# Patient Record
Sex: FEMALE | Race: WHITE | NOT HISPANIC OR LATINO | Employment: OTHER | URBAN - METROPOLITAN AREA
[De-identification: names, ages, dates, MRNs, and addresses within clinical notes are randomized per-mention and may not be internally consistent; named-entity substitution may affect disease eponyms.]

---

## 2017-05-25 ENCOUNTER — HOSPITAL ENCOUNTER (EMERGENCY)
Facility: CLINIC | Age: 42
Discharge: HOME | End: 2017-05-25
Attending: FAMILY MEDICINE | Admitting: FAMILY MEDICINE

## 2017-05-25 ENCOUNTER — APPOINTMENT (OUTPATIENT)
Dept: CT IMAGING | Facility: CLINIC | Age: 42
End: 2017-05-25
Attending: FAMILY MEDICINE

## 2017-05-25 VITALS
HEART RATE: 86 BPM | RESPIRATION RATE: 20 BRPM | DIASTOLIC BLOOD PRESSURE: 73 MMHG | OXYGEN SATURATION: 96 % | TEMPERATURE: 98.1 F | SYSTOLIC BLOOD PRESSURE: 105 MMHG

## 2017-05-25 DIAGNOSIS — R10.9 LEFT FLANK PAIN: ICD-10-CM

## 2017-05-25 DIAGNOSIS — R30.0 DYSURIA: ICD-10-CM

## 2017-05-25 LAB
ALBUMIN SERPL-MCNC: 3.6 G/DL (ref 3.4–5)
ALBUMIN UR-MCNC: 10 MG/DL
ALP SERPL-CCNC: 73 U/L (ref 40–150)
ALT SERPL W P-5'-P-CCNC: 17 U/L (ref 0–50)
ANION GAP SERPL CALCULATED.3IONS-SCNC: 7 MMOL/L (ref 3–14)
APPEARANCE UR: CLEAR
AST SERPL W P-5'-P-CCNC: 15 U/L (ref 0–45)
BASOPHILS # BLD AUTO: 0 10E9/L (ref 0–0.2)
BASOPHILS NFR BLD AUTO: 0.3 %
BILIRUB SERPL-MCNC: 0.8 MG/DL (ref 0.2–1.3)
BILIRUB UR QL STRIP: NEGATIVE
BUN SERPL-MCNC: 6 MG/DL (ref 7–30)
CALCIUM SERPL-MCNC: 8.5 MG/DL (ref 8.5–10.1)
CHLORIDE SERPL-SCNC: 109 MMOL/L (ref 94–109)
CO2 SERPL-SCNC: 24 MMOL/L (ref 20–32)
COLOR UR AUTO: YELLOW
CREAT SERPL-MCNC: 0.58 MG/DL (ref 0.52–1.04)
DIFFERENTIAL METHOD BLD: ABNORMAL
EOSINOPHIL # BLD AUTO: 0.1 10E9/L (ref 0–0.7)
EOSINOPHIL NFR BLD AUTO: 0.9 %
ERYTHROCYTE [DISTWIDTH] IN BLOOD BY AUTOMATED COUNT: 13.7 % (ref 10–15)
GFR SERPL CREATININE-BSD FRML MDRD: ABNORMAL ML/MIN/1.7M2
GLUCOSE SERPL-MCNC: 98 MG/DL (ref 70–99)
GLUCOSE UR STRIP-MCNC: NEGATIVE MG/DL
HCT VFR BLD AUTO: 38.2 % (ref 35–47)
HGB BLD-MCNC: 12.9 G/DL (ref 11.7–15.7)
HGB UR QL STRIP: NEGATIVE
IMM GRANULOCYTES # BLD: 0 10E9/L (ref 0–0.4)
IMM GRANULOCYTES NFR BLD: 0.2 %
KETONES UR STRIP-MCNC: NEGATIVE MG/DL
LEUKOCYTE ESTERASE UR QL STRIP: ABNORMAL
LIPASE SERPL-CCNC: 62 U/L (ref 73–393)
LYMPHOCYTES # BLD AUTO: 3 10E9/L (ref 0.8–5.3)
LYMPHOCYTES NFR BLD AUTO: 26.4 %
MCH RBC QN AUTO: 31.1 PG (ref 26.5–33)
MCHC RBC AUTO-ENTMCNC: 33.8 G/DL (ref 31.5–36.5)
MCV RBC AUTO: 92 FL (ref 78–100)
MONOCYTES # BLD AUTO: 1.1 10E9/L (ref 0–1.3)
MONOCYTES NFR BLD AUTO: 9.3 %
MUCOUS THREADS #/AREA URNS LPF: PRESENT /LPF
NEUTROPHILS # BLD AUTO: 7.2 10E9/L (ref 1.6–8.3)
NEUTROPHILS NFR BLD AUTO: 62.9 %
NITRATE UR QL: NEGATIVE
NRBC # BLD AUTO: 0 10*3/UL
NRBC BLD AUTO-RTO: 0 /100
PH UR STRIP: 6 PH (ref 5–7)
PLATELET # BLD AUTO: 217 10E9/L (ref 150–450)
POTASSIUM SERPL-SCNC: 3.4 MMOL/L (ref 3.4–5.3)
PROT SERPL-MCNC: 6.9 G/DL (ref 6.8–8.8)
RBC # BLD AUTO: 4.15 10E12/L (ref 3.8–5.2)
RBC #/AREA URNS AUTO: 2 /HPF (ref 0–2)
SODIUM SERPL-SCNC: 140 MMOL/L (ref 133–144)
SP GR UR STRIP: 1.02 (ref 1–1.03)
SQUAMOUS #/AREA URNS AUTO: 2 /HPF (ref 0–1)
TRANS CELLS #/AREA URNS HPF: <1 /HPF (ref 0–1)
URN SPEC COLLECT METH UR: ABNORMAL
UROBILINOGEN UR STRIP-MCNC: NORMAL MG/DL (ref 0–2)
WBC # BLD AUTO: 11.4 10E9/L (ref 4–11)
WBC #/AREA URNS AUTO: 7 /HPF (ref 0–2)

## 2017-05-25 PROCEDURE — 81001 URINALYSIS AUTO W/SCOPE: CPT | Performed by: FAMILY MEDICINE

## 2017-05-25 PROCEDURE — 96361 HYDRATE IV INFUSION ADD-ON: CPT | Performed by: FAMILY MEDICINE

## 2017-05-25 PROCEDURE — 99285 EMERGENCY DEPT VISIT HI MDM: CPT | Mod: 25 | Performed by: FAMILY MEDICINE

## 2017-05-25 PROCEDURE — 96365 THER/PROPH/DIAG IV INF INIT: CPT | Performed by: FAMILY MEDICINE

## 2017-05-25 PROCEDURE — 74176 CT ABD & PELVIS W/O CONTRAST: CPT

## 2017-05-25 PROCEDURE — 80053 COMPREHEN METABOLIC PANEL: CPT | Performed by: FAMILY MEDICINE

## 2017-05-25 PROCEDURE — 96376 TX/PRO/DX INJ SAME DRUG ADON: CPT | Performed by: FAMILY MEDICINE

## 2017-05-25 PROCEDURE — 25000128 H RX IP 250 OP 636: Performed by: FAMILY MEDICINE

## 2017-05-25 PROCEDURE — 83690 ASSAY OF LIPASE: CPT | Performed by: FAMILY MEDICINE

## 2017-05-25 PROCEDURE — 87086 URINE CULTURE/COLONY COUNT: CPT | Performed by: FAMILY MEDICINE

## 2017-05-25 PROCEDURE — 99285 EMERGENCY DEPT VISIT HI MDM: CPT | Mod: Z6 | Performed by: FAMILY MEDICINE

## 2017-05-25 PROCEDURE — 85025 COMPLETE CBC W/AUTO DIFF WBC: CPT | Performed by: FAMILY MEDICINE

## 2017-05-25 PROCEDURE — 96375 TX/PRO/DX INJ NEW DRUG ADDON: CPT | Performed by: FAMILY MEDICINE

## 2017-05-25 RX ORDER — HYDROCODONE BITARTRATE AND ACETAMINOPHEN 5; 325 MG/1; MG/1
1-2 TABLET ORAL EVERY 4 HOURS PRN
Qty: 15 TABLET | Refills: 0 | Status: SHIPPED | OUTPATIENT
Start: 2017-05-25 | End: 2017-08-02

## 2017-05-25 RX ORDER — LIDOCAINE 40 MG/G
CREAM TOPICAL
Status: DISCONTINUED | OUTPATIENT
Start: 2017-05-25 | End: 2017-05-25 | Stop reason: HOSPADM

## 2017-05-25 RX ORDER — CIPROFLOXACIN 500 MG/1
500 TABLET, FILM COATED ORAL 2 TIMES DAILY
Qty: 14 TABLET | Refills: 0 | Status: SHIPPED | OUTPATIENT
Start: 2017-05-25 | End: 2017-06-01

## 2017-05-25 RX ORDER — SODIUM CHLORIDE 9 MG/ML
1000 INJECTION, SOLUTION INTRAVENOUS CONTINUOUS
Status: DISCONTINUED | OUTPATIENT
Start: 2017-05-25 | End: 2017-05-25 | Stop reason: HOSPADM

## 2017-05-25 RX ORDER — HYDROMORPHONE HYDROCHLORIDE 1 MG/ML
0.5 INJECTION, SOLUTION INTRAMUSCULAR; INTRAVENOUS; SUBCUTANEOUS
Status: DISCONTINUED | OUTPATIENT
Start: 2017-05-25 | End: 2017-05-25 | Stop reason: HOSPADM

## 2017-05-25 RX ORDER — CEFTRIAXONE 1 G/1
1 INJECTION, POWDER, FOR SOLUTION INTRAMUSCULAR; INTRAVENOUS ONCE
Status: COMPLETED | OUTPATIENT
Start: 2017-05-25 | End: 2017-05-25

## 2017-05-25 RX ADMIN — HYDROMORPHONE HYDROCHLORIDE 0.5 MG: 1 INJECTION, SOLUTION INTRAMUSCULAR; INTRAVENOUS; SUBCUTANEOUS at 15:40

## 2017-05-25 RX ADMIN — SODIUM CHLORIDE 1000 ML: 9 INJECTION, SOLUTION INTRAVENOUS at 14:00

## 2017-05-25 RX ADMIN — HYDROMORPHONE HYDROCHLORIDE 0.5 MG: 1 INJECTION, SOLUTION INTRAMUSCULAR; INTRAVENOUS; SUBCUTANEOUS at 14:00

## 2017-05-25 RX ADMIN — CEFTRIAXONE 1 G: 1 INJECTION, POWDER, FOR SOLUTION INTRAMUSCULAR; INTRAVENOUS at 15:41

## 2017-05-25 RX ADMIN — SODIUM CHLORIDE 1000 ML: 9 INJECTION, SOLUTION INTRAVENOUS at 15:38

## 2017-05-25 ASSESSMENT — ENCOUNTER SYMPTOMS
TROUBLE SWALLOWING: 0
BRUISES/BLEEDS EASILY: 0
NECK STIFFNESS: 0
DECREASED CONCENTRATION: 1
SHORTNESS OF BREATH: 0
EYE REDNESS: 0
DIARRHEA: 0
CHILLS: 0
DIFFICULTY URINATING: 0
WEAKNESS: 0
FLANK PAIN: 1
HEADACHES: 0
COLOR CHANGE: 0
ABDOMINAL PAIN: 1
NUMBNESS: 0
BACK PAIN: 0
FEVER: 0
CONFUSION: 0
ACTIVITY CHANGE: 0
NAUSEA: 1
DYSURIA: 1
ARTHRALGIAS: 0
DYSPHORIC MOOD: 1
RECTAL PAIN: 0
FREQUENCY: 1
FATIGUE: 0
HEMATURIA: 0
CONSTIPATION: 0
APPETITE CHANGE: 1
VOMITING: 1

## 2017-05-25 NOTE — ED AVS SNAPSHOT
[facility], Emergency Department    [address] [address]    Phone:  [phone]                                       Meli Rodriguez   MRN: [id number]    Department:  [facility], Emergency Department   Date of Visit:  5/25/2017           Patient Information     Date Of Birth          1975        Your diagnoses for this visit were:     Left flank pain     Dysuria        You were seen by Riky Carrera MD.      Follow-up Information     Follow up with No Ref-Primary, Physician.        Discharge Instructions       Home.  Take the cipro for infection.  Take the vicodin for pain.  Make sure to follow up with MD for a recheck.  Return if fever or other concerns.        24 Hour Appointment Hotline       To make an appointment at any [facility] clinic, call [phone] ([phone]). If you don't have a family doctor or clinic, we will help you find one. [facility] clinics are conveniently located to serve the needs of you and your family.             Review of your medicines      START taking        Dose / Directions Last dose taken    ciprofloxacin 500 MG tablet   Commonly known as:  CIPRO   Dose:  500 mg   Quantity:  14 tablet        Take 1 tablet (500 mg) by mouth 2 times daily for 7 days   Refills:  0        HYDROcodone-acetaminophen 5-325 MG per tablet   Commonly known as:  NORCO   Dose:  1-2 tablet   Quantity:  15 tablet        Take 1-2 tablets by mouth every 4 hours as needed for moderate to severe pain   Refills:  0                Prescriptions were sent or printed at these locations (2 Prescriptions)                   Other Prescriptions                Printed at Department/Unit printer (2 of 2)         HYDROcodone-acetaminophen (NORCO) 5-325 MG per tablet               ciprofloxacin (CIPRO) 500 MG tablet                Procedures and tests performed during your visit     CBC with platelets differential    CT Abdomen Pelvis w/o Contrast    Comprehensive metabolic panel     "Lipase    Peripheral IV catheter    Pulse oximetry nursing    UA with Microscopic    Urine Culture Aerobic Bacterial      Orders Needing Specimen Collection     None      Pending Results     Date and Time Order Name Status Description    2017 1349 Urine Culture Aerobic Bacterial In process             Pending Culture Results     Date and Time Order Name Status Description    2017 1349 Urine Culture Aerobic Bacterial In process             Pending Results Instructions     If you had any lab results that were not finalized at the time of your Discharge, you can call the ED Lab Result RN at [phone]. You will be contacted by this team for any positive Lab results or changes in treatment. The nurses are available 7 days a week from 10A to 6:30P.  You can leave a message 24 hours per day and they will return your call.        Thank you for choosing [facility]       Thank you for choosing [facility] for your care. Our goal is always to provide you with excellent care. Hearing back from our patients is one way we can continue to improve our services. Please take a few minutes to complete the written survey that you may receive in the mail after you visit with us. Thank you!        [employer] Information     [employer] lets you send messages to your doctor, view your test results, renew your prescriptions, schedule appointments and more. To sign up, go to [email or web address] . Click on \"Log in\" on the left side of the screen, which will take you to the Welcome page. Then click on \"Sign up Now\" on the right side of the page.     You will be asked to enter the access code listed below, as well as some personal information. Please follow the directions to create your username and password.     Your access code is: [email or web address]  Expires: 2017  4:33 PM     Your access code will  in 90 days. If you need help or a new code, please call your [facility] clinic or [phone].        Care EveryWhere ID     This is " your Care EveryWhere ID. This could be used by other organizations to access your [facility] medical records  [id number]        After Visit Summary       This is your record. Keep this with you and show to your community pharmacist(s) and doctor(s) at your next visit.

## 2017-05-25 NOTE — ED NOTES
Bed: ED15  Expected date: 5/25/17  Expected time: 12:30 PM  Means of arrival: Ambulance  Comments:  Keaton Guajardo 41 year old Female, left sided abd pain/flank pain

## 2017-05-25 NOTE — ED PROVIDER NOTES
History     Chief Complaint   Patient presents with     Abdominal Pain     HPI  Meli Garcia is a 41 year old female with a history of IBS and chronic abdominal pain who was brought in by ambulance with complaints of abdominal pain. Patient reports developing left sided abdominal pain and left flank pain 2 days ago. She describes the pain as sharp and states it has been constant and progressively worse over the past few days. The pain is worse with any kind of movement. She also reports nausea and an episode of vomiting this morning. She does report a history of one small kidney stone noted to be in the kidney 2 years ago, but denies any other issues with kidney stones. Patient also reports a history of urinary retention. She reported a surgery when she was 9 years old for urethral stricture but denies any further procedures since. She states 3 days ago she had some urinary retention overnight and the next day developed urinary incontinence, she reports having about 10 episodes of incontinence per day since. She notes some frequency with decreased urine volumes. She otherwise denies fevers, chills, chest pain, shortness of breath or rash. She has been taking Tylenol and ibuprofen for the pain without relief. Patient received 4mg Zofran and 1mg Dilaudid en route with improvement in her pain.    I have reviewed the Medications, Allergies, Past Medical and Surgical History, and Social History in the [email or web address] system.    Past Medical History:   Diagnosis Date     ADD (attention deficit disorder with hyperactivity)      Allergic state      Anxiety      Depressive disorder      Drug-seeking behavior      Irritable bowel syndrome (IBS)      Migraine      PTSD (post-traumatic stress disorder)      Thyroid disease      Ulcer (H)      Urinary retention        Past Surgical History:   Procedure Laterality Date     APPENDECTOMY       CHOLECYSTECTOMY  2005     HYSTERECTOMY TOTAL ABDOMINAL, BILATERAL SALPINGO-OOPHORECTOMY,  COMBINED  2001    bleeding ovarian cysts     HYSTERECTOMY, PAP NO LONGER INDICATED       URETHRA SURGERY      temporary stent       Family History   Problem Relation Age of Onset     Unknown/Adopted Father        Social History   Substance Use Topics     Smoking status: Former Smoker     Packs/day: 0.50     Years: 20.00     Smokeless tobacco: Current User      Comment: e-cig     Alcohol use No     No current facility-administered medications for this encounter.      Current Outpatient Prescriptions   Medication     HYDROcodone-acetaminophen (NORCO) 5-325 MG per tablet     ciprofloxacin (CIPRO) 500 MG tablet        Allergies   Allergen Reactions     Demerol Itching     Droperidol Other (See Comments)     twitching       Nsaids Other (See Comments)     Gastric ulcer     Toradol Itching         Review of Systems   Constitutional: Positive for appetite change (some nausea noted currently now improved). Negative for activity change, chills, fatigue and fever.   HENT: Negative for congestion and trouble swallowing.    Eyes: Negative for redness and visual disturbance.   Respiratory: Negative for shortness of breath.    Cardiovascular: Negative for chest pain.   Gastrointestinal: Positive for abdominal pain (left sided), nausea and vomiting. Negative for constipation, diarrhea and rectal pain.   Genitourinary: Positive for dysuria, flank pain (left) and frequency. Negative for difficulty urinating, hematuria and urgency.   Musculoskeletal: Negative for arthralgias, back pain, gait problem and neck stiffness.   Skin: Negative for color change.   Allergic/Immunologic: Negative for immunocompromised state.   Neurological: Negative for weakness, numbness and headaches.   Hematological: Does not bruise/bleed easily.   Psychiatric/Behavioral: Positive for decreased concentration and dysphoric mood. Negative for confusion.   All other systems reviewed and are negative.      Physical Exam   BP: (!) 136/97  Heart Rate: 81  Temp:  98.1  F (36.7  C)  Resp: 20  SpO2: 98 %  Physical Exam   Constitutional: She is oriented to person, place, and time. She appears well-developed and well-nourished. She appears distressed.   Patient mild distress alert and oriented nausea is improved   HENT:   Head: Normocephalic and atraumatic.   Mouth/Throat: Oropharynx is clear and moist. No oropharyngeal exudate.   Eyes: Pupils are equal, round, and reactive to light. No scleral icterus.   Neck: Normal range of motion. Neck supple.   Cardiovascular: Regular rhythm, normal heart sounds and intact distal pulses.    Pulmonary/Chest: Breath sounds normal. No stridor. No respiratory distress.   Abdominal: Soft. Bowel sounds are normal. She exhibits no mass. There is tenderness. There is no rebound and no guarding.   Musculoskeletal: She exhibits tenderness. She exhibits no edema or deformity.   Left-sided CVA tenderness   Neurological: She is alert and oriented to person, place, and time. She has normal reflexes. No cranial nerve deficit. Coordination normal.   Skin: Skin is warm and dry. No rash noted. She is not diaphoretic. No erythema. No pallor.   Psychiatric:   Mildly anxious   Nursing note and vitals reviewed.      ED Course     ED Course     Procedures         In ER IV established.  Labs are drawn.  Patient received a liter normal saline IV bolus.  Further testing urinalysis 7 white cells to red cells.  White count is 11.4 hemoglobin is 12.9.  Creatinine is 0.58.  Lipase 62.    Patient states she is not pregnant and has had previous hysterectomy.  Therefore brings does not done.  CT scan abdomen and pelvis was done revealing no acute abnormality seen no kidney stones etc.    Reevaluated with patient.  Patient agrees plan to received a gram Rocephin IV to treat urinary symptoms as these seem to be more likely her symptoms.  Patient was sent out with Cipro along with Vicodin for pain and to follow-up with M.D. return if any concerns patient agrees with plan to  discharge      Critical Care time:  none               Labs Ordered and Resulted from Time of ED Arrival Up to the Time of Departure from the ED   CBC WITH PLATELETS DIFFERENTIAL - Abnormal; Notable for the following:        Result Value    WBC 11.4 (*)     All other components within normal limits   COMPREHENSIVE METABOLIC PANEL - Abnormal; Notable for the following:     Urea Nitrogen 6 (*)     All other components within normal limits   LIPASE - Abnormal; Notable for the following:     Lipase 62 (*)     All other components within normal limits   ROUTINE UA WITH MICROSCOPIC - Abnormal; Notable for the following:     Protein Albumin Urine 10 (*)     Leukocyte Esterase Urine Large (*)     WBC Urine 7 (*)     Squamous Epithelial /HPF Urine 2 (*)     Mucous Urine Present (*)     All other components within normal limits   PULSE OXIMETRY NURSING   PERIPHERAL IV CATHETER   URINE CULTURE AEROBIC BACTERIAL     Results for orders placed or performed during the hospital encounter of 05/25/17 (from the past 24 hour(s))   CBC with platelets differential   Result Value Ref Range    WBC 11.4 (H) 4.0 - 11.0 10e9/L    RBC Count 4.15 3.8 - 5.2 10e12/L    Hemoglobin 12.9 11.7 - 15.7 g/dL    Hematocrit 38.2 35.0 - 47.0 %    MCV 92 78 - 100 fl    MCH 31.1 26.5 - 33.0 pg    MCHC 33.8 31.5 - 36.5 g/dL    RDW 13.7 10.0 - 15.0 %    Platelet Count 217 150 - 450 10e9/L    Diff Method Automated Method     % Neutrophils 62.9 %    % Lymphocytes 26.4 %    % Monocytes 9.3 %    % Eosinophils 0.9 %    % Basophils 0.3 %    % Immature Granulocytes 0.2 %    Nucleated RBCs 0 0 /100    Absolute Neutrophil 7.2 1.6 - 8.3 10e9/L    Absolute Lymphocytes 3.0 0.8 - 5.3 10e9/L    Absolute Monocytes 1.1 0.0 - 1.3 10e9/L    Absolute Eosinophils 0.1 0.0 - 0.7 10e9/L    Absolute Basophils 0.0 0.0 - 0.2 10e9/L    Abs Immature Granulocytes 0.0 0 - 0.4 10e9/L    Absolute Nucleated RBC 0.0    Comprehensive metabolic panel   Result Value Ref Range    Sodium 140 133 -  144 mmol/L    Potassium 3.4 3.4 - 5.3 mmol/L    Chloride 109 94 - 109 mmol/L    Carbon Dioxide 24 20 - 32 mmol/L    Anion Gap 7 3 - 14 mmol/L    Glucose 98 70 - 99 mg/dL    Urea Nitrogen 6 (L) 7 - 30 mg/dL    Creatinine 0.58 0.52 - 1.04 mg/dL    GFR Estimate >90  Non  GFR Calc   >60 mL/min/1.7m2    GFR Estimate If Black >90   GFR Calc   >60 mL/min/1.7m2    Calcium 8.5 8.5 - 10.1 mg/dL    Bilirubin Total 0.8 0.2 - 1.3 mg/dL    Albumin 3.6 3.4 - 5.0 g/dL    Protein Total 6.9 6.8 - 8.8 g/dL    Alkaline Phosphatase 73 40 - 150 U/L    ALT 17 0 - 50 U/L    AST 15 0 - 45 U/L   Lipase   Result Value Ref Range    Lipase 62 (L) 73 - 393 U/L   UA with Microscopic   Result Value Ref Range    Color Urine Yellow     Appearance Urine Clear     Glucose Urine Negative NEG mg/dL    Bilirubin Urine Negative NEG    Ketones Urine Negative NEG mg/dL    Specific Gravity Urine 1.018 1.003 - 1.035    Blood Urine Negative NEG    pH Urine 6.0 5.0 - 7.0 pH    Protein Albumin Urine 10 (A) NEG mg/dL    Urobilinogen mg/dL Normal 0.0 - 2.0 mg/dL    Nitrite Urine Negative NEG    Leukocyte Esterase Urine Large (A) NEG    Source Catheterized Urine     WBC Urine 7 (H) 0 - 2 /HPF    RBC Urine 2 0 - 2 /HPF    Squamous Epithelial /HPF Urine 2 (H) 0 - 1 /HPF    Transitional Epi <1 0 - 1 /HPF    Mucous Urine Present (A) NEG /LPF   CT Abdomen Pelvis w/o Contrast    Narrative    EXAMINATION: CT ABDOMEN PELVIS W/O CONTRAST, 5/25/2017 2:21 PM    TECHNIQUE:  Helical CT images from the lung bases through the  symphysis pubis were obtained  without IV contrast.    COMPARISON: Abdominal CT 3/3/2015    HISTORY: left flank pain    FINDINGS:  Noncontrast enhanced appearance of the liver, spleen, kidneys,  pancreas, and partially distended urinary bladder are unremarkable.  Cholecystectomy changes. No pathologic or suspicious lymphadenopathy  in the abdomen. No significant free fluid. No pneumoperitoneum. No  small bowel obstruction.  Fatty infiltration diffusely throughout the  wall of the decompressed large bowel (literature has demonstrated this  finding to be nonpathologic).  No evidence of significant diverticular  disease. Appendectomy changes. No renal or urinary stone.    The lung bases are clear. Tiny pericardial effusion versus pericardial  thickening, is not significantly changed from 3/3/2015.      Impression    IMPRESSION:   No acute findings.     I have personally reviewed the examination and initial interpretation  and I agree with the findings.    HEIDI REINOSO MD            Assessments & Plan (with Medical Decision Making)  41-year-old female with chronic abdominal pain presented with left flank pain with urinary symptoms.  Urine just shows 7 white cells she has dysuric symptoms some frequency.  She had history of a kidney stone in the past although CT scan today did not show any sign of stone stranding obstruction etc.  Labs otherwise stable and within normal limits.  Patient discharged home after giving a dose of Rocephin here in the ER as her some question about her getting medications filled.  I did write her for some Vicodin along with Cipro and follow-up with M.D. return if any worsening symptoms patient agrees with plan to discharge.           I have reviewed the nursing notes.    I have reviewed the findings, diagnosis, plan and need for follow up with the patient.    Discharge Medication List as of 5/25/2017  4:34 PM      START taking these medications    Details   HYDROcodone-acetaminophen (NORCO) 5-325 MG per tablet Take 1-2 tablets by mouth every 4 hours as needed for moderate to severe pain, Disp-15 tablet, R-0, Local Print      ciprofloxacin (CIPRO) 500 MG tablet Take 1 tablet (500 mg) by mouth 2 times daily for 7 days, Disp-14 tablet, R-0, Local Print             Final diagnoses:   Left flank pain   Dysuria   Andree FITZGERALD, am serving as a trained medical scribe to document services personally performed  by Riky Carrera MD, based on the provider's statements to me.      I, Riky Carrera MD, was physically present and have reviewed and verified the accuracy of this note documented by Andree Caballero.       5/25/2017   [facility], [facility] EMERGENCY DEPARTMENT    This note was created at least in part by the use of dragon voice dictation system. Inadvertent typographical errors may still exist.  Riky Carrera MD.         Riky Carrera MD  05/25/17 2147

## 2017-05-25 NOTE — DISCHARGE INSTRUCTIONS
Home.  Take the cipro for infection.  Take the vicodin for pain.  Make sure to follow up with MD for a recheck.  Return if fever or other concerns.

## 2017-05-25 NOTE — ED NOTES
Pt arrives from home via EMS for complaint of abdominal pain and distention. Pt has history of frequent UTIs. 4mg of zofran and 1mg of Dilaudid given without relief.

## 2017-05-25 NOTE — ED AVS SNAPSHOT
[facility], [place], Emergency Department    [address] [address]    Phone:  [phone]                                       Meli Rodriguez   MRN: [id number]    Department:  [facility], Emergency Department   Date of Visit:  5/25/2017           After Visit Summary Signature Page     I have received my discharge instructions, and my questions have been answered. I have discussed any challenges I see with this plan with the nurse or doctor.    ..........................................................................................................................................  Patient/Patient Representative Signature      ..........................................................................................................................................  Patient Representative Print Name and Relationship to Patient    ..................................................               ................................................  Date                                            Time    ..........................................................................................................................................  Reviewed by Signature/Title    ...................................................              ..............................................  Date                                                            Time

## 2017-05-26 LAB
BACTERIA SPEC CULT: NORMAL
Lab: NORMAL
MICRO REPORT STATUS: NORMAL
SPECIMEN SOURCE: NORMAL

## 2017-06-03 ENCOUNTER — HOSPITAL ENCOUNTER (EMERGENCY)
Facility: CLINIC | Age: 42
Discharge: HOME | End: 2017-06-03
Attending: EMERGENCY MEDICINE | Admitting: EMERGENCY MEDICINE

## 2017-06-03 VITALS
DIASTOLIC BLOOD PRESSURE: 91 MMHG | OXYGEN SATURATION: 96 % | TEMPERATURE: 99 F | HEART RATE: 95 BPM | SYSTOLIC BLOOD PRESSURE: 112 MMHG | RESPIRATION RATE: 18 BRPM

## 2017-06-03 DIAGNOSIS — K92.1 BLOOD IN STOOL: ICD-10-CM

## 2017-06-03 DIAGNOSIS — R10.84 ABDOMINAL PAIN, GENERALIZED: ICD-10-CM

## 2017-06-03 LAB
ALBUMIN SERPL-MCNC: 3.7 G/DL (ref 3.4–5)
ALP SERPL-CCNC: 85 U/L (ref 40–150)
ALT SERPL W P-5'-P-CCNC: 16 U/L (ref 0–50)
ANION GAP SERPL CALCULATED.3IONS-SCNC: 8 MMOL/L (ref 3–14)
AST SERPL W P-5'-P-CCNC: 11 U/L (ref 0–45)
BASOPHILS # BLD AUTO: 0 10E9/L (ref 0–0.2)
BASOPHILS NFR BLD AUTO: 0.2 %
BILIRUB SERPL-MCNC: 0.8 MG/DL (ref 0.2–1.3)
BUN SERPL-MCNC: 6 MG/DL (ref 7–30)
CALCIUM SERPL-MCNC: 8.2 MG/DL (ref 8.5–10.1)
CHLORIDE SERPL-SCNC: 109 MMOL/L (ref 94–109)
CO2 SERPL-SCNC: 24 MMOL/L (ref 20–32)
CREAT SERPL-MCNC: 0.69 MG/DL (ref 0.52–1.04)
DIFFERENTIAL METHOD BLD: NORMAL
EOSINOPHIL # BLD AUTO: 0.1 10E9/L (ref 0–0.7)
EOSINOPHIL NFR BLD AUTO: 1.3 %
ERYTHROCYTE [DISTWIDTH] IN BLOOD BY AUTOMATED COUNT: 13.6 % (ref 10–15)
GFR SERPL CREATININE-BSD FRML MDRD: ABNORMAL ML/MIN/1.7M2
GLUCOSE SERPL-MCNC: 104 MG/DL (ref 70–99)
HCT VFR BLD AUTO: 38.6 % (ref 35–47)
HGB BLD-MCNC: 13.4 G/DL (ref 11.7–15.7)
IMM GRANULOCYTES # BLD: 0 10E9/L (ref 0–0.4)
IMM GRANULOCYTES NFR BLD: 0.3 %
LACTATE BLD-SCNC: 1 MMOL/L (ref 0.7–2.1)
LIPASE SERPL-CCNC: 152 U/L (ref 73–393)
LYMPHOCYTES # BLD AUTO: 3.4 10E9/L (ref 0.8–5.3)
LYMPHOCYTES NFR BLD AUTO: 37.6 %
MCH RBC QN AUTO: 31.6 PG (ref 26.5–33)
MCHC RBC AUTO-ENTMCNC: 34.7 G/DL (ref 31.5–36.5)
MCV RBC AUTO: 91 FL (ref 78–100)
MONOCYTES # BLD AUTO: 0.9 10E9/L (ref 0–1.3)
MONOCYTES NFR BLD AUTO: 9.7 %
NEUTROPHILS # BLD AUTO: 4.6 10E9/L (ref 1.6–8.3)
NEUTROPHILS NFR BLD AUTO: 50.9 %
NRBC # BLD AUTO: 0 10*3/UL
NRBC BLD AUTO-RTO: 0 /100
PLATELET # BLD AUTO: 217 10E9/L (ref 150–450)
POTASSIUM SERPL-SCNC: 3.6 MMOL/L (ref 3.4–5.3)
PROT SERPL-MCNC: 6.8 G/DL (ref 6.8–8.8)
RBC # BLD AUTO: 4.24 10E12/L (ref 3.8–5.2)
SODIUM SERPL-SCNC: 141 MMOL/L (ref 133–144)
WBC # BLD AUTO: 9 10E9/L (ref 4–11)

## 2017-06-03 PROCEDURE — 83605 ASSAY OF LACTIC ACID: CPT | Performed by: EMERGENCY MEDICINE

## 2017-06-03 PROCEDURE — 96361 HYDRATE IV INFUSION ADD-ON: CPT | Performed by: EMERGENCY MEDICINE

## 2017-06-03 PROCEDURE — 96374 THER/PROPH/DIAG INJ IV PUSH: CPT | Performed by: EMERGENCY MEDICINE

## 2017-06-03 PROCEDURE — 83690 ASSAY OF LIPASE: CPT | Performed by: EMERGENCY MEDICINE

## 2017-06-03 PROCEDURE — 25000128 H RX IP 250 OP 636: Performed by: EMERGENCY MEDICINE

## 2017-06-03 PROCEDURE — 80053 COMPREHEN METABOLIC PANEL: CPT | Performed by: EMERGENCY MEDICINE

## 2017-06-03 PROCEDURE — 99285 EMERGENCY DEPT VISIT HI MDM: CPT | Mod: 25 | Performed by: EMERGENCY MEDICINE

## 2017-06-03 PROCEDURE — 81001 URINALYSIS AUTO W/SCOPE: CPT | Performed by: EMERGENCY MEDICINE

## 2017-06-03 PROCEDURE — 99284 EMERGENCY DEPT VISIT MOD MDM: CPT | Mod: Z6 | Performed by: EMERGENCY MEDICINE

## 2017-06-03 PROCEDURE — 85025 COMPLETE CBC W/AUTO DIFF WBC: CPT | Performed by: EMERGENCY MEDICINE

## 2017-06-03 RX ORDER — HYDROMORPHONE HYDROCHLORIDE 1 MG/ML
0.5 INJECTION, SOLUTION INTRAMUSCULAR; INTRAVENOUS; SUBCUTANEOUS ONCE
Status: COMPLETED | OUTPATIENT
Start: 2017-06-03 | End: 2017-06-03

## 2017-06-03 RX ORDER — ONDANSETRON 2 MG/ML
8 INJECTION INTRAMUSCULAR; INTRAVENOUS ONCE
Status: DISCONTINUED | OUTPATIENT
Start: 2017-06-03 | End: 2017-06-04 | Stop reason: HOSPADM

## 2017-06-03 RX ADMIN — SODIUM CHLORIDE 1000 ML: 9 INJECTION, SOLUTION INTRAVENOUS at 20:35

## 2017-06-03 RX ADMIN — HYDROMORPHONE HYDROCHLORIDE 0.5 MG: 1 INJECTION, SOLUTION INTRAMUSCULAR; INTRAVENOUS; SUBCUTANEOUS at 20:47

## 2017-06-03 ASSESSMENT — ENCOUNTER SYMPTOMS
CHILLS: 0
ABDOMINAL PAIN: 1
VOMITING: 1
DIARRHEA: 0
NAUSEA: 1
FLANK PAIN: 1
FEVER: 0
ANAL BLEEDING: 1
SHORTNESS OF BREATH: 0

## 2017-06-04 LAB
ALBUMIN UR-MCNC: 10 MG/DL
APPEARANCE UR: ABNORMAL
BACTERIA #/AREA URNS HPF: ABNORMAL /HPF
BILIRUB UR QL STRIP: NEGATIVE
COLOR UR AUTO: YELLOW
GLUCOSE UR STRIP-MCNC: NEGATIVE MG/DL
HGB UR QL STRIP: ABNORMAL
KETONES UR STRIP-MCNC: 5 MG/DL
LEUKOCYTE ESTERASE UR QL STRIP: ABNORMAL
MUCOUS THREADS #/AREA URNS LPF: PRESENT /LPF
NITRATE UR QL: NEGATIVE
PH UR STRIP: 6 PH (ref 5–7)
RBC #/AREA URNS AUTO: 2 /HPF (ref 0–2)
SP GR UR STRIP: 1.02 (ref 1–1.03)
SQUAMOUS #/AREA URNS AUTO: 5 /HPF (ref 0–1)
URN SPEC COLLECT METH UR: ABNORMAL
UROBILINOGEN UR STRIP-MCNC: 4 MG/DL (ref 0–2)
WBC #/AREA URNS AUTO: 15 /HPF (ref 0–2)

## 2017-06-04 NOTE — ED PROVIDER NOTES
"  History     Chief Complaint   Patient presents with     Abdominal Pain     Rectal Bleeding     HPI  Meli Rodriguez is a 41 year old female with a history of recurrent abdominal pain, recurrent hematochezia, drug-seeking behavior, and prescription opioid and benzodiazepine abuse who presents via EMS for evaluation of abdominal pain, rectal bleeding, nausea and vomiting. The patient reports that she developed bleeding with what appears to be mucoid substance per rectum, which has persisted into today. She states that she's also had a cramping pain in her right lower quadrant abdomen, which wraps around into her right flank and right lower back, a few days ago, and this pain has been progressively worsening. Today, she reports that she additionally endorsed nausea with vomiting, 4-5 episodes of non-bloody, non-bilious emesis, with last episode at around 7 PM (1.5 hours prior to arrival). The patient reports a history of similar recurrent abdominal pain and similar bloody, mucoid discharge per rectum, for which she underwent colonoscopy approximately 1 month ago that was reportedly unremarkable. The patient was given fentanyl en route but reports that this did not \"touch her pain.\" She states that only Dilaudid IV and oxycodone help her recurrent pain. She reports that she did take 1 tablet of Tylenol earlier. The patient reports there is no chance of pregnancy as she is status post total abdominal hysterectomy and bilateral salpingo-oophorectomy.     I have reviewed the Medications, Allergies, Past Medical and Surgical History, and Social History in the Epic system.    Current Facility-Administered Medications   Medication     ondansetron (ZOFRAN) injection 8 mg     Current Outpatient Prescriptions   Medication     HYDROcodone-acetaminophen (NORCO) 5-325 MG per tablet     Past Medical History:   Diagnosis Date     ADD (attention deficit disorder with hyperactivity)      Allergic state      Anxiety      Depressive " disorder      Drug-seeking behavior      Irritable bowel syndrome (IBS)      Migraine      PTSD (post-traumatic stress disorder)      Thyroid disease      Ulcer (H)      Urinary retention        Past Surgical History:   Procedure Laterality Date     APPENDECTOMY       CHOLECYSTECTOMY  2005     HYSTERECTOMY TOTAL ABDOMINAL, BILATERAL SALPINGO-OOPHORECTOMY, COMBINED  2001    bleeding ovarian cysts     HYSTERECTOMY, PAP NO LONGER INDICATED       URETHRA SURGERY      temporary stent       Family History   Problem Relation Age of Onset     Unknown/Adopted Father        Social History   Substance Use Topics     Smoking status: Former Smoker     Packs/day: 0.50     Years: 20.00     Smokeless tobacco: Current User      Comment: e-cig     Alcohol use No        Allergies   Allergen Reactions     Demerol Itching     Droperidol Other (See Comments)     twitching       Nsaids Other (See Comments)     Gastric ulcer     Toradol Itching       Review of Systems   Constitutional: Negative for chills and fever.   Respiratory: Negative for shortness of breath.    Cardiovascular: Negative for chest pain.   Gastrointestinal: Positive for abdominal pain (RLQ), anal bleeding, nausea and vomiting. Negative for diarrhea.   Genitourinary: Positive for flank pain (right).   All other systems reviewed and are negative.      Physical Exam   BP: (!) 112/91  Pulse: 95  Temp: 99  F (37.2  C)  Resp: 18  SpO2: 96 %  Physical Exam   Constitutional: She appears well-developed and well-nourished. No distress.   Eyes: Right eye exhibits no discharge. Left eye exhibits no discharge.   Cardiovascular: Normal rate.    Pulmonary/Chest: Effort normal. No stridor. No respiratory distress. She has no wheezes.   Abdominal: There is tenderness. There is no guarding.   Genitourinary: Rectal exam shows external hemorrhoid and guaiac positive stool.   Neurological: She is alert. No cranial nerve deficit.   Skin: Skin is warm. She is not diaphoretic.       ED Course      Procedures    Results for orders placed or performed during the hospital encounter of 06/03/17 (from the past 24 hour(s))   CBC with platelets differential   Result Value Ref Range    WBC 9.0 4.0 - 11.0 10e9/L    RBC Count 4.24 3.8 - 5.2 10e12/L    Hemoglobin 13.4 11.7 - 15.7 g/dL    Hematocrit 38.6 35.0 - 47.0 %    MCV 91 78 - 100 fl    MCH 31.6 26.5 - 33.0 pg    MCHC 34.7 31.5 - 36.5 g/dL    RDW 13.6 10.0 - 15.0 %    Platelet Count 217 150 - 450 10e9/L    Diff Method Automated Method     % Neutrophils 50.9 %    % Lymphocytes 37.6 %    % Monocytes 9.7 %    % Eosinophils 1.3 %    % Basophils 0.2 %    % Immature Granulocytes 0.3 %    Nucleated RBCs 0 0 /100    Absolute Neutrophil 4.6 1.6 - 8.3 10e9/L    Absolute Lymphocytes 3.4 0.8 - 5.3 10e9/L    Absolute Monocytes 0.9 0.0 - 1.3 10e9/L    Absolute Eosinophils 0.1 0.0 - 0.7 10e9/L    Absolute Basophils 0.0 0.0 - 0.2 10e9/L    Abs Immature Granulocytes 0.0 0 - 0.4 10e9/L    Absolute Nucleated RBC 0.0    Comprehensive metabolic panel   Result Value Ref Range    Sodium 141 133 - 144 mmol/L    Potassium 3.6 3.4 - 5.3 mmol/L    Chloride 109 94 - 109 mmol/L    Carbon Dioxide 24 20 - 32 mmol/L    Anion Gap 8 3 - 14 mmol/L    Glucose 104 (H) 70 - 99 mg/dL    Urea Nitrogen 6 (L) 7 - 30 mg/dL    Creatinine 0.69 0.52 - 1.04 mg/dL    GFR Estimate >90  Non  GFR Calc   >60 mL/min/1.7m2    GFR Estimate If Black >90   GFR Calc   >60 mL/min/1.7m2    Calcium 8.2 (L) 8.5 - 10.1 mg/dL    Bilirubin Total 0.8 0.2 - 1.3 mg/dL    Albumin 3.7 3.4 - 5.0 g/dL    Protein Total 6.8 6.8 - 8.8 g/dL    Alkaline Phosphatase 85 40 - 150 U/L    ALT 16 0 - 50 U/L    AST 11 0 - 45 U/L   Lipase   Result Value Ref Range    Lipase 152 73 - 393 U/L   Lactic acid whole blood   Result Value Ref Range    Lactic Acid 1.0 0.7 - 2.1 mmol/L       Assessments & Plan (with Medical Decision Making)   Meli Rodriguez is a 41 year old female who is presenting to the ED with symptoms  of abdominal pain for which she has had multiple prior presentation. She also reports BRBPR. Hemoccult positive; however, hemoglobin stable. We discussed the need for follow up with GI. After receiving pain medications, the patient left as I was in another room before her urine return.      I have reviewed the nursing notes.    I have reviewed the findings, diagnosis, plan and need for follow up with the patient.    Discharge Medication List as of 6/3/2017 10:38 PM          Final diagnoses:   Abdominal pain, generalized   Blood in stool     I, Denver Noyola, am serving as a trained medical scribe to document services personally performed by Christ Tyler MD, based on the provider's statements to me.      I, Chrsit Tyler MD, was physically present and have reviewed and verified the accuracy of this note documented by Denver Noyola.      6/3/2017   [facility], [place], EMERGENCY DEPARTMENT     Christ Tyler MD  06/03/17 7052

## 2017-06-04 NOTE — ED NOTES
Patient BIBA for abdominal pain, n/v/d, and small amounts of blood in the stool x 5. The pain has been present for 2 days, and the blood in the stool started this morning. Patient was recently admitted about a week ago for an infection. She had a colonoscopy about month ago, with some polyps removed.

## 2017-06-22 ENCOUNTER — TRANSFERRED RECORDS (OUTPATIENT)
Dept: HEALTH INFORMATION MANAGEMENT | Facility: CLINIC | Age: 42
End: 2017-06-22

## 2017-08-02 ENCOUNTER — HOSPITAL ENCOUNTER (EMERGENCY)
Facility: CLINIC | Age: 42
Discharge: HOME | End: 2017-08-03
Attending: EMERGENCY MEDICINE | Admitting: EMERGENCY MEDICINE

## 2017-08-02 ENCOUNTER — APPOINTMENT (OUTPATIENT)
Dept: CT IMAGING | Facility: CLINIC | Age: 42
End: 2017-08-02
Attending: EMERGENCY MEDICINE

## 2017-08-02 DIAGNOSIS — N10 ACUTE PYELONEPHRITIS: ICD-10-CM

## 2017-08-02 LAB
ALBUMIN UR-MCNC: NEGATIVE MG/DL
AMORPH CRY #/AREA URNS HPF: ABNORMAL /HPF
ANION GAP SERPL CALCULATED.3IONS-SCNC: 10 MMOL/L (ref 3–14)
APPEARANCE UR: ABNORMAL
BASOPHILS # BLD AUTO: 0 10E9/L (ref 0–0.2)
BASOPHILS NFR BLD AUTO: 0.2 %
BILIRUB UR QL STRIP: NEGATIVE
BUN SERPL-MCNC: 8 MG/DL (ref 7–30)
CALCIUM SERPL-MCNC: 9.2 MG/DL (ref 8.5–10.1)
CHLORIDE SERPL-SCNC: 103 MMOL/L (ref 94–109)
CO2 SERPL-SCNC: 23 MMOL/L (ref 20–32)
COLOR UR AUTO: YELLOW
CREAT SERPL-MCNC: 0.68 MG/DL (ref 0.52–1.04)
DIFFERENTIAL METHOD BLD: NORMAL
EOSINOPHIL # BLD AUTO: 0.1 10E9/L (ref 0–0.7)
EOSINOPHIL NFR BLD AUTO: 1 %
ERYTHROCYTE [DISTWIDTH] IN BLOOD BY AUTOMATED COUNT: 13.5 % (ref 10–15)
GFR SERPL CREATININE-BSD FRML MDRD: ABNORMAL ML/MIN/1.7M2
GLUCOSE SERPL-MCNC: 97 MG/DL (ref 70–99)
GLUCOSE UR STRIP-MCNC: NEGATIVE MG/DL
HCT VFR BLD AUTO: 38.3 % (ref 35–47)
HGB BLD-MCNC: 13.1 G/DL (ref 11.7–15.7)
HGB UR QL STRIP: NEGATIVE
IMM GRANULOCYTES # BLD: 0 10E9/L (ref 0–0.4)
IMM GRANULOCYTES NFR BLD: 0.2 %
KETONES UR STRIP-MCNC: NEGATIVE MG/DL
LEUKOCYTE ESTERASE UR QL STRIP: ABNORMAL
LYMPHOCYTES # BLD AUTO: 2.8 10E9/L (ref 0.8–5.3)
LYMPHOCYTES NFR BLD AUTO: 28.7 %
MCH RBC QN AUTO: 30.8 PG (ref 26.5–33)
MCHC RBC AUTO-ENTMCNC: 34.2 G/DL (ref 31.5–36.5)
MCV RBC AUTO: 90 FL (ref 78–100)
MONOCYTES # BLD AUTO: 0.9 10E9/L (ref 0–1.3)
MONOCYTES NFR BLD AUTO: 8.9 %
MUCOUS THREADS #/AREA URNS LPF: PRESENT /LPF
NEUTROPHILS # BLD AUTO: 5.8 10E9/L (ref 1.6–8.3)
NEUTROPHILS NFR BLD AUTO: 61 %
NITRATE UR QL: POSITIVE
NRBC # BLD AUTO: 0 10*3/UL
NRBC BLD AUTO-RTO: 0 /100
PH UR STRIP: 6 PH (ref 5–7)
PLATELET # BLD AUTO: 207 10E9/L (ref 150–450)
POTASSIUM SERPL-SCNC: 3.3 MMOL/L (ref 3.4–5.3)
RBC # BLD AUTO: 4.25 10E12/L (ref 3.8–5.2)
RBC #/AREA URNS AUTO: 3 /HPF (ref 0–2)
SODIUM SERPL-SCNC: 136 MMOL/L (ref 133–144)
SP GR UR STRIP: 1.01 (ref 1–1.03)
SQUAMOUS #/AREA URNS AUTO: 12 /HPF (ref 0–1)
URN SPEC COLLECT METH UR: ABNORMAL
UROBILINOGEN UR STRIP-MCNC: NORMAL MG/DL (ref 0–2)
WBC # BLD AUTO: 9.6 10E9/L (ref 4–11)
WBC #/AREA URNS AUTO: 37 /HPF (ref 0–2)

## 2017-08-02 PROCEDURE — 74176 CT ABD & PELVIS W/O CONTRAST: CPT

## 2017-08-02 PROCEDURE — 87086 URINE CULTURE/COLONY COUNT: CPT | Performed by: EMERGENCY MEDICINE

## 2017-08-02 PROCEDURE — 96365 THER/PROPH/DIAG IV INF INIT: CPT | Performed by: EMERGENCY MEDICINE

## 2017-08-02 PROCEDURE — 25000128 H RX IP 250 OP 636: Performed by: EMERGENCY MEDICINE

## 2017-08-02 PROCEDURE — 85025 COMPLETE CBC W/AUTO DIFF WBC: CPT | Performed by: EMERGENCY MEDICINE

## 2017-08-02 PROCEDURE — 81001 URINALYSIS AUTO W/SCOPE: CPT | Performed by: EMERGENCY MEDICINE

## 2017-08-02 PROCEDURE — 80048 BASIC METABOLIC PNL TOTAL CA: CPT | Performed by: EMERGENCY MEDICINE

## 2017-08-02 PROCEDURE — 99285 EMERGENCY DEPT VISIT HI MDM: CPT | Mod: Z6 | Performed by: EMERGENCY MEDICINE

## 2017-08-02 PROCEDURE — 96375 TX/PRO/DX INJ NEW DRUG ADDON: CPT | Performed by: EMERGENCY MEDICINE

## 2017-08-02 PROCEDURE — 99285 EMERGENCY DEPT VISIT HI MDM: CPT | Mod: 25 | Performed by: EMERGENCY MEDICINE

## 2017-08-02 RX ORDER — CEFTRIAXONE 1 G/1
1 INJECTION, POWDER, FOR SOLUTION INTRAMUSCULAR; INTRAVENOUS ONCE
Status: COMPLETED | OUTPATIENT
Start: 2017-08-02 | End: 2017-08-02

## 2017-08-02 RX ORDER — HYDROCODONE BITARTRATE AND ACETAMINOPHEN 5; 325 MG/1; MG/1
1-2 TABLET ORAL EVERY 4 HOURS PRN
Qty: 15 TABLET | Refills: 0 | Status: SHIPPED | OUTPATIENT
Start: 2017-08-02 | End: 2019-05-24

## 2017-08-02 RX ORDER — SODIUM CHLORIDE 9 MG/ML
1000 INJECTION, SOLUTION INTRAVENOUS CONTINUOUS
Status: DISCONTINUED | OUTPATIENT
Start: 2017-08-02 | End: 2017-08-03 | Stop reason: HOSPADM

## 2017-08-02 RX ORDER — ONDANSETRON 2 MG/ML
4 INJECTION INTRAMUSCULAR; INTRAVENOUS ONCE
Status: COMPLETED | OUTPATIENT
Start: 2017-08-02 | End: 2017-08-02

## 2017-08-02 RX ORDER — CIPROFLOXACIN 500 MG/1
500 TABLET, FILM COATED ORAL 2 TIMES DAILY
Qty: 20 TABLET | Refills: 0 | Status: SHIPPED | OUTPATIENT
Start: 2017-08-02 | End: 2018-08-28

## 2017-08-02 RX ADMIN — ONDANSETRON 4 MG: 2 INJECTION INTRAMUSCULAR; INTRAVENOUS at 23:25

## 2017-08-02 RX ADMIN — CEFTRIAXONE 1 G: 1 INJECTION, POWDER, FOR SOLUTION INTRAMUSCULAR; INTRAVENOUS at 23:24

## 2017-08-02 RX ADMIN — SODIUM CHLORIDE 1000 ML: 900 INJECTION, SOLUTION INTRAVENOUS at 23:16

## 2017-08-02 RX ADMIN — HYDROMORPHONE HYDROCHLORIDE 1 MG: 1 INJECTION, SOLUTION INTRAMUSCULAR; INTRAVENOUS; SUBCUTANEOUS at 23:25

## 2017-08-02 NOTE — ED AVS SNAPSHOT
" [facility], Emergency Department    [address] [address]    Phone:  [phone]                                       Meli Rodriguez   MRN: [id number]    Department:  [facility], Emergency Department   Date of Visit:  8/2/2017           Patient Information     Date Of Birth          1975        Your diagnoses for this visit were:     Acute pyelonephritis        You were seen by Aime Chester MD.      Follow-up Information     Follow up with [facility], Emergency Department.    Specialty:  EMERGENCY MEDICINE    Why:  If symptoms worsen    Contact information:    [address] [id number] [phone]    Additional information:    The [facility] is located on the corner of [address] and [address] on the [facility]. It is easily accessible from virtually any point in the [facility] area, via [employer] and [employer].        Discharge Instructions       Please make an appointment to follow up with Your Primary Care Provider in 2-3 days.      Kidney Infection (Adult, Female)    An infection in one or both kidneys is called \"pyelonephritis.\" It usually happens when bacteria (or rarely, viruses, fungi, or other disease-causing organisms) get into the kidney. The bacteria (or other disease-causing organisms) can enter the kidneys from the bladder or blood traveling from other parts of the body. A kidney infection can become serious. It can cause severe illness, scarring of the kidneys, or kidney failure if not treated properly.  Common causes for this problem include:    Not keeping the genital area clean and dry, which promotes the growth of bacteria    Wiping back to front which drags bacteria from the rectum toward the urinary opening (urethra)    Wearing tight pants or underwear (this lets moisture build up in the genital area, which helps bacteria grow)    Holding urine in for long periods of time    Dehydration  Kidney " infections can cause symptoms similar to a bladder infection. Symptoms include:    Pain (or burning) when urinating    Having to urinate more often than usual    Blood in the urine (pink or red)    Abdominal pain or discomfort, usually in the lower abdomen    Pain in the side or back    Pain above the pubic bone    Fever or chills    Vomiting    Loss of appetite  Treatment is oral antibiotics, or in more severe cases, intramuscular or IV antibiotics. These are started right away and may be changed once urine culture results determine the infecting organisms. Treatment helps prevent a more serious kidney infection.  Medicines  Medicines can help in the treatment of a bladder infection:    Take antibiotics until they are used up, even if you feel better. It is important to finish them to make sure the infection is gone.    Unless another medicine was prescribed, you can use over-the-counter medicines for pain, fever, or discomfort. If you have chronic liver of kidney disease, talk with your healthcare provider before using these medicines. Also talk with your provider if you've ever had a stomach ulcer or gastrointestinal (GI) bleeding, or are taking blood thinners.  Home care  The following are general care guidelines:    Stay home from work or school. Rest in bed until your fever breaks and you are feeling better, or as advised by your healthcare provider.    Drink lots of fluid unless you must restrict fluids for other medical reasons. This will force the medicine into your urinary system and flush the bacteria out of your body. Ask your healthcare provider how much you should drink.    Avoid sex until you have finished all of your medicine and your symptoms are gone.    Avoid caffeine, alcohol, and spicy foods. These foods may irritate the kidneys and bladder.    Avoid taking bubble baths. Sensitivity to the chemicals in bubble baths can irritate the urethra.    Make sure you wipe from front to back after using  the toilet.    Wear loose cloths and cotton underwear.  Prevention  These self-care steps can help prevent future infections:    Drink plenty of fluids to prevent dehydration and flush out the bladder. Do this unless you must restrict fluids for other health reasons, or your healthcare provider told you not to.    Proper cleaning after going to the bathroom in important. Make sure you wipe from front to back after using the toilet.    Urinate more often. Don't try to hold urine in for a long time.    Avoid tight-fitting pants and underwear.    Improve your diet to prevent constipation. Eat more fruits, vegetables, and fiber. Eat less junk and fatty foods. Constipation can make a urinary tract infection more likely. Talk with your healthcare provider if you have trouble with bowel movements.    Urinate right after intercourse to flush out the bladder.  Follow-up care  Follow up with your healthcare provider, or as advised. Additional testing may be needed to make sure the infection has cleared. Close follow-up and further testing is very important to find the cause and to prevent future infections.  If a urine culture was done, you will be contacted if your treatment needs to be changed. If directed, you may call to find out the results.  If you had an X-ray, CT scan, or other diagnostic test, you will be notified of any new findings that may affect your care.  Call 911  Call 911 if any of the following occur:    Trouble breathing    Fainting or loss of consciousness    Rapid or very slow heart rate    Weakness, dizziness, or fainting    Difficulty arousing or confusion  When to seek medical advice  Call your healthcare provider right away if any of these occur:    Fever 100.4 F (38 C) or higher after 48 hours of treatment, or as advised by your healthcare provider    Not feeling better within 1 to 2 days after starting antibiotics    Any symptom that continues after 3 days of treatment    Increasing pain in the  stomach, back, side, or groin area    Repeated vomiting    Not able to take prescribed medicine due to nausea or another reason    Bloody, dark-colored, or foul smelling urine    Trouble urinating or decreased urine output    No urine for 8 hours, no tears when crying, sunken eyes, or dry mouth  Date Last Reviewed: 10/1/2016    1601-9926 [employer]. [address] [place] [address], [place] [address]. All rights reserved. This information is not intended as a substitute for professional medical care. Always follow your healthcare professional's instructions.          24 Hour Appointment Hotline       To make an appointment at any [facility], call [phone] ([phone]). If you don't have a family doctor or clinic, we will help you find one. [facility] are conveniently located to serve the needs of you and your family.             Review of your medicines      START taking        Dose / Directions Last dose taken    ciprofloxacin 500 MG tablet   Commonly known as:  CIPRO   Dose:  500 mg   Quantity:  20 tablet        Take 1 tablet (500 mg) by mouth 2 times daily   Refills:  0          Our records show that you are taking the medicines listed below. If these are incorrect, please call your family doctor or clinic.        Dose / Directions Last dose taken    HYDROcodone-acetaminophen 5-325 MG per tablet   Commonly known as:  NORCO   Dose:  1-2 tablet   Quantity:  15 tablet        Take 1-2 tablets by mouth every 4 hours as needed for moderate to severe pain   Refills:  0                Prescriptions were sent or printed at these locations (2 Prescriptions)                   Other Prescriptions                Printed at Department/Unit printer (2 of 2)         ciprofloxacin (CIPRO) 500 MG tablet               HYDROcodone-acetaminophen (NORCO) 5-325 MG per tablet                Procedures and tests performed during your visit     Abd/pelvis CT no contrast - Stone Protocol    Basic metabolic panel  "   CBC with platelets differential    Straight cath for urine    UA with Microscopic    Urine Culture      Orders Needing Specimen Collection     None      Pending Results     Date and Time Order Name Status Description    2017 2208 Abd/pelvis CT no contrast - Stone Protocol Preliminary             Pending Culture Results     No orders found for last 3 day(s).            Pending Results Instructions     If you had any lab results that were not finalized at the time of your Discharge, you can call the ED Lab Result RN at [phone]. You will be contacted by this team for any positive Lab results or changes in treatment. The nurses are available 7 days a week from 10A to 6:30P.  You can leave a message 24 hours per day and they will return your call.        Thank you for choosing [facility]       Thank you for choosing [facility] for your care. Our goal is always to provide you with excellent care. Hearing back from our patients is one way we can continue to improve our services. Please take a few minutes to complete the written survey that you may receive in the mail after you visit with us. Thank you!        [employer] Information     [employer] lets you send messages to your doctor, view your test results, renew your prescriptions, schedule appointments and more. To sign up, go to [email or web address] . Click on \"Log in\" on the left side of the screen, which will take you to the Welcome page. Then click on \"Sign up Now\" on the right side of the page.     You will be asked to enter the access code listed below, as well as some personal information. Please follow the directions to create your username and password.     Your access code is: [email or web address]  Expires: 2017  4:33 PM     Your access code will  in 90 days. If you need help or a new code, please call your [facility] clinic or [phone].        Care EveryWhere ID     This is your Care EveryWhere ID. This could be used by other organizations to access " your [facility] medical records  [id number]        Equal Access to Services     PRANAV WALTERS : Hannah Epstein, nancie patel, deric hogan, benita basurto. So [facility] [phone].    ATENCIÓN: Si habla español, tiene a lowery disposición servicios gratuitos de asistencia lingüística. Llame al [phone].    We comply with applicable federal civil rights laws and [place] laws. We do not discriminate on the basis of race, color, national origin, age, disability sex, sexual orientation or gender identity.            After Visit Summary       This is your record. Keep this with you and show to your community pharmacist(s) and doctor(s) at your next visit.

## 2017-08-02 NOTE — ED AVS SNAPSHOT
[facility], [place], Emergency Department    [address] [address]    Phone:  [phone]                                       Meli Rodriguez   MRN: [id number]    Department:  [facility], Emergency Department   Date of Visit:  8/2/2017           After Visit Summary Signature Page     I have received my discharge instructions, and my questions have been answered. I have discussed any challenges I see with this plan with the nurse or doctor.    ..........................................................................................................................................  Patient/Patient Representative Signature      ..........................................................................................................................................  Patient Representative Print Name and Relationship to Patient    ..................................................               ................................................  Date                                            Time    ..........................................................................................................................................  Reviewed by Signature/Title    ...................................................              ..............................................  Date                                                            Time

## 2017-08-03 VITALS
SYSTOLIC BLOOD PRESSURE: 101 MMHG | HEIGHT: 64 IN | DIASTOLIC BLOOD PRESSURE: 76 MMHG | RESPIRATION RATE: 20 BRPM | BODY MASS INDEX: 22.2 KG/M2 | OXYGEN SATURATION: 98 % | HEART RATE: 77 BPM | WEIGHT: 130 LBS | TEMPERATURE: 99.5 F

## 2017-08-03 NOTE — ED PROVIDER NOTES
History     Chief Complaint   Patient presents with     Flank Pain     Back Pain     Incontinence     HPI  Meli Rodriguez is a 41 year old female with a history of urinary retention, anxiety, hysterectomy, cholecystectomy, appendectomy, bilateral salpingo oophorectomy, and drug seeking behavior who presents to the ED with back pain, left flank pain, and incontinence. Per review of [facility]where she was seen in the ED on 07/09/17 for hematuria. Her UA was negative for UTI, and her bladder scan showed she had over 300 cc of urine. She was discharged home with pyridium. She did have a CT of her pelvis on 07/05/17 which was unremarkable. Patient states she has urine incontinence which she has never had before. She has complaints of severe back pain radiating to her left flank. No fevers, no blood in urine, no vaginal bleeding or discharge. Tolerating PO, no vomiting     PAST MEDICAL HISTORY  Past Medical History:   Diagnosis Date     ADD (attention deficit disorder with hyperactivity)      Allergic state      Anxiety      Depressive disorder      Drug-seeking behavior      Irritable bowel syndrome (IBS)      Migraine      PTSD (post-traumatic stress disorder)      Thyroid disease      Ulcer (H)      Urinary retention      PAST SURGICAL HISTORY  Past Surgical History:   Procedure Laterality Date     APPENDECTOMY       CHOLECYSTECTOMY  2005     HYSTERECTOMY TOTAL ABDOMINAL, BILATERAL SALPINGO-OOPHORECTOMY, COMBINED  2001    bleeding ovarian cysts     HYSTERECTOMY, PAP NO LONGER INDICATED       URETHRA SURGERY      temporary stent     FAMILY HISTORY  Family History   Problem Relation Age of Onset     Unknown/Adopted Father      SOCIAL HISTORY  Social History   Substance Use Topics     Smoking status: Current Every Day Smoker     Packs/day: 0.50     Years: 20.00     Smokeless tobacco: Never Used     Alcohol use No     MEDICATIONS  Current Facility-Administered Medications   Medication     0.9% sodium chloride  "BOLUS    Followed by     0.9% sodium chloride infusion     Current Outpatient Prescriptions   Medication     HYDROcodone-acetaminophen (NORCO) 5-325 MG per tablet     ALLERGIES  Allergies   Allergen Reactions     Demerol Itching     Droperidol Other (See Comments)     twitching       Nsaids Other (See Comments)     Gastric ulcer     Toradol Itching     I have reviewed the Medications, Allergies, Past Medical and Surgical History, and Social History in the Epic system.    Review of Systems  All other systems negative except as noted in the HPI.    Physical Exam   BP: 129/89  Pulse: 77  Temp: 99.5  F (37.5  C)  Resp: 20  Height: 162.6 cm (5' 4\")  Weight: 59 kg (130 lb) (stated)  SpO2: 99 %  Physical Exam  Gen: rocking on stretcher, with stuffed animal in hand. Appears uncomfortable, intermittently grimacing  HEENT: NCAT, PERRL, EOMI, MMM  Neck: trachea midline, supple with FROM  Cardio: normal rate regular rhythm, no R/M/G, normally perfused and warm  Pulm: steady, non-labored respirations, normal WOB, CTAB, no w/r/r  Abd: soft nt/nd, no organomegaly, left CVA tenderness  Ext: normal peripheral pulses, no edema, neurovascularly intact distally.  Skin: no rashes or signs of trauma  Neuro: no focal deficits, 5/5 strength in all ext    ED Course     ED Course     Procedures  10:04 PM  The patient was seen and examined by Dr. Chester in Room 3.                Labs Ordered and Resulted from Time of ED Arrival Up to the Time of Departure from the ED   ROUTINE UA WITH MICROSCOPIC - Abnormal; Notable for the following:        Result Value    Nitrite Urine Positive (*)     Leukocyte Esterase Urine Large (*)     WBC Urine 37 (*)     RBC Urine 3 (*)     Squamous Epithelial /HPF Urine 12 (*)     Mucous Urine Present (*)     Amorphous Crystals Few (*)     All other components within normal limits   BASIC METABOLIC PANEL   CBC WITH PLATELETS DIFFERENTIAL   STRAIGHT CATH FOR URINE   URINE CULTURE AEROBIC BACTERIAL        "     Assessments & Plan (with Medical Decision Making)   41-year-old female with a history of chronic abdominal pain, opiate and benzodiazepine abuse as well as drug seeking behavior who presents with flank pain radiating to her left abdomen and associated with dysuria concerning for UTI versus pyelonephritis versus stone.  After history and physical examination a urinalysis was obtained via straight catheter and was infected but with sqams, so will treat for pyelo empirically. This was sent for culture and ceftriaxone, dilaudid and zofran given to patient along with IVF. Basic labs were obtained and unremarkable with a normal chemistry and a normal CBC.  A CT of the abdomen and pelvis stone protocol was obtained and did not show any evidence of kidney stone. The patient was reassured and feeling better. She was explained that she has a kidney infection and should take antibiotics and pain meds as prescribed. She was encouraged to follow-up with primary care and discharged home at this time good condition with return to Emergency Department precautions.    I have reviewed the nursing notes.  I have reviewed the findings, diagnosis, plan and need for follow up with the patient.  New Prescriptions    CIPROFLOXACIN (CIPRO) 500 MG TABLET    Take 1 tablet (500 mg) by mouth 2 times daily    HYDROCODONE-ACETAMINOPHEN (NORCO) 5-325 MG PER TABLET    Take 1-2 tablets by mouth every 4 hours as needed for moderate to severe pain       Final diagnoses:   Acute pyelonephritis     ICayetano, am serving as a trained medical scribe to document services personally performed by Aime Chester MD, based on the provider's statements to me.      Aime FITZGERALD MD, was physically present and have reviewed and verified the accuracy of this note documented by Cayetano Rizo    8/2/2017   [facility], EMERGENCY DEPARTMENT     Aime Chester MD  08/07/17 [id number]

## 2017-08-03 NOTE — DISCHARGE INSTRUCTIONS
"Please make an appointment to follow up with Your Primary Care Provider in 2-3 days.      Kidney Infection (Adult, Female)    An infection in one or both kidneys is called \"pyelonephritis.\" It usually happens when bacteria (or rarely, viruses, fungi, or other disease-causing organisms) get into the kidney. The bacteria (or other disease-causing organisms) can enter the kidneys from the bladder or blood traveling from other parts of the body. A kidney infection can become serious. It can cause severe illness, scarring of the kidneys, or kidney failure if not treated properly.  Common causes for this problem include:    Not keeping the genital area clean and dry, which promotes the growth of bacteria    Wiping back to front which drags bacteria from the rectum toward the urinary opening (urethra)    Wearing tight pants or underwear (this lets moisture build up in the genital area, which helps bacteria grow)    Holding urine in for long periods of time    Dehydration  Kidney infections can cause symptoms similar to a bladder infection. Symptoms include:    Pain (or burning) when urinating    Having to urinate more often than usual    Blood in the urine (pink or red)    Abdominal pain or discomfort, usually in the lower abdomen    Pain in the side or back    Pain above the pubic bone    Fever or chills    Vomiting    Loss of appetite  Treatment is oral antibiotics, or in more severe cases, intramuscular or IV antibiotics. These are started right away and may be changed once urine culture results determine the infecting organisms. Treatment helps prevent a more serious kidney infection.  Medicines  Medicines can help in the treatment of a bladder infection:    Take antibiotics until they are used up, even if you feel better. It is important to finish them to make sure the infection is gone.    Unless another medicine was prescribed, you can use over-the-counter medicines for pain, fever, or discomfort. If you have " chronic liver of kidney disease, talk with your healthcare provider before using these medicines. Also talk with your provider if you've ever had a stomach ulcer or gastrointestinal (GI) bleeding, or are taking blood thinners.  Home care  The following are general care guidelines:    Stay home from work or school. Rest in bed until your fever breaks and you are feeling better, or as advised by your healthcare provider.    Drink lots of fluid unless you must restrict fluids for other medical reasons. This will force the medicine into your urinary system and flush the bacteria out of your body. Ask your healthcare provider how much you should drink.    Avoid sex until you have finished all of your medicine and your symptoms are gone.    Avoid caffeine, alcohol, and spicy foods. These foods may irritate the kidneys and bladder.    Avoid taking bubble baths. Sensitivity to the chemicals in bubble baths can irritate the urethra.    Make sure you wipe from front to back after using the toilet.    Wear loose cloths and cotton underwear.  Prevention  These self-care steps can help prevent future infections:    Drink plenty of fluids to prevent dehydration and flush out the bladder. Do this unless you must restrict fluids for other health reasons, or your healthcare provider told you not to.    Proper cleaning after going to the bathroom in important. Make sure you wipe from front to back after using the toilet.    Urinate more often. Don't try to hold urine in for a long time.    Avoid tight-fitting pants and underwear.    Improve your diet to prevent constipation. Eat more fruits, vegetables, and fiber. Eat less junk and fatty foods. Constipation can make a urinary tract infection more likely. Talk with your healthcare provider if you have trouble with bowel movements.    Urinate right after intercourse to flush out the bladder.  Follow-up care  Follow up with your healthcare provider, or as advised. Additional testing may  be needed to make sure the infection has cleared. Close follow-up and further testing is very important to find the cause and to prevent future infections.  If a urine culture was done, you will be contacted if your treatment needs to be changed. If directed, you may call to find out the results.  If you had an X-ray, CT scan, or other diagnostic test, you will be notified of any new findings that may affect your care.  Call 911  Call 911 if any of the following occur:    Trouble breathing    Fainting or loss of consciousness    Rapid or very slow heart rate    Weakness, dizziness, or fainting    Difficulty arousing or confusion  When to seek medical advice  Call your healthcare provider right away if any of these occur:    Fever 100.4 F (38 C) or higher after 48 hours of treatment, or as advised by your healthcare provider    Not feeling better within 1 to 2 days after starting antibiotics    Any symptom that continues after 3 days of treatment    Increasing pain in the stomach, back, side, or groin area    Repeated vomiting    Not able to take prescribed medicine due to nausea or another reason    Bloody, dark-colored, or foul smelling urine    Trouble urinating or decreased urine output    No urine for 8 hours, no tears when crying, sunken eyes, or dry mouth  Date Last Reviewed: 10/1/2016    7320-7770 [employer]. [address] [place] [address], [place] [address]. All rights reserved. This information is not intended as a substitute for professional medical care. Always follow your healthcare professional's instructions.

## 2017-08-03 NOTE — ED NOTES
BIBA for flank pain and back pain. Reports that suddenly 2 hrs ago incontinent of urine, nausea, pain in left flank and back. 4mg zofran by EMS.

## 2017-08-04 ENCOUNTER — TELEPHONE (OUTPATIENT)
Dept: EMERGENCY MEDICINE | Facility: CLINIC | Age: 42
End: 2017-08-04

## 2017-08-04 LAB
BACTERIA SPEC CULT: NO GROWTH
Lab: NORMAL
MICRO REPORT STATUS: NORMAL
SPECIMEN SOURCE: NORMAL

## 2017-08-04 NOTE — TELEPHONE ENCOUNTER
"[facility] result notification:    [facility] ED lab result protocol used  Urine Culture    Reason for call  Notify of lab results, assess symptoms,  review ED providers recommendations/discharge instructions (if necessary) and advise per ED lab result f/u protocol    Lab Result  Final urine culture report shows \"NO GROWTH\" and is NEGATIVE.  [facility] ED discharge antibiotic: Ciprofloxacin (Cipro) 500 mg tablet,  1 tablet (500 mg) by mouth 2 times daily for 10 days  Patient was on any antibiotic's prior culture collection (Yes/No): Unknown  RN verified ED provider Rx's antibiotic only for UTI (Yes/No): dx'd with pyelonephritis  Recommendations per [facility] ED Lab result protocol - Urine culture protocol.    Information table from ED Provider visit on 8/2/17  Symptoms reported at ED visit (Chief complaint, HPI) Meli Rodriguez is a 41 year old female with a history of urinary retention, anxiety, hysterectomy, cholecystectomy, appendectomy, bilateral salpingo oophorectomy, and drug seeking behavior who presents to the ED with back pain, left flank pain, and incontinence. Per review of [facility] Everywhere she was seen in the ED on 07/09/17 for hematuria. Her UA was negative for UTI, and her bladder scan showed she had over 300 cc of urine. She was discharged home with pyridium. She did have a CT of her pelvis on 07/05/17 which was unremarkable. Patient states she has urine incontinence which she has never had before. She has complaints of severe back pain radiating to her left flank. No fevers, no blood in urine, no vaginal bleeding or discharge. Tolerating PO, no vomiting    ED providers Impression and Plan (applicable information) 41-year-old female with a history of chronic abdominal pain, opiate and benzodiazepine abuse as well as drug seeking behavior who presents with flank pain radiating to her left abdomen and associated with dysuria concerning for UTI versus pyelonephritis " "versus stone.  After history and physical examination a urinalysis was obtained via straight catheter and was obviously infected. This was sent for culture and ceftriaxone, dilaudid and zofran given to patient along with IVF. Basic labs were obtained and unremarkable with a normal chemistry and a normal CBC.  A CT of the abdomen and pelvis stone protocol was obtained and did not show any evidence of kidney stone. The patient was reassured and feeling better. She was explained that she has a kidney infection and should take antibiotics and pain meds as prescribed. She was encouraged to follow-up with primary care and discharged home at this time good condition with return to Emergency Department precautions.   Miscellaneous information N/A     RN Assessment (Patient s current Symptoms), include time called.  [Insert Left message here if message left]  \"Wrong number\".       Angelica Bojorquez RN    [facility] RN  Lung Nodule and ED Lab Results F/U RN  Epic pool (ED late result f/u RN) : P [id number]   # [phone]    Copy of Lab result   Order   Urine Culture [[id number]] (Order [id number])   Exam Information   Exam Date Exam Time Accession # Results    8/2/17 10:24 PM [id number]    Component Results   Component Collected Lab   Specimen Description 08/02/2017 10:24 PM 75   Unspecified Urine   Special Requests 08/02/2017 10:24 PM 75   Specimen received in preservative   Culture Micro 08/02/2017 10:24    No growth   Micro Report Status 08/02/2017 10:24    FINAL 08/04/2017         "

## 2017-08-12 ENCOUNTER — HOSPITAL ENCOUNTER (EMERGENCY)
Facility: CLINIC | Age: 42
Discharge: HOME | End: 2017-08-12
Attending: EMERGENCY MEDICINE | Admitting: EMERGENCY MEDICINE

## 2017-08-12 VITALS
SYSTOLIC BLOOD PRESSURE: 111 MMHG | DIASTOLIC BLOOD PRESSURE: 88 MMHG | HEART RATE: 67 BPM | TEMPERATURE: 98 F | RESPIRATION RATE: 16 BRPM | OXYGEN SATURATION: 98 %

## 2017-08-12 DIAGNOSIS — K62.5 RECTAL BLEEDING: ICD-10-CM

## 2017-08-12 LAB
ALBUMIN UR-MCNC: NEGATIVE MG/DL
ANION GAP SERPL CALCULATED.3IONS-SCNC: 9 MMOL/L (ref 3–14)
APPEARANCE UR: CLEAR
BASOPHILS # BLD AUTO: 0 10E9/L (ref 0–0.2)
BASOPHILS NFR BLD AUTO: 0.2 %
BILIRUB UR QL STRIP: NEGATIVE
BUN SERPL-MCNC: 8 MG/DL (ref 7–30)
CALCIUM SERPL-MCNC: 8.6 MG/DL (ref 8.5–10.1)
CHLORIDE SERPL-SCNC: 108 MMOL/L (ref 94–109)
CO2 SERPL-SCNC: 22 MMOL/L (ref 20–32)
COLOR UR AUTO: NORMAL
CREAT SERPL-MCNC: 0.63 MG/DL (ref 0.52–1.04)
DIFFERENTIAL METHOD BLD: NORMAL
EOSINOPHIL # BLD AUTO: 0.1 10E9/L (ref 0–0.7)
EOSINOPHIL NFR BLD AUTO: 1 %
ERYTHROCYTE [DISTWIDTH] IN BLOOD BY AUTOMATED COUNT: 13.8 % (ref 10–15)
GFR SERPL CREATININE-BSD FRML MDRD: NORMAL ML/MIN/1.7M2
GLUCOSE SERPL-MCNC: 93 MG/DL (ref 70–99)
GLUCOSE UR STRIP-MCNC: NEGATIVE MG/DL
HCT VFR BLD AUTO: 36.4 % (ref 35–47)
HGB BLD-MCNC: 12.4 G/DL (ref 11.7–15.7)
HGB UR QL STRIP: NEGATIVE
IMM GRANULOCYTES # BLD: 0 10E9/L (ref 0–0.4)
IMM GRANULOCYTES NFR BLD: 0.1 %
INR BLD: 0.9 (ref 0.86–1.14)
KETONES UR STRIP-MCNC: NEGATIVE MG/DL
LEUKOCYTE ESTERASE UR QL STRIP: NEGATIVE
LYMPHOCYTES # BLD AUTO: 2.7 10E9/L (ref 0.8–5.3)
LYMPHOCYTES NFR BLD AUTO: 31 %
MCH RBC QN AUTO: 30.9 PG (ref 26.5–33)
MCHC RBC AUTO-ENTMCNC: 34.1 G/DL (ref 31.5–36.5)
MCV RBC AUTO: 91 FL (ref 78–100)
MONOCYTES # BLD AUTO: 0.6 10E9/L (ref 0–1.3)
MONOCYTES NFR BLD AUTO: 6.7 %
NEUTROPHILS # BLD AUTO: 5.3 10E9/L (ref 1.6–8.3)
NEUTROPHILS NFR BLD AUTO: 61 %
NITRATE UR QL: NEGATIVE
NRBC # BLD AUTO: 0 10*3/UL
NRBC BLD AUTO-RTO: 0 /100
PH UR STRIP: 7 PH (ref 5–7)
PLATELET # BLD AUTO: 163 10E9/L (ref 150–450)
POTASSIUM SERPL-SCNC: 3.6 MMOL/L (ref 3.4–5.3)
RBC # BLD AUTO: 4.01 10E12/L (ref 3.8–5.2)
RBC #/AREA URNS AUTO: 0 /HPF (ref 0–2)
SODIUM SERPL-SCNC: 138 MMOL/L (ref 133–144)
SP GR UR STRIP: 1 (ref 1–1.03)
SQUAMOUS #/AREA URNS AUTO: 1 /HPF (ref 0–1)
URN SPEC COLLECT METH UR: NORMAL
UROBILINOGEN UR STRIP-MCNC: NORMAL MG/DL (ref 0–2)
WBC # BLD AUTO: 8.7 10E9/L (ref 4–11)
WBC #/AREA URNS AUTO: 1 /HPF (ref 0–2)

## 2017-08-12 PROCEDURE — 99282 EMERGENCY DEPT VISIT SF MDM: CPT | Mod: Z6 | Performed by: EMERGENCY MEDICINE

## 2017-08-12 PROCEDURE — 80048 BASIC METABOLIC PNL TOTAL CA: CPT | Performed by: EMERGENCY MEDICINE

## 2017-08-12 PROCEDURE — 85610 PROTHROMBIN TIME: CPT | Mod: QW

## 2017-08-12 PROCEDURE — 99283 EMERGENCY DEPT VISIT LOW MDM: CPT | Performed by: EMERGENCY MEDICINE

## 2017-08-12 PROCEDURE — 25000132 ZZH RX MED GY IP 250 OP 250 PS 637: Performed by: EMERGENCY MEDICINE

## 2017-08-12 PROCEDURE — 81001 URINALYSIS AUTO W/SCOPE: CPT | Performed by: EMERGENCY MEDICINE

## 2017-08-12 PROCEDURE — 85025 COMPLETE CBC W/AUTO DIFF WBC: CPT | Performed by: EMERGENCY MEDICINE

## 2017-08-12 RX ORDER — HYDROMORPHONE HYDROCHLORIDE 1 MG/ML
0.5 INJECTION, SOLUTION INTRAMUSCULAR; INTRAVENOUS; SUBCUTANEOUS
Status: DISCONTINUED | OUTPATIENT
Start: 2017-08-12 | End: 2017-08-12

## 2017-08-12 RX ORDER — DICYCLOMINE HYDROCHLORIDE 10 MG/1
10 CAPSULE ORAL ONCE
Status: COMPLETED | OUTPATIENT
Start: 2017-08-12 | End: 2017-08-12

## 2017-08-12 RX ORDER — OXYCODONE HYDROCHLORIDE 5 MG/1
5 TABLET ORAL ONCE
Status: COMPLETED | OUTPATIENT
Start: 2017-08-12 | End: 2017-08-12

## 2017-08-12 RX ADMIN — DICYCLOMINE HYDROCHLORIDE 10 MG: 10 CAPSULE ORAL at 20:41

## 2017-08-12 RX ADMIN — OXYCODONE HYDROCHLORIDE 5 MG: 5 TABLET ORAL at 20:41

## 2017-08-12 ASSESSMENT — ENCOUNTER SYMPTOMS
BLOOD IN STOOL: 1
DIARRHEA: 1
ABDOMINAL PAIN: 1
BACK PAIN: 1

## 2017-08-12 NOTE — ED AVS SNAPSHOT
[facility], [place], Emergency Department    [address] [address]    Phone:  [phone]                                       Meli Rodriguez   MRN: [id number]    Department:  [facility], Emergency Department   Date of Visit:  8/12/2017           After Visit Summary Signature Page     I have received my discharge instructions, and my questions have been answered. I have discussed any challenges I see with this plan with the nurse or doctor.    ..........................................................................................................................................  Patient/Patient Representative Signature      ..........................................................................................................................................  Patient Representative Print Name and Relationship to Patient    ..................................................               ................................................  Date                                            Time    ..........................................................................................................................................  Reviewed by Signature/Title    ...................................................              ..............................................  Date                                                            Time

## 2017-08-12 NOTE — ED AVS SNAPSHOT
[facility], Emergency Department    [address] [address]    Phone:  [phone]                                       Meli Rodriguez   MRN: [id number]    Department:  [facility], Emergency Department   Date of Visit:  8/12/2017           Patient Information     Date Of Birth          1975        Your diagnoses for this visit were:     Rectal bleeding        You were seen by Jose Fried MD.        Discharge Instructions       Please make an appointment to follow up with Your Primary Care Provider as soon as possible.    Lower GI Bleeding (Stable)  You have signs of blood in your stool. This is called rectal bleeding. The bleeding may have begun in another part of your gastrointestinal (GI) tract. If the blood is bright red, it is likely coming from the lower part of the GI tract. If the blood is black or dark, it might be coming from higher up in the GI tract. Very small amounts of GI bleeding may not be visible and can only be discovered during a test on your stool. Possible causes of lower GI bleeding include:    Hemorrhoids    Anal fissures    Diverticulitis    Inflammatory bowel disease (Crohn's disease or ulcerative colitis)    Polyps (growths) in the intestine  Note: Iron supplements and medicines for diarrhea or upset stomach can cause black stools. Foods such as licorice and red beets can also discolor the stool and be mistaken for bleeding. These are not bleeding and are not a cause for alarm.  Home care  You have not lost a large amount of blood and your condition appears stable at this time. You may resume normal activity as long as you feel well.  Avoid NSAIDs, such as aspirin, ibuprofen, or naproxen. They can irritate the stomach and cause further bleeding. If you are taking these medicines for other medical reasons, talk to your healthcare provider before you stop them.   Follow-up care  Follow up with your healthcare provider as advised. Further tests may be  needed to find the cause of your bleeding.  When to seek medical advice  Call your healthcare provider for any of the following:    Large amount of rectal bleeding     Increasing abdominal pain    Weakness, dizziness  Call 911  Get emergency medical care if any of the following occur:    Loss of consciousness    Vomiting blood  Date Last Reviewed: 6/24/2015 2000-2017 [employer]. [address] [place] [address], [place] [address]. All rights reserved. This information is not intended as a substitute for professional medical care. Always follow your healthcare professional's instructions.          24 Hour Appointment Hotline       To make an appointment at any [facility], call [phone] ([phone]). If you don't have a family doctor or clinic, we will help you find one. [facility] clinics are conveniently located to serve the needs of you and your family.             Review of your medicines      Our records show that you are taking the medicines listed below. If these are incorrect, please call your family doctor or clinic.        Dose / Directions Last dose taken    ciprofloxacin 500 MG tablet   Commonly known as:  CIPRO   Dose:  500 mg   Quantity:  20 tablet        Take 1 tablet (500 mg) by mouth 2 times daily   Refills:  0        HYDROcodone-acetaminophen 5-325 MG per tablet   Commonly known as:  NORCO   Dose:  1-2 tablet   Quantity:  15 tablet        Take 1-2 tablets by mouth every 4 hours as needed for moderate to severe pain   Refills:  0                Procedures and tests performed during your visit     Basic metabolic panel    CBC with platelets differential    INR point of care    ISTAT INR  POCT    UA with Microscopic reflex to Culture      Orders Needing Specimen Collection     None      Pending Results     No orders found from 8/10/2017 to 8/13/2017.            Pending Culture Results     No orders found from 8/10/2017 to 8/13/2017.            Pending Results Instructions     If you  "had any lab results that were not finalized at the time of your Discharge, you can call the ED Lab Result RN at [phone]. You will be contacted by this team for any positive Lab results or changes in treatment. The nurses are available 7 days a week from 10A to 6:30P.  You can leave a message 24 hours per day and they will return your call.        Thank you for choosing [facility]       Thank you for choosing [facility] for your care. Our goal is always to provide you with excellent care. Hearing back from our patients is one way we can continue to improve our services. Please take a few minutes to complete the written survey that you may receive in the mail after you visit with us. Thank you!        [employer] Information     [facility] lets you send messages to your doctor, view your test results, renew your prescriptions, schedule appointments and more. To sign up, go to [email or web address] . Click on \"Log in\" on the left side of the screen, which will take you to the Welcome page. Then click on \"Sign up Now\" on the right side of the page.     You will be asked to enter the access code listed below, as well as some personal information. Please follow the directions to create your username and password.     Your access code is: [email or web address]  Expires: 2017  4:33 PM     Your access code will  in 90 days. If you need help or a new code, please call your [facility] clinic or [phone].        Care EveryWhere ID     This is your Care EveryWhere ID. This could be used by other organizations to access your [facility] medical records  [id number]        Equal Access to Services     [facility]: Hadii ambrose Epstein, waaxda luperi, qaybta arielaalbenita granger. So [facility] [phone].    ATENCIÓN: Si habla español, tiene a lowery disposición servicios gratuitos de asistencia lingüística. Llame al [phone].    We comply with applicable federal civil rights laws and " [place] laws. We do not discriminate on the basis of race, color, national origin, age, disability sex, sexual orientation or gender identity.            After Visit Summary       This is your record. Keep this with you and show to your community pharmacist(s) and doctor(s) at your next visit.

## 2017-08-13 NOTE — DISCHARGE INSTRUCTIONS
Please make an appointment to follow up with Your Primary Care Provider as soon as possible.    Lower GI Bleeding (Stable)  You have signs of blood in your stool. This is called rectal bleeding. The bleeding may have begun in another part of your gastrointestinal (GI) tract. If the blood is bright red, it is likely coming from the lower part of the GI tract. If the blood is black or dark, it might be coming from higher up in the GI tract. Very small amounts of GI bleeding may not be visible and can only be discovered during a test on your stool. Possible causes of lower GI bleeding include:    Hemorrhoids    Anal fissures    Diverticulitis    Inflammatory bowel disease (Crohn's disease or ulcerative colitis)    Polyps (growths) in the intestine  Note: Iron supplements and medicines for diarrhea or upset stomach can cause black stools. Foods such as licorice and red beets can also discolor the stool and be mistaken for bleeding. These are not bleeding and are not a cause for alarm.  Home care  You have not lost a large amount of blood and your condition appears stable at this time. You may resume normal activity as long as you feel well.  Avoid NSAIDs, such as aspirin, ibuprofen, or naproxen. They can irritate the stomach and cause further bleeding. If you are taking these medicines for other medical reasons, talk to your healthcare provider before you stop them.   Follow-up care  Follow up with your healthcare provider as advised. Further tests may be needed to find the cause of your bleeding.  When to seek medical advice  Call your healthcare provider for any of the following:    Large amount of rectal bleeding     Increasing abdominal pain    Weakness, dizziness  Call 911  Get emergency medical care if any of the following occur:    Loss of consciousness    Vomiting blood  Date Last Reviewed: 6/24/2015 2000-2017 [employer]. [address] [place] [address], [place] [address]. All rights reserved. This  information is not intended as a substitute for professional medical care. Always follow your healthcare professional's instructions.

## 2017-08-13 NOTE — ED PROVIDER NOTES
History     Chief Complaint   Patient presents with     Rectal Bleeding     HPI  Meli Rodriguez is a 41 year old female with a history of urinary retention, anxiety, hysterectomy, cholecystectomy, appendectomy, bilateral salpingo oophorectomy, and drug seeking behavior presents to the ED with complaints of rectal bleeding. Patient notes she is experiencing back pain that is radiating to her abdomen. Patient notes that she has had diarrhea for the past 2 days and was noticing some blood in her stool. Patient notes that she had an episode similar to this recently (5/2017) and had a colonoscopy (see below). Patient does not present with any other complaints at this time.   Impression:       - The examined portion of the ileum was normal.       - One 4 mm polyp at the hepatic flexure. Resected and retrieved.       - Non-bleeding internal hemorrhoids.       - Biopsies were taken with a cold forceps for histology in the entire        colon.  I have reviewed the Medications, Allergies, Past Medical and Surgical History, and Social History in the "[employer] system.    Past Medical History:   Diagnosis Date     ADD (attention deficit disorder with hyperactivity)      Allergic state      Anxiety      Depressive disorder      Drug-seeking behavior      Irritable bowel syndrome (IBS)      Migraine      PTSD (post-traumatic stress disorder)      Thyroid disease      Ulcer (H)      Urinary retention        Past Surgical History:   Procedure Laterality Date     APPENDECTOMY       CHOLECYSTECTOMY  2005     HYSTERECTOMY TOTAL ABDOMINAL, BILATERAL SALPINGO-OOPHORECTOMY, COMBINED  2001    bleeding ovarian cysts     HYSTERECTOMY, PAP NO LONGER INDICATED       URETHRA SURGERY      temporary stent       Family History   Problem Relation Age of Onset     Unknown/Adopted Father        Social History   Substance Use Topics     Smoking status: Current Every Day Smoker     Packs/day: 0.50     Years: 20.00     Smokeless tobacco: Never Used      Alcohol use No       No current facility-administered medications for this encounter.      Current Outpatient Prescriptions   Medication     ciprofloxacin (CIPRO) 500 MG tablet     HYDROcodone-acetaminophen (NORCO) 5-325 MG per tablet          Allergies   Allergen Reactions     Demerol Itching     Droperidol Other (See Comments)     twitching       Nsaids Other (See Comments)     Gastric ulcer     Toradol Itching       Review of Systems   Gastrointestinal: Positive for abdominal pain, blood in stool and diarrhea.   Musculoskeletal: Positive for back pain.       Physical Exam   BP: 109/85  Pulse: 78  Temp: 98.3  F (36.8  C)  Resp: 18  SpO2: 99 %  Physical Exam   Constitutional: She is oriented to person, place, and time. She appears well-developed and well-nourished. No distress.   HENT:   Head: Normocephalic and atraumatic.   Eyes: No scleral icterus.   Neck: Normal range of motion. Neck supple.   Cardiovascular: Normal rate.    Pulmonary/Chest: Effort normal. No respiratory distress.   Abdominal: Soft.   Neurological: She is alert and oriented to person, place, and time.   Skin: Skin is warm and dry. No rash noted. She is not diaphoretic. No erythema. No pallor.       ED Course     ED Course     Procedures             Critical Care time:  none         Results for orders placed or performed during the hospital encounter of 08/12/17   Basic metabolic panel   Result Value Ref Range    Sodium 138 133 - 144 mmol/L    Potassium 3.6 3.4 - 5.3 mmol/L    Chloride 108 94 - 109 mmol/L    Carbon Dioxide 22 20 - 32 mmol/L    Anion Gap 9 3 - 14 mmol/L    Glucose 93 70 - 99 mg/dL    Urea Nitrogen 8 7 - 30 mg/dL    Creatinine 0.63 0.52 - 1.04 mg/dL    GFR Estimate >90  Non  GFR Calc   >60 mL/min/1.7m2    GFR Estimate If Black >90   GFR Calc   >60 mL/min/1.7m2    Calcium 8.6 8.5 - 10.1 mg/dL   CBC with platelets differential   Result Value Ref Range    WBC 8.7 4.0 - 11.0 10e9/L    RBC Count 4.01 3.8  - 5.2 10e12/L    Hemoglobin 12.4 11.7 - 15.7 g/dL    Hematocrit 36.4 35.0 - 47.0 %    MCV 91 78 - 100 fl    MCH 30.9 26.5 - 33.0 pg    MCHC 34.1 31.5 - 36.5 g/dL    RDW 13.8 10.0 - 15.0 %    Platelet Count 163 150 - 450 10e9/L    Diff Method Automated Method     % Neutrophils 61.0 %    % Lymphocytes 31.0 %    % Monocytes 6.7 %    % Eosinophils 1.0 %    % Basophils 0.2 %    % Immature Granulocytes 0.1 %    Nucleated RBCs 0 0 /100    Absolute Neutrophil 5.3 1.6 - 8.3 10e9/L    Absolute Lymphocytes 2.7 0.8 - 5.3 10e9/L    Absolute Monocytes 0.6 0.0 - 1.3 10e9/L    Absolute Eosinophils 0.1 0.0 - 0.7 10e9/L    Absolute Basophils 0.0 0.0 - 0.2 10e9/L    Abs Immature Granulocytes 0.0 0 - 0.4 10e9/L    Absolute Nucleated RBC 0.0    UA with Microscopic reflex to Culture   Result Value Ref Range    Color Urine Light Yellow     Appearance Urine Clear     Glucose Urine Negative NEG mg/dL    Bilirubin Urine Negative NEG    Ketones Urine Negative NEG mg/dL    Specific Gravity Urine 1.005 1.003 - 1.035    Blood Urine Negative NEG    pH Urine 7.0 5.0 - 7.0 pH    Protein Albumin Urine Negative NEG mg/dL    Urobilinogen mg/dL Normal 0.0 - 2.0 mg/dL    Nitrite Urine Negative NEG    Leukocyte Esterase Urine Negative NEG    Source Urine     WBC Urine 1 0 - 2 /HPF    RBC Urine 0 0 - 2 /HPF    Squamous Epithelial /HPF Urine 1 0 - 1 /HPF   INR point of care   Result Value Ref Range    INR Point of Care 0.9 0.86 - 1.14              Labs Ordered and Resulted from Time of ED Arrival Up to the Time of Departure from the ED   BASIC METABOLIC PANEL   CBC WITH PLATELETS DIFFERENTIAL   ROUTINE UA WITH MICROSCOPIC REFLEX TO CULTURE   INR POINT OF CARE   ISTAT INR NURSING POCT            Assessments & Plan (with Medical Decision Making)   This is a 41-year-old female patient coming into the emergency room with complaints of rectal bleeding.  She states that this has been going on all day today.  She was unwilling to participate and the rest of her  exam here in the emergency room.  I did review her medical charts from previous emergency department visits both here at the [facility] and at other institutions.  She has a long history of planes of the discomfort and rectal bleeding that have been worked up in the past.  She has otherwise had stable hemoglobins.  I am uncomfortable with providing this patient opiates that she has a long history of opiate abuse and malingering behavior.  I did offer to do additional testing here in the emergency room but the patient was unwilling.  I recommended that she follow up with a primary care physician and she stated she was unwilling to do that.  At this time I believe she can be safely discharged and she can follow-up in the emergency room she has any further concerns.    I have reviewed the nursing notes.    I have reviewed the findings, diagnosis, plan and need for follow up with the patient.    Discharge Medication List as of 8/12/2017  9:40 PM          Final diagnoses:   Rectal bleeding   Mayi FITZGERALD, am serving as a trained medical scribe to document services personally performed by Devaughn Fried MD, based on the provider's statements to me.    IDevaughn MD, was physically present and have reviewed and verified the accuracy of this note documented by Mayi Christopher.     8/12/2017   [facility], [place], EMERGENCY DEPARTMENT     Jose Fried MD  08/12/17 5074       Jose Fried MD  08/12/17 3899       Jose Fried MD  08/12/17 4595

## 2018-08-28 ENCOUNTER — APPOINTMENT (OUTPATIENT)
Dept: GENERAL RADIOLOGY | Facility: CLINIC | Age: 43
End: 2018-08-28
Attending: FAMILY MEDICINE
Payer: MEDICAID

## 2018-08-28 ENCOUNTER — HOSPITAL ENCOUNTER (EMERGENCY)
Facility: CLINIC | Age: 43
Discharge: HOME | End: 2018-08-28
Attending: FAMILY MEDICINE | Admitting: FAMILY MEDICINE
Payer: MEDICAID

## 2018-08-28 VITALS
TEMPERATURE: 98.4 F | SYSTOLIC BLOOD PRESSURE: 132 MMHG | OXYGEN SATURATION: 96 % | HEART RATE: 70 BPM | BODY MASS INDEX: 28.17 KG/M2 | DIASTOLIC BLOOD PRESSURE: 90 MMHG | HEIGHT: 63 IN | WEIGHT: 159 LBS | RESPIRATION RATE: 19 BRPM

## 2018-08-28 DIAGNOSIS — R07.9 CHEST PAIN, UNSPECIFIED TYPE: ICD-10-CM

## 2018-08-28 LAB
ALBUMIN SERPL-MCNC: 4.1 G/DL (ref 3.4–5)
ALP SERPL-CCNC: 65 U/L (ref 40–150)
ALT SERPL W P-5'-P-CCNC: 15 U/L (ref 0–50)
ANION GAP SERPL CALCULATED.3IONS-SCNC: 8 MMOL/L (ref 3–14)
AST SERPL W P-5'-P-CCNC: 18 U/L (ref 0–45)
BASOPHILS # BLD AUTO: 0 10E9/L (ref 0–0.2)
BASOPHILS NFR BLD AUTO: 0.4 %
BILIRUB SERPL-MCNC: 0.9 MG/DL (ref 0.2–1.3)
BUN SERPL-MCNC: 6 MG/DL (ref 7–30)
CALCIUM SERPL-MCNC: 8.8 MG/DL (ref 8.5–10.1)
CHLORIDE SERPL-SCNC: 108 MMOL/L (ref 94–109)
CO2 SERPL-SCNC: 24 MMOL/L (ref 20–32)
CREAT SERPL-MCNC: 0.71 MG/DL (ref 0.52–1.04)
DIFFERENTIAL METHOD BLD: NORMAL
EOSINOPHIL # BLD AUTO: 0.1 10E9/L (ref 0–0.7)
EOSINOPHIL NFR BLD AUTO: 1.2 %
ERYTHROCYTE [DISTWIDTH] IN BLOOD BY AUTOMATED COUNT: 13.2 % (ref 10–15)
GFR SERPL CREATININE-BSD FRML MDRD: >90 ML/MIN/1.7M2
GLUCOSE SERPL-MCNC: 90 MG/DL (ref 70–99)
HCT VFR BLD AUTO: 39.3 % (ref 35–47)
HGB BLD-MCNC: 13.4 G/DL (ref 11.7–15.7)
IMM GRANULOCYTES # BLD: 0 10E9/L (ref 0–0.4)
IMM GRANULOCYTES NFR BLD: 0.2 %
LYMPHOCYTES # BLD AUTO: 2.9 10E9/L (ref 0.8–5.3)
LYMPHOCYTES NFR BLD AUTO: 33.6 %
MCH RBC QN AUTO: 31.2 PG (ref 26.5–33)
MCHC RBC AUTO-ENTMCNC: 34.1 G/DL (ref 31.5–36.5)
MCV RBC AUTO: 92 FL (ref 78–100)
MONOCYTES # BLD AUTO: 0.8 10E9/L (ref 0–1.3)
MONOCYTES NFR BLD AUTO: 9.6 %
NEUTROPHILS # BLD AUTO: 4.7 10E9/L (ref 1.6–8.3)
NEUTROPHILS NFR BLD AUTO: 55 %
NRBC # BLD AUTO: 0 10*3/UL
NRBC BLD AUTO-RTO: 0 /100
PLATELET # BLD AUTO: 198 10E9/L (ref 150–450)
POTASSIUM SERPL-SCNC: 3.3 MMOL/L (ref 3.4–5.3)
PROT SERPL-MCNC: 7.2 G/DL (ref 6.8–8.8)
RBC # BLD AUTO: 4.29 10E12/L (ref 3.8–5.2)
SODIUM SERPL-SCNC: 140 MMOL/L (ref 133–144)
TROPONIN I SERPL-MCNC: <0.015 UG/L (ref 0–0.04)
WBC # BLD AUTO: 8.5 10E9/L (ref 4–11)

## 2018-08-28 PROCEDURE — 85025 COMPLETE CBC W/AUTO DIFF WBC: CPT | Performed by: FAMILY MEDICINE

## 2018-08-28 PROCEDURE — 84484 ASSAY OF TROPONIN QUANT: CPT | Performed by: FAMILY MEDICINE

## 2018-08-28 PROCEDURE — 25000132 ZZH RX MED GY IP 250 OP 250 PS 637: Performed by: FAMILY MEDICINE

## 2018-08-28 PROCEDURE — 93010 ELECTROCARDIOGRAM REPORT: CPT | Mod: Z6 | Performed by: FAMILY MEDICINE

## 2018-08-28 PROCEDURE — 99284 EMERGENCY DEPT VISIT MOD MDM: CPT | Mod: 25

## 2018-08-28 PROCEDURE — 93005 ELECTROCARDIOGRAM TRACING: CPT

## 2018-08-28 PROCEDURE — 71046 X-RAY EXAM CHEST 2 VIEWS: CPT

## 2018-08-28 PROCEDURE — 99284 EMERGENCY DEPT VISIT MOD MDM: CPT | Mod: 25 | Performed by: FAMILY MEDICINE

## 2018-08-28 PROCEDURE — 80053 COMPREHEN METABOLIC PANEL: CPT | Performed by: FAMILY MEDICINE

## 2018-08-28 RX ORDER — SUCRALFATE ORAL 1 G/10ML
1 SUSPENSION ORAL ONCE
Status: COMPLETED | OUTPATIENT
Start: 2018-08-28 | End: 2018-08-28

## 2018-08-28 RX ADMIN — SUCRALFATE 1 G: 1 SUSPENSION ORAL at 17:28

## 2018-08-28 NOTE — DISCHARGE INSTRUCTIONS
Return to the Emergency Room if the following occurs:     Worsened breathing, fever >101, vomiting/dehydration, or for any concern at anytime.    Or, follow-up with the following provider as we discussed:     Return to your primary doctor this week or next for reevaluation.    Medications discussed:    None new.  No changes.    If you received pain-relieving or sedating medication during your time in the ER, avoid alcohol, driving automobiles, or working with machinery.  Also, a responsible adult must stay with you.        Call the Nurse Advice Line at [phone] or [phone] for any concern at anytime.

## 2018-08-28 NOTE — ED AVS SNAPSHOT
[facility] Emergency Department    [address] [address]    Phone:  [phone]    Fax:  [phone]                                       Meli Rodriguez   MRN: [id number]    Department:  [facility] Emergency Department   Date of Visit:  8/28/2018           After Visit Summary Signature Page     I have received my discharge instructions, and my questions have been answered. I have discussed any challenges I see with this plan with the nurse or doctor.    ..........................................................................................................................................  Patient/Patient Representative Signature      ..........................................................................................................................................  Patient Representative Print Name and Relationship to Patient    ..................................................               ................................................  Date                                            Time    ..........................................................................................................................................  Reviewed by Signature/Title    ...................................................              ..............................................  Date                                                            Time          22EPIC Rev 08/18

## 2018-08-28 NOTE — ED AVS SNAPSHOT
[facility] Emergency Department    [address] [address]    Phone:  [phone]    Fax:  [phone]                                       Meli Rodriguez   MRN: [id number]    Department:  [facility] Emergency Department   Date of Visit:  8/28/2018           Patient Information     Date Of Birth          1975        Your diagnoses for this visit were:     Chest pain, unspecified type        You were seen by Carlos Enrique Fontana MD.        Discharge Instructions       Return to the Emergency Room if the following occurs:     Worsened breathing, fever >101, vomiting/dehydration, or for any concern at anytime.    Or, follow-up with the following provider as we discussed:     Return to your primary doctor this week or next for reevaluation.    Medications discussed:    None new.  No changes.    If you received pain-relieving or sedating medication during your time in the ER, avoid alcohol, driving automobiles, or working with machinery.  Also, a responsible adult must stay with you.        Call the Nurse Advice Line at [phone] or [phone] for any concern at anytime.      24 Hour Appointment Hotline       To make an appointment at any [facility], call [phone] ([phone]). If you don't have a family doctor or clinic, we will help you find one. [facility] clinics are conveniently located to serve the needs of you and your family.             Review of your medicines      Our records show that you are taking the medicines listed below. If these are incorrect, please call your family doctor or clinic.        Dose / Directions Last dose taken    HYDROcodone-acetaminophen 5-325 MG per tablet   Commonly known as:  NORCO   Dose:  1-2 tablet   Quantity:  15 tablet        Take 1-2 tablets by mouth every 4 hours as needed for moderate to severe pain   Refills:  0                Procedures and tests performed during your visit     CBC with platelets differential     Comprehensive metabolic panel    EKG 12 lead    Troponin I    XR Chest 2 Views      Orders Needing Specimen Collection     None      Pending Results     Date and Time Order Name Status Description    8/28/2018 1720 XR Chest 2 Views Preliminary             Pending Culture Results     No orders found from 8/26/2018 to 8/29/2018.            Pending Results Instructions     If you had any lab results that were not finalized at the time of your Discharge, you can call the ED Lab Result RN at [phone]. You will be contacted by this team for any positive Lab results or changes in treatment. The nurses are available 7 days a week from 10A to 6:30P.  You can leave a message 24 hours per day and they will return your call.        Test Results From Your Hospital Stay        8/28/2018  4:57 PM      Component Results     Component Value Ref Range & Units Status    WBC 8.5 4.0 - 11.0 10e9/L Final    RBC Count 4.29 3.8 - 5.2 10e12/L Final    Hemoglobin 13.4 11.7 - 15.7 g/dL Final    Hematocrit 39.3 35.0 - 47.0 % Final    MCV 92 78 - 100 fl Final    MCH 31.2 26.5 - 33.0 pg Final    MCHC 34.1 31.5 - 36.5 g/dL Final    RDW 13.2 10.0 - 15.0 % Final    Platelet Count 198 150 - 450 10e9/L Final    Diff Method Automated Method  Final    % Neutrophils 55.0 % Final    % Lymphocytes 33.6 % Final    % Monocytes 9.6 % Final    % Eosinophils 1.2 % Final    % Basophils 0.4 % Final    % Immature Granulocytes 0.2 % Final    Nucleated RBCs 0 0 /100 Final    Absolute Neutrophil 4.7 1.6 - 8.3 10e9/L Final    Absolute Lymphocytes 2.9 0.8 - 5.3 10e9/L Final    Absolute Monocytes 0.8 0.0 - 1.3 10e9/L Final    Absolute Eosinophils 0.1 0.0 - 0.7 10e9/L Final    Absolute Basophils 0.0 0.0 - 0.2 10e9/L Final    Abs Immature Granulocytes 0.0 0 - 0.4 10e9/L Final    Absolute Nucleated RBC 0.0  Final         8/28/2018  5:18 PM      Component Results     Component Value Ref Range & Units Status    Sodium 140 133 - 144 mmol/L Final    Potassium 3.3 (L) 3.4  "- 5.3 mmol/L Final    Chloride 108 94 - 109 mmol/L Final    Carbon Dioxide 24 20 - 32 mmol/L Final    Anion Gap 8 3 - 14 mmol/L Final    Glucose 90 70 - 99 mg/dL Final    Urea Nitrogen 6 (L) 7 - 30 mg/dL Final    Creatinine 0.71 0.52 - 1.04 mg/dL Final    GFR Estimate >90 >60 mL/min/1.7m2 Final    Non  GFR Calc    GFR Estimate If Black >90 >60 mL/min/1.7m2 Final    African American GFR Calc    Calcium 8.8 8.5 - 10.1 mg/dL Final    Bilirubin Total 0.9 0.2 - 1.3 mg/dL Final    Albumin 4.1 3.4 - 5.0 g/dL Final    Protein Total 7.2 6.8 - 8.8 g/dL Final    Alkaline Phosphatase 65 40 - 150 U/L Final    ALT 15 0 - 50 U/L Final    AST 18 0 - 45 U/L Final         8/28/2018  5:18 PM      Component Results     Component Value Ref Range & Units Status    Troponin I ES <0.015 0.000 - 0.045 ug/L Final    The 99th percentile for upper reference range is 0.045 ug/L.  Troponin values   in the range of 0.045 - 0.120 ug/L may be associated with risks of adverse   clinical events.           8/28/2018  5:46 PM      Narrative     CHEST TWO VIEW 8/28/2018 5:44 PM     COMPARISON: Frontal chest x-ray 7/15/2015.    HISTORY: Chest pain.     FINDINGS: The cardiac silhouette, pulmonary vasculature, lungs and  pleural spaces are within normal limits.        Impression     IMPRESSION: Clear lungs.                Thank you for choosing [facility]       Thank you for choosing [facility] for your care. Our goal is always to provide you with excellent care. Hearing back from our patients is one way we can continue to improve our services. Please take a few minutes to complete the written survey that you may receive in the mail after you visit with us. Thank you!        [employer] Information     [employer] lets you send messages to your doctor, view your test results, renew your prescriptions, schedule appointments and more. To sign up, go to [email or web address] . Click on \"Log in\" on the left side of the screen, which will take you to the " "Welcome page. Then click on \"Sign up Now\" on the right side of the page.     You will be asked to enter the access code listed below, as well as some personal information. Please follow the directions to create your username and password.     Your access code is: [email or web address]  Expires: 2018  6:07 PM     Your access code will  in 90 days. If you need help or a new code, please call your [facility] clinic or [phone].        Care EveryWhere ID     This is your Care EveryWhere ID. This could be used by other organizations to access your [facility] medical records  [id number]        Equal Access to Services     [facility] Center[facility] : Hannah Epstein, nancie patel, deric hogan, benita basurto. So [facility] [phone].    ATENCIÓN: Si habla español, tiene a lowery disposición servicios gratuitos de asistencia lingüística. Llame al [phone].    We comply with applicable federal civil rights laws and [place] laws. We do not discriminate on the basis of race, color, national origin, age, disability, sex, sexual orientation, or gender identity.            After Visit Summary       This is your record. Keep this with you and show to your community pharmacist(s) and doctor(s) at your next visit.                  "

## 2018-08-28 NOTE — ED NOTES
"Presents via private car with sig other with c/o chest pain that woke her this AM, states is sharp shooting pain that radiates into back, c/o heartburn, left arm pain. Also had emesis x4 today. Pt tried to take OTC meds for discomfort with no relief. Pt states pain is constant and gets worse intermittently. States has had cough for 2-3 days, feels \"raspy\".   "

## 2018-08-28 NOTE — ED PROVIDER NOTES
"DEN  Recent is a 43-year-old female presenting by private car with her  for chest pain.  She has a known history of chronic bronchitis.  She smokes.  She denies using an albuterol inhaler on a regular basis.  She reports a known history of reflux that occurs on a regular basis.  She does not take medication for this.  She has had a cholecystectomy, appendectomy, and a total hysterectomy.  No recent alcohol.  No drugs of abuse reported.    The patient describes left-sided chest pain starting overnight.  She awoke with pain.  The pain is been waxing and waning since.  She feels it in her left chest.  It will radiate into her left back.  It will radiate into her left axilla and down the left arm.  She denies diaphoresis.  She has thrown up 4 times today.  No shortness of breath though she does describe some trouble going down the steps today \"because my chest hurt.\"  Minimal cough started today.  She does report wheezing.  No fever.  No leg pain or swelling.    ROS: All other review of systems are negative other than that noted above.     Past Medical History:   Diagnosis Date     ADD (attention deficit disorder with hyperactivity)      Allergic state      Anxiety      Depressive disorder      Drug-seeking behavior      Irritable bowel syndrome (IBS)      Migraine      PTSD (post-traumatic stress disorder)      Thyroid disease      Ulcer (H)      Urinary retention      Past Surgical History:   Procedure Laterality Date     APPENDECTOMY       CHOLECYSTECTOMY  2005     HYSTERECTOMY TOTAL ABDOMINAL, BILATERAL SALPINGO-OOPHORECTOMY, COMBINED  2001    bleeding ovarian cysts     HYSTERECTOMY, PAP NO LONGER INDICATED       URETHRA SURGERY      temporary stent     Family History   Problem Relation Age of Onset     Unknown/Adopted Father      Social History   Substance Use Topics     Smoking status: Current Every Day Smoker     Packs/day: 0.50     Years: 20.00     Smokeless tobacco: Never Used     Alcohol use No " "        PHYSICAL  /90  Pulse 70  Temp 98.4  F (36.9  C) (Oral)  Resp 19  Ht 1.6 m (5' 3\")  Wt 72.1 kg (159 lb)  SpO2 96%  BMI 28.17 kg/m2  General: Patient is alert and in moderate to severe distress.  Conversational but then will cry out with pain.  Neurological: Alert.  Moving upper and lower extremities equally, bilaterally.  Head / Neck: Atraumatic.  Ears: Not done.  Eyes: Pupils are equal, round, and reactive.  Normal conjunctiva.  Nose: Midline.  No epistaxis.  Mouth / Throat: No ulcerations or lesions.  Upper pharynx is not erythematous.  Moist.  Respiratory: No respiratory distress. CTA B.  Cardiovascular: Regular rhythm.  Peripheral extremities are warm.  No edema.  No calf tenderness.  Abdomen / Pelvis: Not tender.  No distention.  Soft throughout.  Genitalia: Not done.  Musculoskeletal: No tenderness over major muscles and joints.  Sitting up in the bed seems to exacerbate her pain in the left chest.  Skin: No evidence of rash or trauma.        ED COURSE  1723.  The patient has left-sided chest pain with specific associated symptoms, as described above.  She has had a cough.  She does describe wheezing though I do not appreciate any here.  She does report throwing up 4 times today.  No hematemesis or coffee-ground emesis.  No diarrhea.  No constipation.  No hematochezia or melena.  EKG is unremarkable when compared to previous.  This is documented below.  Chest x-ray ordered.  Lab values pending.  She reports multiple uses of Maalox and lidocaine in the past without any relief of symptoms.  That said, she denies having pain like this previously.  She tells me, \"It just numbs my tongue and that is it.\"  Carafate ordered.    Labs Ordered and Resulted from Time of ED Arrival Up to the Time of Departure from the ED   COMPREHENSIVE METABOLIC PANEL - Abnormal; Notable for the following:        Result Value    Potassium 3.3 (*)     Urea Nitrogen 6 (*)     All other components within normal limits " "  CBC WITH PLATELETS DIFFERENTIAL   TROPONIN I     IMAGING  Images reviewed by me.  Radiology report also reviewed.  XR Chest 2 Views   Preliminary Result   IMPRESSION: Clear lungs.        EKG  (1630)   Interpretation performed by me.  Rate: 71     Rhythm: sinus     Axis: nl  Intervals: MN (12-2) 228, QRS (<12) 84, QTc (>5) 403  P wave: nl     QRS complex: nl  ST segment / T-wave: T-wave inversion in V1- V3.  T-wave flattening in V4.  Conclusion: First-degree AV block, negative T-wave similar to previous EKG on 7/15/15.    1807.  The patient has unremarkable lab work.  Her chest x-ray is unremarkable.  I have low concern for acute coronary syndrome.  Her EKG is similar to previous and her troponin is negative.  I have low concern for pulmonary embolism.  She is without tachycardia, obvious shortness of breath, leg pain or swelling, hemoptysis.  Low concern for aortic dissection.  She has apparently had this multiple times in the past.  She became very frustrated after I told her that I was not finding anything obvious as a cause for her pain.  She began to swear and yell at me.  She told me to leave the room because I was not helpful.  I tried to understand why she was frustrated.  She told me that \"All of you doctors suck  No one can tell me what is going on or relieve my pain.\"  I asked her if she had a primary doctor.  She told me that she refuses to see her doctor, \"because all he wants to do is poked needles into my chest and stuff.\"  There is no obvious need for narcotic medication at this time.  I am not sure if that is ultimately what she was trying to obtain.  There is some concern for that if she is coming back to the ER environment repeatedly with similar symptoms.  Hopefully, she will follow-up with a new primary doctor or her old one for further guidance.  There is no emergent need for hospitalization at this time.    Medications   sucralfate (CARAFATE) suspension 1 g (1 g Oral Given 8/28/18 8873) "       IMPRESSION    ICD-10-CM    1. Chest pain, unspecified type R07.9            Critical Care time:  none                    Carlos Enrique Fontana MD  08/28/18 3667

## 2018-08-28 NOTE — ED NOTES
"Pt states the carafate isn't going to help, states she had had GI cocktails in the past when she has this pain and it doesn't help. Informed pt this is different than the GI cocktail. Pt states this isn't pain from her stomach, it is her heart. \"Why doesn't anyone listen to me?\"  Informed pt that her blood tests and EKG aren't showing any cause for concern for a heart attack. Pt states she will throw up the carafate, states her gag reflex is \"very sensitive\", offered water to drink after to get rid of medicine taste, pt declines. Pt asking sig other for her soda pop.   "

## 2019-03-27 ENCOUNTER — HOSPITAL ENCOUNTER (EMERGENCY)
Facility: CLINIC | Age: 44
Discharge: HOME | End: 2019-03-27
Attending: FAMILY MEDICINE | Admitting: FAMILY MEDICINE
Payer: COMMERCIAL

## 2019-03-27 VITALS
TEMPERATURE: 98 F | RESPIRATION RATE: 20 BRPM | BODY MASS INDEX: 28.34 KG/M2 | OXYGEN SATURATION: 99 % | SYSTOLIC BLOOD PRESSURE: 115 MMHG | DIASTOLIC BLOOD PRESSURE: 77 MMHG | WEIGHT: 160 LBS

## 2019-03-27 DIAGNOSIS — R33.9 URINARY RETENTION WITH INCOMPLETE BLADDER EMPTYING: ICD-10-CM

## 2019-03-27 LAB
ALBUMIN UR-MCNC: NEGATIVE MG/DL
APPEARANCE UR: CLEAR
BACTERIA #/AREA URNS HPF: ABNORMAL /HPF
BILIRUB UR QL STRIP: NEGATIVE
COLOR UR AUTO: ABNORMAL
GLUCOSE UR STRIP-MCNC: NEGATIVE MG/DL
HGB UR QL STRIP: NEGATIVE
KETONES UR STRIP-MCNC: NEGATIVE MG/DL
LEUKOCYTE ESTERASE UR QL STRIP: ABNORMAL
NITRATE UR QL: NEGATIVE
PH UR STRIP: 7 PH (ref 5–7)
RBC #/AREA URNS AUTO: 1 /HPF (ref 0–2)
SOURCE: ABNORMAL
SP GR UR STRIP: 1.01 (ref 1–1.03)
SQUAMOUS #/AREA URNS AUTO: <1 /HPF (ref 0–1)
UROBILINOGEN UR STRIP-MCNC: 0 MG/DL (ref 0–2)
WBC #/AREA URNS AUTO: 1 /HPF (ref 0–5)

## 2019-03-27 PROCEDURE — 99283 EMERGENCY DEPT VISIT LOW MDM: CPT | Performed by: FAMILY MEDICINE

## 2019-03-27 PROCEDURE — 81001 URINALYSIS AUTO W/SCOPE: CPT | Performed by: EMERGENCY MEDICINE

## 2019-03-27 PROCEDURE — 99284 EMERGENCY DEPT VISIT MOD MDM: CPT | Mod: Z6 | Performed by: FAMILY MEDICINE

## 2019-03-28 NOTE — ED NOTES
Pt presents to ED with concerns of urinary obstruction that has been going on for 24-36 hours now. 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 Pt has had issues with urinary obstruction throughout her life. Pt states it's not all the way blocked, but it is partially blocked. Pt states feels like she is not able to empty completely and has to push harder to get urine out. No other symptoms. Has had a suprapubic catheter in the past.

## 2019-03-28 NOTE — ED PROVIDER NOTES
HPI  Current medications, past medical history, and social history are reviewed.    The patient is a 43-year-old female presenting with concern for urinary retention.  She tells me she has had multiple episodes of retention in the past.  She tells me she has urinary scarring.  She tells me she has had to have procedural sedation for placement of a urinary catheter because of this.  She tells me she last saw a urologist in 2007 at [facility].  She denies having a regular urologist.  She tells me she is getting progressively worsened pain in her pelvis.  She tells me the urine does come out when she pushes hard but it is becoming more difficult.  She denies recent fever.  No back pain.  No nausea or vomiting.  No recent trauma or injury.    ROS: All other review of systems are negative other than that noted above.      Past Medical History:   Diagnosis Date     ADD (attention deficit disorder with hyperactivity)      Allergic state      Anxiety      Depressive disorder      Drug-seeking behavior      Irritable bowel syndrome (IBS)      Migraine      PTSD (post-traumatic stress disorder)      Thyroid disease      Ulcer (H)      Urinary retention      Past Surgical History:   Procedure Laterality Date     APPENDECTOMY       CHOLECYSTECTOMY  2005     HYSTERECTOMY TOTAL ABDOMINAL, BILATERAL SALPINGO-OOPHORECTOMY, COMBINED  2001    bleeding ovarian cysts     HYSTERECTOMY, PAP NO LONGER INDICATED       URETHRA SURGERY      temporary stent     Medicines    HYDROcodone-acetaminophen (NORCO) 5-325 MG per tablet     Family History   Problem Relation Age of Onset     Unknown/Adopted Father      Social History     Tobacco Use     Smoking status: Current Every Day Smoker     Packs/day: 0.50     Years: 20.00     Pack years: 10.00     Smokeless tobacco: Never Used   Substance Use Topics     Alcohol use: No     Drug use: No     Comment: Denies, past marijuana         PHYSICAL  /77   Temp 98  F (36.7  C) (Oral)   Resp 20   " Wt 72.6 kg (160 lb)   SpO2 99%   BMI 28.34 kg/m    General: Patient is alert and in mild distress.  She is frustrated.  Neurological: Alert.  Moving upper and lower extremities equally, bilaterally.  Head / Neck: Atraumatic.  Ears: Not done.  Eyes: Pupils are equal, round, and reactive.  Normal conjunctiva.  Nose: Midline.  No epistaxis.  Mouth / Throat:  Moist. Respiratory: No respiratory distress.  Cardiovascular: Regular rhythm.  Peripheral extremities are warm.  Abdomen / Pelvis: Not done.  Genitalia: Not done.  Musculoskeletal: Not done.  Skin: No evidence of rash or trauma.        PHYSICIAN  2018.  The patient told me her history and I suggested a couple of different options.  The first was that we tried to place a bladder catheter to provide relief of her obstruction.  This was with the understanding that it might be a very difficult placement and that we may not be successful.  The patient told me that even if we were successful and we sent her home with a catheter she would pull it 10 minutes after she got home, \"because I am just super anxious and I cannot keep it in.\"  The patient also expresses her extreme frustration that she is without medical insurance starting on Monday.  I told her that it would be extremely difficult if not impossible to have a urologist see her tonight for this problem.  She has not with complete obstruction as she is able to push urine out and maintain her comfort.  She does not need an emergent bladder catheterization.  I did suggest that provide contact information for the urology clinic.  I suggested she try to get in as soon as possible though as it may be very difficult to be seen before Monday.  She told me that was a joke because she would never get in.  My only suggestion was that she perhaps show up at an ER in the [facility] city such as the [place] where she might have the fortune of getting urology to come and see her in the ED.  I suggested she do this during the " "daytime hours.  At this point, she got up and walked out of the room.  Both she and her  then got up and walked out of the room and loudly told the nurses that we \"did not do anything for them.\"  Admittedly, this was a difficult situation and even if we were able to get a catheter placed she would pull it at home so I am not sure how is to help her here in the ED.  She is looking for definitive treatment of her urethral adhesions or scarring if that is in fact the direct cause of her obstruction and unfortunately I cannot get that done tonight.      IMPRESSION    ICD-10-CM    1. Urinary retention with incomplete bladder emptying R33.9                   Carlos Enrique Fontana MD  03/27/19 2023    "

## 2019-05-24 ENCOUNTER — HOSPITAL ENCOUNTER (EMERGENCY)
Facility: CLINIC | Age: 44
Discharge: LEFT BEFORE DISCHARGE | End: 2019-05-24
Attending: EMERGENCY MEDICINE | Admitting: EMERGENCY MEDICINE

## 2019-05-24 VITALS
BODY MASS INDEX: 28.34 KG/M2 | TEMPERATURE: 98 F | HEIGHT: 63 IN | RESPIRATION RATE: 16 BRPM | DIASTOLIC BLOOD PRESSURE: 76 MMHG | OXYGEN SATURATION: 99 % | HEART RATE: 99 BPM | SYSTOLIC BLOOD PRESSURE: 129 MMHG

## 2019-05-24 DIAGNOSIS — R30.0 DYSURIA: ICD-10-CM

## 2019-05-24 PROCEDURE — 99282 EMERGENCY DEPT VISIT SF MDM: CPT | Performed by: EMERGENCY MEDICINE

## 2019-05-24 PROCEDURE — 99282 EMERGENCY DEPT VISIT SF MDM: CPT | Mod: Z6 | Performed by: EMERGENCY MEDICINE

## 2019-05-24 ASSESSMENT — ENCOUNTER SYMPTOMS
DIFFICULTY URINATING: 1
VOMITING: 1
DYSURIA: 1
ABDOMINAL PAIN: 1

## 2019-05-24 NOTE — ED TRIAGE NOTES
"Pt presents ambulatory to triage with c/o abdominal pain, nausea and urinary retention that started 2 days ago. Pt states she was going to the [facility] she \"heard something hit the bottom of the toilet and I know it was a stone\".   "

## 2019-05-24 NOTE — ED PROVIDER NOTES
"  History     Chief Complaint   Patient presents with     Abdominal Pain     Urinary Retention     The history is provided by the patient and medical records.     Meli Rodriguez is a 43 year old female with history of polysubstance abuse and multiple ER visits for urinary retention and UTI, who presents to the ED with complaint of urinary retention and abdominal pain. She states that yesterday afternoon she went to urinate and had to push hard, she then heard a \"ting\" hit the toilet. Since then she is unable to urinate and if she tries it burns. She also reports puking for 2 days. She states she does not have medical insurance. The patient had a similar presentation most recently at [facility] ED 5/14/19, and at this time she had a CT scan that demonstrated no evidence of renal stones or ureteral or bladder stones or other acute pathology.     I have reviewed the Medications, Allergies, Past Medical and Surgical History, and Social History in the [employer] system.  Past Medical History:   Diagnosis Date     ADD (attention deficit disorder with hyperactivity)      Allergic state      Anxiety      Depressive disorder      Drug-seeking behavior      Irritable bowel syndrome (IBS)      Migraine      PTSD (post-traumatic stress disorder)      Thyroid disease      Ulcer      Urinary retention        Past Surgical History:   Procedure Laterality Date     APPENDECTOMY       CHOLECYSTECTOMY  2005     HYSTERECTOMY TOTAL ABDOMINAL, BILATERAL SALPINGO-OOPHORECTOMY, COMBINED  2001    bleeding ovarian cysts     HYSTERECTOMY, PAP NO LONGER INDICATED       URETHRA SURGERY      temporary stent       Family History   Problem Relation Age of Onset     Unknown/Adopted Father        Social History     Tobacco Use     Smoking status: Current Every Day Smoker     Packs/day: 0.50     Years: 20.00     Pack years: 10.00     Smokeless tobacco: Never Used   Substance Use Topics     Alcohol use: No       No current facility-administered medications for " "this encounter.      No current outpatient medications on file.        Allergies   Allergen Reactions     Demerol Itching     Droperidol Other (See Comments)     twitching       Nsaids Other (See Comments)     Gastric ulcer     Toradol Itching     Adhesive Tape Rash       Review of Systems   Constitutional: Negative for fever.   Respiratory: Negative for shortness of breath.    Cardiovascular: Negative for chest pain.   Gastrointestinal: Positive for abdominal pain and vomiting.   Genitourinary: Positive for difficulty urinating and dysuria.   All other systems reviewed and are negative.      Physical Exam   BP: 129/76  Pulse: 99  Temp: 98  F (36.7  C)  Resp: 16  Height: 160 cm (5' 3\")  SpO2: 99 %      Physical Exam   Constitutional: She is oriented to person, place, and time. She appears well-developed and well-nourished.  Non-toxic appearance. She does not appear ill. No distress.   HENT:   Head: Normocephalic and atraumatic.   Eyes: Pupils are equal, round, and reactive to light. EOM are normal. No scleral icterus.   Neck: Normal range of motion. Neck supple.   Cardiovascular: Normal rate.   Pulmonary/Chest: Effort normal. No respiratory distress.   Abdominal: Soft. She exhibits no distension. There is no tenderness. There is no rigidity, no rebound, no guarding and no CVA tenderness.   Neurological: She is alert and oriented to person, place, and time. She has normal strength. No sensory deficit.   Skin: Skin is warm and dry. No rash noted. No pallor.   Psychiatric: She has a normal mood and affect. Her behavior is normal.   Nursing note and vitals reviewed.      ED Course        Procedures               Assessments & Plan (with Medical Decision Making)   This patient  presented to the Emergency Department complaining of hearing a kidney stone fall into the toilet and then having pain and difficulty urinating. She appeared in no acute distress and was resting comfortably in her bed. I was able to review records " on the patient and this is her 16th Emergency Department visit since January 1st of this year. She had recently been seen, 10 days ago, at another Emergency Department with the same story of hearing a kidney stone fall in the toilet and then having pain and was worked up with CT scan that demonstrated no evidence of renal stones or ureteral or bladder stones or other acute pathology. Her abdominal exam is benign, without peritoneal signs and it was explained to the patient that I would be happy to check her urine to see if there was infection and do a post-void residual to see if she was in fact retaining. I stated that we would be happy to treat any infection with antibiotics and to try pyridium for her discomfort and if she is truly retaining we can place a Patel catheter and have her follow up with urology.   She stated that none of this would work and I explained that we would not be using narcotic pain medication in this case and also explained to her that she most likely did not hear a kidney stone given this is the same story she presented with 10 days ago and at that time during work up no signs of renal stones were seen on CT.  Therefore there was little to no likelihood that she would have developed a large enough kidney stone to have caused the symptoms that she described in the interim. I placed orders for the urinalysis and discussed with the nurse the plan for post void residual bladder scan but evidently before this was obtained the patient stated that she was not going to stay and got up and walked out of the Emergency Department.      This part of the medical record was transcribed by Briseida Waddell, Medical Scribe, from a dictation done by Dr. Leyva.     I have reviewed the nursing notes.    I have reviewed the findings, diagnosis, plan and need for follow up with the patient.       Medication List      There are no discharge medications for this visit.         Final diagnoses:   Dysuria     I,  Briseida Waddell, am serving as a trained medical scribe to document services personally performed by Jerson Leyva MD, based on the provider's statements to me.   I, Jerson Leyva MD, was physically present and have reviewed and verified the accuracy of this note documented by Briseida Waddell.    5/24/2019   [facility], [place], EMERGENCY DEPARTMENT     Jerson Leyva MD  05/25/19 0960

## 2019-05-25 ASSESSMENT — ENCOUNTER SYMPTOMS
FEVER: 0
SHORTNESS OF BREATH: 0

## 2019-08-22 PROCEDURE — 99284 EMERGENCY DEPT VISIT MOD MDM: CPT | Mod: Z6 | Performed by: EMERGENCY MEDICINE

## 2019-08-23 ENCOUNTER — HOSPITAL ENCOUNTER (EMERGENCY)
Facility: CLINIC | Age: 44
Discharge: HOME | End: 2019-08-23
Attending: EMERGENCY MEDICINE | Admitting: EMERGENCY MEDICINE

## 2019-08-23 VITALS
OXYGEN SATURATION: 95 % | WEIGHT: 150 LBS | HEIGHT: 63 IN | TEMPERATURE: 97.9 F | DIASTOLIC BLOOD PRESSURE: 100 MMHG | SYSTOLIC BLOOD PRESSURE: 155 MMHG | HEART RATE: 80 BPM | RESPIRATION RATE: 20 BRPM | BODY MASS INDEX: 26.58 KG/M2

## 2019-08-23 DIAGNOSIS — Z88.9 MULTIPLE ALLERGIES: ICD-10-CM

## 2019-08-23 DIAGNOSIS — R11.2 NON-INTRACTABLE VOMITING WITH NAUSEA, UNSPECIFIED VOMITING TYPE: ICD-10-CM

## 2019-08-23 LAB
ALBUMIN SERPL-MCNC: 4.8 G/DL (ref 3.4–5)
ALBUMIN UR-MCNC: NEGATIVE MG/DL
ALP SERPL-CCNC: 78 U/L (ref 40–150)
ALT SERPL W P-5'-P-CCNC: 12 U/L (ref 0–50)
ANION GAP SERPL CALCULATED.3IONS-SCNC: 7 MMOL/L (ref 3–14)
APPEARANCE UR: CLEAR
AST SERPL W P-5'-P-CCNC: 15 U/L (ref 0–45)
BASOPHILS # BLD AUTO: 0 10E9/L (ref 0–0.2)
BASOPHILS NFR BLD AUTO: 0.3 %
BILIRUB SERPL-MCNC: 1.3 MG/DL (ref 0.2–1.3)
BILIRUB UR QL STRIP: NEGATIVE
BUN SERPL-MCNC: 7 MG/DL (ref 7–30)
CALCIUM SERPL-MCNC: 9.5 MG/DL (ref 8.5–10.1)
CHLORIDE SERPL-SCNC: 105 MMOL/L (ref 94–109)
CO2 SERPL-SCNC: 25 MMOL/L (ref 20–32)
COLOR UR AUTO: YELLOW
CREAT SERPL-MCNC: 0.68 MG/DL (ref 0.52–1.04)
DIFFERENTIAL METHOD BLD: NORMAL
EOSINOPHIL # BLD AUTO: 0.1 10E9/L (ref 0–0.7)
EOSINOPHIL NFR BLD AUTO: 1 %
ERYTHROCYTE [DISTWIDTH] IN BLOOD BY AUTOMATED COUNT: 12.7 % (ref 10–15)
GFR SERPL CREATININE-BSD FRML MDRD: >90 ML/MIN/{1.73_M2}
GLUCOSE SERPL-MCNC: 106 MG/DL (ref 70–99)
GLUCOSE UR STRIP-MCNC: NEGATIVE MG/DL
HCG UR QL: NEGATIVE
HCT VFR BLD AUTO: 45 % (ref 35–47)
HGB BLD-MCNC: 15.4 G/DL (ref 11.7–15.7)
HGB UR QL STRIP: ABNORMAL
IMM GRANULOCYTES # BLD: 0 10E9/L (ref 0–0.4)
IMM GRANULOCYTES NFR BLD: 0.2 %
KETONES UR STRIP-MCNC: 5 MG/DL
LEUKOCYTE ESTERASE UR QL STRIP: ABNORMAL
LYMPHOCYTES # BLD AUTO: 2.5 10E9/L (ref 0.8–5.3)
LYMPHOCYTES NFR BLD AUTO: 25.2 %
MCH RBC QN AUTO: 30.3 PG (ref 26.5–33)
MCHC RBC AUTO-ENTMCNC: 34.2 G/DL (ref 31.5–36.5)
MCV RBC AUTO: 88 FL (ref 78–100)
MONOCYTES # BLD AUTO: 0.9 10E9/L (ref 0–1.3)
MONOCYTES NFR BLD AUTO: 9 %
MUCOUS THREADS #/AREA URNS LPF: PRESENT /LPF
NEUTROPHILS # BLD AUTO: 6.5 10E9/L (ref 1.6–8.3)
NEUTROPHILS NFR BLD AUTO: 64.3 %
NITRATE UR QL: NEGATIVE
NRBC # BLD AUTO: 0 10*3/UL
NRBC BLD AUTO-RTO: 0 /100
PH UR STRIP: 5 PH (ref 5–7)
PLATELET # BLD AUTO: 231 10E9/L (ref 150–450)
POTASSIUM SERPL-SCNC: 3.6 MMOL/L (ref 3.4–5.3)
PROT SERPL-MCNC: 8.2 G/DL (ref 6.8–8.8)
RBC # BLD AUTO: 5.09 10E12/L (ref 3.8–5.2)
RBC #/AREA URNS AUTO: 1 /HPF (ref 0–2)
SODIUM SERPL-SCNC: 137 MMOL/L (ref 133–144)
SOURCE: ABNORMAL
SP GR UR STRIP: 1.01 (ref 1–1.03)
SQUAMOUS #/AREA URNS AUTO: 1 /HPF (ref 0–1)
UROBILINOGEN UR STRIP-MCNC: 0 MG/DL (ref 0–2)
WBC # BLD AUTO: 10.1 10E9/L (ref 4–11)
WBC #/AREA URNS AUTO: 8 /HPF (ref 0–5)

## 2019-08-23 PROCEDURE — 96361 HYDRATE IV INFUSION ADD-ON: CPT | Performed by: EMERGENCY MEDICINE

## 2019-08-23 PROCEDURE — 25000128 H RX IP 250 OP 636: Performed by: EMERGENCY MEDICINE

## 2019-08-23 PROCEDURE — 80053 COMPREHEN METABOLIC PANEL: CPT | Performed by: EMERGENCY MEDICINE

## 2019-08-23 PROCEDURE — 85025 COMPLETE CBC W/AUTO DIFF WBC: CPT | Performed by: EMERGENCY MEDICINE

## 2019-08-23 PROCEDURE — 96374 THER/PROPH/DIAG INJ IV PUSH: CPT | Performed by: EMERGENCY MEDICINE

## 2019-08-23 PROCEDURE — 81001 URINALYSIS AUTO W/SCOPE: CPT | Performed by: EMERGENCY MEDICINE

## 2019-08-23 PROCEDURE — 81025 URINE PREGNANCY TEST: CPT | Performed by: EMERGENCY MEDICINE

## 2019-08-23 PROCEDURE — 99284 EMERGENCY DEPT VISIT MOD MDM: CPT | Mod: 25 | Performed by: EMERGENCY MEDICINE

## 2019-08-23 RX ORDER — DIPHENHYDRAMINE HYDROCHLORIDE 50 MG/ML
25 INJECTION INTRAMUSCULAR; INTRAVENOUS ONCE
Status: DISCONTINUED | OUTPATIENT
Start: 2019-08-23 | End: 2019-08-23 | Stop reason: HOSPADM

## 2019-08-23 RX ORDER — ONDANSETRON 2 MG/ML
4 INJECTION INTRAMUSCULAR; INTRAVENOUS EVERY 30 MIN PRN
Status: DISCONTINUED | OUTPATIENT
Start: 2019-08-23 | End: 2019-08-23 | Stop reason: HOSPADM

## 2019-08-23 RX ADMIN — ONDANSETRON 4 MG: 2 INJECTION INTRAMUSCULAR; INTRAVENOUS at 17:46

## 2019-08-23 RX ADMIN — SODIUM CHLORIDE 1000 ML: 9 INJECTION, SOLUTION INTRAVENOUS at 17:15

## 2019-08-23 ASSESSMENT — ENCOUNTER SYMPTOMS
FEVER: 0
SHORTNESS OF BREATH: 0
DIARRHEA: 1
AGITATION: 0
COLOR CHANGE: 0
NAUSEA: 1
VOMITING: 1

## 2019-08-23 ASSESSMENT — MIFFLIN-ST. JEOR: SCORE: 1304.53

## 2019-08-23 NOTE — ED AVS SNAPSHOT
[facility] Emergency Department  [address] [facility] [address]  Phone:  [phone]  Fax:  [phone]                                    Meli Rodriguez   MRN: [id number]    Department:  [facility] Emergency Department   Date of Visit:  8/23/2019           After Visit Summary Signature Page    I have received my discharge instructions, and my questions have been answered. I have discussed any challenges I see with this plan with the nurse or doctor.    ..........................................................................................................................................  Patient/Patient Representative Signature      ..........................................................................................................................................  Patient Representative Print Name and Relationship to Patient    ..................................................               ................................................  Date                                   Time    ..........................................................................................................................................  Reviewed by Signature/Title    ...................................................              ..............................................  Date                                               Time          22EPIC Rev 08/18

## 2019-08-23 NOTE — DISCHARGE INSTRUCTIONS
You were seen today for nausea and vomiting. I suspect your nausea will improve if you quit marijuana. Please consider this.     Please return if you have worsening pain, nausea, or inability to eat.

## 2019-08-23 NOTE — ED PROVIDER NOTES
History     Chief Complaint   Patient presents with     Nausea & Vomiting     Started this morning, abdominal cramping.      HPI  Meli Rodriguez is a 44 year old female who presents with vomiting and nausea.  The patient woke up with this.  Also with brown diarrhea, but she says this is regular for her and not new.  She is a smoker.  She is status post cholecystectomy and status post appendectomy.  She also has no reproductive organs after a hysterectomy.  No fevers.  No recent travel.  No camping no new foods.  Nobody else in the house with same.  She denies chest pain or shortness of breath. She denies trauma.  She is a daily marijuana smoker she has been cautioned about marijuana causing hyperemesis in the past.  She is a history of polysubstance abuse.  She has multiple allergies.     Allergies:  Allergies   Allergen Reactions     Compazine [Prochlorperazine]      Demerol Itching     Diphenhydramine      Droperidol Other (See Comments)     twitching       Nsaids Other (See Comments)     Gastric ulcer     Toradol Itching     Adhesive Tape Rash       Problem List:    Patient Active Problem List    Diagnosis Date Noted     ADD (attention deficit disorder) 02/18/2014     Priority: Medium     Chondromalacia of patella 12/11/2013     Priority: Medium     Drug-seeking behavior 12/06/2013     Priority: Medium     Health Care Home 12/03/2013     Priority: Medium       Status:  Unable to reach    -See Letters for H Care Plan - Emergency Care Plan           Social phobia: with panic attacks 07/23/2012     Priority: Medium     Moderate recurrent major depression: per psychiatry consult 07/23/2012     Priority: Medium     Prescription Opioid and benzodiazepine abuse 07/23/2012     Priority: Medium     Contusion of duodenum 06/26/2012     Priority: Medium     Vitamin D deficiency 06/26/2012     Priority: Medium     At risk for osteoporosis 06/26/2012     Priority: Medium     Hematochezia 06/26/2012     Priority: Medium  "    Abdominal pain, unspecified abdominal location 06/25/2012     Priority: Medium     Problem list name updated by automated process. Provider to review          Past Medical History:    Past Medical History:   Diagnosis Date     ADD (attention deficit disorder with hyperactivity)      Allergic state      Anxiety      Depressive disorder      Drug-seeking behavior      Irritable bowel syndrome (IBS)      Migraine      PTSD (post-traumatic stress disorder)      Thyroid disease      Ulcer      Urinary retention        Past Surgical History:    Past Surgical History:   Procedure Laterality Date     APPENDECTOMY       CHOLECYSTECTOMY  2005     HYSTERECTOMY TOTAL ABDOMINAL, BILATERAL SALPINGO-OOPHORECTOMY, COMBINED  2001    bleeding ovarian cysts     HYSTERECTOMY, PAP NO LONGER INDICATED       URETHRA SURGERY      temporary stent       Family History:    Family History   Problem Relation Age of Onset     Unknown/Adopted Father        Social History:  Marital Status:   [2]  Social History     Tobacco Use     Smoking status: Current Every Day Smoker     Packs/day: 0.50     Years: 20.00     Pack years: 10.00     Smokeless tobacco: Never Used   Substance Use Topics     Alcohol use: No     Drug use: No     Comment: Denies, past marijuana        Medications:      No current outpatient medications on file.      Review of Systems   Constitutional: Negative for fever.   Respiratory: Negative for shortness of breath.    Cardiovascular: Negative for chest pain.   Gastrointestinal: Positive for diarrhea, nausea and vomiting.   Skin: Negative for color change.   Psychiatric/Behavioral: Negative for agitation.       Physical Exam   BP: (!) 170/111  Heart Rate: 83  Temp: 97.5  F (36.4  C)  Height: 160 cm (5' 3\")  Weight: 68 kg (150 lb)  SpO2: 94 %      Physical Exam   Constitutional: No distress.   Non-toxic   HENT:   Head: Normocephalic.   Red hair coloring noted   Eyes: EOM are normal. Left eye exhibits no discharge. " "  Cardiovascular: Normal rate.   Pulmonary/Chest: Effort normal. No stridor.   Abdominal: She exhibits no distension. There is no tenderness.   + active bowel sounds   Musculoskeletal: She exhibits no edema.   Neurological: She is alert. No cranial nerve deficit.   Skin: No erythema.   Psychiatric:   anxious   Vitals reviewed.      ED Course        Procedures            607 -the patient refused Compazine.  She also refused Benadryl.  She says she is allergic to both of these and they both make her \"shake uncontrollably.\"She says that the only thing that works for her is Zofran and I will spoke to her and told her that I was not going to give her any she was agreeable to this and took the Zofran.  Also give her fluids.  She feels much better at this time she did not want any other treatment.  She does not have a white count and her electrolytes are normal.  She has no fever.  I think she is clinically stable for discharge at this time.  Encouraged her to follow-up with regular doctor about her blood pressure which is somewhat high here.  She is agreeable to this.  She is ambulating normally.  I will discharge her at this time.    Results for orders placed or performed during the hospital encounter of 08/23/19 (from the past 24 hour(s))   CBC with platelets differential   Result Value Ref Range    WBC 10.1 4.0 - 11.0 10e9/L    RBC Count 5.09 3.8 - 5.2 10e12/L    Hemoglobin 15.4 11.7 - 15.7 g/dL    Hematocrit 45.0 35.0 - 47.0 %    MCV 88 78 - 100 fl    MCH 30.3 26.5 - 33.0 pg    MCHC 34.2 31.5 - 36.5 g/dL    RDW 12.7 10.0 - 15.0 %    Platelet Count 231 150 - 450 10e9/L    Diff Method Automated Method     % Neutrophils 64.3 %    % Lymphocytes 25.2 %    % Monocytes 9.0 %    % Eosinophils 1.0 %    % Basophils 0.3 %    % Immature Granulocytes 0.2 %    Nucleated RBCs 0 0 /100    Absolute Neutrophil 6.5 1.6 - 8.3 10e9/L    Absolute Lymphocytes 2.5 0.8 - 5.3 10e9/L    Absolute Monocytes 0.9 0.0 - 1.3 10e9/L    Absolute " Eosinophils 0.1 0.0 - 0.7 10e9/L    Absolute Basophils 0.0 0.0 - 0.2 10e9/L    Abs Immature Granulocytes 0.0 0 - 0.4 10e9/L    Absolute Nucleated RBC 0.0    Comprehensive metabolic panel   Result Value Ref Range    Sodium 137 133 - 144 mmol/L    Potassium 3.6 3.4 - 5.3 mmol/L    Chloride 105 94 - 109 mmol/L    Carbon Dioxide 25 20 - 32 mmol/L    Anion Gap 7 3 - 14 mmol/L    Glucose 106 (H) 70 - 99 mg/dL    Urea Nitrogen 7 7 - 30 mg/dL    Creatinine 0.68 0.52 - 1.04 mg/dL    GFR Estimate >90 >60 mL/min/[1.73_m2]    GFR Estimate If Black >90 >60 mL/min/[1.73_m2]    Calcium 9.5 8.5 - 10.1 mg/dL    Bilirubin Total 1.3 0.2 - 1.3 mg/dL    Albumin 4.8 3.4 - 5.0 g/dL    Protein Total 8.2 6.8 - 8.8 g/dL    Alkaline Phosphatase 78 40 - 150 U/L    ALT 12 0 - 50 U/L    AST 15 0 - 45 U/L   HCG qualitative urine (UPT)   Result Value Ref Range    HCG Qual Urine Negative NEG^Negative       Medications   0.9% sodium chloride BOLUS (0 mLs Intravenous Stopped 8/23/19 1810)     Followed by   0.9% sodium chloride BOLUS (has no administration in time range)   prochlorperazine (COMPAZINE) injection 10 mg (has no administration in time range)   diphenhydrAMINE (BENADRYL) injection 25 mg (25 mg Intravenous Not Given 8/23/19 1716)   ondansetron (ZOFRAN) injection 4 mg (4 mg Intravenous Given 8/23/19 1746)       Assessments & Plan (with Medical Decision Making)     Overall the patient looks well and I doubt that she has an acute medical need given that she has no appendix or gallbladder. I strongly doubt that she is need for her surgeon at this time.  Given her age I doubt that she has mesenteric ischemia.  And also this would be an atypical presentation.  I doubt that is a pelvic pathology as she is status post hysterectomy.  Unfortunately she is allergic to.  Also I can give her Haldol which I think would help her the most in the setting of possible hyperemesis syndrome.  I will give her Compazine Benadryl and fluids.  Of note she is also  allergic to Toradol which may help her cramping but unfortunately causes her itching.    I have reviewed the nursing notes.    I have reviewed the findings, diagnosis, plan and need for follow up with the patient.    New Prescriptions    No medications on file       Final diagnoses:   Non-intractable vomiting with nausea, unspecified vomiting type   Multiple allergies       8/23/2019   [facility] EMERGENCY DEPARTMENT     Rocky Marcano MD  08/23/19 4998

## 2019-08-23 NOTE — ED NOTES
Pt states she is allergic to benedryl and compazine. She states she usually doesn't come here and can't remember all her allergies. Pt on the phone and watching tv. No distress noted. States they usually give her zofran and dilaudid

## 2019-08-23 NOTE — ED NOTES
States she woke up with abd pain/n/v. Has diarrhea but has that everyday x several years. Has been dx with intestine problems in the past.

## 2020-09-23 ENCOUNTER — APPOINTMENT (OUTPATIENT)
Dept: GENERAL RADIOLOGY | Facility: CLINIC | Age: 45
End: 2020-09-23
Attending: EMERGENCY MEDICINE

## 2020-09-23 ENCOUNTER — APPOINTMENT (OUTPATIENT)
Dept: CT IMAGING | Facility: CLINIC | Age: 45
End: 2020-09-23
Attending: EMERGENCY MEDICINE

## 2020-09-23 ENCOUNTER — HOSPITAL ENCOUNTER (EMERGENCY)
Facility: CLINIC | Age: 45
Discharge: HOME | End: 2020-09-23
Attending: EMERGENCY MEDICINE | Admitting: EMERGENCY MEDICINE

## 2020-09-23 VITALS
OXYGEN SATURATION: 94 % | HEART RATE: 65 BPM | TEMPERATURE: 99.2 F | RESPIRATION RATE: 30 BRPM | WEIGHT: 153 LBS | BODY MASS INDEX: 27.1 KG/M2 | SYSTOLIC BLOOD PRESSURE: 111 MMHG | DIASTOLIC BLOOD PRESSURE: 79 MMHG

## 2020-09-23 DIAGNOSIS — N73.9 PELVIC INFECTION IN FEMALE: ICD-10-CM

## 2020-09-23 DIAGNOSIS — R10.9 ABDOMINAL PAIN, UNSPECIFIED ABDOMINAL LOCATION: ICD-10-CM

## 2020-09-23 DIAGNOSIS — N30.01 ACUTE CYSTITIS WITH HEMATURIA: ICD-10-CM

## 2020-09-23 LAB
ALBUMIN SERPL-MCNC: 2.8 G/DL (ref 3.4–5)
ALBUMIN UR-MCNC: NEGATIVE MG/DL
ALP SERPL-CCNC: 61 U/L (ref 40–150)
ALT SERPL W P-5'-P-CCNC: 11 U/L (ref 0–50)
ANION GAP SERPL CALCULATED.3IONS-SCNC: 4 MMOL/L (ref 3–14)
APPEARANCE UR: ABNORMAL
AST SERPL W P-5'-P-CCNC: 12 U/L (ref 0–45)
BASOPHILS # BLD AUTO: 0 10E9/L (ref 0–0.2)
BASOPHILS NFR BLD AUTO: 0.3 %
BILIRUB SERPL-MCNC: 0.3 MG/DL (ref 0.2–1.3)
BILIRUB UR QL STRIP: NEGATIVE
BUN SERPL-MCNC: 8 MG/DL (ref 7–30)
CALCIUM SERPL-MCNC: 7.3 MG/DL (ref 8.5–10.1)
CHLORIDE SERPL-SCNC: 111 MMOL/L (ref 94–109)
CO2 SERPL-SCNC: 24 MMOL/L (ref 20–32)
COLOR UR AUTO: YELLOW
CREAT SERPL-MCNC: 0.66 MG/DL (ref 0.52–1.04)
D DIMER PPP FEU-MCNC: <0.3 UG/ML FEU (ref 0–0.5)
DIFFERENTIAL METHOD BLD: ABNORMAL
EOSINOPHIL # BLD AUTO: 0.2 10E9/L (ref 0–0.7)
EOSINOPHIL NFR BLD AUTO: 1.9 %
ERYTHROCYTE [DISTWIDTH] IN BLOOD BY AUTOMATED COUNT: 13.5 % (ref 10–15)
GFR SERPL CREATININE-BSD FRML MDRD: >90 ML/MIN/{1.73_M2}
GLUCOSE SERPL-MCNC: 89 MG/DL (ref 70–99)
GLUCOSE UR STRIP-MCNC: NEGATIVE MG/DL
HCT VFR BLD AUTO: 33 % (ref 35–47)
HGB BLD-MCNC: 11.1 G/DL (ref 11.7–15.7)
HGB UR QL STRIP: NEGATIVE
IMM GRANULOCYTES # BLD: 0 10E9/L (ref 0–0.4)
IMM GRANULOCYTES NFR BLD: 0.2 %
KETONES UR STRIP-MCNC: NEGATIVE MG/DL
LACTATE BLD-SCNC: 0.8 MMOL/L (ref 0.7–2)
LEUKOCYTE ESTERASE UR QL STRIP: ABNORMAL
LIPASE SERPL-CCNC: 605 U/L (ref 73–393)
LYMPHOCYTES # BLD AUTO: 2.9 10E9/L (ref 0.8–5.3)
LYMPHOCYTES NFR BLD AUTO: 33.1 %
MCH RBC QN AUTO: 31.9 PG (ref 26.5–33)
MCHC RBC AUTO-ENTMCNC: 33.6 G/DL (ref 31.5–36.5)
MCV RBC AUTO: 95 FL (ref 78–100)
MONOCYTES # BLD AUTO: 0.8 10E9/L (ref 0–1.3)
MONOCYTES NFR BLD AUTO: 9.6 %
MUCOUS THREADS #/AREA URNS LPF: PRESENT /LPF
NEUTROPHILS # BLD AUTO: 4.7 10E9/L (ref 1.6–8.3)
NEUTROPHILS NFR BLD AUTO: 54.9 %
NITRATE UR QL: NEGATIVE
NRBC # BLD AUTO: 0 10*3/UL
NRBC BLD AUTO-RTO: 0 /100
PH UR STRIP: 6 PH (ref 5–7)
PLATELET # BLD AUTO: 174 10E9/L (ref 150–450)
POTASSIUM SERPL-SCNC: 3.3 MMOL/L (ref 3.4–5.3)
PROT SERPL-MCNC: 5.6 G/DL (ref 6.8–8.8)
RBC # BLD AUTO: 3.48 10E12/L (ref 3.8–5.2)
RBC #/AREA URNS AUTO: 9 /HPF (ref 0–2)
SODIUM SERPL-SCNC: 139 MMOL/L (ref 133–144)
SOURCE: ABNORMAL
SP GR UR STRIP: 1.01 (ref 1–1.03)
SPECIMEN SOURCE: NORMAL
SQUAMOUS #/AREA URNS AUTO: 3 /HPF (ref 0–1)
TROPONIN I SERPL-MCNC: <0.015 UG/L (ref 0–0.04)
TROPONIN I SERPL-MCNC: <0.015 UG/L (ref 0–0.04)
UROBILINOGEN UR STRIP-MCNC: 0 MG/DL (ref 0–2)
WBC # BLD AUTO: 8.6 10E9/L (ref 4–11)
WBC #/AREA URNS AUTO: 56 /HPF (ref 0–5)
WET PREP SPEC: NORMAL

## 2020-09-23 PROCEDURE — 84484 ASSAY OF TROPONIN QUANT: CPT | Performed by: EMERGENCY MEDICINE

## 2020-09-23 PROCEDURE — 80053 COMPREHEN METABOLIC PANEL: CPT | Performed by: EMERGENCY MEDICINE

## 2020-09-23 PROCEDURE — 87088 URINE BACTERIA CULTURE: CPT | Performed by: EMERGENCY MEDICINE

## 2020-09-23 PROCEDURE — 83690 ASSAY OF LIPASE: CPT | Performed by: EMERGENCY MEDICINE

## 2020-09-23 PROCEDURE — 25000128 H RX IP 250 OP 636: Performed by: EMERGENCY MEDICINE

## 2020-09-23 PROCEDURE — 96376 TX/PRO/DX INJ SAME DRUG ADON: CPT | Performed by: EMERGENCY MEDICINE

## 2020-09-23 PROCEDURE — 96375 TX/PRO/DX INJ NEW DRUG ADDON: CPT | Performed by: EMERGENCY MEDICINE

## 2020-09-23 PROCEDURE — 87491 CHLMYD TRACH DNA AMP PROBE: CPT | Performed by: EMERGENCY MEDICINE

## 2020-09-23 PROCEDURE — 81001 URINALYSIS AUTO W/SCOPE: CPT | Performed by: EMERGENCY MEDICINE

## 2020-09-23 PROCEDURE — 93010 ELECTROCARDIOGRAM REPORT: CPT | Mod: Z6 | Performed by: EMERGENCY MEDICINE

## 2020-09-23 PROCEDURE — 85025 COMPLETE CBC W/AUTO DIFF WBC: CPT | Performed by: EMERGENCY MEDICINE

## 2020-09-23 PROCEDURE — 85379 FIBRIN DEGRADATION QUANT: CPT | Performed by: EMERGENCY MEDICINE

## 2020-09-23 PROCEDURE — 25000132 ZZH RX MED GY IP 250 OP 250 PS 637: Performed by: EMERGENCY MEDICINE

## 2020-09-23 PROCEDURE — 74177 CT ABD & PELVIS W/CONTRAST: CPT

## 2020-09-23 PROCEDURE — 93005 ELECTROCARDIOGRAM TRACING: CPT | Performed by: EMERGENCY MEDICINE

## 2020-09-23 PROCEDURE — 71046 X-RAY EXAM CHEST 2 VIEWS: CPT

## 2020-09-23 PROCEDURE — 96372 THER/PROPH/DIAG INJ SC/IM: CPT | Performed by: EMERGENCY MEDICINE

## 2020-09-23 PROCEDURE — 87591 N.GONORRHOEAE DNA AMP PROB: CPT | Performed by: EMERGENCY MEDICINE

## 2020-09-23 PROCEDURE — 25000125 ZZHC RX 250: Performed by: EMERGENCY MEDICINE

## 2020-09-23 PROCEDURE — 87186 SC STD MICRODIL/AGAR DIL: CPT | Performed by: EMERGENCY MEDICINE

## 2020-09-23 PROCEDURE — 83605 ASSAY OF LACTIC ACID: CPT | Performed by: EMERGENCY MEDICINE

## 2020-09-23 PROCEDURE — 96374 THER/PROPH/DIAG INJ IV PUSH: CPT | Mod: 59 | Performed by: EMERGENCY MEDICINE

## 2020-09-23 PROCEDURE — 87086 URINE CULTURE/COLONY COUNT: CPT | Performed by: EMERGENCY MEDICINE

## 2020-09-23 PROCEDURE — 99285 EMERGENCY DEPT VISIT HI MDM: CPT | Mod: 25 | Performed by: EMERGENCY MEDICINE

## 2020-09-23 PROCEDURE — 87210 SMEAR WET MOUNT SALINE/INK: CPT | Performed by: EMERGENCY MEDICINE

## 2020-09-23 RX ORDER — CEPHALEXIN 500 MG/1
500 CAPSULE ORAL 2 TIMES DAILY
Qty: 10 CAPSULE | Refills: 0 | Status: SHIPPED | OUTPATIENT
Start: 2020-09-23 | End: 2020-09-28

## 2020-09-23 RX ORDER — AZITHROMYCIN 250 MG/1
1000 TABLET, FILM COATED ORAL ONCE
Status: COMPLETED | OUTPATIENT
Start: 2020-09-23 | End: 2020-09-23

## 2020-09-23 RX ORDER — HYDROMORPHONE HYDROCHLORIDE 1 MG/ML
0.5 INJECTION, SOLUTION INTRAMUSCULAR; INTRAVENOUS; SUBCUTANEOUS
Status: DISCONTINUED | OUTPATIENT
Start: 2020-09-23 | End: 2020-09-24 | Stop reason: HOSPADM

## 2020-09-23 RX ORDER — IOPAMIDOL 755 MG/ML
75 INJECTION, SOLUTION INTRAVASCULAR ONCE
Status: COMPLETED | OUTPATIENT
Start: 2020-09-23 | End: 2020-09-23

## 2020-09-23 RX ORDER — ONDANSETRON 2 MG/ML
4 INJECTION INTRAMUSCULAR; INTRAVENOUS EVERY 30 MIN PRN
Status: DISCONTINUED | OUTPATIENT
Start: 2020-09-23 | End: 2020-09-24 | Stop reason: HOSPADM

## 2020-09-23 RX ORDER — ONDANSETRON 2 MG/ML
4 INJECTION INTRAMUSCULAR; INTRAVENOUS ONCE
Status: DISCONTINUED | OUTPATIENT
Start: 2020-09-23 | End: 2020-09-24 | Stop reason: HOSPADM

## 2020-09-23 RX ADMIN — HYDROMORPHONE HYDROCHLORIDE 0.5 MG: 1 INJECTION, SOLUTION INTRAMUSCULAR; INTRAVENOUS; SUBCUTANEOUS at 18:12

## 2020-09-23 RX ADMIN — ONDANSETRON 4 MG: 2 INJECTION INTRAMUSCULAR; INTRAVENOUS at 18:10

## 2020-09-23 RX ADMIN — AZITHROMYCIN MONOHYDRATE 1000 MG: 250 TABLET ORAL at 22:15

## 2020-09-23 RX ADMIN — LIDOCAINE HYDROCHLORIDE 250 MG: 10 INJECTION, SOLUTION EPIDURAL; INFILTRATION; INTRACAUDAL; PERINEURAL at 22:15

## 2020-09-23 RX ADMIN — SODIUM CHLORIDE 59 ML: 9 INJECTION, SOLUTION INTRAVENOUS at 21:03

## 2020-09-23 RX ADMIN — HYDROMORPHONE HYDROCHLORIDE 0.5 MG: 1 INJECTION, SOLUTION INTRAMUSCULAR; INTRAVENOUS; SUBCUTANEOUS at 20:50

## 2020-09-23 RX ADMIN — IOPAMIDOL 75 ML: 755 INJECTION, SOLUTION INTRAVENOUS at 21:03

## 2020-09-23 NOTE — ED NOTES
"Pt requesting pain meds, states \"the doctor said he was going to put in orders for pain meds\" talked with her in length there are no orders and I will touch base with MD while she is in x ray. She agrees to this and states \"ya just let me know\"  "

## 2020-09-23 NOTE — ED AVS SNAPSHOT
[facility] Emergency Department  [address] [address]  Phone:  [phone]  Fax:  [phone]                                    Meli Rodriguez   MRN: [id number]    Department:  [facility] Emergency Department   Date of Visit:  9/23/2020           After Visit Summary Signature Page    I have received my discharge instructions, and my questions have been answered. I have discussed any challenges I see with this plan with the nurse or doctor.    ..........................................................................................................................................  Patient/Patient Representative Signature      ..........................................................................................................................................  Patient Representative Print Name and Relationship to Patient    ..................................................               ................................................  Date                                   Time    ..........................................................................................................................................  Reviewed by Signature/Title    ...................................................              ..............................................  Date                                               Time          22EPIC Rev 08/18

## 2020-09-23 NOTE — ED NOTES
Patient arrives with EMS from home. Had chest pain that started around 1200 that was unresolved with medications. Patient complains of LLQ abdominal pain upon arrival. Denies nausea or vomiting. Normal BM this morning.

## 2020-09-24 ENCOUNTER — HOSPITAL ENCOUNTER (EMERGENCY)
Facility: CLINIC | Age: 45
Discharge: HOME | End: 2020-09-24
Attending: EMERGENCY MEDICINE | Admitting: EMERGENCY MEDICINE

## 2020-09-24 VITALS
TEMPERATURE: 97.5 F | BODY MASS INDEX: 27.11 KG/M2 | SYSTOLIC BLOOD PRESSURE: 112 MMHG | HEART RATE: 82 BPM | WEIGHT: 153 LBS | OXYGEN SATURATION: 96 % | HEIGHT: 63 IN | RESPIRATION RATE: 16 BRPM | DIASTOLIC BLOOD PRESSURE: 76 MMHG

## 2020-09-24 DIAGNOSIS — N93.9 VAGINAL BLEEDING: ICD-10-CM

## 2020-09-24 DIAGNOSIS — R10.32 ABDOMINAL PAIN, LEFT LOWER QUADRANT: ICD-10-CM

## 2020-09-24 PROCEDURE — 99283 EMERGENCY DEPT VISIT LOW MDM: CPT | Performed by: EMERGENCY MEDICINE

## 2020-09-24 PROCEDURE — 99284 EMERGENCY DEPT VISIT MOD MDM: CPT | Mod: Z6 | Performed by: EMERGENCY MEDICINE

## 2020-09-24 RX ORDER — ACETAMINOPHEN 325 MG/1
650 TABLET ORAL EVERY 4 HOURS PRN
COMMUNITY
Start: 2019-10-26 | End: 2023-07-10

## 2020-09-24 RX ORDER — ACETAMINOPHEN 500 MG
1000 TABLET ORAL ONCE
Status: DISCONTINUED | OUTPATIENT
Start: 2020-09-24 | End: 2020-09-24 | Stop reason: HOSPADM

## 2020-09-24 ASSESSMENT — ENCOUNTER SYMPTOMS
NAUSEA: 0
DYSURIA: 0
BACK PAIN: 0
ABDOMINAL PAIN: 1
NECK STIFFNESS: 0
HEADACHES: 0
FEVER: 0
FLANK PAIN: 1
DIARRHEA: 0
COUGH: 0
SINUS PAIN: 0
DIZZINESS: 0
CONSTIPATION: 0
SORE THROAT: 0
CHILLS: 0
COLOR CHANGE: 0
LIGHT-HEADEDNESS: 0
VOMITING: 0
FREQUENCY: 0
SHORTNESS OF BREATH: 0
NECK PAIN: 0

## 2020-09-24 ASSESSMENT — MIFFLIN-ST. JEOR: SCORE: 1313.13

## 2020-09-24 NOTE — ED NOTES
"Went in to do 2nd tropin, she states \"either admit me or send me home, everything so far has came back good I want to go home\". Tried to talk with her on repeat trop however she just continued to say the same thing over again. \"either admit me or send me home\" will talk with MD  "

## 2020-09-24 NOTE — ED NOTES
Pt asking about test results, she is sitting on her sell phone and seems comfortable. Talked with her that results are back and things so far look good and the MD will be in soon to go over everything.

## 2020-09-24 NOTE — RESULT ENCOUNTER NOTE
Emergency Dept/Urgent Care discharge antibiotic (if prescribed): Cephalexin (Keflex) 500 mg capsule, 1 capsule (500 mg) by mouth 2 times daily for 5 days.  Date of Rx (if applicable):  9/23/20  No changes in treatment per Urine culture protocol.

## 2020-09-24 NOTE — ED PROVIDER NOTES
History     Chief Complaint   Patient presents with     Vaginal Bleeding     was seen for this yesterday; pain continues today; now today has vaginal bleeding, passing clots     HPI  Meli Rodriguez is a 45 year old female with history pain, recurrent major depression, drug-seeking behavior, opioid and benzodiazepine dependence, social phobia, ADD, who presents with abdominal pain and vaginal bleeding.  She does not have any tampons or pads and has been using a paper towel difficult to quantify amount of vaginal bleeding.  She does report there have been clots.  Patient was seen in this emergency department yesterday for abdominal pain and had unremarkable work-up including CT scan of abdomen and pelvis.  She was treated empirically for STI and urinary tract infection.  No pelvic exam done at that time and she self swab.  Of note patient has had a total hysterectomy does not menstruate.  States she has not been sexually active recently no known vaginal trauma.  No fevers, chills, nausea, vomiting, diarrhea      The patient's PMHx, Surgical Hx, Allergies, and Medications were all reviewed with the patient.    Allergies:  Allergies   Allergen Reactions     Compazine [Prochlorperazine]      Demerol Itching     Diphenhydramine      Droperidol Other (See Comments)     twitching       Nsaids Other (See Comments)     Gastric ulcer     Toradol Itching     Adhesive Tape Rash       Problem List:    Patient Active Problem List    Diagnosis Date Noted     ADD (attention deficit disorder) 02/18/2014     Priority: Medium     Chondromalacia of patella 12/11/2013     Priority: Medium     Drug-seeking behavior 12/06/2013     Priority: Medium     Health Care Home 12/03/2013     Priority: Medium       Status:  Unable to reach    -See Letters for H Care Plan - Emergency Care Plan           Social phobia: with panic attacks 07/23/2012     Priority: Medium     Moderate recurrent major depression: per psychiatry consult 07/23/2012      Priority: Medium     Prescription Opioid and benzodiazepine abuse 07/23/2012     Priority: Medium     Contusion of duodenum 06/26/2012     Priority: Medium     Vitamin D deficiency 06/26/2012     Priority: Medium     At risk for osteoporosis 06/26/2012     Priority: Medium     Hematochezia 06/26/2012     Priority: Medium     Abdominal pain, unspecified abdominal location 06/25/2012     Priority: Medium     Problem list name updated by automated process. Provider to review          Past Medical History:    Past Medical History:   Diagnosis Date     ADD (attention deficit disorder with hyperactivity)      Allergic state      Anxiety      Depressive disorder      Drug-seeking behavior      Irritable bowel syndrome (IBS)      Migraine      PTSD (post-traumatic stress disorder)      Thyroid disease      Ulcer      Urinary retention        Past Surgical History:    Past Surgical History:   Procedure Laterality Date     APPENDECTOMY       CHOLECYSTECTOMY  2005     HYSTERECTOMY TOTAL ABDOMINAL, BILATERAL SALPINGO-OOPHORECTOMY, COMBINED  2001    bleeding ovarian cysts     HYSTERECTOMY, PAP NO LONGER INDICATED       URETHRA SURGERY      temporary stent       Family History:    Family History   Problem Relation Age of Onset     Unknown/Adopted Father        Social History:  Marital Status:   [2]  Social History     Tobacco Use     Smoking status: Current Every Day Smoker     Packs/day: 0.50     Years: 20.00     Pack years: 10.00     Smokeless tobacco: Never Used   Substance Use Topics     Alcohol use: No     Drug use: No     Comment: Denies, past marijuana        Medications:    acetaminophen (TYLENOL) 325 MG tablet  cephALEXin (KEFLEX) 500 MG capsule          Review of Systems   Constitutional: Negative for chills and fever.   HENT: Negative for sore throat.    Eyes: Negative for visual disturbance.   Respiratory: Negative for cough and shortness of breath.    Cardiovascular: Negative for chest pain.  "  Gastrointestinal: Positive for abdominal pain.   Genitourinary: Positive for vaginal bleeding.   Musculoskeletal: Negative for back pain.   Skin: Negative for wound.   Allergic/Immunologic: Negative for immunocompromised state.   Neurological: Negative for light-headedness and headaches.       Physical Exam   BP: 112/76  Pulse: 82  Temp: 97.5  F (36.4  C)  Resp: 16  Height: 160 cm (5' 3\")  Weight: 69.4 kg (153 lb)  SpO2: 96 %    Physical Exam  GEN: Awake, alert, and cooperative.  Appears distressed  HENT: MMM. External ears and nose normal bilaterally.  EYES: EOM intact. Conjunctiva clear. No discharge.   NECK: Supple, symmetric.  CV : Regular rate and rhythm.  Extremities warm and well-perfused  PULM: Normal effort.   ABD: Soft, minimal left lower quadrant tenderness, non-distended. No rebound or guarding.  PELVIC: The external genitalia is without lesions.  Introitus is normal, vaginal walls pink and moist without lesions or evidence of trauma. Cervix is surgically absent. No purulent drainage. Scant amount of blood in vagina and no active bleeding. No identifiable source of bleeding.   NEURO: Normal speech. Following commands. CN II-XII grossly intact. Answering questions and interacting appropriately.   EXT: No gross deformity. Warm and well perfused  INT: Warm. No diaphoresis. Normal color.        ED Course        Procedures           Critical Care time:  none                   Medications   acetaminophen (TYLENOL) tablet 1,000 mg (has no administration in time range)       Assessments & Plan (with Medical Decision Making)   45 year old female with complex past medical history who presents with continued abdominal pain and now with one day of vaginal bleeding in setting of surgical history of total hysterectomy On arrival to Emergency Department, vital signs were blood pressure 112/76, temperature 97.5, pulse 82, respiratory rate 16, SPO2 96% on room air.  She appeared distressed but better than when I saw " her yesterday.  Base concern was vaginal bleeding.  Vaginal exam with normal external genitalia and no source of bleeding identified.  Scant amount of blood in the vault.  Cervix surgically absent.  No lacerations or lesions appreciated.  I discussed repeating a few labs to see if there is any change in giving her oral pain medications.  She declined.  She was very frustrated and irritated and wanted to go home.  She did not want to wait for her paperwork with department.  I did strongly recommend to her before she left that she follows up with a gynecologist.  And concern that she has limited insight which may limit her abilities to effectively interface with the healthcare system.  We did discuss medical insurance and possibility for Medicaid she did tell me that someone significant.  I feel she would benefit significantly from establishing care with a primary care physician.  No signs of active bleeding today, no tachycardia or hypotension, alert oriented and living independently.     I have reviewed the nursing notes.         New Prescriptions    No medications on file       Final diagnoses:   Abdominal pain, left lower quadrant   Vaginal bleeding     Nicholas Núñez MD    9/24/2020   [facility] EMERGENCY DEPARTMENT    Disclaimer: This note consists of words and symbols derived from keyboarding and dictation using voice recognition software.  As a result, there may be errors that have gone undetected.  Please consider this when interpreting information found in this note.             Nicholas Núñez MD  09/26/20 1810

## 2020-09-24 NOTE — ED NOTES
"Patient states medication does not work for her, won't .  CephALEXin (KEFLEX) 500 MG capsule  Take 1 capsule (500 mg) by mouth 2 times daily for 5 days     Medication is ready for  at:    [id number] - [place], [place] - [address] [facility]    Medication flagged for prescribing provider for removal as \"Not filled/taken by patient\"  "

## 2020-09-24 NOTE — ED PROVIDER NOTES
History     Chief Complaint   Patient presents with     Chest Pain     left sided, radiates to armpit and arm     HPI  Meli Rodriguez is a 45 year old female with history of abdominal pain, recurrent major depressions, drug seeking behavior, opioid and BZD dependence. ADD, social phobia who presents with sided chest and abdominal pain.  She called EMS for left-sided chest pain that began at around noon today.  Her chest pain radiates to her left arm and is not associated with shortness of breath, nausea, vomiting, or diaphoresis.It also runs to the left side of her abdomen.  Pain started while she was at rest and has continued on and is moderate in severity.  No known provocative or palliating features.  BM this morning, no diarrhea. Denies fevers, chills, sore throat or headache.  She was given full dose aspirin as well as nitroglycerin by EMS without any change in her pain. No known coronary artery disease. She denies dysuria urgency or frequency but states she thinks he may have a urinary tract infection.  She has some abnormal vaginal discharge for the past 2 months.  She said she is only been with her  for the past 21 years and that he is her power of . I asked her why she had power of  and she says that she has a hard time understanding complex issues and ADHD. States she has an understanding of a twelve year old.     Chart review shows that she has had several ED visits for similar complaints. She does not have medical insurance and will not go to primary care physician.     The patient's PMHx, Surgical Hx, Allergies, and Medications were all reviewed with the patient.    Allergies:  Allergies   Allergen Reactions     Compazine [Prochlorperazine]      Demerol Itching     Diphenhydramine      Droperidol Other (See Comments)     twitching       Nsaids Other (See Comments)     Gastric ulcer     Toradol Itching     Adhesive Tape Rash       Problem List:    Patient Active Problem List     Diagnosis Date Noted     ADD (attention deficit disorder) 02/18/2014     Priority: Medium     Chondromalacia of patella 12/11/2013     Priority: Medium     Drug-seeking behavior 12/06/2013     Priority: Medium     Health Care Home 12/03/2013     Priority: Medium       Status:  Unable to reach    -See Letters for [facility] - Emergency Care Plan           Social phobia: with panic attacks 07/23/2012     Priority: Medium     Moderate recurrent major depression: per psychiatry consult 07/23/2012     Priority: Medium     Prescription Opioid and benzodiazepine abuse 07/23/2012     Priority: Medium     Contusion of duodenum 06/26/2012     Priority: Medium     Vitamin D deficiency 06/26/2012     Priority: Medium     At risk for osteoporosis 06/26/2012     Priority: Medium     Hematochezia 06/26/2012     Priority: Medium     Abdominal pain, unspecified abdominal location 06/25/2012     Priority: Medium     Problem list name updated by automated process. Provider to review          Past Medical History:    Past Medical History:   Diagnosis Date     ADD (attention deficit disorder with hyperactivity)      Allergic state      Anxiety      Depressive disorder      Drug-seeking behavior      Irritable bowel syndrome (IBS)      Migraine      PTSD (post-traumatic stress disorder)      Thyroid disease      Ulcer      Urinary retention        Past Surgical History:    Past Surgical History:   Procedure Laterality Date     APPENDECTOMY       CHOLECYSTECTOMY  2005     HYSTERECTOMY TOTAL ABDOMINAL, BILATERAL SALPINGO-OOPHORECTOMY, COMBINED  2001    bleeding ovarian cysts     HYSTERECTOMY, PAP NO LONGER INDICATED       URETHRA SURGERY      temporary stent       Family History:    Family History   Problem Relation Age of Onset     Unknown/Adopted Father        Social History:  Marital Status:   [2]  Social History     Tobacco Use     Smoking status: Current Every Day Smoker     Packs/day: 0.50     Years: 20.00     Pack years:  10.00     Smokeless tobacco: Never Used   Substance Use Topics     Alcohol use: No     Drug use: No     Comment: Denies, past marijuana        Medications:    cephALEXin (KEFLEX) 500 MG capsule          Review of Systems   Constitutional: Negative for chills and fever.   HENT: Negative for sinus pain and sore throat.    Eyes: Negative for visual disturbance.   Respiratory: Negative for cough and shortness of breath.    Cardiovascular: Positive for chest pain. Negative for leg swelling.   Gastrointestinal: Positive for abdominal pain. Negative for constipation, diarrhea, nausea and vomiting.   Genitourinary: Positive for flank pain and vaginal discharge. Negative for dysuria, frequency and urgency.   Musculoskeletal: Negative for back pain, neck pain and neck stiffness.   Skin: Negative for color change.   Neurological: Negative for dizziness, light-headedness and headaches.       Physical Exam   BP: 107/77  Pulse: 87  Temp: 99.2  F (37.3  C)  Resp: 18  Weight: 69.4 kg (153 lb)  SpO2: 97 %    Physical Exam  GEN: Awake, alert, and cooperative. Appears acutely distressed.  HENT: MMM. External ears and nose normal bilaterally.  EYES: EOM intact. Conjunctiva mildly injected. No discharge.   NECK: Supple, symmetric.non tender  CV : Regular rate and rhythm. No murmurs appreciated.  PULM: Normal effort. No wheezes, rales, or rhonchi bilaterally.  ABD: Soft, left sided abdominal tenderness, non-distended. Voluntary guarding, no rebound tenderness.  BACK: no midline spinal tenderness.  CVA tenderness on the left.    NEURO: Following commands. CN II-XII grossly intact.   EXT: No gross deformity. Warm and well perfused  INT: Warm. No diaphoresis. Normal color.        ED Course        Procedures             EKG Interpretation:      Interpreted by Nicholas Núñez MD  Time reviewed: 1716  Symptoms at time of EKG: CP   Rhythm: normal sinus   Rate: 91  Axis: Normal  Ectopy: none  Conduction: normal  ST Segments/ T Waves:  Non-specific ST-T wave changes  Q Waves: none  Comparison to prior: Unchanged from 8/28/2018    Clinical Impression: no acute changes and non-specific EKG      Critical Care time:  none               Results for orders placed or performed during the hospital encounter of 09/23/20 (from the past 24 hour(s))   D dimer quantitative   Result Value Ref Range    D Dimer <0.3 0.0 - 0.50 ug/ml FEU   CBC with platelets differential   Result Value Ref Range    WBC 8.6 4.0 - 11.0 10e9/L    RBC Count 3.48 (L) 3.8 - 5.2 10e12/L    Hemoglobin 11.1 (L) 11.7 - 15.7 g/dL    Hematocrit 33.0 (L) 35.0 - 47.0 %    MCV 95 78 - 100 fl    MCH 31.9 26.5 - 33.0 pg    MCHC 33.6 31.5 - 36.5 g/dL    RDW 13.5 10.0 - 15.0 %    Platelet Count 174 150 - 450 10e9/L    Diff Method Automated Method     % Neutrophils 54.9 %    % Lymphocytes 33.1 %    % Monocytes 9.6 %    % Eosinophils 1.9 %    % Basophils 0.3 %    % Immature Granulocytes 0.2 %    Nucleated RBCs 0 0 /100    Absolute Neutrophil 4.7 1.6 - 8.3 10e9/L    Absolute Lymphocytes 2.9 0.8 - 5.3 10e9/L    Absolute Monocytes 0.8 0.0 - 1.3 10e9/L    Absolute Eosinophils 0.2 0.0 - 0.7 10e9/L    Absolute Basophils 0.0 0.0 - 0.2 10e9/L    Abs Immature Granulocytes 0.0 0 - 0.4 10e9/L    Absolute Nucleated RBC 0.0    Comprehensive metabolic panel   Result Value Ref Range    Sodium 139 133 - 144 mmol/L    Potassium 3.3 (L) 3.4 - 5.3 mmol/L    Chloride 111 (H) 94 - 109 mmol/L    Carbon Dioxide 24 20 - 32 mmol/L    Anion Gap 4 3 - 14 mmol/L    Glucose 89 70 - 99 mg/dL    Urea Nitrogen 8 7 - 30 mg/dL    Creatinine 0.66 0.52 - 1.04 mg/dL    GFR Estimate >90 >60 mL/min/[1.73_m2]    GFR Estimate If Black >90 >60 mL/min/[1.73_m2]    Calcium 7.3 (L) 8.5 - 10.1 mg/dL    Bilirubin Total 0.3 0.2 - 1.3 mg/dL    Albumin 2.8 (L) 3.4 - 5.0 g/dL    Protein Total 5.6 (L) 6.8 - 8.8 g/dL    Alkaline Phosphatase 61 40 - 150 U/L    ALT 11 0 - 50 U/L    AST 12 0 - 45 U/L   Troponin I   Result Value Ref Range    Troponin I ES  <0.015 0.000 - 0.045 ug/L   Lipase   Result Value Ref Range    Lipase 605 (H) 73 - 393 U/L   Lactic acid whole blood   Result Value Ref Range    Lactic Acid 0.8 0.7 - 2.0 mmol/L   Chest XR,  PA & LAT    Narrative    CHEST TWO VIEWS  9/23/2020 5:54 PM     HISTORY:  Left-sided chest pain.    COMPARISON: 8/28/2018.      Impression    IMPRESSION: Chest is negative and unchanged. Lungs clear. No pleural  effusions.    DAVION DAVILA MD   UA with Microscopic   Result Value Ref Range    Color Urine Yellow     Appearance Urine Slightly Cloudy     Glucose Urine Negative NEG^Negative mg/dL    Bilirubin Urine Negative NEG^Negative    Ketones Urine Negative NEG^Negative mg/dL    Specific Gravity Urine 1.015 1.003 - 1.035    Blood Urine Negative NEG^Negative    pH Urine 6.0 5.0 - 7.0 pH    Protein Albumin Urine Negative NEG^Negative mg/dL    Urobilinogen mg/dL 0.0 0.0 - 2.0 mg/dL    Nitrite Urine Negative NEG^Negative    Leukocyte Esterase Urine Moderate (A) NEG^Negative    Source Midstream Urine     WBC Urine 56 (H) 0 - 5 /HPF    RBC Urine 9 (H) 0 - 2 /HPF    Squamous Epithelial /HPF Urine 3 (H) 0 - 1 /HPF    Mucous Urine Present (A) NEG^Negative /LPF   Wet prep    Specimen: Vagina   Result Value Ref Range    Specimen Description Vagina     Wet Prep No Trichomonas seen     Wet Prep No clue cells seen     Wet Prep No yeast seen     Wet Prep WBC'S seen  Many      Troponin I (second draw)   Result Value Ref Range    Troponin I ES <0.015 0.000 - 0.045 ug/L   CT Abdomen Pelvis w Contrast    Narrative    EXAM: CT ABDOMEN PELVIS W CONTRAST  LOCATION: [facility]  DATE/TIME: 9/23/2020 9:01 PM    INDICATION: Left-sided abdominal pain.  COMPARISON: 08/12/2020  TECHNIQUE: CT scan of the abdomen and pelvis was performed following injection of IV contrast. Multiplanar reformats were obtained. Dose reduction techniques were used.  CONTRAST: 75 mL Isovue 370    FINDINGS:   LOWER CHEST: Normal.    HEPATOBILIARY: Prior  cholecystectomy. Otherwise unremarkable.    PANCREAS: Normal.    SPLEEN: Normal.    ADRENAL GLANDS: Normal.    KIDNEYS/BLADDER: Normal.    BOWEL: No obstruction or acute inflammatory change.    LYMPH NODES: Normal.    VASCULATURE: Unremarkable.    PELVIC ORGANS: Hysterectomy. No adnexal lesions.    MUSCULOSKELETAL: Normal.      Impression    IMPRESSION:   1.  No etiology for symptoms evident. Nothing obstructive or inflammatory.       Medications   HYDROmorphone (PF) (DILAUDID) injection 0.5 mg (0.5 mg Intravenous Given 9/23/20 2050)   ondansetron (ZOFRAN) injection 4 mg (4 mg Intravenous Given 9/23/20 1810)   ondansetron (ZOFRAN) injection 4 mg (has no administration in time range)   iopamidol (ISOVUE-370) solution 75 mL (75 mLs Intravenous Given 9/23/20 2103)   sodium chloride 0.9 % bag 500mL for CT scan flush use (59 mLs Intravenous Given 9/23/20 2103)   cefTRIAXone (ROCEPHIN) 250 mg in lidocaine (PF) (XYLOCAINE) 1 % injection (250 mg Intramuscular Given 9/23/20 2215)   azithromycin (ZITHROMAX) tablet 1,000 mg (1,000 mg Oral Given 9/23/20 2215)       Assessments & Plan (with Medical Decision Making)   45 year old female with past medical history of recurrent depression, substance abuse, abdominal pain, with one day of left sided chest and abdominal pain.  On arrival to Emergency Department, vital signs were 107/77, temperature 99.2, pulse 87, respiratory rate 18, SPO2 97% on room air. Appears acutely distressed.  0.5 mg IV aliquots of hydromorphone for pain and 4 mg IV ondansetron for nausea.    Chest pain is reducible wall palpation.  ECG obtained and no evidence of acute ischemia.  Initial troponin below level detection.  I have a low suspicion for ACS.  Plan for repeat troponin.  Repeat troponin also below level of detection.  Chest x-ray without effusion, pneumothorax, or infiltrate.  No cough, fever, or shortness of breath.  Very low suspicion for pneumonia.  No fussy, tachycardia, hypotension, or pleuritic  component to her chest pain, PERC negative, very low suspicion for VTE do not feel any further work-up necessary.    Her abdominal pain is along the entire second side of her abdomen as well as her left flank.  She does have CVA tenderness on the left.  CBC without leukocytosis, hemoglobin is 11.1.  She denies any blood in her urine, no black tarry stools, she is status post hysterectomy  Her menstruates, hemolysis and tenderness.  Normal lactate.  Patient planes of abdominal pain.  Chart review shows that she has had numerous CTs of her abdomen pelvis without any underlying pathology identified on most recent scans.  I discussed work-up at outside hospital for left-sided abdominal pain with negative CT scans.  She states that this pain is different than the pain she has had before.  I discussed my concern with the amount of radiation she is receiving.  She again says her pain is severe, different from prior and would like a CT scan.  CT of abdomen pelvis unrevealing.  Images reviewed person as well as report from radiologist which is noted above.    Her urinalysis has 56 white cells and moderate leuk esterase.  This could represent infection and with her left CVA tenderness could represent pyonephritis.  Urine culture pending.  She also is complaining of abnormal vaginal discharge.  Discussed testing options and she elected for self swab.  Wet prep unremarkable.  Gonorrhea chlamydia pending at time of disposition.  She states that she cannot follow-up in primary care clinic or with gynecology because she does not have any health insurance discussed option of treating empirically which she opted for care she was given to 50 mg IM Rocephin 1 day of oral azithromycin.  Plan to treat with course of cephalexin for possible upper urinary tract infection.  Prescription for cephalexin sent to preferred pharmacy.    I have reviewed the nursing notes.         New Prescriptions    CEPHALEXIN (KEFLEX) 500 MG CAPSULE    Take 1  capsule (500 mg) by mouth 2 times daily for 5 days       Final diagnoses:   Abdominal pain, unspecified abdominal location - left sided   Acute cystitis with hematuria   Pelvic infection in female - possible     Nicholas Núñez MD    9/23/2020   [facility] EMERGENCY DEPARTMENT    Disclaimer: This note consists of words and symbols derived from keyboarding and dictation using voice recognition software.  As a result, there may be errors that have gone undetected.  Please consider this when interpreting information found in this note.             Nicholas Núñez MD  09/26/20 [id number]

## 2020-09-24 NOTE — DISCHARGE INSTRUCTIONS
Take cephalexin twice daily for next 5 days. Your urine has been sent for culture and testing for STI's will not be resulted for 1-2 days. You should follow up in the Family Medicine Clinic this next week to monitor treatment response and for outstanding results.     If your symptoms worsen or you develop new or concerning symptoms, please return to the Emergency Department for further evaluation and treatment.

## 2020-09-25 LAB
C TRACH DNA SPEC QL NAA+PROBE: NEGATIVE
N GONORRHOEA DNA SPEC QL NAA+PROBE: NEGATIVE
SPECIMEN SOURCE: NORMAL
SPECIMEN SOURCE: NORMAL

## 2020-09-26 LAB
BACTERIA SPEC CULT: ABNORMAL
BACTERIA SPEC CULT: ABNORMAL
Lab: ABNORMAL
SPECIMEN SOURCE: ABNORMAL

## 2020-09-26 ASSESSMENT — ENCOUNTER SYMPTOMS
WOUND: 0
BACK PAIN: 0
CHILLS: 0
SORE THROAT: 0
FEVER: 0
LIGHT-HEADEDNESS: 0
HEADACHES: 0
ABDOMINAL PAIN: 1
SHORTNESS OF BREATH: 0
COUGH: 0

## 2020-09-26 NOTE — RESULT ENCOUNTER NOTE
Final Urine Culture Report on 9/26/20  Emergency Dept/Urgent Care discharge antibiotic prescribed: Cephalexin (Keflex) 500 mg capsule, 1 capsule (500 mg) by mouth 2 times daily for 5 days.   #1. Bacteria, 10,000 - 50,000 colonies/mL Coagulase Negative Staphylococcus, is SUSCEPTIBLE to Antibiotic.    As per [facility] ED Lab Result protocol, no change in antibiotic therapy.

## 2021-02-23 ENCOUNTER — HOSPITAL ENCOUNTER (EMERGENCY)
Facility: CLINIC | Age: 46
Discharge: HOME | End: 2021-02-23
Attending: FAMILY MEDICINE | Admitting: FAMILY MEDICINE

## 2021-02-23 ENCOUNTER — APPOINTMENT (OUTPATIENT)
Dept: CT IMAGING | Facility: CLINIC | Age: 46
End: 2021-02-23
Attending: FAMILY MEDICINE

## 2021-02-23 VITALS
SYSTOLIC BLOOD PRESSURE: 103 MMHG | OXYGEN SATURATION: 93 % | DIASTOLIC BLOOD PRESSURE: 70 MMHG | HEART RATE: 64 BPM | WEIGHT: 170 LBS | TEMPERATURE: 98.1 F | BODY MASS INDEX: 30.11 KG/M2 | RESPIRATION RATE: 18 BRPM

## 2021-02-23 DIAGNOSIS — R19.7 NAUSEA VOMITING AND DIARRHEA: ICD-10-CM

## 2021-02-23 DIAGNOSIS — R11.2 NAUSEA VOMITING AND DIARRHEA: ICD-10-CM

## 2021-02-23 DIAGNOSIS — R31.9 HEMATURIA, UNSPECIFIED TYPE: ICD-10-CM

## 2021-02-23 LAB
ABO + RH BLD: NORMAL
ABO + RH BLD: NORMAL
ALBUMIN SERPL-MCNC: 3.8 G/DL (ref 3.4–5)
ALBUMIN UR-MCNC: 30 MG/DL
ALP SERPL-CCNC: 99 U/L (ref 40–150)
ALT SERPL W P-5'-P-CCNC: 15 U/L (ref 0–50)
ANION GAP SERPL CALCULATED.3IONS-SCNC: 6 MMOL/L (ref 3–14)
APPEARANCE UR: ABNORMAL
AST SERPL W P-5'-P-CCNC: 12 U/L (ref 0–45)
BASOPHILS # BLD AUTO: 0 10E9/L (ref 0–0.2)
BASOPHILS NFR BLD AUTO: 0.4 %
BILIRUB SERPL-MCNC: 0.8 MG/DL (ref 0.2–1.3)
BILIRUB UR QL STRIP: NEGATIVE
BLD GP AB SCN SERPL QL: NORMAL
BLOOD BANK CMNT PATIENT-IMP: NORMAL
BUN SERPL-MCNC: 7 MG/DL (ref 7–30)
CALCIUM SERPL-MCNC: 8.8 MG/DL (ref 8.5–10.1)
CHLORIDE SERPL-SCNC: 107 MMOL/L (ref 94–109)
CO2 SERPL-SCNC: 25 MMOL/L (ref 20–32)
COLOR UR AUTO: YELLOW
CREAT SERPL-MCNC: 0.7 MG/DL (ref 0.52–1.04)
DIFFERENTIAL METHOD BLD: NORMAL
EOSINOPHIL # BLD AUTO: 0.1 10E9/L (ref 0–0.7)
EOSINOPHIL NFR BLD AUTO: 1.1 %
ERYTHROCYTE [DISTWIDTH] IN BLOOD BY AUTOMATED COUNT: 12.9 % (ref 10–15)
GFR SERPL CREATININE-BSD FRML MDRD: >90 ML/MIN/{1.73_M2}
GLUCOSE SERPL-MCNC: 103 MG/DL (ref 70–99)
GLUCOSE UR STRIP-MCNC: NEGATIVE MG/DL
HCT VFR BLD AUTO: 39.4 % (ref 35–47)
HGB BLD-MCNC: 13.4 G/DL (ref 11.7–15.7)
HGB UR QL STRIP: ABNORMAL
IMM GRANULOCYTES # BLD: 0 10E9/L (ref 0–0.4)
IMM GRANULOCYTES NFR BLD: 0.2 %
INR PPP: 1.08 (ref 0.86–1.14)
KETONES UR STRIP-MCNC: NEGATIVE MG/DL
LEUKOCYTE ESTERASE UR QL STRIP: NEGATIVE
LIPASE SERPL-CCNC: 58 U/L (ref 73–393)
LYMPHOCYTES # BLD AUTO: 3.4 10E9/L (ref 0.8–5.3)
LYMPHOCYTES NFR BLD AUTO: 35.8 %
MCH RBC QN AUTO: 31 PG (ref 26.5–33)
MCHC RBC AUTO-ENTMCNC: 34 G/DL (ref 31.5–36.5)
MCV RBC AUTO: 91 FL (ref 78–100)
MONOCYTES # BLD AUTO: 1.1 10E9/L (ref 0–1.3)
MONOCYTES NFR BLD AUTO: 11 %
MUCOUS THREADS #/AREA URNS LPF: PRESENT /LPF
NEUTROPHILS # BLD AUTO: 4.9 10E9/L (ref 1.6–8.3)
NEUTROPHILS NFR BLD AUTO: 51.5 %
NITRATE UR QL: NEGATIVE
NRBC # BLD AUTO: 0 10*3/UL
NRBC BLD AUTO-RTO: 0 /100
PH UR STRIP: 6 PH (ref 5–7)
PLATELET # BLD AUTO: 211 10E9/L (ref 150–450)
POTASSIUM SERPL-SCNC: 3.7 MMOL/L (ref 3.4–5.3)
PROT SERPL-MCNC: 7.1 G/DL (ref 6.8–8.8)
RBC # BLD AUTO: 4.32 10E12/L (ref 3.8–5.2)
RBC #/AREA URNS AUTO: >182 /HPF (ref 0–2)
SODIUM SERPL-SCNC: 138 MMOL/L (ref 133–144)
SOURCE: ABNORMAL
SP GR UR STRIP: 1.02 (ref 1–1.03)
SPECIMEN EXP DATE BLD: NORMAL
UROBILINOGEN UR STRIP-MCNC: 2 MG/DL (ref 0–2)
WBC # BLD AUTO: 9.6 10E9/L (ref 4–11)
WBC #/AREA URNS AUTO: 5 /HPF (ref 0–5)

## 2021-02-23 PROCEDURE — 250N000011 HC RX IP 250 OP 636: Performed by: FAMILY MEDICINE

## 2021-02-23 PROCEDURE — 83690 ASSAY OF LIPASE: CPT | Performed by: FAMILY MEDICINE

## 2021-02-23 PROCEDURE — 96375 TX/PRO/DX INJ NEW DRUG ADDON: CPT | Performed by: FAMILY MEDICINE

## 2021-02-23 PROCEDURE — 81001 URINALYSIS AUTO W/SCOPE: CPT | Performed by: FAMILY MEDICINE

## 2021-02-23 PROCEDURE — 74177 CT ABD & PELVIS W/CONTRAST: CPT

## 2021-02-23 PROCEDURE — 86901 BLOOD TYPING SEROLOGIC RH(D): CPT | Performed by: FAMILY MEDICINE

## 2021-02-23 PROCEDURE — 85025 COMPLETE CBC W/AUTO DIFF WBC: CPT | Performed by: FAMILY MEDICINE

## 2021-02-23 PROCEDURE — 99285 EMERGENCY DEPT VISIT HI MDM: CPT | Performed by: FAMILY MEDICINE

## 2021-02-23 PROCEDURE — 99285 EMERGENCY DEPT VISIT HI MDM: CPT | Mod: 25 | Performed by: FAMILY MEDICINE

## 2021-02-23 PROCEDURE — 86900 BLOOD TYPING SEROLOGIC ABO: CPT | Performed by: FAMILY MEDICINE

## 2021-02-23 PROCEDURE — 96361 HYDRATE IV INFUSION ADD-ON: CPT | Performed by: FAMILY MEDICINE

## 2021-02-23 PROCEDURE — 258N000003 HC RX IP 258 OP 636: Performed by: FAMILY MEDICINE

## 2021-02-23 PROCEDURE — 250N000013 HC RX MED GY IP 250 OP 250 PS 637: Performed by: FAMILY MEDICINE

## 2021-02-23 PROCEDURE — 86850 RBC ANTIBODY SCREEN: CPT | Performed by: FAMILY MEDICINE

## 2021-02-23 PROCEDURE — 85610 PROTHROMBIN TIME: CPT | Performed by: FAMILY MEDICINE

## 2021-02-23 PROCEDURE — 96374 THER/PROPH/DIAG INJ IV PUSH: CPT | Mod: 59 | Performed by: FAMILY MEDICINE

## 2021-02-23 PROCEDURE — C9113 INJ PANTOPRAZOLE SODIUM, VIA: HCPCS | Performed by: FAMILY MEDICINE

## 2021-02-23 PROCEDURE — 250N000009 HC RX 250: Performed by: FAMILY MEDICINE

## 2021-02-23 PROCEDURE — 80053 COMPREHEN METABOLIC PANEL: CPT | Performed by: FAMILY MEDICINE

## 2021-02-23 RX ORDER — MORPHINE SULFATE 4 MG/ML
4 INJECTION, SOLUTION INTRAMUSCULAR; INTRAVENOUS ONCE
Status: COMPLETED | OUTPATIENT
Start: 2021-02-23 | End: 2021-02-23

## 2021-02-23 RX ORDER — IOPAMIDOL 755 MG/ML
83 INJECTION, SOLUTION INTRAVASCULAR ONCE
Status: COMPLETED | OUTPATIENT
Start: 2021-02-23 | End: 2021-02-23

## 2021-02-23 RX ORDER — SUCRALFATE ORAL 1 G/10ML
1-2 SUSPENSION ORAL 4 TIMES DAILY PRN
Qty: 420 ML | Refills: 0 | Status: SHIPPED | OUTPATIENT
Start: 2021-02-23 | End: 2021-03-02

## 2021-02-23 RX ORDER — HYDROMORPHONE HYDROCHLORIDE 1 MG/ML
0.2 INJECTION, SOLUTION INTRAMUSCULAR; INTRAVENOUS; SUBCUTANEOUS ONCE
Status: COMPLETED | OUTPATIENT
Start: 2021-02-23 | End: 2021-02-23

## 2021-02-23 RX ORDER — ONDANSETRON 2 MG/ML
4 INJECTION INTRAMUSCULAR; INTRAVENOUS
Status: DISCONTINUED | OUTPATIENT
Start: 2021-02-23 | End: 2021-02-23 | Stop reason: HOSPADM

## 2021-02-23 RX ADMIN — HYDROMORPHONE HYDROCHLORIDE 0.2 MG: 1 INJECTION, SOLUTION INTRAMUSCULAR; INTRAVENOUS; SUBCUTANEOUS at 20:42

## 2021-02-23 RX ADMIN — LIDOCAINE HYDROCHLORIDE 30 ML: 20 SOLUTION ORAL; TOPICAL at 20:46

## 2021-02-23 RX ADMIN — MORPHINE SULFATE 4 MG: 4 INJECTION, SOLUTION INTRAMUSCULAR; INTRAVENOUS at 19:44

## 2021-02-23 RX ADMIN — ONDANSETRON 4 MG: 2 INJECTION INTRAMUSCULAR; INTRAVENOUS at 19:44

## 2021-02-23 RX ADMIN — IOPAMIDOL 83 ML: 755 INJECTION, SOLUTION INTRAVENOUS at 20:12

## 2021-02-23 RX ADMIN — PANTOPRAZOLE SODIUM 40 MG: 40 INJECTION, POWDER, FOR SOLUTION INTRAVENOUS at 19:44

## 2021-02-23 RX ADMIN — SODIUM CHLORIDE 1000 ML: 9 INJECTION, SOLUTION INTRAVENOUS at 19:44

## 2021-02-23 RX ADMIN — SODIUM CHLORIDE 61 ML: 9 INJECTION, SOLUTION INTRAVENOUS at 20:12

## 2021-02-24 NOTE — DISCHARGE INSTRUCTIONS
Return to the Emergency Room if the following occurs:     Severely worsened pain, dehydration, or for any concern at anytime.    Or, follow-up with the following provider as we discussed:     Return to your primary doctor to discuss the blood seen in your urine and to discuss your diarrhea imaging if persistent over the next 5 days.    Medications discussed:    Prilosec daily x 30 days.  Carafate or acute pain.  Avoid caffeine, smoking, chewing tobacco, ibuprofen/advil/aleve, alcohol, eating within 2-3 hours of bed.    If you received pain-relieving or sedating medication during your time in the ER, avoid alcohol, driving automobiles, or working with machinery.  Also, a responsible adult must stay with you.        Call the Nurse Advice Line at [phone] or [phone] for any concern at anytime.

## 2021-02-24 NOTE — ED PROVIDER NOTES
"  HPI   The patient is a 45-year-old female presenting with abdominal pain, nausea with vomiting, and black stool.  She has a known history of stomach ulcer diagnosed about 10 years ago.  She has not had an upper endoscopy since that time.  She avoids NSAIDs.  She does smoke.  She does not use chewing tobacco.  She uses alcohol rarely.  She smokes marijuana daily.  She does not take medication for her stomach on a regular basis.    The patient started to feel poorly about 5 days ago.  She first began to have epigastric abdominal pain.  Her pain is been constant but waxing and waning since that time.  Her pain has severely worsened over the past 24 to 48 hours.  She describes generalized abdominal pain now.  She was nauseous and vomiting today only.  She denies coffee-ground emesis.  She does report 10+ episodes of diarrhea daily over the past 3 days.  These are described as small volume but with \"coffee-ground black stuff and blood.\"  She denies lightheadedness or fainting.  She denies chest pain.  She does have back pain at about the same level as her epigastrium.  No dysuria, urgency, or frequency of urination.  No vaginal symptoms that are new or different.  No trauma or injury.        Allergies:  Allergies   Allergen Reactions     Compazine [Prochlorperazine]      Demerol Itching     Diphenhydramine      Droperidol Other (See Comments)     twitching       Nsaids Other (See Comments)     Gastric ulcer     Toradol Itching     Adhesive Tape Rash     Problem List:    Patient Active Problem List    Diagnosis Date Noted     ADD (attention deficit disorder) 02/18/2014     Priority: Medium     Chondromalacia of patella 12/11/2013     Priority: Medium     Drug-seeking behavior 12/06/2013     Priority: Medium     Health Care Home 12/03/2013     Priority: Medium       Status:  Unable to reach    -See Letters for [facility] Care Plan - Emergency Care Plan           Social phobia: with panic attacks 07/23/2012     Priority: Medium "     Moderate recurrent major depression: per psychiatry consult 07/23/2012     Priority: Medium     Prescription Opioid and benzodiazepine abuse 07/23/2012     Priority: Medium     Contusion of duodenum 06/26/2012     Priority: Medium     Vitamin D deficiency 06/26/2012     Priority: Medium     At risk for osteoporosis 06/26/2012     Priority: Medium     Hematochezia 06/26/2012     Priority: Medium     Abdominal pain, unspecified abdominal location 06/25/2012     Priority: Medium     Problem list name updated by automated process. Provider to review        Past Medical History:    Past Medical History:   Diagnosis Date     ADD (attention deficit disorder with hyperactivity)      Allergic state      Anxiety      Depressive disorder      Drug-seeking behavior      Irritable bowel syndrome (IBS)      Migraine      PTSD (post-traumatic stress disorder)      Thyroid disease      Ulcer      Urinary retention      Past Surgical History:    Past Surgical History:   Procedure Laterality Date     APPENDECTOMY       CHOLECYSTECTOMY  2005     HYSTERECTOMY TOTAL ABDOMINAL, BILATERAL SALPINGO-OOPHORECTOMY, COMBINED  2001    bleeding ovarian cysts     HYSTERECTOMY, PAP NO LONGER INDICATED       URETHRA SURGERY      temporary stent     Family History:    Family History   Problem Relation Age of Onset     Unknown/Adopted Father      Social History:  Marital Status:   [2]  Social History     Tobacco Use     Smoking status: Current Every Day Smoker     Packs/day: 0.50     Years: 20.00     Pack years: 10.00     Smokeless tobacco: Never Used   Substance Use Topics     Alcohol use: No     Drug use: No     Comment: Denies, past marijuana      Medications:    omeprazole (PRILOSEC) 20 MG DR capsule  sucralfate (CARAFATE) 1 GM/10ML suspension  acetaminophen (TYLENOL) 325 MG tablet      Review of Systems   All other systems reviewed and are negative.      PE   BP: 129/77  Pulse: 86  Temp: 98.1  F (36.7  C)  Resp: 18  Weight: 77.1 kg  (170 lb)  SpO2: 100 %  Physical Exam  Vitals signs reviewed.   Constitutional:       General: She is in acute distress.      Appearance: She is well-developed.      Comments: Patient is lying on her right side.  She is moving frequently but remains obviously uncomfortable.  Skin is warm.  No respiratory distress.  Talking in full sentences.   HENT:      Head: Normocephalic and atraumatic.      Right Ear: External ear normal.      Left Ear: External ear normal.      Nose: Nose normal.      Mouth/Throat:      Mouth: Mucous membranes are moist.      Pharynx: Oropharynx is clear.   Eyes:      Extraocular Movements: Extraocular movements intact.      Conjunctiva/sclera: Conjunctivae normal.      Pupils: Pupils are equal, round, and reactive to light.   Neck:      Musculoskeletal: Normal range of motion.   Cardiovascular:      Rate and Rhythm: Normal rate and regular rhythm.   Pulmonary:      Effort: Pulmonary effort is normal.   Abdominal:      Comments: She describes extreme tenderness as I push in the epigastrium and upper abdomen both left and right.  She has tenderness as a move down into the pelvis but not as severe.  Soft throughout.  No distention.   Musculoskeletal: Normal range of motion.   Skin:     General: Skin is warm and dry.   Neurological:      Mental Status: She is alert and oriented to person, place, and time.   Psychiatric:         Behavior: Behavior normal.         ED COURSE and [facility]   1934.  Patient presents with epigastric pain that has evolved into generalized abdominal pain and tenderness.  She has had frequent diarrhea over the past 3 days with report of melena.  She has had vomiting today without reported melena.  Vital signs within normal limits.  Lab values pending.  CT scan pending.    2143.  Blood work unremarkable.  Urine analysis shows microscopic hematuria.  CT scan does not show evidence of acute pathology.  Certainly no evidence of ulcer perforation.  No need for emergent hospitalization  or consultation.  Hemoglobin is within normal limits.  Nausea with vomiting and diarrhea perhaps related to an infectious source?  Prilosec recommended.  She refuses a prescription for Zofran saying that she has plenty at home.  Carafate as needed for epigastric discomfort.  Close follow-up regarding hematuria.  Return for worsening as discussed.    LABS  Labs Ordered and Resulted from Time of ED Arrival Up to the Time of Departure from the ED   COMPREHENSIVE METABOLIC PANEL - Abnormal; Notable for the following components:       Result Value    Glucose 103 (*)     All other components within normal limits   LIPASE - Abnormal; Notable for the following components:    Lipase 58 (*)     All other components within normal limits   ROUTINE UA WITH MICROSCOPIC - Abnormal; Notable for the following components:    Blood Urine Large (*)     Protein Albumin Urine 30 (*)     RBC Urine >182 (*)     Mucous Urine Present (*)     All other components within normal limits   CBC WITH PLATELETS DIFFERENTIAL   INR   ABO/RH TYPE AND SCREEN       IMAGING  Images reviewed by me.  Radiology report also reviewed.  CT Abdomen Pelvis w Contrast   Final Result   IMPRESSION:    1.  No definite etiology for symptoms. No suggestion for obstruction or acute inflammatory change involving bowel.          Procedures    Medications   ondansetron (ZOFRAN) injection 4 mg (4 mg Intravenous Given 2/23/21 1944)   0.9% sodium chloride BOLUS (0 mLs Intravenous Stopped 2/23/21 2044)   morphine (PF) injection 4 mg (4 mg Intravenous Given 2/23/21 1944)   iopamidol (ISOVUE-370) solution 83 mL (83 mLs Intravenous Given 2/23/21 2012)   sodium chloride 0.9 % bag 500mL for CT scan flush use (61 mLs Intravenous Given 2/23/21 2012)   pantoprazole (PROTONIX) IV push injection 40 mg (40 mg Intravenous Given 2/23/21 1944)   HYDROmorphone (PF) (DILAUDID) injection 0.2 mg (0.2 mg Intravenous Given 2/23/21 2042)   lidocaine (XYLOCAINE) 2 % 15 mL, alum & mag  hydroxide-simethicone (MAALOX) 15 mL GI Cocktail (30 mLs Oral Given 2/23/21 2046)         IMPRESSION       ICD-10-CM    1. Nausea vomiting and diarrhea  R11.2     R19.7    2. Hematuria, unspecified type  R31.9             Medication List      Started    omeprazole 20 MG DR capsule  Commonly known as: priLOSEC  20 mg, Oral, DAILY     sucralfate 1 GM/10ML suspension  Commonly known as: Carafate  1-2 g, Oral, 4 TIMES DAILY PRN                          Carlos Enrique Fontana MD  02/23/21 9439

## 2021-05-21 ENCOUNTER — APPOINTMENT (OUTPATIENT)
Dept: GENERAL RADIOLOGY | Facility: CLINIC | Age: 46
End: 2021-05-21
Attending: EMERGENCY MEDICINE
Payer: COMMERCIAL

## 2021-05-21 ENCOUNTER — HOSPITAL ENCOUNTER (EMERGENCY)
Facility: CLINIC | Age: 46
Discharge: HOME | End: 2021-05-21
Attending: EMERGENCY MEDICINE | Admitting: EMERGENCY MEDICINE
Payer: COMMERCIAL

## 2021-05-21 VITALS
TEMPERATURE: 98.8 F | WEIGHT: 167 LBS | HEART RATE: 80 BPM | BODY MASS INDEX: 29.59 KG/M2 | HEIGHT: 63 IN | OXYGEN SATURATION: 97 % | SYSTOLIC BLOOD PRESSURE: 138 MMHG | RESPIRATION RATE: 22 BRPM | DIASTOLIC BLOOD PRESSURE: 100 MMHG

## 2021-05-21 DIAGNOSIS — S93.401A SPRAIN OF RIGHT ANKLE, UNSPECIFIED LIGAMENT, INITIAL ENCOUNTER: ICD-10-CM

## 2021-05-21 PROCEDURE — 73610 X-RAY EXAM OF ANKLE: CPT | Mod: RT

## 2021-05-21 PROCEDURE — 99284 EMERGENCY DEPT VISIT MOD MDM: CPT | Performed by: EMERGENCY MEDICINE

## 2021-05-21 PROCEDURE — 73630 X-RAY EXAM OF FOOT: CPT | Mod: RT

## 2021-05-21 ASSESSMENT — MIFFLIN-ST. JEOR: SCORE: 1376.64

## 2021-05-21 NOTE — ED PROVIDER NOTES
History     Chief Complaint   Patient presents with     Ankle Pain     ankle injury patient alert orient  vitals stabl. Pt reports she was out walking her dog, stepped in a large hole and rolled right ankle, pt reports she hear a popping noise. Pt given 4mg zofran and 50mcg fentanyl en route      HPI  Meli Rodriguez is a 45 year old female who arrives by ambulance secondary to right ankle pain.  She was seen 2 days ago at [facility] emergency department for right ankle pain after reportedly catching her foot after getting out of bed and feeling a pop, subsequently got tangled up with the dog and felt another pop.  X-ray exams from that visit were read as negative and reviewed in epic.  She arrives by ambulance today after reportedly stepping in a hole at noon while chasing her dog.  She received fentanyl in route.  She describes her pain as severe worse with any movement or palpation.  Denies numbness.  Denies injury elsewhere.  Patient has chronic abdominal pain, multiple emergency department visits over the last few years, drug-seeking behavior documented in chart 12/6/2013.  Reports trying ibuprofen and Tylenol for her ankle pain prior to arrival with no relief.  Allergies:  Allergies   Allergen Reactions     Compazine [Prochlorperazine]      Demerol Itching     Diphenhydramine      Droperidol Other (See Comments)     twitching       Nsaids Other (See Comments)     Gastric ulcer     Toradol Itching     Adhesive Tape Rash       Problem List:    Patient Active Problem List    Diagnosis Date Noted     ADD (attention deficit disorder) 02/18/2014     Priority: Medium     Chondromalacia of patella 12/11/2013     Priority: Medium     Drug-seeking behavior 12/06/2013     Priority: Medium     Health Care Home 12/03/2013     Priority: Medium       Status:  Unable to reach    -See Letters for [facility] Care Plan - Emergency Care Plan           Social phobia: with panic attacks 07/23/2012     Priority: Medium     Moderate  "recurrent major depression: per psychiatry consult 07/23/2012     Priority: Medium     Prescription Opioid and benzodiazepine abuse 07/23/2012     Priority: Medium     Contusion of duodenum 06/26/2012     Priority: Medium     Vitamin D deficiency 06/26/2012     Priority: Medium     At risk for osteoporosis 06/26/2012     Priority: Medium     Hematochezia 06/26/2012     Priority: Medium     Abdominal pain, unspecified abdominal location 06/25/2012     Priority: Medium     Problem list name updated by automated process. Provider to review          Past Medical History:    Past Medical History:   Diagnosis Date     ADD (attention deficit disorder with hyperactivity)      Allergic state      Anxiety      Depressive disorder      Drug-seeking behavior      Irritable bowel syndrome (IBS)      Migraine      PTSD (post-traumatic stress disorder)      Thyroid disease      Ulcer      Urinary retention        Past Surgical History:    Past Surgical History:   Procedure Laterality Date     APPENDECTOMY       CHOLECYSTECTOMY  2005     HYSTERECTOMY TOTAL ABDOMINAL, BILATERAL SALPINGO-OOPHORECTOMY, COMBINED  2001    bleeding ovarian cysts     HYSTERECTOMY, PAP NO LONGER INDICATED       URETHRA SURGERY      temporary stent       Family History:    Family History   Problem Relation Age of Onset     Unknown/Adopted Father        Social History:  Marital Status:   [2]  Social History     Tobacco Use     Smoking status: Current Every Day Smoker     Packs/day: 0.50     Years: 20.00     Pack years: 10.00     Smokeless tobacco: Never Used   Substance Use Topics     Alcohol use: No     Drug use: No     Comment: Denies, past marijuana        Medications:    acetaminophen (TYLENOL) 325 MG tablet          Review of Systems  Problem focused review of systems otherwise negative    Physical Exam   BP: (!) 138/100  Pulse: 80  Temp: 98.8  F (37.1  C)  Resp: 22  Height: 160 cm (5' 3\")  Weight: 75.8 kg (167 lb)  SpO2: 97 %      Physical " Exam  Toxic appearing no respiratory distress alert and oriented  Writhing in pain  Examination of the right lower extremity shows the knee is unremarkable, lower leg is unremarkable, there is a slight abrasion just inferior and posterior to the medial malleolus, there is no ligamentous laxity appreciated although exam is difficult secondary to patient's level of pain and inability to cooperate with exam, there is some tenderness along the insertion of the Achilles over the calcaneus, although I am able to palpate the Achilles throughout its length, calcaneus is nontender, lateral malleolus is nontender without redness or swelling, pulses are intact and strong sensation intact to light touch, proximal fifth metatarsal is nontender.  ED Course        Procedures               Critical Care time:  none               Results for orders placed or performed during the hospital encounter of 05/21/21 (from the past 24 hour(s))   Foot  XR, G/E 3 views, right    Narrative    EXAM: XR FOOT RIGHT G/E 3 VIEWS  LOCATION: [facility]  DATE/TIME: 5/21/2021 6:41 PM    INDICATION: Right foot pain.  COMPARISON: None.      Impression    IMPRESSION: No evidence of acute fracture. 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 Normal joint spacing.   Ankle XR, G/E 3 views, right    Narrative    EXAM: XR ANKLE RIGHT G/E 3 VIEWS  LOCATION: [facility]  DATE/TIME: 5/21/2021 6:46 PM    INDICATION: Right ankle pain.  COMPARISON: None.      Impression    IMPRESSION: Normal joint spaces and alignment. No fracture.       Medications - No data to display    Assessments & Plan (with Medical Decision Making)  Reportedly stepped in a hole while chasing her dog at noon arrives by ambulance.  Received fentanyl in route.  History of drug-seeking behavior.  Seen 2 days ago in emergency department or see hospital, right ankle sprain, x-ray exams at that time were negative.  There is  some minimal abrasion inferior and posterior to the medial malleolus.  Otherwise exam is unremarkable.  X-ray exams as above by my review as well as radiology read are negative for acute finding.  Recommend crutches, advance weightbearing as tolerated, ice, ibuprofen and Tylenol, follow-up primary care/sports medicine     I have reviewed the nursing notes.    I have reviewed the findings, diagnosis, plan and need for follow up with the patient.          Discharge Medication List as of 5/21/2021  7:04 PM          Final diagnoses:   Sprain of right ankle, unspecified ligament, initial encounter       5/21/2021   [facility] EMERGENCY DEPT     Jose Rodriguez MD  05/22/21 5089

## 2021-05-21 NOTE — ED NOTES
Bed: ED23  Expected date:   Expected time:   Means of arrival:   Comments:  Ambulance  ankle injury

## 2021-05-22 NOTE — DISCHARGE INSTRUCTIONS
Is, elevate, ibuprofen and Tylenol    Crutches as needed advance bearing as tolerated    Follow-up primary care for further evaluation.

## 2021-05-25 ENCOUNTER — RECORDS - HEALTHEAST (OUTPATIENT)
Dept: ADMINISTRATIVE | Facility: CLINIC | Age: 46
End: 2021-05-25

## 2021-05-29 ENCOUNTER — RECORDS - HEALTHEAST (OUTPATIENT)
Dept: ADMINISTRATIVE | Facility: CLINIC | Age: 46
End: 2021-05-29

## 2021-09-04 ENCOUNTER — HEALTH MAINTENANCE LETTER (OUTPATIENT)
Age: 46
End: 2021-09-04

## 2022-09-03 ENCOUNTER — HOSPITAL ENCOUNTER (EMERGENCY)
Facility: CLINIC | Age: 47
Discharge: LEFT BEFORE DISCHARGE | End: 2022-09-03

## 2022-09-03 VITALS
HEIGHT: 63 IN | BODY MASS INDEX: 29.58 KG/M2 | OXYGEN SATURATION: 99 % | DIASTOLIC BLOOD PRESSURE: 95 MMHG | SYSTOLIC BLOOD PRESSURE: 135 MMHG | RESPIRATION RATE: 16 BRPM | HEART RATE: 99 BPM | TEMPERATURE: 97.5 F

## 2022-10-16 ENCOUNTER — HEALTH MAINTENANCE LETTER (OUTPATIENT)
Age: 47
End: 2022-10-16

## 2023-03-29 ENCOUNTER — TRANSCRIBE ORDERS (OUTPATIENT)
Dept: OTHER | Age: 48
End: 2023-03-29

## 2023-03-29 DIAGNOSIS — R10.9 CHRONIC ABDOMINAL PAIN: Primary | ICD-10-CM

## 2023-03-29 DIAGNOSIS — G89.29 CHRONIC ABDOMINAL PAIN: Primary | ICD-10-CM

## 2023-03-30 ENCOUNTER — DOCUMENTATION ONLY (OUTPATIENT)
Dept: GASTROENTEROLOGY | Facility: CLINIC | Age: 48
End: 2023-03-30
Payer: COMMERCIAL

## 2023-03-30 NOTE — PROGRESS NOTES
Aury Contreras RN to received records from:    [facility]  Phone: [phone]  Fax: [phone]    Faxed request on  Thursday 3/30/2023 at 8:15AM    Miroslava Mancia MA

## 2023-04-12 ENCOUNTER — DOCUMENTATION ONLY (OUTPATIENT)
Dept: GASTROENTEROLOGY | Facility: CLINIC | Age: 48
End: 2023-04-12
Payer: COMMERCIAL

## 2023-04-12 NOTE — PROGRESS NOTES
SPENCER Salcedo, RN: Requested we re-request records from [facility] as no records have come through from request on 3/30.     Called [facility] to request records for most recent 3 years if care on 4/12 at 2:00pm     Clinic Info: [facility]  [address],   [place] [address]    Phone: [phone]   Fax: [phone]    Mary Wright

## 2023-04-17 NOTE — PROGRESS NOTES
Re-Requested records on 4/17 at 11:45am. Request records via phone.     [facility] office said they would send 1 office note and 1 EGD reports from 2017. That was all the records they had on the patient.     Clinic Info: [facility]  [address],   [place] [address]     Phone: [phone]   Fax: [phone]    Mary Wright

## 2023-04-21 NOTE — PROGRESS NOTES
[facility] called writer back on 4/21 at 8:44am.     [facility] said they will re-fax  Records again today, (1 office note and 1 EGD from 2017) and keep a close eye on the fax to guarantee it goes through.     Mary Wright

## 2023-04-21 NOTE — PROGRESS NOTES
4th time requesting records for this patient.    Request most recent 3 years of care with [facility].     Called on 4/21 and left a voicemail at 8:20am. Requested [facility] ofice call writer back with a status update on this request.     Clinic Info: [facility]  [address],   [place] [address]     Phone: [phone]   Fax: [phone]     Mary Wright

## 2023-04-24 NOTE — TELEPHONE ENCOUNTER
REFERRAL INFORMATION:    Referring Provider:  Gena Cornejo    Referring Clinic:  [facility]    Reason for Visit/Diagnosis: Chronic abdominal pain     FUTURE VISIT INFORMATION:    Appointment Date: 5/2/2023    Appointment Time:      NOTES STATUS DETAILS   OFFICE NOTE from Referring Provider Internal 3/27/2023, 12/1/2021 OV With KAYLEY Cornejo   OFFICE NOTE from Other Specialist N/A    HOSPITAL DISCHARGE SUMMARY/  ED VISITS Care Everywhere 3/23/2023 [facility] ED with TREASURE Downing  2/28/2023 [facility] ED with BASSAM Cartwright  10/19/2022 [facility] ED with HEVER RoeMore In Epic   OPERATIVE REPORT N/A    MEDICATION LIST Internal         ENDOSCOPY  Care Everywhere 6/22/2019 - [facility]  6/22/2017 - [facility]   COLONOSCOPY Care Everywhere 5/6/2017 - [facility]   ERCP N/A    EUS N/A    STOOL TESTING N/A    PERTINENT LABS N/A    PATHOLOGY REPORTS (RELATED) N/A    IMAGING (CT, MRI, EGD, MRCP, Small Bowel Follow Through/SBT, MR/CT Enterography) Received  [facility]:   CT ABD PEL: 2/23/2021, 9/23/2020, 8/12/2020,  2/21/2019    [facility]   10/19/2022, 6/17/2021 CT ABD PEL     [facility]: IN PACS   7/1/2022, 5/11/2021, 4/26/2021 CT ABD PEL   4/11/2021 XR ABD       [facility] requested on 4/12/2023 - See encounter.  Request sent to [facility] for [place] fax [phone]

## 2023-05-02 ENCOUNTER — OFFICE VISIT (OUTPATIENT)
Dept: GASTROENTEROLOGY | Facility: CLINIC | Age: 48
End: 2023-05-02
Attending: NURSE PRACTITIONER
Payer: COMMERCIAL

## 2023-05-02 ENCOUNTER — PRE VISIT (OUTPATIENT)
Dept: GASTROENTEROLOGY | Facility: CLINIC | Age: 48
End: 2023-05-02

## 2023-05-02 VITALS
RESPIRATION RATE: 16 BRPM | OXYGEN SATURATION: 96 % | SYSTOLIC BLOOD PRESSURE: 120 MMHG | WEIGHT: 136 LBS | DIASTOLIC BLOOD PRESSURE: 82 MMHG | BODY MASS INDEX: 24.09 KG/M2 | HEART RATE: 84 BPM | TEMPERATURE: 96 F

## 2023-05-02 DIAGNOSIS — G89.29 CHRONIC ABDOMINAL PAIN: ICD-10-CM

## 2023-05-02 DIAGNOSIS — Z92.89 HISTORY OF CT SCAN: ICD-10-CM

## 2023-05-02 DIAGNOSIS — R63.4 WEIGHT LOSS: ICD-10-CM

## 2023-05-02 DIAGNOSIS — G89.29 OTHER CHRONIC PAIN: Primary | ICD-10-CM

## 2023-05-02 DIAGNOSIS — Z98.890 HX OF COLONOSCOPY: ICD-10-CM

## 2023-05-02 DIAGNOSIS — Z98.890 HISTORY OF ESOPHAGOGASTRODUODENOSCOPY (EGD): ICD-10-CM

## 2023-05-02 DIAGNOSIS — R10.9 CHRONIC ABDOMINAL PAIN: ICD-10-CM

## 2023-05-02 LAB — TSH SERPL DL<=0.005 MIU/L-ACNC: 3.99 UIU/ML (ref 0.3–4.2)

## 2023-05-02 PROCEDURE — 84443 ASSAY THYROID STIM HORMONE: CPT | Performed by: INTERNAL MEDICINE

## 2023-05-02 PROCEDURE — 99203 OFFICE O/P NEW LOW 30 MIN: CPT | Performed by: INTERNAL MEDICINE

## 2023-05-02 PROCEDURE — 36415 COLL VENOUS BLD VENIPUNCTURE: CPT | Performed by: INTERNAL MEDICINE

## 2023-05-02 PROCEDURE — 86364 TISS TRNSGLTMNASE EA IG CLAS: CPT | Performed by: INTERNAL MEDICINE

## 2023-05-02 ASSESSMENT — PAIN SCALES - GENERAL: PAINLEVEL: SEVERE PAIN (6)

## 2023-05-02 NOTE — LETTER
May 22, 2023      Meli Rodriguez  [address] [facility] [address]        Dear ,    We are writing to inform you of your test results.    These blood test labs are normal.      Resulted Orders   TSH   Result Value Ref Range    TSH 3.99 0.30 - 4.20 uIU/mL   Tissue transglutaminase lanette IgA and IgG   Result Value Ref Range    Tissue Transglutaminase Antibody IgA <0.2 <7.0 U/mL      Comment:      Negative- The tTG-IgA assay has limited utility for patients with decreased levels of IgA. Screening for celiac disease should include IgA testing to rule out selective IgA deficiency and to guide selection and interpretation of serological testing. tTG-IgG testing may be positive in celiac disease patients with IgA deficiency.    Tissue Transglutaminase Antibody IgG <0.6 <7.0 U/mL      Comment:      Negative       If you have any questions or concerns, please call the clinic at the number listed above.       Sincerely,      Jong Damico MD

## 2023-05-02 NOTE — PATIENT INSTRUCTIONS
As discussed    1.  Explore my GI [email or web address] with an attention to low FODMAPs vegetables and beans.  Ignore the other parts.  This is a diet trial for 4 to 6 weeks only    2.  Over-the-counter Salonpas patch to the upper abdominal area may offer some relief.  Voltaren gel 1% to the area may also provide some relief.  In addition there is a over-the-counter peppermint product called IBgard may offer relief with chronic pain.    3.  Chronic pain clinic may offer some benefit.  Encouraged consultation with that group instead of neurology which may not be as helpful    4.  Stool and lab tests ordered    Follow-up as scheduled

## 2023-05-02 NOTE — PROGRESS NOTES
Gastroenterology    47-year-old to review 20 years of symptoms.  This is predominantly chronic abdominal pain and loss of appetite.  She notes things have become much worse over the last 6 months her weight is perhaps down from 170 pounds to 130-5.  Eating aggravates upper abdominal pain patient notes she has a history of hiatal hernia.  EGD in 2017 revealed LA grade a esophagitis but was otherwise unremarkable.  Patient also notes some rib pain heartburn at night but symptoms persist despite a proton pump inhibitor.  It is uncertain if she was on an H2 receptor antagonist.  No benefit of Tums etc.  Pain is typically 6 out of 10 constantly without clear aggravating alleviating or associated factors aggravation by mail when it becomes 8 out of 10.  There are elements of early satiety.  Uses THC will have episodic vomiting finds relief with taking hot baths.    Neurontin has helped in the past but she notes that this will not be prescribed by her restrictive provider.  She is off the tricyclic because she notes these are not good for her they do not work out.  No clear history of Cymbalta.    Tendency towards diarrhea but this is typically 2-3 stools in the morning and none for the rest of the day none of these are nocturnal rarely they are hard.  There is no pain relief with defecation.  She does recall a history of ulcers greater than 10 years ago.  Previous history of migraines but these are less troublesome now.  Patient scheduled to see neurology but has not until November.  No clear plans for chronic pain consultation.    Previous [place] [facility] evaluation with unknown records.  Apparently there are financial reasons not to follow-up there.    Past medical history: MND, patellar concerns depression anxiety, medication concern    Medications reviewed on epic    Allergies reviewed in epic    Social: Typically stays at home but has involved been involved with  in the past.  She is not able to  work.    Family history noncontributory    Previous colonoscopy in 2017.  Question small polyp pathology uncertain 4 mm.    Review of systems otherwise negative noncontributory    No acute distress.  Blood pressure 120 x 82, pulse 84 afebrile BMI 24.09 head and neck notable for altered dentition, cardiac S1-S2 without murmur lungs clear throughout spine percussion negative.  Abdomen scaphoid tenderness in the epigastrium with light palpation rather severe no organomegaly no masses no lower extremity edema skin without rash.    Abdominal CTs multiple over the last 10 years.  Nondiagnostic.    Impression: Chronic abdominal pain.  Consider elements of dyspepsia.  Chronic THC use with abdominal pain relief with hot baths.  Consider elements of wall pain as well these are coexisting and are difficult to discern from each other.    Plan: 1.  Trial of low FODMAPs with vegetables and legumes for 4 weeks, 2.  IBgard peppermint source for elements of IBS.  3.  Suggest consultation with chronic pain specialty for additional management.  May benefit from Cymbalta.  Neurontin etc.  Patient notes she is intolerant to tricyclic's.  Or they are ineffective.  4.  Add fecal elastase for completeness.  5.  Salonpas patch or topical Voltaren gel 1% to upper abdominal region    Discussed goals for management.  Follow-up with me as scheduled.

## 2023-05-02 NOTE — NURSING NOTE
Chief Complaint   Patient presents with     Consult     Diarrhea     Ongoing for about 6 months      Nausea     Gastrophageal Reflux

## 2023-05-04 LAB
TTG IGA SER-ACNC: <0.2 U/ML
TTG IGG SER-ACNC: <0.6 U/ML

## 2023-06-23 ENCOUNTER — HOSPITAL ENCOUNTER (EMERGENCY)
Facility: CLINIC | Age: 48
Discharge: HOME | End: 2023-06-23
Attending: EMERGENCY MEDICINE | Admitting: EMERGENCY MEDICINE
Payer: COMMERCIAL

## 2023-06-23 ENCOUNTER — APPOINTMENT (OUTPATIENT)
Dept: GENERAL RADIOLOGY | Facility: CLINIC | Age: 48
End: 2023-06-23
Attending: EMERGENCY MEDICINE
Payer: COMMERCIAL

## 2023-06-23 VITALS
HEART RATE: 64 BPM | WEIGHT: 129 LBS | TEMPERATURE: 97 F | BODY MASS INDEX: 22.86 KG/M2 | RESPIRATION RATE: 13 BRPM | HEIGHT: 63 IN | SYSTOLIC BLOOD PRESSURE: 123 MMHG | OXYGEN SATURATION: 96 % | DIASTOLIC BLOOD PRESSURE: 88 MMHG

## 2023-06-23 DIAGNOSIS — J40 BRONCHITIS WITH WHEEZING: ICD-10-CM

## 2023-06-23 PROCEDURE — 93005 ELECTROCARDIOGRAM TRACING: CPT

## 2023-06-23 PROCEDURE — 71046 X-RAY EXAM CHEST 2 VIEWS: CPT

## 2023-06-23 PROCEDURE — 93010 ELECTROCARDIOGRAM REPORT: CPT | Performed by: EMERGENCY MEDICINE

## 2023-06-23 PROCEDURE — 250N000009 HC RX 250: Performed by: EMERGENCY MEDICINE

## 2023-06-23 PROCEDURE — 99284 EMERGENCY DEPT VISIT MOD MDM: CPT | Mod: 25

## 2023-06-23 PROCEDURE — 94640 AIRWAY INHALATION TREATMENT: CPT

## 2023-06-23 PROCEDURE — 99284 EMERGENCY DEPT VISIT MOD MDM: CPT | Mod: 25 | Performed by: EMERGENCY MEDICINE

## 2023-06-23 RX ORDER — IPRATROPIUM BROMIDE AND ALBUTEROL SULFATE 2.5; .5 MG/3ML; MG/3ML
3 SOLUTION RESPIRATORY (INHALATION) ONCE
Status: COMPLETED | OUTPATIENT
Start: 2023-06-23 | End: 2023-06-23

## 2023-06-23 RX ORDER — ALBUTEROL SULFATE 90 UG/1
2 AEROSOL, METERED RESPIRATORY (INHALATION) EVERY 4 HOURS PRN
Qty: 8.5 G | Refills: 0 | Status: SHIPPED | OUTPATIENT
Start: 2023-06-23 | End: 2023-06-30

## 2023-06-23 RX ORDER — PREDNISONE 20 MG/1
40 TABLET ORAL DAILY
Qty: 14 TABLET | Refills: 0 | Status: SHIPPED | OUTPATIENT
Start: 2023-06-23 | End: 2023-06-30

## 2023-06-23 RX ORDER — DOXYCYCLINE 100 MG/1
100 CAPSULE ORAL 2 TIMES DAILY
Qty: 14 CAPSULE | Refills: 0 | Status: SHIPPED | OUTPATIENT
Start: 2023-06-23 | End: 2023-06-30

## 2023-06-23 RX ADMIN — IPRATROPIUM BROMIDE AND ALBUTEROL SULFATE 3 ML: .5; 3 SOLUTION RESPIRATORY (INHALATION) at 12:01

## 2023-06-23 ASSESSMENT — ACTIVITIES OF DAILY LIVING (ADL): ADLS_ACUITY_SCORE: 35

## 2023-06-23 NOTE — ED TRIAGE NOTES
Pt states she has CP for over 10 days.  Pt states that when she lays flat, O2 sats drop and she gets stabbing pains, and heart palpitations. Denies fevers.  Pt is current smoker.      Triage Assessment     Row Name 06/23/23 1015       Triage Assessment (Adult)    Airway WDL WDL       Respiratory WDL    Respiratory WDL rhythm/pattern    Rhythm/Pattern, Respiratory shortness of breath       Skin Circulation/Temperature WDL    Skin Circulation/Temperature WDL WDL       Cardiac WDL    Cardiac WDL X;chest pain       Chest Pain Assessment    Chest Pain Location anterior chest, left;upper back, left       Peripheral/Neurovascular WDL    Peripheral Neurovascular WDL WDL       Cognitive/Neuro/Behavioral WDL    Cognitive/Neuro/Behavioral WDL WDL

## 2023-06-23 NOTE — ED PROVIDER NOTES
History     Chief Complaint   Patient presents with     Chest Pain     Shortness of Breath     Pt states she has CP for over 10 days.  Pt states that when she lays flat, O2 sats drop and she gets stabbing pains, and heart palpitations.      HPI  Meli Rodriguez is a 47 year old female who 10 days of chest pain cough minimally productive every day smoker every day marijuana smoker.  Denies history of asthma.  No fever chills or sweats.  No vomiting or diarrhea.  Noting oxygen saturations in the upper 80s on home monitor.  Denies leg pain or swelling.  No history of a risk factor for DVT/pulmonary embolism.    Allergies:  Allergies   Allergen Reactions     Compazine [Prochlorperazine]      Demerol Itching     Diphenhydramine      Droperidol Other (See Comments)     twitching       Ketorolac Tromethamine Itching     Nsaids Other (See Comments)     Gastric ulcer     Adhesive Tape Rash       Problem List:    Patient Active Problem List    Diagnosis Date Noted     ADD (attention deficit disorder) 02/18/2014     Priority: Medium     Chondromalacia of patella 12/11/2013     Priority: Medium     Drug-seeking behavior 12/06/2013     Priority: Medium     Health Care Home 12/03/2013     Priority: Medium       Status:  Unable to reach    -See Letters for [facility] Care Plan - Emergency Care Plan           Social phobia: with panic attacks 07/23/2012     Priority: Medium     Moderate recurrent major depression: per psychiatry consult 07/23/2012     Priority: Medium     Prescription Opioid and benzodiazepine abuse 07/23/2012     Priority: Medium     Contusion of duodenum 06/26/2012     Priority: Medium     Vitamin D deficiency 06/26/2012     Priority: Medium     At risk for osteoporosis 06/26/2012     Priority: Medium     Hematochezia 06/26/2012     Priority: Medium     Abdominal pain, unspecified abdominal location 06/25/2012     Priority: Medium     Problem list name updated by automated process. 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     Bipolar 2 disorder (H)      Carpal tunnel syndrome      Colitis      Depression      Depressive disorder      Drug-seeking behavior      Drug-seeking behavior      GERD (gastroesophageal reflux disease)      Head injury      Irritable bowel syndrome (IBS)      Kidney infection      Lower urinary tract infection 2/18/2011     Migraine      Migraine      Polysubstance abuse (H) 12/4/2012     PTSD (post-traumatic stress disorder)      PTSD (post-traumatic stress disorder)      Thyroid disease      Ulcer      Urinary retention        Past Surgical History:    Past Surgical History:   Procedure Laterality Date     APPENDECTOMY       APPENDECTOMY       CHOLECYSTECTOMY  2005     CHOLECYSTECTOMY       HYSTERECTOMY      with ophorectomy bilateral     HYSTERECTOMY TOTAL ABDOMINAL, BILATERAL SALPINGO-OOPHORECTOMY, COMBINED  2001    bleeding ovarian cysts     HYSTERECTOMY, PAP NO LONGER INDICATED       TONSILLECTOMY, ADENOIDECTOMY, MYRINGOTOMY, INSERT TUBE BILATERAL, COMBINED       TOTAL VAGINAL HYSTERECTOMY       URETHRA SURGERY      temporary stent     URETHRA SURGERY         Family History:    Family History   Problem Relation Age of Onset     Unknown/Adopted Father      Chronic Obstructive Pulmonary Disease Mother      Aneurysm Maternal Grandmother      Cerebrovascular Disease Maternal Grandfather      Breast Cancer No family hx of      Cancer No family hx of      Diabetes No family hx of        Social History:  Marital Status:   [2]  Social History     Tobacco Use     Smoking status: Every Day     Packs/day: 0.50     Years: 20.00     Pack years: 10.00     Types: Cigarettes  "    Smokeless tobacco: Never   Substance Use Topics     Alcohol use: No     Drug use: No     Comment: Denies, past marijuana        Medications:    albuterol (PROAIR HFA/PROVENTIL HFA/VENTOLIN HFA) 108 (90 Base) MCG/ACT inhaler  doxycycline hyclate (VIBRAMYCIN) 100 MG capsule  predniSONE (DELTASONE) 20 MG tablet  acetaminophen (TYLENOL) 325 MG tablet          Review of Systems    Physical Exam   BP: 115/80  Pulse: 91  Temp: 97  F (36.1  C)  Resp: 16  Height: 160 cm (5' 3\")  Weight: 58.5 kg (129 lb)  SpO2: 96 %      Physical Exam  Nontoxic appearing no respiratory distress alert and oriented ×3  Head atraumatic normocephalic  Neck supple full active painless range of motion  Lungs diminished breath sounds increased expiratory phase expiratory wheeze  Heart regular no murmur  Abdomen soft nontender bowel sounds positive   Strength and sensation grossly intact throughout the extremities, gait and station normal  Speech is fluent, good eye contact, thought processes are rational  Lower extremities without swelling, redness or tenderness  Pedal pulses symmetrical and strong    ED Course                 Procedures         ECG: Normal rate and rhythm, axis and intervals within normal limits, no Q waves, no ectopy no acute ST or T wave changes, unchanged from previous, read by Dr. Jose Rodriguez  T wave inversion V1 V2 unchanged from previous       Results for orders placed or performed during the hospital encounter of 06/23/23 (from the past 24 hour(s))   Chest XR,  PA & LAT    Narrative    XR CHEST 2 VIEWS   6/23/2023 12:20 PM     HISTORY: Cough    COMPARISON: 9/23/2020      Impression    IMPRESSION: Cardiac and mediastinal silhouettes are unremarkable. No  pleural effusion, pneumothorax or focal consolidation.    LV SALDANA MD         SYSTEM ID:  [id number]       Medications   ipratropium - albuterol 0.5 mg/2.5 mg/3 mL (DUONEB) neb solution 3 mL (3 mLs Nebulization $Given 6/23/23 1201)       Assessments & Plan (with " Medical Decision Making)  47-year-old female daily smoker Daily marijuana use presents with wheezing no fever.  Usual differential considered.  No indication for lab work.  Chest x-ray by my independent review of images as well as radiology read is negative for acute finding.  ECG without acute ischemic change.  Safe for discharged home.  Treat with prednisone, doxycycline, albuterol, return criteria reviewed     I have reviewed the nursing notes.    I have reviewed the findings, diagnosis, plan and need for follow up with the patient.          New Prescriptions    ALBUTEROL (PROAIR HFA/PROVENTIL HFA/VENTOLIN HFA) 108 (90 BASE) MCG/ACT INHALER    Inhale 2 puffs into the lungs every 4 hours as needed for shortness of breath, wheezing or cough    DOXYCYCLINE HYCLATE (VIBRAMYCIN) 100 MG CAPSULE    Take 1 capsule (100 mg) by mouth 2 times daily for 7 days    PREDNISONE (DELTASONE) 20 MG TABLET    Take 2 tablets (40 mg) by mouth daily for 7 days       Final diagnoses:   Bronchitis with wheezing       6/23/2023   [facility] EMERGENCY DEPT     Jose Rodriguez MD  06/23/23 0717

## 2023-06-23 NOTE — DISCHARGE INSTRUCTIONS
Albuterol inhaler as needed    Doxycycline and prednisone as prescribed    Return for progressive shortness of air passing out or any other concern

## 2023-06-26 ENCOUNTER — TELEPHONE (OUTPATIENT)
Dept: SCHEDULING | Facility: CLINIC | Age: 48
End: 2023-06-26
Payer: COMMERCIAL

## 2023-06-26 NOTE — TELEPHONE ENCOUNTER
Please call pt back.       Pt has never been seen by a [place] doctor in clinic And is not an established pt here.     The only office visit she has with FP is from 3 years ago with [facility].     We do not have any appts to offer her .

## 2023-06-26 NOTE — TELEPHONE ENCOUNTER
New Medication Request    Contacts       Type Contact Phone/Fax    06/26/2023 08:04 AM CDT Phone (Incoming) Meli Rodriguez (Self) [phone] (H)          What medication are you requesting?: Albuterol inhaler: 90 base, 2 puffs every 4 hours, doxycycline: 100 mg 1capsule 2x a day for 7 days, prednisone:  20mg 2 tablets for 7 days.     Reason for medication request: Pt was seen at the ER and prescribed these medications, however, pt is on a restricted insurance and was told by the pharmacy that since it did not come from her PCP, they cannot fill the prescription. PT was diagnosed with bronchitis with wheezing.     Have you taken this medication before?: Yes: Prednisone, years ago.     Controlled Substance Agreement on file:   CSA -- Patient Level:    CSA: None found at the patient level.         Patient offered an appointment? No    Preferred Pharmacy:     [address], [place] [address]  Phone: [phone]        Could we send this information to you in [facility] or would you prefer to receive a phone call?:   Patient would prefer a phone call   Okay to leave a detailed message?: Yes at Cell number on file:    Telephone Information:   Mobile [phone]

## 2023-06-27 NOTE — TELEPHONE ENCOUNTER
"The medications should not be out for a few more days.    If she is not improving, can we see her in a \"same day\" or \"approval req\" spot with Dr. Angeles with whom patient is establishing care at the end of July?      Routed to Dr. Angeles to address issue (patient wanting refills of meds prescribed in ER for bronchitis).    Paula Sherwood RN  [facility]      "

## 2023-06-29 NOTE — TELEPHONE ENCOUNTER
"I can't refill medication for a patient that I have never seen and has not established care here . She can be scheduled in a \"same day \" or \"approval req\" spot.  "

## 2023-06-30 ENCOUNTER — OFFICE VISIT (OUTPATIENT)
Dept: FAMILY MEDICINE | Facility: CLINIC | Age: 48
End: 2023-06-30
Payer: COMMERCIAL

## 2023-06-30 VITALS
WEIGHT: 134.6 LBS | BODY MASS INDEX: 23.85 KG/M2 | HEIGHT: 63 IN | TEMPERATURE: 97.7 F | SYSTOLIC BLOOD PRESSURE: 120 MMHG | HEART RATE: 92 BPM | OXYGEN SATURATION: 98 % | DIASTOLIC BLOOD PRESSURE: 84 MMHG | RESPIRATION RATE: 16 BRPM

## 2023-06-30 DIAGNOSIS — F17.210 CIGARETTE SMOKER: ICD-10-CM

## 2023-06-30 DIAGNOSIS — J20.9 ACUTE BRONCHITIS WITH SYMPTOMS > 10 DAYS: Primary | ICD-10-CM

## 2023-06-30 PROCEDURE — 99204 OFFICE O/P NEW MOD 45 MIN: CPT | Performed by: STUDENT IN AN ORGANIZED HEALTH CARE EDUCATION/TRAINING PROGRAM

## 2023-06-30 RX ORDER — PREDNISONE 20 MG/1
40 TABLET ORAL DAILY
Qty: 10 TABLET | Refills: 0 | Status: SHIPPED | OUTPATIENT
Start: 2023-06-30 | End: 2023-07-05

## 2023-06-30 RX ORDER — DOXYCYCLINE 100 MG/1
100 CAPSULE ORAL 2 TIMES DAILY
Qty: 14 CAPSULE | Refills: 0 | Status: SHIPPED | OUTPATIENT
Start: 2023-06-30 | End: 2023-07-10

## 2023-06-30 RX ORDER — ALBUTEROL SULFATE 90 UG/1
2 AEROSOL, METERED RESPIRATORY (INHALATION) EVERY 4 HOURS PRN
Qty: 8.5 G | Refills: 0 | Status: SHIPPED | OUTPATIENT
Start: 2023-06-30 | End: 2023-09-12

## 2023-06-30 ASSESSMENT — PATIENT HEALTH QUESTIONNAIRE - PHQ9
10. IF YOU CHECKED OFF ANY PROBLEMS, HOW DIFFICULT HAVE THESE PROBLEMS MADE IT FOR YOU TO DO YOUR WORK, TAKE CARE OF THINGS AT HOME, OR GET ALONG WITH OTHER PEOPLE: VERY DIFFICULT
SUM OF ALL RESPONSES TO PHQ QUESTIONS 1-9: 14
SUM OF ALL RESPONSES TO PHQ QUESTIONS 1-9: 14

## 2023-06-30 NOTE — PROGRESS NOTES
Assessment & Plan     1. Acute bronchitis with symptoms > 10 days    - doxycycline hyclate (VIBRAMYCIN) 100 MG capsule; Take 1 capsule (100 mg) by mouth 2 times daily  Dispense: 14 capsule; Refill: 0  - albuterol (PROAIR HFA/PROVENTIL HFA/VENTOLIN HFA) 108 (90 Base) MCG/ACT inhaler; Inhale 2 puffs into the lungs every 4 hours as needed for shortness of breath, wheezing or cough  Dispense: 8.5 g; Refill: 0  - predniSONE (DELTASONE) 20 MG tablet; Take 2 tablets (40 mg) by mouth daily for 5 days With food  Dispense: 10 tablet; Refill: 0  Primary care follow up scheduling.    2. Cigarette smoker    - doxycycline hyclate (VIBRAMYCIN) 100 MG capsule; Take 1 capsule (100 mg) by mouth 2 times daily  Dispense: 14 capsule; Refill: 0  - albuterol (PROAIR HFA/PROVENTIL HFA/VENTOLIN HFA) 108 (90 Base) MCG/ACT inhaler; Inhale 2 puffs into the lungs every 4 hours as needed for shortness of breath, wheezing or cough  Dispense: 8.5 g; Refill: 0  - predniSONE (DELTASONE) 20 MG tablet; Take 2 tablets (40 mg) by mouth daily for 5 days With food  Dispense: 10 tablet; Refill: 0    She declined preventive screening and vaccine today.    She has had total hystectomy    Prescription drug management    Nicotine/Tobacco Cessation:  She reports that she has been smoking cigarettes. She has a 10.00 pack-year smoking history. She has never used smokeless tobacco.  Nicotine/Tobacco Cessation Plan:   Information offered: Patient not interested at this time          Radha Estrada MD  [facility] ANNY Garrison is a 47 year old, presenting for the following health issues:  Establish Care (Bronchitis and chest pain concern )    Pt was seen in the Ed  On 6/23/23 for 10 days of chest pain with cough . Cough Is productive and she smokes everyday.    Denies history of asthma.  No fever chills or sweats.  No vomiting or diarrhea.  Noting oxygen saturations in the upper 80s on home monitor.  Denies leg pain or  "swelling.  No history of a risk factor for DVT/pulmonary embolism.       No data to display              History of Present Illness       Reason for visit:  Bronchitis    She eats 2-3 servings of fruits and vegetables daily.She consumes 11 or more sweetened beverage(s) daily.She exercises with enough effort to increase her heart rate 9 or less minutes per day.  She exercises with enough effort to increase her heart rate 3 or less days per week.   She is taking medications regularly.    Today's PHQ-9         PHQ-9 Total Score: 14    PHQ-9 Q9 Thoughts of better off dead/self-harm past 2 weeks :   Not at all    How difficult have these problems made it for you to do your work, take care of things at home, or get along with other people: Very difficult             Review of Systems   Constitutional, HEENT, cardiovascular, pulmonary, gi and gu systems are negative, except as otherwise noted.      Objective    /84 (BP Location: Right arm, Patient Position: Sitting, Cuff Size: Adult Regular)   Pulse 92   Temp 97.7  F (36.5  C) (Temporal)   Resp 16   Ht 1.6 m (5' 3\")   Wt 61.1 kg (134 lb 9.6 oz)   SpO2 98%   BMI 23.84 kg/m    Body mass index is 23.84 kg/m .  Physical Exam   GENERAL: healthy, alert and no distress  RESP: lungs clear to auscultation - no rales, rhonchi or wheezes  CV: regular rate and rhythm, normal S1 S2, no S3 or S4, no murmur, click or rub, no   MS: no gross musculoskeletal defects noted, no edema  Pysch: normal affect              "

## 2023-06-30 NOTE — PATIENT INSTRUCTIONS
Kaden Garrison,    Thank you for allowing [facility] to manage your care.        I sent your prescriptions to your pharmacy.        For your convenience, test results are released as soon as they are available  Please allow 1-2 business days for me to send you a comment about your results.  If not done so, I encourage you to login into [employer] ([email or web address] to review your results in real time.     If you have any questions or concerns, please feel free to call us at [phone].    Sincerely,    Dr. Estrada    Did you know?      You can schedule a video visit for follow-up appointments as well as future appointments for certain conditions.  Please see the below link.     [email or web address]    If you have not already done so,  I encourage you to sign up for Face to Face [employer] ([email or web address]).  This will allow you to review your results, securely communicate with a provider, and schedule virtual visits as well.

## 2023-07-10 ENCOUNTER — HOSPITAL ENCOUNTER (OUTPATIENT)
Facility: CLINIC | Age: 48
Setting detail: OBSERVATION
Discharge: LEFT BEFORE DISCHARGE | End: 2023-07-10
Attending: EMERGENCY MEDICINE | Admitting: INTERNAL MEDICINE
Payer: COMMERCIAL

## 2023-07-10 VITALS
TEMPERATURE: 98.3 F | OXYGEN SATURATION: 94 % | HEIGHT: 63 IN | DIASTOLIC BLOOD PRESSURE: 74 MMHG | WEIGHT: 136.69 LBS | HEART RATE: 68 BPM | SYSTOLIC BLOOD PRESSURE: 115 MMHG | RESPIRATION RATE: 17 BRPM | BODY MASS INDEX: 24.22 KG/M2

## 2023-07-10 DIAGNOSIS — K92.0 HEMATEMESIS WITH NAUSEA: ICD-10-CM

## 2023-07-10 DIAGNOSIS — R10.30 ABDOMINAL PAIN, LOWER: Primary | ICD-10-CM

## 2023-07-10 DIAGNOSIS — R10.30 LOWER ABDOMINAL PAIN: ICD-10-CM

## 2023-07-10 DIAGNOSIS — K92.1 HEMATOCHEZIA: ICD-10-CM

## 2023-07-10 LAB
ALBUMIN UR-MCNC: NEGATIVE MG/DL
AMPHETAMINES UR QL SCN: ABNORMAL
ANION GAP SERPL CALCULATED.3IONS-SCNC: 17 MMOL/L (ref 7–15)
APPEARANCE UR: CLEAR
BARBITURATES UR QL SCN: ABNORMAL
BASOPHILS # BLD AUTO: 0 10E3/UL (ref 0–0.2)
BASOPHILS NFR BLD AUTO: 0 %
BENZODIAZ UR QL SCN: ABNORMAL
BILIRUB UR QL STRIP: NEGATIVE
BUN SERPL-MCNC: 10.9 MG/DL (ref 6–20)
BZE UR QL SCN: ABNORMAL
CALCIUM SERPL-MCNC: 10.9 MG/DL (ref 8.6–10)
CANNABINOIDS UR QL SCN: ABNORMAL
CHLORIDE SERPL-SCNC: 98 MMOL/L (ref 98–107)
COLOR UR AUTO: YELLOW
CREAT SERPL-MCNC: 0.76 MG/DL (ref 0.51–0.95)
DEPRECATED HCO3 PLAS-SCNC: 20 MMOL/L (ref 22–29)
EOSINOPHIL # BLD AUTO: 0 10E3/UL (ref 0–0.7)
EOSINOPHIL NFR BLD AUTO: 0 %
ERYTHROCYTE [DISTWIDTH] IN BLOOD BY AUTOMATED COUNT: 13.8 % (ref 10–15)
ETHANOL SERPL-MCNC: <0.01 G/DL
GASTROCULT GAST QL: POSITIVE
GFR SERPL CREATININE-BSD FRML MDRD: >90 ML/MIN/1.73M2
GLUCOSE SERPL-MCNC: 138 MG/DL (ref 70–99)
GLUCOSE UR STRIP-MCNC: NEGATIVE MG/DL
HCT VFR BLD AUTO: 53.1 % (ref 35–47)
HEMOCCULT STL QL: POSITIVE
HGB BLD-MCNC: 18.5 G/DL (ref 11.7–15.7)
HGB UR QL STRIP: ABNORMAL
HOLD SPECIMEN: NORMAL
IMM GRANULOCYTES # BLD: 0.1 10E3/UL
IMM GRANULOCYTES NFR BLD: 1 %
KETONES UR STRIP-MCNC: 5 MG/DL
LEUKOCYTE ESTERASE UR QL STRIP: NEGATIVE
LYMPHOCYTES # BLD AUTO: 1.5 10E3/UL (ref 0.8–5.3)
LYMPHOCYTES NFR BLD AUTO: 8 %
MCH RBC QN AUTO: 33.6 PG (ref 26.5–33)
MCHC RBC AUTO-ENTMCNC: 34.8 G/DL (ref 31.5–36.5)
MCV RBC AUTO: 97 FL (ref 78–100)
MONOCYTES # BLD AUTO: 1.2 10E3/UL (ref 0–1.3)
MONOCYTES NFR BLD AUTO: 6 %
MUCOUS THREADS #/AREA URNS LPF: PRESENT /LPF
NEUTROPHILS # BLD AUTO: 15.8 10E3/UL (ref 1.6–8.3)
NEUTROPHILS NFR BLD AUTO: 85 %
NITRATE UR QL: NEGATIVE
NRBC # BLD AUTO: 0 10E3/UL
NRBC BLD AUTO-RTO: 0 /100
OPIATES UR QL SCN: ABNORMAL
PCP QUAL URINE (ROCHE): ABNORMAL
PH GAST: ABNORMAL [PH]
PH UR STRIP: 6 [PH] (ref 5–7)
PLATELET # BLD AUTO: 209 10E3/UL (ref 150–450)
POTASSIUM SERPL-SCNC: 4.5 MMOL/L (ref 3.4–5.3)
RBC # BLD AUTO: 5.5 10E6/UL (ref 3.8–5.2)
RBC URINE: 3 /HPF
SODIUM SERPL-SCNC: 135 MMOL/L (ref 136–145)
SP GR UR STRIP: 1.02 (ref 1–1.03)
UROBILINOGEN UR STRIP-MCNC: NORMAL MG/DL
WBC # BLD AUTO: 18.7 10E3/UL (ref 4–11)
WBC URINE: 3 /HPF

## 2023-07-10 PROCEDURE — 85025 COMPLETE CBC W/AUTO DIFF WBC: CPT | Performed by: EMERGENCY MEDICINE

## 2023-07-10 PROCEDURE — 258N000003 HC RX IP 258 OP 636: Performed by: INTERNAL MEDICINE

## 2023-07-10 PROCEDURE — 81001 URINALYSIS AUTO W/SCOPE: CPT | Mod: XU | Performed by: EMERGENCY MEDICINE

## 2023-07-10 PROCEDURE — 250N000011 HC RX IP 250 OP 636: Performed by: EMERGENCY MEDICINE

## 2023-07-10 PROCEDURE — 250N000013 HC RX MED GY IP 250 OP 250 PS 637: Performed by: INTERNAL MEDICINE

## 2023-07-10 PROCEDURE — 36415 COLL VENOUS BLD VENIPUNCTURE: CPT | Performed by: EMERGENCY MEDICINE

## 2023-07-10 PROCEDURE — 80048 BASIC METABOLIC PNL TOTAL CA: CPT | Performed by: EMERGENCY MEDICINE

## 2023-07-10 PROCEDURE — 82272 OCCULT BLD FECES 1-3 TESTS: CPT | Performed by: EMERGENCY MEDICINE

## 2023-07-10 PROCEDURE — G0378 HOSPITAL OBSERVATION PER HR: HCPCS

## 2023-07-10 PROCEDURE — 96361 HYDRATE IV INFUSION ADD-ON: CPT | Performed by: EMERGENCY MEDICINE

## 2023-07-10 PROCEDURE — 80307 DRUG TEST PRSMV CHEM ANLYZR: CPT | Performed by: EMERGENCY MEDICINE

## 2023-07-10 PROCEDURE — 82077 ASSAY SPEC XCP UR&BREATH IA: CPT | Performed by: EMERGENCY MEDICINE

## 2023-07-10 PROCEDURE — 250N000011 HC RX IP 250 OP 636: Mod: JZ | Performed by: INTERNAL MEDICINE

## 2023-07-10 PROCEDURE — 96376 TX/PRO/DX INJ SAME DRUG ADON: CPT

## 2023-07-10 PROCEDURE — 258N000003 HC RX IP 258 OP 636: Performed by: EMERGENCY MEDICINE

## 2023-07-10 PROCEDURE — C9113 INJ PANTOPRAZOLE SODIUM, VIA: HCPCS | Mod: JZ | Performed by: INTERNAL MEDICINE

## 2023-07-10 PROCEDURE — 99285 EMERGENCY DEPT VISIT HI MDM: CPT | Performed by: EMERGENCY MEDICINE

## 2023-07-10 PROCEDURE — 96374 THER/PROPH/DIAG INJ IV PUSH: CPT | Performed by: EMERGENCY MEDICINE

## 2023-07-10 PROCEDURE — 99222 1ST HOSP IP/OBS MODERATE 55: CPT | Mod: AI | Performed by: INTERNAL MEDICINE

## 2023-07-10 PROCEDURE — 96376 TX/PRO/DX INJ SAME DRUG ADON: CPT | Performed by: EMERGENCY MEDICINE

## 2023-07-10 PROCEDURE — 99285 EMERGENCY DEPT VISIT HI MDM: CPT | Mod: 25 | Performed by: EMERGENCY MEDICINE

## 2023-07-10 PROCEDURE — C9113 INJ PANTOPRAZOLE SODIUM, VIA: HCPCS | Mod: JZ | Performed by: EMERGENCY MEDICINE

## 2023-07-10 PROCEDURE — 82271 OCCULT BLOOD OTHER SOURCES: CPT | Performed by: EMERGENCY MEDICINE

## 2023-07-10 PROCEDURE — 96375 TX/PRO/DX INJ NEW DRUG ADDON: CPT | Performed by: EMERGENCY MEDICINE

## 2023-07-10 RX ORDER — ALBUTEROL SULFATE 90 UG/1
2 AEROSOL, METERED RESPIRATORY (INHALATION) EVERY 4 HOURS PRN
Status: DISCONTINUED | OUTPATIENT
Start: 2023-07-10 | End: 2023-07-10 | Stop reason: HOSPADM

## 2023-07-10 RX ORDER — ONDANSETRON 2 MG/ML
4 INJECTION INTRAMUSCULAR; INTRAVENOUS EVERY 6 HOURS PRN
Status: DISCONTINUED | OUTPATIENT
Start: 2023-07-10 | End: 2023-07-10

## 2023-07-10 RX ORDER — DICYCLOMINE HCL 20 MG
20 TABLET ORAL
Status: DISCONTINUED | OUTPATIENT
Start: 2023-07-10 | End: 2023-07-10 | Stop reason: HOSPADM

## 2023-07-10 RX ORDER — ACETAMINOPHEN 500 MG
1000 TABLET ORAL EVERY 8 HOURS PRN
Status: DISCONTINUED | OUTPATIENT
Start: 2023-07-10 | End: 2023-07-10 | Stop reason: HOSPADM

## 2023-07-10 RX ORDER — ONDANSETRON 2 MG/ML
4 INJECTION INTRAMUSCULAR; INTRAVENOUS ONCE
Status: COMPLETED | OUTPATIENT
Start: 2023-07-10 | End: 2023-07-10

## 2023-07-10 RX ORDER — ONDANSETRON 2 MG/ML
4 INJECTION INTRAMUSCULAR; INTRAVENOUS EVERY 6 HOURS PRN
Status: DISCONTINUED | OUTPATIENT
Start: 2023-07-10 | End: 2023-07-10 | Stop reason: HOSPADM

## 2023-07-10 RX ORDER — ONDANSETRON 4 MG/1
4 TABLET, ORALLY DISINTEGRATING ORAL EVERY 6 HOURS PRN
Status: DISCONTINUED | OUTPATIENT
Start: 2023-07-10 | End: 2023-07-10 | Stop reason: HOSPADM

## 2023-07-10 RX ORDER — ONDANSETRON 4 MG/1
4 TABLET, ORALLY DISINTEGRATING ORAL EVERY 6 HOURS PRN
Status: DISCONTINUED | OUTPATIENT
Start: 2023-07-10 | End: 2023-07-10

## 2023-07-10 RX ORDER — SODIUM CHLORIDE 9 MG/ML
INJECTION, SOLUTION INTRAVENOUS CONTINUOUS
Status: DISCONTINUED | OUTPATIENT
Start: 2023-07-10 | End: 2023-07-10 | Stop reason: HOSPADM

## 2023-07-10 RX ORDER — MORPHINE SULFATE 4 MG/ML
4 INJECTION, SOLUTION INTRAMUSCULAR; INTRAVENOUS ONCE
Status: COMPLETED | OUTPATIENT
Start: 2023-07-10 | End: 2023-07-10

## 2023-07-10 RX ADMIN — SODIUM CHLORIDE: 9 INJECTION, SOLUTION INTRAVENOUS at 17:41

## 2023-07-10 RX ADMIN — ONDANSETRON 4 MG: 2 INJECTION INTRAMUSCULAR; INTRAVENOUS at 18:36

## 2023-07-10 RX ADMIN — ACETAMINOPHEN 1000 MG: 500 TABLET, FILM COATED ORAL at 18:17

## 2023-07-10 RX ADMIN — PANTOPRAZOLE SODIUM 40 MG: 40 INJECTION, POWDER, LYOPHILIZED, FOR SOLUTION INTRAVENOUS at 18:17

## 2023-07-10 RX ADMIN — SODIUM CHLORIDE, POTASSIUM CHLORIDE, SODIUM LACTATE AND CALCIUM CHLORIDE 1000 ML: 600; 310; 30; 20 INJECTION, SOLUTION INTRAVENOUS at 11:53

## 2023-07-10 RX ADMIN — MORPHINE SULFATE 4 MG: 4 INJECTION, SOLUTION INTRAMUSCULAR; INTRAVENOUS at 15:40

## 2023-07-10 RX ADMIN — ONDANSETRON 4 MG: 2 INJECTION INTRAMUSCULAR; INTRAVENOUS at 12:35

## 2023-07-10 RX ADMIN — ONDANSETRON 4 MG: 2 INJECTION INTRAMUSCULAR; INTRAVENOUS at 15:34

## 2023-07-10 RX ADMIN — PANTOPRAZOLE SODIUM 40 MG: 40 INJECTION, POWDER, LYOPHILIZED, FOR SOLUTION INTRAVENOUS at 15:34

## 2023-07-10 ASSESSMENT — ACTIVITIES OF DAILY LIVING (ADL)
ADLS_ACUITY_SCORE: 35

## 2023-07-10 NOTE — H&P
[facility]    History and Physical - Hospitalist Service       Date of Admission:  7/10/2023    Assessment & Plan      Meli Rodriguez is a 47 year old female admitted on 7/10/2023.  Patient reportedly lives with brother and sister-in-law in law.  Patient presents [facility] emergency department on 7/10/2023 with reported bright red blood per rectum.  Patient was also noted to vomit blood by the emergency department provider.  Patient appears to have an extensive psychiatric history.  At this time, patient frankly denies alcohol or illicit drug use.  Patient reports a large bowel movement this morning and noticed bright red blood.  Patient does have a history of symptomatic hemorrhoids.  Given patient's hemoglobin of 18.5 g/dL, it is unlikely she will require inpatient endoscopy.  Patient will be admitted for observation to the hospitalist service.    #Hematemesis  #Hematochezia  #Symptomatic hemorrhoids  #Intractable nausea and vomiting  #Consider cannabinoid hyperemesis syndrome  #Chronic abdominal pain  - Protonix 40 mg IV twice daily  - Continue to monitor hemoglobin hematocrit  - IV hydration with normal saline at 100 mL/h  - QTc within normal limits  - Zofran as needed nausea/vomiting  - Compazine allergy noted  - Work to avoid opiate analgesics whenever possible    #Asthma  - Continue PTA albuterol       Diet:  Clear liquid diet, advance as tolerated  DVT Prophylaxis: Ambulate every shift  Patel Catheter: Not present  Lines: None     Cardiac Monitoring: None  Code Status:  Full resuscitation status    Clinically Significant Risk Factors Present on Admission           # Hypercalcemia: Highest Ca = 10.9 mg/dL in last 2 days, will monitor as appropriate                      Disposition Plan      Expected Discharge Date: 07/11/2023                  Jose Hoang DO, MHS  Hospitalist Service  [facility]  Securely message with [employer]  (more info)  Text page via [facility] Paging/Directory     ______________________________________________________________________    Chief Complaint   Hematemesis, hematochezia, nausea/vomiting.    History is obtained from the patient & electronic medical record.    History of Present Illness   Meli Rodriguez is a 47 year old female admitted on 7/10/2023.  Patient reportedly lives with brother and sister-in-law in law.  Patient presents [facility] emergency department on 7/10/2023 with reported bright red blood per rectum.  Patient was also noted to vomit blood by the emergency department provider.  Patient appears to have an extensive psychiatric history.  At this time, patient frankly denies alcohol or illicit drug use.  Patient reports a large bowel movement this morning and noticed bright red blood.  Patient does have a history of symptomatic hemorrhoids.  Given patient's hemoglobin of 18.5 g/dL, it is unlikely she will require inpatient endoscopy.  Patient will be admitted for observation to the hospitalist service.    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      Migraine      Migraine      Polysubstance abuse (H) 12/4/2012     PTSD (post-traumatic stress disorder)      PTSD  (post-traumatic stress disorder)      Thyroid disease      Ulcer      Urinary retention        Past Surgical History   Past Surgical History:   Procedure Laterality Date     APPENDECTOMY       APPENDECTOMY       CHOLECYSTECTOMY  01/01/2005     CHOLECYSTECTOMY       HYSTERECTOMY      with ophorectomy bilateral     HYSTERECTOMY TOTAL ABDOMINAL, BILATERAL SALPINGO-OOPHORECTOMY, COMBINED  01/01/2001    bleeding ovarian cysts     HYSTERECTOMY, PAP NO LONGER INDICATED       HYSTERECTOMY, PAP NO LONGER INDICATED       TONSILLECTOMY, ADENOIDECTOMY, MYRINGOTOMY, INSERT TUBE BILATERAL, COMBINED       TOTAL VAGINAL HYSTERECTOMY       URETHRA SURGERY      temporary stent     URETHRA SURGERY         Prior to Admission Medications   Prior to Admission Medications   Prescriptions Last Dose Informant Patient Reported? Taking?   albuterol (PROAIR HFA/PROVENTIL HFA/VENTOLIN HFA) 108 (90 Base) MCG/ACT inhaler Past Week Self No Yes   Sig: Inhale 2 puffs into the lungs every 4 hours as needed for shortness of breath, wheezing or cough      Facility-Administered Medications: None        Physical Exam   Vital Signs: Temp: 98.4  F (36.9  C) Temp src: Oral BP: 135/88 Pulse: 93   Resp: 18 SpO2: 95 % O2 Device: None (Room air)    Weight: 135 lbs 0 oz    GENERAL: Alert and oriented x 3; no acute distress; well-nourished.  HEENT: Normocephalic; atraumatic; PERRLA; MMM.  CV: RRR; normal S1, S2; no rubs, murmurs, or gallops.  RESP: Lung fields clear to aucultation B/L; no wheezing or crepitations.  GI: Abdomen is soft, lower quadrant abdominal discomfort to palpation.  : Deferred genital examination.   MSK: No clubbing, cyanosis, or edema.  DERM: Skin is intact; no rash, lesions, or skin breakdown.  NEURO: No focal deficits appreciated; strength & sensorium are grossly intact.  PSYCH: No active hallucinations; affect, insight appear within normal limits.    Medical Decision Making       55 MINUTES SPENT BY ME on the date of service doing chart  review, history, exam, documentation & further activities per the note.      Data     I have personally reviewed the following data over the past 24 hrs:    18.7 (H)  \   18.5 (H)   / 209     135 (L) 98 10.9 /  138 (H)   4.5 20 (L) 0.76 \       Imaging results reviewed over the past 24 hrs:   No results found for this or any previous visit (from the past 24 hour(s)).

## 2023-07-10 NOTE — ED TRIAGE NOTES
Patient reports about 0400 am woke up with abdominal pain felt like I was constipated, when went to bathroom had to strain to have BM, and blood poured out of rectum.  5 episodes of bloody stool 1 episode of bloody emesis.  Has h/o hemorrhoids,and GI consult scheduled within the past month.  Cant eat because I puke.      Triage Assessment       Row Name 07/10/23 1053       Triage Assessment (Adult)    Airway WDL WDL       Respiratory WDL    Respiratory WDL WDL       Skin Circulation/Temperature WDL    Skin Circulation/Temperature WDL WDL       Cardiac WDL    Cardiac WDL WDL       Peripheral/Neurovascular WDL    Peripheral Neurovascular WDL WDL       Cognitive/Neuro/Behavioral WDL    Cognitive/Neuro/Behavioral WDL WDL

## 2023-07-10 NOTE — ED NOTES
[facility]   Admission Handoff    The patient is Meli Rodriguez, 47 year old who arrived in the ED by AMBULANCE from home with a complaint of Nausea, Vomiting, & Diarrhea (Patient reports about 0400 am woke up with abdominal pain felt like I was constipated, when went to bathroom had to strain to have BM, and blood poured out of rectum.  Has h/o hemorrhoids,and GI consult scheduled within the past month.  Cant eat because I puke. )  . The patient's current symptoms are a recurrence of a past episode and during this time the symptoms have decreased. In the ED, patient was diagnosed with   Final diagnoses:   Lower abdominal pain   Hematemesis with nausea   Hematochezia         Needed?: No    Allergies:    Allergies   Allergen Reactions    Demerol Itching    Droperidol Other (See Comments)     twitching      Ketorolac Tromethamine Itching    Nsaids Other (See Comments)     Gastric ulcer    Adhesive Tape Rash    Compazine [Prochlorperazine] Rash     Arm red and rash    Diphenhydramine Anxiety     Jittery and jumpy       Past Medical Hx:   Past Medical History:   Diagnosis Date    ADD (attention deficit disorder with hyperactivity)     ADD (attention deficit disorder)     ADHD (attention deficit hyperactivity disorder)     Agoraphobia     pt reported    Allergic state     Antisocial personality disorder (H)     Anxiety     Anxiety     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     Migraine     Migraine     Polysubstance abuse (H) 12/4/2012    PTSD (post-traumatic stress disorder)     PTSD (post-traumatic stress  "disorder)     Thyroid disease     Ulcer     Urinary retention        Initial vitals were: BP: (!) 130/107  Pulse: 96  Temp: 98.4  F (36.9  C)  Resp: 18  Height: 160 cm (5' 3\")  Weight: 61.2 kg (135 lb)  SpO2: 98 %   Recent vital Signs: /82   Pulse 73   Temp 98.4  F (36.9  C) (Oral)   Resp 18   Ht 1.6 m (5' 3\")   Wt 61.2 kg (135 lb)   SpO2 92%   BMI 23.91 kg/m      Elimination Status: Continent: Yes     Activity Level: SBA    Fall Status: Reason for falls risk: Elimination  nonskid shoes/slippers when out of bed and patient and family education    Baseline Mental status: WDL  Current Mental Status changes: at basesline    Infection present or suspected this encounter: no  Sepsis suspected: No    Isolation type: N/A    Bariatric equipment needed?: No    In the ED these meds were given:   Medications   pantoprazole (PROTONIX) IV push injection 40 mg (has no administration in time range)   sodium chloride 0.9% infusion (has no administration in time range)   acetaminophen (TYLENOL) tablet 1,000 mg (has no administration in time range)   ondansetron (ZOFRAN) injection 4 mg (has no administration in time range)   lactated ringers BOLUS 1,000 mL (0 mLs Intravenous Stopped 7/10/23 1336)   ondansetron (ZOFRAN) injection 4 mg (4 mg Intravenous $Given 7/10/23 1235)   pantoprazole (PROTONIX) IV push injection 40 mg (40 mg Intravenous $Given 7/10/23 1534)   morphine (PF) injection 4 mg (4 mg Intravenous $Given 7/10/23 1540)   ondansetron (ZOFRAN) injection 4 mg (4 mg Intravenous $Given 7/10/23 1534)       Drips running?  Yes    Home pump  No    Current LDAs: Peripheral IV: Site L AC; Gauge 20  normal saline     Results:   Labs/Imaging  Ordered and Resulted from Time of ED Arrival Up to the Time of Departure from the ED  Results for orders placed or performed during the hospital encounter of 07/10/23 (from the past 24 hour(s))   [place] Draw    Narrative    The following orders were created for panel order [place] " Draw.  Procedure                               Abnormality         Status                     ---------                               -----------         ------                     Extra Green Top (Lithium...[[id number]]                                                 Extra Purple Top Tube[[id number]]                            Final result                 Please view results for these tests on the individual orders.   CBC with platelets, differential    Narrative    The following orders were created for panel order CBC with platelets, differential.  Procedure                               Abnormality         Status                     ---------                               -----------         ------                     CBC with platelets and d...[[id number]]  Abnormal            Final result                 Please view results for these tests on the individual orders.   Basic metabolic panel   Result Value Ref Range    Sodium 135 (L) 136 - 145 mmol/L    Potassium 4.5 3.4 - 5.3 mmol/L    Chloride 98 98 - 107 mmol/L    Carbon Dioxide (CO2) 20 (L) 22 - 29 mmol/L    Anion Gap 17 (H) 7 - 15 mmol/L    Urea Nitrogen 10.9 6.0 - 20.0 mg/dL    Creatinine 0.76 0.51 - 0.95 mg/dL    Calcium 10.9 (H) 8.6 - 10.0 mg/dL    Glucose 138 (H) 70 - 99 mg/dL    GFR Estimate >90 >60 mL/min/1.73m2   Extra Purple Top Tube   Result Value Ref Range    Hold Specimen JI    CBC with platelets and differential   Result Value Ref Range    WBC Count 18.7 (H) 4.0 - 11.0 10e3/uL    RBC Count 5.50 (H) 3.80 - 5.20 10e6/uL    Hemoglobin 18.5 (H) 11.7 - 15.7 g/dL    Hematocrit 53.1 (H) 35.0 - 47.0 %    MCV 97 78 - 100 fL    MCH 33.6 (H) 26.5 - 33.0 pg    MCHC 34.8 31.5 - 36.5 g/dL    RDW 13.8 10.0 - 15.0 %    Platelet Count 209 150 - 450 10e3/uL    % Neutrophils 85 %    % Lymphocytes 8 %    % Monocytes 6 %    % Eosinophils 0 %    % Basophils 0 %    % Immature Granulocytes 1 %    NRBCs per 100 WBC 0 <1 /100    Absolute Neutrophils 15.8 (H) 1.6 - 8.3  10e3/uL    Absolute Lymphocytes 1.5 0.8 - 5.3 10e3/uL    Absolute Monocytes 1.2 0.0 - 1.3 10e3/uL    Absolute Eosinophils 0.0 0.0 - 0.7 10e3/uL    Absolute Basophils 0.0 0.0 - 0.2 10e3/uL    Absolute Immature Granulocytes 0.1 <=0.4 10e3/uL    Absolute NRBCs 0.0 10e3/uL   Occult blood stool   Result Value Ref Range    Occult Blood Positive (A) Negative   Occult blood gastric   Result Value Ref Range    Occult Blood Gastric Positive (A) Negative    pH Gastric 4 pH    UA Macroscopic with reflex to Microscopic and Culture    Specimen: Urine, Midstream   Result Value Ref Range    Color Urine Yellow Colorless, Straw, Light Yellow, Yellow    Appearance Urine Clear Clear    Glucose Urine Negative Negative mg/dL    Bilirubin Urine Negative Negative    Ketones Urine 5 (A) Negative mg/dL    Specific Gravity Urine 1.018 1.003 - 1.035    Blood Urine Small (A) Negative    pH Urine 6.0 5.0 - 7.0    Protein Albumin Urine Negative Negative mg/dL    Urobilinogen Urine Normal Normal, 2.0 mg/dL    Nitrite Urine Negative Negative    Leukocyte Esterase Urine Negative Negative    Mucus Urine Present (A) None Seen /LPF    RBC Urine 3 (H) <=2 /HPF    WBC Urine 3 <=5 /HPF    Narrative    Urine Culture not indicated   Urine Drugs of Abuse Screen    Narrative    The following orders were created for panel order Urine Drugs of Abuse Screen.  Procedure                               Abnormality         Status                     ---------                               -----------         ------                     Drug abuse screen 77 uri...[[id number]]  Abnormal            Final result                 Please view results for these tests on the individual orders.   Drug abuse screen 77 urine (FL, RH, SH)   Result Value Ref Range    Amphetamines Urine Screen Negative Screen Negative    Barbituates Urine Screen Negative Screen Negative    Benzodiazepine Urine Screen Negative Screen Negative    Cannabinoids Urine Screen Positive (A) Screen Negative     Opiates Urine Screen Positive (A) Screen Negative    PCP Urine Screen Negative Screen Negative    Cocaine Urine Screen Negative Screen Negative       For the majority of the shift this patient's behavior was Green     Cardiac Rhythm: Normal Sinus  Pt needs tele? No  Skin/wound Issues: None    Code Status: Full Code    Pain control: good    Nausea control: good    Abnormal labs/tests/findings requiring intervention:     Patient tested for COVID 19 prior to admission: NO     OBS brochure/video discussed/provided to patient/family: Yes     Family present during ED course? No     Family Comments/Social Situation comments: Patient advocate at bedside    Tasks needing completion: None    Angeles Watson RN

## 2023-07-10 NOTE — ED PROVIDER NOTES
History     Chief Complaint   Patient presents with     Nausea, Vomiting, & Diarrhea     Patient reports about 0400 am woke up with abdominal pain felt like I was constipated, when went to bathroom had to strain to have BM, and blood poured out of rectum.  Has h/o hemorrhoids,and GI consult scheduled within the past month.  Cant eat because I puke.      HPI   Meli Rodriguez is a 47 year old female with complex past medical history notable for chronic abdominal pain, nausea, and vomiting who presents via EMS with blood in stool and bloody emesis. Felt difficult having bowel movement this morning that had large amount of loose stool. She reports hx of hemorrhoids but no rectal pain at this time. Bright red blood in emesis. No melena. Pain is typically across her lower abdomen. Symptoms have been ongoing for years. Previously [facility] but now is seeing a gastroenterologist in [place]. States that she is not currently taking any medications. Was previously on ppi. No fevers or chills. No chest pain. No dyspnea.     EGD in 2017 w/ LA grade A esophagitis. No mention of varices. Numerous unremarkable abdominal CTs.     Rare alcohol use, none recently. Stopped using NSAIDS in 2017 ulceration. Lives at home with .     GI clinic note from 5/2/23 reviewed.     [facility]       Value Time User    State PDMP site checked  Yes 7/10/2023  2:41 PM Nicholas Núñez MD            The patient's PMHx, Surgical Hx, Allergies, and Medications were all reviewed with the patient.    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     Priority: Medium     ADD (attention deficit disorder) 02/18/2014     Priority: Medium     Chondromalacia of patella 12/11/2013     Priority: Medium     Drug-seeking behavior 12/06/2013     Priority: Medium     Health Care Home 12/03/2013     Priority: Medium       Status:  Unable to reach    -See Letters for [facility] Care Plan - Emergency Care Plan           Cannabis abuse 07/03/2013     Priority: Medium     Polysubstance abuse (H) 12/04/2012     Priority: Medium     Social phobia: with panic attacks 07/23/2012     Priority: Medium     Moderate recurrent major depression: per psychiatry consult 07/23/2012     Priority: Medium     Prescription Opioid and benzodiazepine abuse 07/23/2012     Priority: Medium     Contusion of duodenum 06/26/2012     Priority: Medium     Vitamin D deficiency 06/26/2012     Priority: Medium     At risk for osteoporosis 06/26/2012     Priority: Medium     Hematochezia 06/26/2012     Priority: Medium     Abdominal pain, unspecified abdominal location 06/25/2012     Priority: Medium     Problem list name updated by automated process. Provider to review       Panic attack 05/22/2012     Priority: Medium     Acquired absence of both cervix and uterus 09/16/2011     Priority: Medium     Colitis 05/16/2011     Priority: Medium     Outbursts of anger 12/14/2009     Priority: Medium     Gastroesophageal reflux disease 06/02/2009     Priority: Medium     Formatting of this note might be different from the original.  Esophagitis- EGD 2017. PPI not helpful. No further follow up.       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     Bipolar 2 disorder (H)      Carpal tunnel syndrome      Colitis      Depression      Depressive disorder      Drug-seeking behavior      Drug-seeking behavior      GERD (gastroesophageal reflux disease)      Head injury      Irritable bowel syndrome (IBS)      Kidney infection      Lower urinary tract infection 2/18/2011     Migraine      Migraine      Polysubstance abuse (H) 12/4/2012     PTSD (post-traumatic stress disorder)      PTSD (post-traumatic stress disorder)      Thyroid disease      Ulcer      Urinary retention        Past Surgical History:    Past Surgical History:   Procedure Laterality Date     APPENDECTOMY       APPENDECTOMY       CHOLECYSTECTOMY  01/01/2005     CHOLECYSTECTOMY       HYSTERECTOMY      with ophorectomy bilateral     HYSTERECTOMY TOTAL ABDOMINAL, BILATERAL SALPINGO-OOPHORECTOMY, COMBINED  01/01/2001    bleeding ovarian cysts     HYSTERECTOMY, PAP NO LONGER INDICATED       HYSTERECTOMY, PAP NO LONGER INDICATED       TONSILLECTOMY, ADENOIDECTOMY, MYRINGOTOMY, INSERT TUBE BILATERAL, COMBINED       TOTAL VAGINAL HYSTERECTOMY       URETHRA SURGERY      temporary stent     URETHRA SURGERY         Family History:    Family History   Problem Relation Age of Onset     Unknown/Adopted Father      Chronic Obstructive Pulmonary Disease Mother      Aneurysm Maternal Grandmother      Cerebrovascular Disease Maternal Grandfather      Breast Cancer No family hx of      Cancer No family hx of      Diabetes No family hx of        Social History:  Marital Status:   [2]  Social History     Tobacco Use     Smoking status: Every Day     Packs/day: 0.50     Years: 20.00     Pack years: 10.00     Types: Cigarettes     Smokeless tobacco: Never   Substance Use Topics     Alcohol use: No     Drug use: No     Comment: Denies, past marijuana        Medications:   "  albuterol (PROAIR HFA/PROVENTIL HFA/VENTOLIN HFA) 108 (90 Base) MCG/ACT inhaler          Review of Systems  Pertinent positives and negatives mentioned in HPI    Physical Exam   BP: (!) 130/107  Pulse: 96  Temp: 98.4  F (36.9  C)  Resp: 18  Height: 160 cm (5' 3\")  Weight: 61.2 kg (135 lb)  SpO2: 98 %    Physical Exam  GEN: Awake, alert, and cooperative. No acute distress.  HENT: oral mucosa moist.   NECK: Symmetric, freely mobile.   CV : Regular rate and rhythm.  PULM: Normal effort.   ABD: Soft, non-tender, non-distended. No rebound or guarding.   RECTAL: perianal skin tags. Fecal occult blood positive. No bright red blood or melena.   NEURO: Normal speech. Following commands. CN II-XII grossly intact. Answering questions and interacting appropriately.   EXT: No gross deformity. Warm and well perfused.  INT: Warm. No diaphoresis. Normal color.        ED Course        Procedures         Critical Care time:  none               Results for orders placed or performed during the hospital encounter of 07/10/23 (from the past 24 hour(s))   [place] Draw    Narrative    The following orders were created for panel order [place] Draw.  Procedure                               Abnormality         Status                     ---------                               -----------         ------                     Extra Green Top (Lithium...[[id number]]                                                 Extra Purple Top Tube[[id number]]                            Final result                 Please view results for these tests on the individual orders.   CBC with platelets, differential    Narrative    The following orders were created for panel order CBC with platelets, differential.  Procedure                               Abnormality         Status                     ---------                               -----------         ------                     CBC with platelets and d...[[id number]]  Abnormal            Final result          "        Please view results for these tests on the individual orders.   Basic metabolic panel   Result Value Ref Range    Sodium 135 (L) 136 - 145 mmol/L    Potassium 4.5 3.4 - 5.3 mmol/L    Chloride 98 98 - 107 mmol/L    Carbon Dioxide (CO2) 20 (L) 22 - 29 mmol/L    Anion Gap 17 (H) 7 - 15 mmol/L    Urea Nitrogen 10.9 6.0 - 20.0 mg/dL    Creatinine 0.76 0.51 - 0.95 mg/dL    Calcium 10.9 (H) 8.6 - 10.0 mg/dL    Glucose 138 (H) 70 - 99 mg/dL    GFR Estimate >90 >60 mL/min/1.73m2   Extra Purple Top Tube   Result Value Ref Range    Hold Specimen JIC    CBC with platelets and differential   Result Value Ref Range    WBC Count 18.7 (H) 4.0 - 11.0 10e3/uL    RBC Count 5.50 (H) 3.80 - 5.20 10e6/uL    Hemoglobin 18.5 (H) 11.7 - 15.7 g/dL    Hematocrit 53.1 (H) 35.0 - 47.0 %    MCV 97 78 - 100 fL    MCH 33.6 (H) 26.5 - 33.0 pg    MCHC 34.8 31.5 - 36.5 g/dL    RDW 13.8 10.0 - 15.0 %    Platelet Count 209 150 - 450 10e3/uL    % Neutrophils 85 %    % Lymphocytes 8 %    % Monocytes 6 %    % Eosinophils 0 %    % Basophils 0 %    % Immature Granulocytes 1 %    NRBCs per 100 WBC 0 <1 /100    Absolute Neutrophils 15.8 (H) 1.6 - 8.3 10e3/uL    Absolute Lymphocytes 1.5 0.8 - 5.3 10e3/uL    Absolute Monocytes 1.2 0.0 - 1.3 10e3/uL    Absolute Eosinophils 0.0 0.0 - 0.7 10e3/uL    Absolute Basophils 0.0 0.0 - 0.2 10e3/uL    Absolute Immature Granulocytes 0.1 <=0.4 10e3/uL    Absolute NRBCs 0.0 10e3/uL   Occult blood stool   Result Value Ref Range    Occult Blood Positive (A) Negative   Occult blood gastric   Result Value Ref Range    Occult Blood Gastric Positive (A) Negative    pH Gastric 4 pH    UA Macroscopic with reflex to Microscopic and Culture    Specimen: Urine, Midstream   Result Value Ref Range    Color Urine Yellow Colorless, Straw, Light Yellow, Yellow    Appearance Urine Clear Clear    Glucose Urine Negative Negative mg/dL    Bilirubin Urine Negative Negative    Ketones Urine 5 (A) Negative mg/dL    Specific Gravity Urine  1.018 1.003 - 1.035    Blood Urine Small (A) Negative    pH Urine 6.0 5.0 - 7.0    Protein Albumin Urine Negative Negative mg/dL    Urobilinogen Urine Normal Normal, 2.0 mg/dL    Nitrite Urine Negative Negative    Leukocyte Esterase Urine Negative Negative    Mucus Urine Present (A) None Seen /LPF    RBC Urine 3 (H) <=2 /HPF    WBC Urine 3 <=5 /HPF    Narrative    Urine Culture not indicated   Urine Drugs of Abuse Screen    Narrative    The following orders were created for panel order Urine Drugs of Abuse Screen.  Procedure                               Abnormality         Status                     ---------                               -----------         ------                     Drug abuse screen 77 uri...[[id number]]                      In process                   Please view results for these tests on the individual orders.       Medications   pantoprazole (PROTONIX) IV push injection 40 mg (has no administration in time range)   sodium chloride 0.9% infusion (has no administration in time range)   acetaminophen (TYLENOL) tablet 1,000 mg (has no administration in time range)   ondansetron (ZOFRAN) injection 4 mg (has no administration in time range)   lactated ringers BOLUS 1,000 mL (0 mLs Intravenous Stopped 7/10/23 1336)   ondansetron (ZOFRAN) injection 4 mg (4 mg Intravenous $Given 7/10/23 1235)   pantoprazole (PROTONIX) IV push injection 40 mg (40 mg Intravenous $Given 7/10/23 1534)   morphine (PF) injection 4 mg (4 mg Intravenous $Given 7/10/23 1540)   ondansetron (ZOFRAN) injection 4 mg (4 mg Intravenous $Given 7/10/23 1534)       Assessments & Plan (with Medical Decision Making)   47 year old female with complex past medical history including chronic abdominal pain with one day of lower abdominal pain associated with nausea, hematemesis and hematochezia. Non tender abdominal exam. Reassuring vital signs. What appears to be bright red blood and mucous in emesis bag (gastrocult positive). Fecal occult  blood positive. No signs of bleed hemorrhoids or anal fissure. No masses in rectum palpated. One liter LR and IV ondansetron with initial improvement of symptoms. UA w/out evidence of acute infection. CBC w/ leukocytosis w/ left shift, likely related to vomiting and diarrhea. Hgc of 18.5 and hematocrit of 53.1. could be hemoconcentration. BMP w/ gap acidosis likely related to emesis. Calcium slightly elevated. Normal creatinine. Uremia and ketosis unlikely causes of AG acidosis. Okay to recheck after fluids.     2:44 PM nausea improved with Zofran.  Complaining of lower abdominal discomfort and asking for pain medication.  Given hemorrhage will not give NSAIDs.  She reports that she tolerates morphine better than hydromorphone.  4 mg IV morphine given and 40 mg of IV pantoprazole. PO challenge with water which she unfortunately vomited.     On reevaluation, pain improved and reassuring abdominal exam. Still not able to tolerated oral hydration and is uncomfortable going home. I do not think that she needs a scope emergently. Would consider restarting ppi. She did receive 40 mg IV pantoprazole while in the ED.     Plan to obs in hospital. Case discussed with Dr. Jose Hoang who accepted admission. Transition orders placed.       I have reviewed the nursing notes.         New Prescriptions    No medications on file       Final diagnoses:   Lower abdominal pain   Hematemesis with nausea   Hematochezia     Nicholas Núñez MD    7/10/2023   [facility] EMERGENCY DEPT    Disclaimer: This note consists of words and symbols derived from keyboarding and dictation using voice recognition software.  As a result, there may be errors that have gone undetected.  Please consider this when interpreting information found in this note.             Nicholas Núñez MD  07/10/23 [id number]

## 2023-07-10 NOTE — ED NOTES
"Patient has  [facility] to Observation  order. Patient has been given the Observation brochure -  What does Observation mean to me.\"  Patient has been given the opportunity to ask questions about observation status and their plan of care.      Ilsa Chahal RN   "

## 2023-07-10 NOTE — MEDICATION SCRIBE - ADMISSION MEDICATION HISTORY
Medication Scribe Admission Medication History    Admission medication history is complete. The information provided in this note is only as accurate as the sources available at the time of the update.    Medication reconciliation/reorder completed by provider prior to medication history? No    Information Source(s): Patient and CareEverywhere/SureScripts via  in room with patient.    Pertinent Information: Patient finished 7 day course of Doxycycline 100 mg a few days ago.  Does not take Tylenol unless she has a headache.    Changes made to PTA medication list:  Added: None  Deleted: Acetaminophen 325 mg, Doxycycline 100 mg capsule.  Changed: None    Medication Affordability:  Not including over the counter (OTC) medications, was there a time in the past 3 months when you did not take your medications as prescribed because of cost?: No    Allergies reviewed with patient and updates made in EHR: yes, added reactions to Prochlorperazine and Diphenhydramine.    Medication History Completed By: Cecilia Jewell 7/10/2023 3:53 PM    Prior to Admission medications    Medication Sig Last Dose Taking? Auth Provider Long Term End Date   albuterol (PROAIR HFA/PROVENTIL HFA/VENTOLIN HFA) 108 (90 Base) MCG/ACT inhaler Inhale 2 puffs into the lungs every 4 hours as needed for shortness of breath, wheezing or cough Past Week Yes Radha Estrada MD Yes

## 2023-07-10 NOTE — PROGRESS NOTES
Writer was second RN on admission skin check.  Writer agrees with admitting RN's findings.    Kirt CHERY [facility]

## 2023-07-10 NOTE — PROGRESS NOTES
"[facility] [place]MISSION NOTE    Patient admitted to room 2310 at approximately 1830 via cart from emergency room. Patient was accompanied by other:n/a.     Verbal SBAR report received from RN prior to patient arrival.     Patient ambulated to bed with stand-by assist. Patient alert and oriented X 3. Pain is not well controlled.  Medication(s) being used: acetaminophen.  . Admission vital signs: Blood pressure 115/74, pulse 68, temperature 98.3  F (36.8  C), temperature source Oral, resp. rate 17, height 1.6 m (5' 3\"), weight 62 kg (136 lb 11 oz), SpO2 94 %, not currently breastfeeding. Patient was oriented to plan of care, call light, bed controls, tv, telephone, bathroom, and visiting hours.     Risk Assessment    The following safety risks were identified during admission: none. Yellow risk band applied: NO.     Skin Initial Assessment    This writer admitted this patient and completed a full skin assessment and Reynold score in the Adult PCS flowsheet. Appropriate interventions initiated as needed.     Secondary skin check completed by Kirt CHERY.         Education    Patient has a [place] to Observation order: Yes  Observation education completed and documented: Yes done in ED      Regla Meier RN      "

## 2023-07-11 NOTE — PROGRESS NOTES
Pt arrived to unit at 1830 from ED. Given PRN IV zofran per pt request. Asked for pain medication. Tylenol was already given in ED. No other pain medications available. MD paged. No changes to orders. Offered heating pad. Pt unhappy with this option.  MD updated. Removed PIV. Pt signed AMA paperwork left unit ambulatory at 1905.

## 2023-07-11 NOTE — DISCHARGE SUMMARY
[facility]  Hospitalist Discharge Summary      Date of Admission:  7/10/2023  Date of Discharge:  7/10/2023  7:10 PM  Discharging Provider: Jose Hoang DO  Discharge Service: Hospitalist Service    Discharge Diagnoses   Hematemesis  Hematochezia  Symptomatic hemorrhoids  Intractable nausea and vomiting  Consider cannabinoid hyperemesis syndrome  Chronic abdominal pain  Asthma    Clinically Significant Risk Factors          Follow-ups Needed After Discharge   Patient left AGAINST MEDICAL ADVICE.    Unresulted Labs Ordered in the Past 30 Days of this Admission     No orders found for last 31 day(s).        Discharge Disposition   Patient left AGAINST MEDICAL ADVICE    Hospital Course      Meli Rodriguez is a 47 year old female admitted on 7/10/2023.  Patient reportedly lives with brother and sister-in-law in law.  Patient presents [facility] emergency department on 7/10/2023 with reported bright red blood per rectum.  Patient was also noted to vomit blood by the emergency department provider.  Patient appears to have an extensive psychiatric history.  At this time, patient frankly denies alcohol or illicit drug use.  Patient reports a large bowel movement this morning and noticed bright red blood.  Patient does have a history of symptomatic hemorrhoids.  Given patient's hemoglobin of 18.5 g/dL, it is unlikely she will require inpatient endoscopy.  Patient will be admitted for observation to the hospitalist service.    #Hematemesis  #Hematochezia  #Symptomatic hemorrhoids  #Intractable nausea and vomiting  #Consider cannabinoid hyperemesis syndrome  #Chronic abdominal pain  - Protonix 40 mg IV twice daily  - Continue to monitor hemoglobin hematocrit  - IV hydration with normal saline at 100 mL/h  - QTc within normal limits  - Zofran as needed nausea/vomiting  - Compazine allergy noted  - Work to avoid opiate analgesics whenever possible    #Asthma  - Continue PTA  albuterol      Consultations This Hospital Stay   None    Code Status   Prior    Time Spent on this Encounter   I, Jose Hoang DO, personally saw the patient today and spent less than or equal to 30 minutes discharging this patient.       Jose Hoang DO, S  [facility] SURGICAL  [address] [address]  Phone: [phone]  Fax: [phone]  ______________________________________________________________________       Primary Care Physician   Physician No Ref-Primary    Discharge Orders   No discharge procedures on file.    Significant Results and Procedures   Results for orders placed or performed during the hospital encounter of 06/23/23   Chest XR,  PA & LAT    Narrative    XR CHEST 2 VIEWS   6/23/2023 12:20 PM     HISTORY: Cough    COMPARISON: 9/23/2020      Impression    IMPRESSION: Cardiac and mediastinal silhouettes are unremarkable. No  pleural effusion, pneumothorax or focal consolidation.    LV SALDANA MD         SYSTEM ID:  [id number]       Discharge Medications   Discharge Medication List as of 7/10/2023  7:15 PM      CONTINUE these medications which have NOT CHANGED    Details   albuterol (PROAIR HFA/PROVENTIL HFA/VENTOLIN HFA) 108 (90 Base) MCG/ACT inhaler Inhale 2 puffs into the lungs every 4 hours as needed for shortness of breath, wheezing or cough, Disp-8.5 g, R-0, E-PrescribePharmacy may dispense brand covered by insurance (Proair, or proventil or ventolin or generic albuterol inhaler)           Allergies   Allergies   Allergen Reactions     Demerol Itching     Droperidol Other (See Comments)     twitching       Ketorolac Tromethamine Itching     Nsaids Other (See Comments)     Gastric ulcer     Adhesive Tape Rash     Compazine [Prochlorperazine] Rash     Arm red and rash     Diphenhydramine Anxiety     Jittery and jumpy

## 2023-07-28 ENCOUNTER — TELEPHONE (OUTPATIENT)
Dept: FAMILY MEDICINE | Facility: CLINIC | Age: 48
End: 2023-07-28
Payer: COMMERCIAL

## 2023-07-28 NOTE — TELEPHONE ENCOUNTER
I have only seen the patient once for acute issues. She was receiving her care in [facility]. I am okay releasing the medical record we have on file to social security.

## 2023-07-28 NOTE — TELEPHONE ENCOUNTER
Forms/Letter Request    Type of form/letter:  social security     Have you been seen for this request: No    Do we have the form/letter: Yes: 7/28/23    Who is the form from? Patient    Where did/will the form come from? Patient or family brought in       When is form/letter needed by: asap    How would you like the form/letter returned:     Patient Notified form requests are processed in 3-5 business days:Yes    Could we send this information to you in [employer] or would you prefer to receive a phone call?:   Patient would prefer a phone call   Okay to leave a detailed message?: Yes at Cell number on file:    Telephone Information:   Mobile [phone]

## 2023-08-02 ENCOUNTER — HOSPITAL ENCOUNTER (EMERGENCY)
Facility: CLINIC | Age: 48
Discharge: HOME | End: 2023-08-02
Attending: EMERGENCY MEDICINE | Admitting: EMERGENCY MEDICINE
Payer: COMMERCIAL

## 2023-08-02 ENCOUNTER — APPOINTMENT (OUTPATIENT)
Dept: CT IMAGING | Facility: CLINIC | Age: 48
End: 2023-08-02
Attending: EMERGENCY MEDICINE
Payer: COMMERCIAL

## 2023-08-02 VITALS
SYSTOLIC BLOOD PRESSURE: 122 MMHG | TEMPERATURE: 99.3 F | BODY MASS INDEX: 22.15 KG/M2 | DIASTOLIC BLOOD PRESSURE: 74 MMHG | WEIGHT: 125 LBS | HEIGHT: 63 IN | OXYGEN SATURATION: 93 % | RESPIRATION RATE: 18 BRPM | HEART RATE: 77 BPM

## 2023-08-02 DIAGNOSIS — M54.6 ACUTE RIGHT-SIDED THORACIC BACK PAIN: ICD-10-CM

## 2023-08-02 DIAGNOSIS — R10.11 RIGHT UPPER QUADRANT ABDOMINAL PAIN: ICD-10-CM

## 2023-08-02 LAB
ALBUMIN SERPL BCG-MCNC: 4.7 G/DL (ref 3.5–5.2)
ALBUMIN SERPL BCG-MCNC: 4.7 G/DL (ref 3.5–5.2)
ALBUMIN UR-MCNC: NEGATIVE MG/DL
ALP SERPL-CCNC: 86 U/L (ref 35–104)
ALP SERPL-CCNC: 86 U/L (ref 35–104)
ALT SERPL W P-5'-P-CCNC: 9 U/L (ref 0–50)
ALT SERPL W P-5'-P-CCNC: 9 U/L (ref 0–50)
ANION GAP SERPL CALCULATED.3IONS-SCNC: 13 MMOL/L (ref 7–15)
APPEARANCE UR: CLEAR
AST SERPL W P-5'-P-CCNC: 20 U/L (ref 0–45)
AST SERPL W P-5'-P-CCNC: 20 U/L (ref 0–45)
BASOPHILS # BLD AUTO: 0 10E3/UL (ref 0–0.2)
BASOPHILS NFR BLD AUTO: 0 %
BILIRUB DIRECT SERPL-MCNC: 0.37 MG/DL (ref 0–0.3)
BILIRUB SERPL-MCNC: 2.3 MG/DL
BILIRUB SERPL-MCNC: 2.3 MG/DL
BILIRUB UR QL STRIP: NEGATIVE
BUN SERPL-MCNC: 5.3 MG/DL (ref 6–20)
CALCIUM SERPL-MCNC: 9.6 MG/DL (ref 8.6–10)
CHLORIDE SERPL-SCNC: 101 MMOL/L (ref 98–107)
COLOR UR AUTO: YELLOW
CREAT SERPL-MCNC: 0.75 MG/DL (ref 0.51–0.95)
DEPRECATED HCO3 PLAS-SCNC: 21 MMOL/L (ref 22–29)
EOSINOPHIL # BLD AUTO: 0.1 10E3/UL (ref 0–0.7)
EOSINOPHIL NFR BLD AUTO: 1 %
ERYTHROCYTE [DISTWIDTH] IN BLOOD BY AUTOMATED COUNT: 13.2 % (ref 10–15)
GFR SERPL CREATININE-BSD FRML MDRD: >90 ML/MIN/1.73M2
GLUCOSE SERPL-MCNC: 102 MG/DL (ref 70–99)
GLUCOSE UR STRIP-MCNC: NEGATIVE MG/DL
HCG SERPL QL: NEGATIVE
HCT VFR BLD AUTO: 45.6 % (ref 35–47)
HGB BLD-MCNC: 16.2 G/DL (ref 11.7–15.7)
HGB UR QL STRIP: NEGATIVE
HOLD SPECIMEN: NORMAL
IMM GRANULOCYTES # BLD: 0 10E3/UL
IMM GRANULOCYTES NFR BLD: 0 %
KETONES UR STRIP-MCNC: 5 MG/DL
LEUKOCYTE ESTERASE UR QL STRIP: ABNORMAL
LIPASE SERPL-CCNC: 11 U/L (ref 13–60)
LYMPHOCYTES # BLD AUTO: 4.3 10E3/UL (ref 0.8–5.3)
LYMPHOCYTES NFR BLD AUTO: 34 %
MCH RBC QN AUTO: 33.5 PG (ref 26.5–33)
MCHC RBC AUTO-ENTMCNC: 35.5 G/DL (ref 31.5–36.5)
MCV RBC AUTO: 94 FL (ref 78–100)
MONOCYTES # BLD AUTO: 1.2 10E3/UL (ref 0–1.3)
MONOCYTES NFR BLD AUTO: 9 %
MUCOUS THREADS #/AREA URNS LPF: PRESENT /LPF
NEUTROPHILS # BLD AUTO: 6.8 10E3/UL (ref 1.6–8.3)
NEUTROPHILS NFR BLD AUTO: 56 %
NITRATE UR QL: NEGATIVE
NRBC # BLD AUTO: 0 10E3/UL
NRBC BLD AUTO-RTO: 0 /100
PH UR STRIP: 6 [PH] (ref 5–7)
PLATELET # BLD AUTO: 218 10E3/UL (ref 150–450)
POTASSIUM SERPL-SCNC: 3.4 MMOL/L (ref 3.4–5.3)
PROT SERPL-MCNC: 7.2 G/DL (ref 6.4–8.3)
PROT SERPL-MCNC: 7.2 G/DL (ref 6.4–8.3)
RBC # BLD AUTO: 4.84 10E6/UL (ref 3.8–5.2)
RBC URINE: 4 /HPF
SODIUM SERPL-SCNC: 135 MMOL/L (ref 136–145)
SP GR UR STRIP: 1.01 (ref 1–1.03)
SQUAMOUS EPITHELIAL: 1 /HPF
UROBILINOGEN UR STRIP-MCNC: 2 MG/DL
WBC # BLD AUTO: 12.4 10E3/UL (ref 4–11)
WBC URINE: 8 /HPF

## 2023-08-02 PROCEDURE — 250N000009 HC RX 250: Performed by: EMERGENCY MEDICINE

## 2023-08-02 PROCEDURE — 96361 HYDRATE IV INFUSION ADD-ON: CPT | Performed by: EMERGENCY MEDICINE

## 2023-08-02 PROCEDURE — 81001 URINALYSIS AUTO W/SCOPE: CPT | Performed by: EMERGENCY MEDICINE

## 2023-08-02 PROCEDURE — 250N000011 HC RX IP 250 OP 636: Performed by: EMERGENCY MEDICINE

## 2023-08-02 PROCEDURE — 250N000011 HC RX IP 250 OP 636: Mod: JZ | Performed by: EMERGENCY MEDICINE

## 2023-08-02 PROCEDURE — 250N000013 HC RX MED GY IP 250 OP 250 PS 637: Performed by: EMERGENCY MEDICINE

## 2023-08-02 PROCEDURE — 74177 CT ABD & PELVIS W/CONTRAST: CPT

## 2023-08-02 PROCEDURE — 85025 COMPLETE CBC W/AUTO DIFF WBC: CPT | Performed by: EMERGENCY MEDICINE

## 2023-08-02 PROCEDURE — 96374 THER/PROPH/DIAG INJ IV PUSH: CPT | Mod: 59 | Performed by: EMERGENCY MEDICINE

## 2023-08-02 PROCEDURE — 99285 EMERGENCY DEPT VISIT HI MDM: CPT | Mod: 25 | Performed by: EMERGENCY MEDICINE

## 2023-08-02 PROCEDURE — 36415 COLL VENOUS BLD VENIPUNCTURE: CPT | Performed by: EMERGENCY MEDICINE

## 2023-08-02 PROCEDURE — 83690 ASSAY OF LIPASE: CPT | Performed by: EMERGENCY MEDICINE

## 2023-08-02 PROCEDURE — 258N000003 HC RX IP 258 OP 636: Performed by: EMERGENCY MEDICINE

## 2023-08-02 PROCEDURE — 99284 EMERGENCY DEPT VISIT MOD MDM: CPT | Performed by: EMERGENCY MEDICINE

## 2023-08-02 PROCEDURE — 96375 TX/PRO/DX INJ NEW DRUG ADDON: CPT | Mod: 59 | Performed by: EMERGENCY MEDICINE

## 2023-08-02 PROCEDURE — 84703 CHORIONIC GONADOTROPIN ASSAY: CPT | Performed by: EMERGENCY MEDICINE

## 2023-08-02 PROCEDURE — 87086 URINE CULTURE/COLONY COUNT: CPT | Performed by: EMERGENCY MEDICINE

## 2023-08-02 PROCEDURE — 82248 BILIRUBIN DIRECT: CPT | Performed by: EMERGENCY MEDICINE

## 2023-08-02 RX ORDER — IOPAMIDOL 755 MG/ML
61 INJECTION, SOLUTION INTRAVASCULAR ONCE
Status: COMPLETED | OUTPATIENT
Start: 2023-08-02 | End: 2023-08-02

## 2023-08-02 RX ORDER — MORPHINE SULFATE 4 MG/ML
4 INJECTION, SOLUTION INTRAMUSCULAR; INTRAVENOUS ONCE
Status: COMPLETED | OUTPATIENT
Start: 2023-08-02 | End: 2023-08-02

## 2023-08-02 RX ORDER — OXYCODONE HYDROCHLORIDE 5 MG/1
5 TABLET ORAL EVERY 4 HOURS PRN
Status: DISCONTINUED | OUTPATIENT
Start: 2023-08-02 | End: 2023-08-02 | Stop reason: HOSPADM

## 2023-08-02 RX ORDER — ONDANSETRON 2 MG/ML
4 INJECTION INTRAMUSCULAR; INTRAVENOUS ONCE
Status: COMPLETED | OUTPATIENT
Start: 2023-08-02 | End: 2023-08-02

## 2023-08-02 RX ADMIN — IOPAMIDOL 61 ML: 755 INJECTION, SOLUTION INTRAVENOUS at 17:48

## 2023-08-02 RX ADMIN — SODIUM CHLORIDE, POTASSIUM CHLORIDE, SODIUM LACTATE AND CALCIUM CHLORIDE 1000 ML: 600; 310; 30; 20 INJECTION, SOLUTION INTRAVENOUS at 16:42

## 2023-08-02 RX ADMIN — MORPHINE SULFATE 4 MG: 4 INJECTION, SOLUTION INTRAMUSCULAR; INTRAVENOUS at 16:36

## 2023-08-02 RX ADMIN — ONDANSETRON 4 MG: 2 INJECTION INTRAMUSCULAR; INTRAVENOUS at 16:34

## 2023-08-02 RX ADMIN — SODIUM CHLORIDE 55 ML: 9 INJECTION, SOLUTION INTRAVENOUS at 17:48

## 2023-08-02 RX ADMIN — OXYCODONE HYDROCHLORIDE 5 MG: 5 TABLET ORAL at 17:22

## 2023-08-02 ASSESSMENT — ACTIVITIES OF DAILY LIVING (ADL)
ADLS_ACUITY_SCORE: 35
ADLS_ACUITY_SCORE: 35

## 2023-08-02 NOTE — ED PROVIDER NOTES
"  History     Chief Complaint   Patient presents with    Abdominal Pain     HPI  Meli Rodriguez is a 47 year old female with complex past medical history notable for chronic abdominal pain, nausea, and vomiting who presents via EMS with worsening abdominal pain and 4 days of inability to eat 2/2 nausea and vomiting.    Says she has lost a lot of weight over the past month.  Since she was previously on 60 pounds and now was 125 pounds.  Is trying to drink water but that does not come up due to nausea vomiting.  Has been taking Tylenol and ibuprofen \"like candy\" for pain.  Says she has a fever of 99.0.  Says her temperature is normally 97.  No blood in vomit.  States she still does have a small amount of blood in her stools.  I did see her earlier this month and admitted her on 7/10 for melena and hematochezia.  She does have upcoming follow-up with GI and has a virtual appointment on 8/7 (5 days of).  Says that she cannot go on like this any longer.  Quickly clarifies that she has no thoughts of hurting herself or others at this time.    Has chronic abdominal pain but now has pain in her right upper quadrant and in her right back.  Pain is colicky in nature.  Has not had pain like this before.  Surgical history notable for appendectomy, cholecystectomy, hysterectomy.    The patient's PMHx, Surgical Hx, Allergies, and Medications were all reviewed with the patient.    Allergies:  Allergies   Allergen Reactions    Demerol Itching    Droperidol Other (See Comments)     twitching      Ketorolac Tromethamine Itching    Nsaids Other (See Comments)     Gastric ulcer    Adhesive Tape Rash    Compazine [Prochlorperazine] Rash     Arm red and rash    Diphenhydramine Anxiety     Jittery and jumpy       Problem List:    Patient Active Problem List    Diagnosis Date Noted    Lower abdominal pain 07/10/2023     Priority: Medium    Hematemesis with nausea 07/10/2023     Priority: Medium    Borderline intellectual functioning " 11/06/2015     Priority: Medium    ADD (attention deficit disorder) 02/18/2014     Priority: Medium    Chondromalacia of patella 12/11/2013     Priority: Medium    Drug-seeking behavior 12/06/2013     Priority: Medium    Health Care Home 12/03/2013     Priority: Medium       Status:  Unable to reach    -See Letters for [facility] Care Plan - Emergency Care Plan          Cannabis abuse 07/03/2013     Priority: Medium    Polysubstance abuse (H) 12/04/2012     Priority: Medium    Social phobia: with panic attacks 07/23/2012     Priority: Medium    Moderate recurrent major depression: per psychiatry consult 07/23/2012     Priority: Medium    Prescription Opioid and benzodiazepine abuse 07/23/2012     Priority: Medium    Contusion of duodenum 06/26/2012     Priority: Medium    Vitamin D deficiency 06/26/2012     Priority: Medium    At risk for osteoporosis 06/26/2012     Priority: Medium    Hematochezia 06/26/2012     Priority: Medium    Abdominal pain, unspecified abdominal location 06/25/2012     Priority: Medium     Problem list name updated by automated process. Provider to review      Panic attack 05/22/2012     Priority: Medium    Acquired absence of both cervix and uterus 09/16/2011     Priority: Medium    Colitis 05/16/2011     Priority: Medium    Outbursts of anger 12/14/2009     Priority: Medium    Gastroesophageal reflux disease 06/02/2009     Priority: Medium     Formatting of this note might be different from the original.  Esophagitis- EGD 2017. PPI not helpful. No further follow up.      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    Bipolar 2 disorder  (H)     Carpal tunnel syndrome     Colitis     Depression     Depressive disorder     Drug-seeking behavior     Drug-seeking behavior     GERD (gastroesophageal reflux disease)     Head injury     Irritable bowel syndrome (IBS)     Kidney infection     Lower urinary tract infection 2/18/2011    Migraine     Migraine     Polysubstance abuse (H) 12/4/2012    PTSD (post-traumatic stress disorder)     PTSD (post-traumatic stress disorder)     Thyroid disease     Ulcer     Urinary retention        Past Surgical History:    Past Surgical History:   Procedure Laterality Date    APPENDECTOMY      APPENDECTOMY      CHOLECYSTECTOMY  01/01/2005    CHOLECYSTECTOMY      HYSTERECTOMY      with ophorectomy bilateral    HYSTERECTOMY TOTAL ABDOMINAL, BILATERAL SALPINGO-OOPHORECTOMY, COMBINED  01/01/2001    bleeding ovarian cysts    HYSTERECTOMY, PAP NO LONGER INDICATED      HYSTERECTOMY, PAP NO LONGER INDICATED      TONSILLECTOMY, ADENOIDECTOMY, MYRINGOTOMY, INSERT TUBE BILATERAL, COMBINED      TOTAL VAGINAL HYSTERECTOMY      URETHRA SURGERY      temporary stent    URETHRA SURGERY         Family History:    Family History   Problem Relation Age of Onset    Unknown/Adopted Father     Chronic Obstructive Pulmonary Disease Mother     Aneurysm Maternal Grandmother     Cerebrovascular Disease Maternal Grandfather     Breast Cancer No family hx of     Cancer No family hx of     Diabetes No family hx of        Social History:  Marital Status:   [2]  Social History     Tobacco Use    Smoking status: Every Day     Packs/day: 0.50     Years: 20.00     Pack years: 10.00     Types: Cigarettes    Smokeless tobacco: Never   Substance Use Topics    Alcohol use: No    Drug use: No     Comment: Denies, past marijuana        Medications:    albuterol (PROAIR HFA/PROVENTIL HFA/VENTOLIN HFA) 108 (90 Base) MCG/ACT inhaler          Review of Systems  Pertinent positives and negatives mentioned in HPI    Physical Exam   BP: 110/78  Pulse: 95  Temp:  "99.3  F (37.4  C)  Resp: 18  Height: 160 cm (5' 3\")  Weight: 56.7 kg (125 lb)  SpO2: 96 %    Physical Exam  GEN: Appears distressed.  Irritable.  CV : Regular rate and rhythm.  PULM: Normal effort.  Speaking in full sentences and audible wheezing ABD: Right upper quadrant tenderness to palpation.  Abdomen soft nondistended.  No involuntary guarding or rebound tenderness.    BACK: No midline spinal tenderness to palpation.  Right CVA tenderness.  NEURO: Normal speech. Following commands. CN II-XII grossly intact. Answering questions and interacting appropriately.   EXT: No gross deformity.   INT: Warm. No diaphoresis. Normal color.     Wt Readings from Last 3 Encounters:   08/02/23 56.7 kg (125 lb)   07/10/23 62 kg (136 lb 11 oz)   06/30/23 61.1 kg (134 lb 9.6 oz)              ED Course        Procedures       Critical Care time:  none               Results for orders placed or performed during the hospital encounter of 08/02/23 (from the past 24 hour(s))   [facility] Draw    Narrative    The following orders were created for panel order [facility] Draw.  Procedure                               Abnormality         Status                     ---------                               -----------         ------                     Extra Blue Top Tube[[id number]]                              Final result               Extra Red Top Tube[[id number]]                               Final result               Extra Green Top (Lithium...[[id number]]                      Final result               Extra Purple Top Tube[[id number]]                            Final result                 Please view results for these tests on the individual orders.   Extra Blue Top Tube   Result Value Ref Range    Hold Specimen JIC    Extra Red Top Tube   Result Value Ref Range    Hold Specimen jic    Extra Green Top (Lithium Heparin) Tube   Result Value Ref Range    Hold Specimen JIC    Extra Purple Top Tube   Result Value Ref Range    Hold Specimen   "   Comprehensive metabolic panel   Result Value Ref Range    Sodium 135 (L) 136 - 145 mmol/L    Potassium 3.4 3.4 - 5.3 mmol/L    Chloride 101 98 - 107 mmol/L    Carbon Dioxide (CO2) 21 (L) 22 - 29 mmol/L    Anion Gap 13 7 - 15 mmol/L    Urea Nitrogen 5.3 (L) 6.0 - 20.0 mg/dL    Creatinine 0.75 0.51 - 0.95 mg/dL    Calcium 9.6 8.6 - 10.0 mg/dL    Glucose 102 (H) 70 - 99 mg/dL    Alkaline Phosphatase 86 35 - 104 U/L    AST 20 0 - 45 U/L    ALT 9 0 - 50 U/L    Protein Total 7.2 6.4 - 8.3 g/dL    Albumin 4.7 3.5 - 5.2 g/dL    Bilirubin Total 2.3 (H) <=1.2 mg/dL    GFR Estimate >90 >60 mL/min/1.73m2   CBC with Platelets & Differential    Narrative    The following orders were created for panel order CBC with Platelets & Differential.  Procedure                               Abnormality         Status                     ---------                               -----------         ------                     CBC with platelets and d...[[id number]]  Abnormal            Final result                 Please view results for these tests on the individual orders.   Hepatic function panel   Result Value Ref Range    Protein Total 7.2 6.4 - 8.3 g/dL    Albumin 4.7 3.5 - 5.2 g/dL    Bilirubin Total 2.3 (H) <=1.2 mg/dL    Alkaline Phosphatase 86 35 - 104 U/L    AST 20 0 - 45 U/L    ALT 9 0 - 50 U/L    Bilirubin Direct 0.37 (H) 0.00 - 0.30 mg/dL   Lipase   Result Value Ref Range    Lipase 11 (L) 13 - 60 U/L   hCG QUALitative Pregnancy (blood)   Result Value Ref Range    hCG Serum Qualitative Negative Negative   CBC with platelets and differential   Result Value Ref Range    WBC Count 12.4 (H) 4.0 - 11.0 10e3/uL    RBC Count 4.84 3.80 - 5.20 10e6/uL    Hemoglobin 16.2 (H) 11.7 - 15.7 g/dL    Hematocrit 45.6 35.0 - 47.0 %    MCV 94 78 - 100 fL    MCH 33.5 (H) 26.5 - 33.0 pg    MCHC 35.5 31.5 - 36.5 g/dL    RDW 13.2 10.0 - 15.0 %    Platelet Count 218 150 - 450 10e3/uL    % Neutrophils 56 %    % Lymphocytes 34 %    % Monocytes 9 %    %  Eosinophils 1 %    % Basophils 0 %    % Immature Granulocytes 0 %    NRBCs per 100 WBC 0 <1 /100    Absolute Neutrophils 6.8 1.6 - 8.3 10e3/uL    Absolute Lymphocytes 4.3 0.8 - 5.3 10e3/uL    Absolute Monocytes 1.2 0.0 - 1.3 10e3/uL    Absolute Eosinophils 0.1 0.0 - 0.7 10e3/uL    Absolute Basophils 0.0 0.0 - 0.2 10e3/uL    Absolute Immature Granulocytes 0.0 <=0.4 10e3/uL    Absolute NRBCs 0.0 10e3/uL   CBC with platelets, differential *Canceled*    Narrative    The following orders were created for panel order CBC with platelets, differential.  Procedure                               Abnormality         Status                     ---------                               -----------         ------                       Please view results for these tests on the individual orders.   UA with Microscopic reflex to Culture    Specimen: Urine, Midstream   Result Value Ref Range    Color Urine Yellow Colorless, Straw, Light Yellow, Yellow    Appearance Urine Clear Clear    Glucose Urine Negative Negative mg/dL    Bilirubin Urine Negative Negative    Ketones Urine 5 (A) Negative mg/dL    Specific Gravity Urine 1.015 1.003 - 1.035    Blood Urine Negative Negative    pH Urine 6.0 5.0 - 7.0    Protein Albumin Urine Negative Negative mg/dL    Urobilinogen Urine 2.0 Normal, 2.0 mg/dL    Nitrite Urine Negative Negative    Leukocyte Esterase Urine Small (A) Negative    Mucus Urine Present (A) None Seen /LPF    RBC Urine 4 (H) <=2 /HPF    WBC Urine 8 (H) <=5 /HPF    Squamous Epithelials Urine 1 <=1 /HPF    Narrative    Urine Culture not indicated   CT Chest/Abdomen/Pelvis w Contrast    Narrative    EXAM: CT CHEST/ABDOMEN/PELVIS W CONTRAST  LOCATION: [facility]  DATE: 8/2/2023    INDICATION: right CVA and RUQ tenderness and intermittent pain  COMPARISON: None.  TECHNIQUE: CT scan of the chest, abdomen, and pelvis was performed following injection of IV contrast. Multiplanar reformats were obtained. Dose  reduction techniques were used.   CONTRAST: 61 mL Isovue 370    FINDINGS:   LUNGS AND PLEURA: A few tiny peripheral nodules are present within left lung, largest within the upper lobe measuring 3 mm. There may be mild diffuse bronchial wall thickening. No focal infiltrate.    MEDIASTINUM/AXILLAE: No lymphadenopathy. Incidental note of aberrant origin right subclavian artery.    CORONARY ARTERY CALCIFICATION: Mild.    HEPATOBILIARY: Cholecystectomy. Tiny cyst inferior right lobe of liver.    PANCREAS: Normal.    SPLEEN: Normal.    ADRENAL GLANDS: Normal.    KIDNEYS/BLADDER: Tiny cyst left kidney needs no follow-up. Kidneys otherwise negative. Normal bladder.    BOWEL: Fairly prominent submucosal fat deposition along the entire length of the colon is nonspecific but can be seen in patients with history of inflammatory bowel disease. There is no acute inflammatory change. No obstruction.    LYMPH NODES: Normal.    VASCULATURE: Unremarkable.    PELVIC ORGANS: Hysterectomy. No adnexal lesion no free fluid.    MUSCULOSKELETAL: Normal.      Impression    IMPRESSION:  1.  No etiology for symptoms evident.  2.  Prior cholecystectomy with hepatobiliary system otherwise unremarkable.  3.  Right kidney negative. No acute inflammatory changes of bowel.  4.  Colonic submucosal fat deposition nonspecific but can be seen in patients with history of inflammatory bowel disease.       Medications   oxyCODONE (ROXICODONE) tablet 5 mg (5 mg Oral $Given 8/2/23 1722)   lactated ringers BOLUS 1,000 mL (0 mLs Intravenous Stopped 8/2/23 1927)   ondansetron (ZOFRAN) injection 4 mg (4 mg Intravenous $Given 8/2/23 1634)   morphine (PF) injection 4 mg (4 mg Intravenous $Given 8/2/23 1636)   iopamidol (ISOVUE-370) solution 61 mL (61 mLs Intravenous $Given 8/2/23 1748)   sodium chloride 0.9 % bag 500mL for CT scan flush use (55 mLs Intravenous $Given 8/2/23 1748)       Assessments & Plan (with Medical Decision Making)   47 year old female with  chronic abdominal pain, borderline intellectual functioning, prior history of substance abuse, right upper quadrant and right mid back pain and nausea vomiting unable to eat or drink for past 4 days.  On arrival vital signs are reassuring and afebrile.  She did have right upper quadrant tenderness to palpation and right CVA tenderness.  Labs ordered from triage show  leukocytosis of 12.4 which is improved from 3 weeks ago when it was 18.7.  No left shift.  Hemoglobin 16.2.  CMP with bicarb of 21 but normal gap, glucose 102 total bili of 2.3.  Direct bili 0.37 and normal transaminases.  Lipase of 11, pancreatitis unlikely.  Pain treated with 4 mg aliquots of IV morphine and 4 mg ondansetron for nausea.  Also given 1 L of lactated Ringer's solution.  Given her complex history and exam findings CT chest abdomen pelvis was obtained and no etiology found for her symptoms.  She has prior cholecystectomy in the hepatobiliary system otherwise unremarkable.  No signs of inflammatory changes or obstruction in right kidney.  Her urinalysis was not normal but there were only 8 WBCs and 4 RBCs and small leukocyte esterase nitrate negative.  Did discuss either empiric treatment for possible UTI or waiting for culture results to guide therapy.  She wanted to forego antibiotics empirically and was wanting to wait on urine culture.  I think this is a reasonable approach.  She was feeling improved at this time and asking to be discharged    Unfortunately she has chronic abdominal pain however this was reported to be different from her.  I would like her to follow-up with her primary care team.  I would like her to return to the emergency department for new or worsening symptoms.    I have reviewed the nursing notes.         Discharge Medication List as of 8/2/2023  7:27 PM          Final diagnoses:   Right upper quadrant abdominal pain   Acute right-sided thoracic back pain     Nicholas Núñez MD    8/2/2023   [facility]  [place] [facility]    Disclaimer: This note consists of words and symbols derived from keyboarding and dictation using voice recognition software.  As a result, there may be errors that have gone undetected.  Please consider this when interpreting information found in this note.               Nicholas Núñez MD  08/08/23 1111

## 2023-08-02 NOTE — ED TRIAGE NOTES
"Pt has chronic abdominal pain but has not been able to eat or drink anything for the past 4 days. Pt has been unable to keep down food or fluids and feels like she is dehydrated. Pt states that currently \"someone is stabbing me under the rib\". \"I'm a chronic pain patient, so I will be in pain all my life, but I want to get down from 7/8 and I want to get down to a 4/5.\" Pt does not have her appendix or gallbladder.       Pt states that she has anxiety, ADD, autism.      Triage Assessment       Row Name 08/02/23 1435       Triage Assessment (Adult)    Airway WDL WDL       Respiratory WDL    Respiratory WDL WDL       Skin Circulation/Temperature WDL    Skin Circulation/Temperature WDL WDL       Cardiac WDL    Cardiac WDL WDL       Peripheral/Neurovascular WDL    Peripheral Neurovascular WDL WDL       Cognitive/Neuro/Behavioral WDL    Cognitive/Neuro/Behavioral WDL WDL      Row Name 08/02/23 1418       Triage Assessment (Adult)    Airway WDL --       Respiratory WDL    Respiratory WDL --       Skin Circulation/Temperature WDL    Skin Circulation/Temperature WDL --       Cardiac WDL    Cardiac WDL --       Peripheral/Neurovascular WDL    Peripheral Neurovascular WDL --       Cognitive/Neuro/Behavioral WDL    Cognitive/Neuro/Behavioral WDL --                    "

## 2023-08-02 NOTE — ED NOTES
Pt reports that abdominal pain has been shooting into her back as well as up into chest. MD aware. Pt states that morphine previously given helped to slightly decrease chest pain but that she still has intense  R sided abdominal pain located just below just below rib cage. Pt is asking for additional pain meds, and MD notified.

## 2023-08-02 NOTE — ED TRIAGE NOTES
"Pt presents with multiple symptoms that started when he woke at 5am:  Dull abdominal pain \"like I'm starving to death\"     Triage Assessment       Row Name 08/02/23 9488       Triage Assessment (Adult)    Airway WDL WDL       Respiratory WDL    Respiratory WDL WDL       Skin Circulation/Temperature WDL    Skin Circulation/Temperature WDL WDL       Cardiac WDL    Cardiac WDL WDL       Peripheral/Neurovascular WDL    Peripheral Neurovascular WDL WDL       Cognitive/Neuro/Behavioral WDL    Cognitive/Neuro/Behavioral WDL WDL                    "

## 2023-08-03 NOTE — DISCHARGE INSTRUCTIONS
Your urine may be infected, culture is pending. As we discussed you wished to hold antibiotics until culture is complete. This is okay as long as you don't have a temperature above 100.4 or feel worse.     Please keep your follow up with Gastroenterology next week.     As always, if your symptoms worsen or you develop new or concerning symptoms, please return to the Emergency Department for further evaluation and treatment.      I hope you fell better soon.

## 2023-08-04 LAB — BACTERIA UR CULT: NO GROWTH

## 2023-08-06 ENCOUNTER — APPOINTMENT (OUTPATIENT)
Dept: GENERAL RADIOLOGY | Facility: CLINIC | Age: 48
End: 2023-08-06
Attending: EMERGENCY MEDICINE
Payer: COMMERCIAL

## 2023-08-06 ENCOUNTER — HOSPITAL ENCOUNTER (EMERGENCY)
Facility: CLINIC | Age: 48
Discharge: HOME | End: 2023-08-06
Attending: PHYSICIAN ASSISTANT | Admitting: PHYSICIAN ASSISTANT
Payer: COMMERCIAL

## 2023-08-06 VITALS
HEART RATE: 68 BPM | BODY MASS INDEX: 22.5 KG/M2 | SYSTOLIC BLOOD PRESSURE: 136 MMHG | HEIGHT: 63 IN | OXYGEN SATURATION: 99 % | TEMPERATURE: 96.8 F | DIASTOLIC BLOOD PRESSURE: 99 MMHG | WEIGHT: 127 LBS | RESPIRATION RATE: 16 BRPM

## 2023-08-06 DIAGNOSIS — M79.641 PAIN OF RIGHT HAND: ICD-10-CM

## 2023-08-06 PROCEDURE — 29125 APPL SHORT ARM SPLINT STATIC: CPT | Mod: RT | Performed by: PHYSICIAN ASSISTANT

## 2023-08-06 PROCEDURE — 73130 X-RAY EXAM OF HAND: CPT | Mod: RT

## 2023-08-06 PROCEDURE — 250N000013 HC RX MED GY IP 250 OP 250 PS 637: Performed by: PHYSICIAN ASSISTANT

## 2023-08-06 PROCEDURE — 99213 OFFICE O/P EST LOW 20 MIN: CPT | Mod: 25 | Performed by: PHYSICIAN ASSISTANT

## 2023-08-06 PROCEDURE — G0463 HOSPITAL OUTPT CLINIC VISIT: HCPCS | Performed by: PHYSICIAN ASSISTANT

## 2023-08-06 RX ORDER — HYDROCODONE BITARTRATE AND ACETAMINOPHEN 5; 325 MG/1; MG/1
2 TABLET ORAL ONCE
Status: COMPLETED | OUTPATIENT
Start: 2023-08-06 | End: 2023-08-06

## 2023-08-06 RX ORDER — HYDROCODONE BITARTRATE AND ACETAMINOPHEN 5; 325 MG/1; MG/1
1 TABLET ORAL EVERY 6 HOURS PRN
Qty: 6 TABLET | Refills: 0 | Status: SHIPPED | OUTPATIENT
Start: 2023-08-06 | End: 2023-08-06

## 2023-08-06 RX ADMIN — HYDROCODONE BITARTRATE AND ACETAMINOPHEN 2 TABLET: 5; 325 TABLET ORAL at 09:52

## 2023-08-06 ASSESSMENT — ENCOUNTER SYMPTOMS
NEUROLOGICAL NEGATIVE: 1
COLOR CHANGE: 1
MYALGIAS: 1
ARTHRALGIAS: 1

## 2023-08-06 NOTE — DISCHARGE INSTRUCTIONS
Discharging the patient with a sling to support the right forearm and hand.  Recommend over-the-counter Tylenol/ibuprofen as needed for pain control.  Elevate the right hand as much as possible.  Recommend continued application of ice to the affected area and 15-minute intervals.  Follow-up with primary care in 5 to 7 days if symptoms persist.    Recommend urgent medical evaluation if you develop worsening pain, pain out of proportion to injury, numbness or tingling or loss of feeling, worsening redness/tenderness/swelling in right hand.

## 2023-08-06 NOTE — ED PROVIDER NOTES
"  History     Chief Complaint   Patient presents with    Hand Injury     \"Back handed\" a wall on Thursday, using ice and OTC medications without improvement in pain/swelling     HPI  Meli Rodriguez is a 47 year old female who presents for evaluation of right hand pain after slamming the back of her hand into a wall multiple times 3 days ago.  States that she deliberately struck the wall with her hands due to feelings of anger.  Decided to hit the wall multiple times frustration.  She had used ice, Ace wrap, ibuprofen and Tylenol with limited improvement in pain and swelling.  Concerned about a boxer's fracture today considering she has had this previously.  No sensory deficits in the right hand.  No wrist pain or forearm pain.    Allergies:  Allergies   Allergen Reactions    Demerol Itching    Droperidol Other (See Comments)     twitching      Ketorolac Tromethamine Itching    Nsaids Other (See Comments)     Gastric ulcer    Adhesive Tape Rash    Compazine [Prochlorperazine] Rash     Arm red and rash    Diphenhydramine Anxiety     Jittery and jumpy       Problem List:    Patient Active Problem List    Diagnosis Date Noted    Lower abdominal pain 07/10/2023     Priority: Medium    Hematemesis with nausea 07/10/2023     Priority: Medium    Borderline intellectual functioning 11/06/2015     Priority: Medium    ADD (attention deficit disorder) 02/18/2014     Priority: Medium    Chondromalacia of patella 12/11/2013     Priority: Medium    Drug-seeking behavior 12/06/2013     Priority: Medium    Health Care Home 12/03/2013     Priority: Medium       Status:  Unable to reach    -See Letters for [facility] Care Plan - Emergency Care Plan          Cannabis abuse 07/03/2013     Priority: Medium    Polysubstance abuse (H) 12/04/2012     Priority: Medium    Social phobia: with panic attacks 07/23/2012     Priority: Medium    Moderate recurrent major depression: per psychiatry consult 07/23/2012     Priority: Medium    Prescription " Opioid and benzodiazepine abuse 07/23/2012     Priority: Medium    Contusion of duodenum 06/26/2012     Priority: Medium    Vitamin D deficiency 06/26/2012     Priority: Medium    At risk for osteoporosis 06/26/2012     Priority: Medium    Hematochezia 06/26/2012     Priority: Medium    Abdominal pain, unspecified abdominal location 06/25/2012     Priority: Medium     Problem list name updated by automated process. Provider to review      Panic attack 05/22/2012     Priority: Medium    Acquired absence of both cervix and uterus 09/16/2011     Priority: Medium    Colitis 05/16/2011     Priority: Medium    Outbursts of anger 12/14/2009     Priority: Medium    Gastroesophageal reflux disease 06/02/2009     Priority: Medium     Formatting of this note might be different from the original.  Esophagitis- EGD 2017. PPI not helpful. No further follow up.      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    Bipolar 2 disorder (H)     Carpal tunnel syndrome     Colitis     Depression     Depressive disorder     Drug-seeking behavior     Drug-seeking behavior     GERD (gastroesophageal reflux disease)     Head injury     Irritable bowel syndrome (IBS)     Kidney infection     Lower urinary tract infection 2/18/2011    Migraine     Migraine     Polysubstance abuse (H) 12/4/2012    PTSD (post-traumatic stress disorder)     PTSD (post-traumatic stress disorder)     Thyroid disease     Ulcer     Urinary retention        Past Surgical History:    Past Surgical History:   Procedure Laterality Date    APPENDECTOMY      APPENDECTOMY      CHOLECYSTECTOMY  01/01/2005     "CHOLECYSTECTOMY      HYSTERECTOMY      with ophorectomy bilateral    HYSTERECTOMY TOTAL ABDOMINAL, BILATERAL SALPINGO-OOPHORECTOMY, COMBINED  01/01/2001    bleeding ovarian cysts    HYSTERECTOMY, PAP NO LONGER INDICATED      HYSTERECTOMY, PAP NO LONGER INDICATED      TONSILLECTOMY, ADENOIDECTOMY, MYRINGOTOMY, INSERT TUBE BILATERAL, COMBINED      TOTAL VAGINAL HYSTERECTOMY      URETHRA SURGERY      temporary stent    URETHRA SURGERY         Family History:    Family History   Problem Relation Age of Onset    Unknown/Adopted Father     Chronic Obstructive Pulmonary Disease Mother     Aneurysm Maternal Grandmother     Cerebrovascular Disease Maternal Grandfather     Breast Cancer No family hx of     Cancer No family hx of     Diabetes No family hx of        Social History:  Marital Status:   [2]  Social History     Tobacco Use    Smoking status: Every Day     Packs/day: 0.50     Years: 20.00     Pack years: 10.00     Types: Cigarettes    Smokeless tobacco: Never   Substance Use Topics    Alcohol use: No    Drug use: No     Comment: Denies, past marijuana        Medications:    HYDROcodone-acetaminophen (NORCO) 5-325 MG tablet  albuterol (PROAIR HFA/PROVENTIL HFA/VENTOLIN HFA) 108 (90 Base) MCG/ACT inhaler          Review of Systems   Musculoskeletal:  Positive for arthralgias and myalgias.   Skin:  Positive for color change (bruising on dorsum of right hand).   Neurological: Negative.        Physical Exam   BP: (!) 136/99  Pulse: 68  Temp: 96.8  F (36  C)  Resp: 16  Height: 158.8 cm (5' 2.5\")  Weight: 57.6 kg (127 lb)  SpO2: 99 %      Physical Exam  Constitutional:       General: She is not in acute distress.     Appearance: Normal appearance. She is normal weight.   Cardiovascular:      Pulses: Normal pulses.           Radial pulses are 2+ on the right side and 2+ on the left side.   Musculoskeletal:         General: No swelling.      Right forearm: Normal.      Right wrist: Normal.      Right hand: Swelling " and tenderness present. No deformity, lacerations or bony tenderness. Normal range of motion. Normal strength. Normal sensation. Normal capillary refill. Normal pulse.      Cervical back: Normal range of motion and neck supple. No rigidity or tenderness. No muscular tenderness.      Comments: Has ecchymosis and mild swelling of the dorsum of the right hand.  Has moderate tenderness over the third and fourth metacarpals especially.  Strength is 4/5 in the right hand, likely due to pain.  Normal range of motion of the right hand fingers.  Normal sensation light touch bilaterally in the upper extremities.   Skin:     General: Skin is warm and dry.      Capillary Refill: Capillary refill takes less than 2 seconds.   Neurological:      Mental Status: She is alert and oriented to person, place, and time.      Sensory: No sensory deficit.         ED Course                 [facility]    Splint Application    Date/Time: 8/6/2023 9:36 AM    Performed by: Pramod Packer PA-C  Authorized by: Pramod Packer PA-C    Risks, benefits and alternatives discussed.      PRE-PROCEDURE DETAILS     Sensation:  Normal    Skin color:  Normal    PROCEDURE DETAILS     Laterality:  Right    Location:  Hand    Hand:  R hand    Splint type:  Volar short arm    Supplies:  Ortho-Glass    POST PROCEDURE DETAILS     Pain:  Improved    Sensation:  Normal    Skin color:  Normal      PROCEDURE    Patient Tolerance:  Patient tolerated the procedure well with no immediate complications              Results for orders placed or performed during the hospital encounter of 08/06/23 (from the past 24 hour(s))   XR Hand Right G/E 3 Views    Narrative    EXAM: XR HAND RIGHT G/E 3 VIEWS  LOCATION: [facility]  DATE: 8/6/2023    INDICATION: hit a wall with back of hand, pain and swelling  COMPARISON: None.      Impression    IMPRESSION: No acute fracture or malalignment. No significant  degenerative changes.       Medications   HYDROcodone-acetaminophen (NORCO) 5-325 MG per tablet 2 tablet (2 tablets Oral $Given 8/6/23 3148)       Assessments & Plan (with Medical Decision Making)       X-rays of the right hand showed no evidence for acute fracture or malalignment/dislocation.  Presentation is consistent with soft tissue injury.    Provided a volar splint for comfort, procedure described above.  Administered [place] in clinic for symptomatic relief of pain.    Discharging the patient with a sling to support the right forearm and hand.  Recommend over-the-counter Tylenol/ibuprofen as needed for pain control.  Elevate the right hand as much as possible.  Recommend continued application of ice to the affected area and 15-minute intervals.  Follow-up with primary care in 5 to 7 days if symptoms persist.    Recommend urgent medical evaluation if you develop worsening pain, pain out of proportion to injury, numbness or tingling or loss of feeling, worsening redness/tenderness/swelling in right hand.    Unable to prescribe additional pain medication considering the patient has a contract with another primary care provider.  The patient understands this and is willing to request additional pain medications as necessary from her primary care provider.      I have reviewed the nursing notes.    I have reviewed the findings, diagnosis, plan and need for follow up with the patient.      Discharge Medication List as of 8/6/2023  9:37 AM        START taking these medications    Details   HYDROcodone-acetaminophen (NORCO) 5-325 MG tablet Take 1 tablet by mouth every 6 hours as needed for severe pain, Disp-6 tablet, R-0, E-Prescribe             Final diagnoses:   Pain of right hand       8/6/2023   [facility] EMERGENCY DEPT       Pramod Packer PA-C  08/06/23 1177

## 2023-08-06 NOTE — ED TRIAGE NOTES
"\"Back handed\" a wall on Thursday, using ice and OTC medications without improvement in pain/swelling     Triage Assessment       Row Name 08/06/23 0801       Triage Assessment (Adult)    Airway WDL WDL       Respiratory WDL    Respiratory WDL WDL       Skin Circulation/Temperature WDL    Skin Circulation/Temperature WDL WDL       Cardiac WDL    Cardiac WDL WDL       Peripheral/Neurovascular WDL    Peripheral Neurovascular WDL WDL       Cognitive/Neuro/Behavioral WDL    Cognitive/Neuro/Behavioral WDL WDL                    "

## 2023-08-10 NOTE — RESULT ENCOUNTER NOTE
"Final urine culture report shows \"NO GROWTH\" and is NEGATIVE.  Recommendations in treatment per [facility] ED Lab result Urine culture protocol.  "

## 2023-08-15 ENCOUNTER — APPOINTMENT (OUTPATIENT)
Dept: GENERAL RADIOLOGY | Facility: CLINIC | Age: 48
End: 2023-08-15
Attending: EMERGENCY MEDICINE
Payer: COMMERCIAL

## 2023-08-15 ENCOUNTER — HOSPITAL ENCOUNTER (EMERGENCY)
Facility: CLINIC | Age: 48
Discharge: HOME | End: 2023-08-15
Attending: EMERGENCY MEDICINE | Admitting: EMERGENCY MEDICINE
Payer: COMMERCIAL

## 2023-08-15 VITALS
OXYGEN SATURATION: 98 % | HEIGHT: 62 IN | DIASTOLIC BLOOD PRESSURE: 104 MMHG | SYSTOLIC BLOOD PRESSURE: 134 MMHG | RESPIRATION RATE: 16 BRPM | HEART RATE: 87 BPM | BODY MASS INDEX: 23.37 KG/M2 | TEMPERATURE: 98.1 F | WEIGHT: 127 LBS

## 2023-08-15 DIAGNOSIS — R82.90 ABNORMAL URINALYSIS: ICD-10-CM

## 2023-08-15 DIAGNOSIS — R10.9 LEFT FLANK PAIN: ICD-10-CM

## 2023-08-15 DIAGNOSIS — R10.9 LEFT SIDED ABDOMINAL PAIN: ICD-10-CM

## 2023-08-15 LAB
ALBUMIN SERPL BCG-MCNC: 4.2 G/DL (ref 3.5–5.2)
ALBUMIN UR-MCNC: NEGATIVE MG/DL
ALP SERPL-CCNC: 75 U/L (ref 35–104)
ALT SERPL W P-5'-P-CCNC: 8 U/L (ref 0–50)
ANION GAP SERPL CALCULATED.3IONS-SCNC: 11 MMOL/L (ref 7–15)
APPEARANCE UR: CLEAR
AST SERPL W P-5'-P-CCNC: 15 U/L (ref 0–45)
BACTERIA #/AREA URNS HPF: ABNORMAL /HPF
BASOPHILS # BLD AUTO: 0 10E3/UL (ref 0–0.2)
BASOPHILS NFR BLD AUTO: 1 %
BILIRUB SERPL-MCNC: 0.6 MG/DL
BILIRUB UR QL STRIP: NEGATIVE
BUN SERPL-MCNC: 5.8 MG/DL (ref 6–20)
CALCIUM SERPL-MCNC: 9 MG/DL (ref 8.6–10)
CHLORIDE SERPL-SCNC: 105 MMOL/L (ref 98–107)
COLOR UR AUTO: YELLOW
CREAT SERPL-MCNC: 0.67 MG/DL (ref 0.51–0.95)
CRP SERPL-MCNC: <3 MG/L
DEPRECATED HCO3 PLAS-SCNC: 22 MMOL/L (ref 22–29)
EOSINOPHIL # BLD AUTO: 0.1 10E3/UL (ref 0–0.7)
EOSINOPHIL NFR BLD AUTO: 1 %
ERYTHROCYTE [DISTWIDTH] IN BLOOD BY AUTOMATED COUNT: 13.8 % (ref 10–15)
GFR SERPL CREATININE-BSD FRML MDRD: >90 ML/MIN/1.73M2
GLUCOSE SERPL-MCNC: 104 MG/DL (ref 70–99)
GLUCOSE UR STRIP-MCNC: NEGATIVE MG/DL
HCT VFR BLD AUTO: 41.5 % (ref 35–47)
HGB BLD-MCNC: 14.5 G/DL (ref 11.7–15.7)
HGB UR QL STRIP: ABNORMAL
HOLD SPECIMEN: NORMAL
IMM GRANULOCYTES # BLD: 0 10E3/UL
IMM GRANULOCYTES NFR BLD: 0 %
KETONES UR STRIP-MCNC: NEGATIVE MG/DL
LEUKOCYTE ESTERASE UR QL STRIP: ABNORMAL
LYMPHOCYTES # BLD AUTO: 3.2 10E3/UL (ref 0.8–5.3)
LYMPHOCYTES NFR BLD AUTO: 37 %
MCH RBC QN AUTO: 33.9 PG (ref 26.5–33)
MCHC RBC AUTO-ENTMCNC: 34.9 G/DL (ref 31.5–36.5)
MCV RBC AUTO: 97 FL (ref 78–100)
MONOCYTES # BLD AUTO: 1 10E3/UL (ref 0–1.3)
MONOCYTES NFR BLD AUTO: 11 %
MUCOUS THREADS #/AREA URNS LPF: PRESENT /LPF
NEUTROPHILS # BLD AUTO: 4.4 10E3/UL (ref 1.6–8.3)
NEUTROPHILS NFR BLD AUTO: 50 %
NITRATE UR QL: NEGATIVE
NRBC # BLD AUTO: 0 10E3/UL
NRBC BLD AUTO-RTO: 0 /100
PH UR STRIP: 6 [PH] (ref 5–7)
PLATELET # BLD AUTO: 221 10E3/UL (ref 150–450)
POTASSIUM SERPL-SCNC: 4 MMOL/L (ref 3.4–5.3)
PROT SERPL-MCNC: 6.3 G/DL (ref 6.4–8.3)
RBC # BLD AUTO: 4.28 10E6/UL (ref 3.8–5.2)
RBC URINE: >182 /HPF
SODIUM SERPL-SCNC: 138 MMOL/L (ref 136–145)
SP GR UR STRIP: 1.01 (ref 1–1.03)
SQUAMOUS EPITHELIAL: 1 /HPF
UROBILINOGEN UR STRIP-MCNC: NORMAL MG/DL
WBC # BLD AUTO: 8.7 10E3/UL (ref 4–11)
WBC URINE: 30 /HPF

## 2023-08-15 PROCEDURE — 250N000011 HC RX IP 250 OP 636: Mod: JZ | Performed by: EMERGENCY MEDICINE

## 2023-08-15 PROCEDURE — 99284 EMERGENCY DEPT VISIT MOD MDM: CPT | Performed by: EMERGENCY MEDICINE

## 2023-08-15 PROCEDURE — 80053 COMPREHEN METABOLIC PANEL: CPT | Performed by: EMERGENCY MEDICINE

## 2023-08-15 PROCEDURE — 85025 COMPLETE CBC W/AUTO DIFF WBC: CPT | Performed by: EMERGENCY MEDICINE

## 2023-08-15 PROCEDURE — 86140 C-REACTIVE PROTEIN: CPT | Performed by: EMERGENCY MEDICINE

## 2023-08-15 PROCEDURE — 258N000003 HC RX IP 258 OP 636: Performed by: EMERGENCY MEDICINE

## 2023-08-15 PROCEDURE — 96374 THER/PROPH/DIAG INJ IV PUSH: CPT | Performed by: EMERGENCY MEDICINE

## 2023-08-15 PROCEDURE — 96375 TX/PRO/DX INJ NEW DRUG ADDON: CPT | Performed by: EMERGENCY MEDICINE

## 2023-08-15 PROCEDURE — 96376 TX/PRO/DX INJ SAME DRUG ADON: CPT | Performed by: EMERGENCY MEDICINE

## 2023-08-15 PROCEDURE — 99284 EMERGENCY DEPT VISIT MOD MDM: CPT | Mod: 25 | Performed by: EMERGENCY MEDICINE

## 2023-08-15 PROCEDURE — 81001 URINALYSIS AUTO W/SCOPE: CPT | Performed by: EMERGENCY MEDICINE

## 2023-08-15 PROCEDURE — 74019 RADEX ABDOMEN 2 VIEWS: CPT

## 2023-08-15 PROCEDURE — 36415 COLL VENOUS BLD VENIPUNCTURE: CPT | Performed by: EMERGENCY MEDICINE

## 2023-08-15 PROCEDURE — 96361 HYDRATE IV INFUSION ADD-ON: CPT | Performed by: EMERGENCY MEDICINE

## 2023-08-15 PROCEDURE — 87086 URINE CULTURE/COLONY COUNT: CPT | Performed by: EMERGENCY MEDICINE

## 2023-08-15 RX ORDER — ONDANSETRON 2 MG/ML
4 INJECTION INTRAMUSCULAR; INTRAVENOUS
Status: COMPLETED | OUTPATIENT
Start: 2023-08-15 | End: 2023-08-15

## 2023-08-15 RX ORDER — CEPHALEXIN 500 MG/1
500 CAPSULE ORAL 2 TIMES DAILY
Qty: 10 CAPSULE | Refills: 0 | Status: SHIPPED | OUTPATIENT
Start: 2023-08-15 | End: 2023-08-20

## 2023-08-15 RX ORDER — CEPHALEXIN 500 MG/1
500 CAPSULE ORAL 2 TIMES DAILY
Qty: 6 CAPSULE | Refills: 0 | Status: SHIPPED | OUTPATIENT
Start: 2023-08-15 | End: 2023-08-15

## 2023-08-15 RX ORDER — MORPHINE SULFATE 4 MG/ML
4 INJECTION, SOLUTION INTRAMUSCULAR; INTRAVENOUS
Status: COMPLETED | OUTPATIENT
Start: 2023-08-15 | End: 2023-08-15

## 2023-08-15 RX ADMIN — MORPHINE SULFATE 4 MG: 4 INJECTION, SOLUTION INTRAMUSCULAR; INTRAVENOUS at 10:18

## 2023-08-15 RX ADMIN — MORPHINE SULFATE 4 MG: 4 INJECTION, SOLUTION INTRAMUSCULAR; INTRAVENOUS at 12:28

## 2023-08-15 RX ADMIN — SODIUM CHLORIDE 500 ML: 9 INJECTION, SOLUTION INTRAVENOUS at 10:18

## 2023-08-15 RX ADMIN — ONDANSETRON 4 MG: 2 INJECTION INTRAMUSCULAR; INTRAVENOUS at 10:17

## 2023-08-15 ASSESSMENT — ENCOUNTER SYMPTOMS
NEUROLOGICAL NEGATIVE: 1
HEMATOLOGIC/LYMPHATIC NEGATIVE: 1
ENDOCRINE NEGATIVE: 1
EYES NEGATIVE: 1
NAUSEA: 1
CONSTITUTIONAL NEGATIVE: 1
FLANK PAIN: 1
ALLERGIC/IMMUNOLOGIC NEGATIVE: 1
PSYCHIATRIC NEGATIVE: 1
ABDOMINAL PAIN: 1
RESPIRATORY NEGATIVE: 1

## 2023-08-15 ASSESSMENT — ACTIVITIES OF DAILY LIVING (ADL)
ADLS_ACUITY_SCORE: 35
ADLS_ACUITY_SCORE: 35

## 2023-08-15 NOTE — DISCHARGE INSTRUCTIONS
1) Your evaluation today did not reveal any evidence of an infection in the abdomen or findings to suggest an acute process with recent imaging completed 13 days prior.  We discussed the CT findings from August 2, 2023.  Your work-up did show some abnormal bacteria in the urine based on your concern about urine color changes you are placed on oral antibiotics - cephalexin) pending urine culture results.    2) we have discussed that if there is a need to change antibiotics based on urine culture results you will be notified by the ED follow-up lab team.    3) Be sure to touch base with your primary care team for medication management needs and follow-up care

## 2023-08-15 NOTE — ED NOTES
Bed: ED10  Expected date: 8/15/23  Expected time: 9:29 AM  Means of arrival:   Comments:  EMS - [facility]

## 2023-08-15 NOTE — ED PROVIDER NOTES
History     Chief Complaint   Patient presents with    Flank Pain     4mg zofran PTA and 50mcg of fentanyl PTA.  Pt is A&Ox4.  VSS.  Pt has known kidney cyst.  States pain has been manageable, until last night at midnight.  Pain unbearable at that time and pt was dry heavin gprior to zofran.      HPI  Meli Rodriguez is a 47 year old female who presents by EMS with flank pain.  Patient reported on intake that she been previously diagnosed with kidney cyst and her pain was intractable she received IV fentanyl and Zofran by EMS providers.    Medical records show a diagnosis of polysubstance abuse, history of hematemesis, borderline intellectual functioning, history of cannabis abuse prior diagnosis of colitis and concern for prescription opiate and benzodiazepine abuse and drug-seeking behavior.    On examination patient was accompanied by her Claire Barboza from home in [facility].  She arrived by EMS reporting that over the last 4 days she has had left upper quadrant and left flank pain.  She was initially concerned about Imaging findings from her visit on 8/2/2023 where she was worried that there had been ruptured cysts.  Given unexpected findings described in the lung, a tiny cyst found in the liver lobe and left kidney-no follow-up required.  Patient was also concerned that there was a dark coloration to her urine.  She was read about a kidney infection.  And wanted to come in for evaluation.  She confirmed that she takes active medications.  She expressed frustration about being labeled as a drug seeker.    Allergies:  Allergies   Allergen Reactions    Demerol Itching    Droperidol Other (See Comments)     twitching      Ketorolac Tromethamine Itching    Nsaids Other (See Comments)     Gastric ulcer    Adhesive Tape Rash    Compazine [Prochlorperazine] Rash     Arm red and rash    Diphenhydramine Anxiety     Jittery and jumpy       Problem List:    Patient Active Problem List    Diagnosis Date Noted     Lower abdominal pain 07/10/2023     Priority: Medium    Hematemesis with nausea 07/10/2023     Priority: Medium    Borderline intellectual functioning 11/06/2015     Priority: Medium    ADD (attention deficit disorder) 02/18/2014     Priority: Medium    Chondromalacia of patella 12/11/2013     Priority: Medium    Drug-seeking behavior 12/06/2013     Priority: Medium    Health Care Home 12/03/2013     Priority: Medium       Status:  Unable to reach    -See Letters for [facility] Care Plan - Emergency Care Plan          Cannabis abuse 07/03/2013     Priority: Medium    Polysubstance abuse (H) 12/04/2012     Priority: Medium    Social phobia: with panic attacks 07/23/2012     Priority: Medium    Moderate recurrent major depression: per psychiatry consult 07/23/2012     Priority: Medium    Prescription Opioid and benzodiazepine abuse 07/23/2012     Priority: Medium    Contusion of duodenum 06/26/2012     Priority: Medium    Vitamin D deficiency 06/26/2012     Priority: Medium    At risk for osteoporosis 06/26/2012     Priority: Medium    Hematochezia 06/26/2012     Priority: Medium    Abdominal pain, unspecified abdominal location 06/25/2012     Priority: Medium     Problem list name updated by automated process. Provider to review      Panic attack 05/22/2012     Priority: Medium    Acquired absence of both cervix and uterus 09/16/2011     Priority: Medium    Colitis 05/16/2011     Priority: Medium    Outbursts of anger 12/14/2009     Priority: Medium    Gastroesophageal reflux disease 06/02/2009     Priority: Medium     Formatting of this note might be different from the original.  Esophagitis- EGD 2017. PPI not helpful. No further follow up.      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    Bipolar 2 disorder (H)     Carpal tunnel syndrome     Colitis     Depression     Depressive disorder     Drug-seeking behavior     Drug-seeking behavior     GERD (gastroesophageal reflux disease)     Head injury     Irritable bowel syndrome (IBS)     Kidney infection     Lower urinary tract infection 2/18/2011    Migraine     Migraine     Polysubstance abuse (H) 12/4/2012    PTSD (post-traumatic stress disorder)     PTSD (post-traumatic stress disorder)     Thyroid disease     Ulcer     Urinary retention        Past Surgical History:    Past Surgical History:   Procedure Laterality Date    APPENDECTOMY      APPENDECTOMY      CHOLECYSTECTOMY  01/01/2005    CHOLECYSTECTOMY      HYSTERECTOMY      with ophorectomy bilateral    HYSTERECTOMY TOTAL ABDOMINAL, BILATERAL SALPINGO-OOPHORECTOMY, COMBINED  01/01/2001    bleeding ovarian cysts    HYSTERECTOMY, PAP NO LONGER INDICATED      HYSTERECTOMY, PAP NO LONGER INDICATED      TONSILLECTOMY, ADENOIDECTOMY, MYRINGOTOMY, INSERT TUBE BILATERAL, COMBINED      TOTAL VAGINAL HYSTERECTOMY      URETHRA SURGERY      temporary stent    URETHRA SURGERY         Family History:    Family History   Problem Relation Age of Onset    Unknown/Adopted Father     Chronic Obstructive Pulmonary Disease Mother     Aneurysm Maternal Grandmother     Cerebrovascular Disease Maternal Grandfather     Breast Cancer No family hx of     Cancer No family hx of     Diabetes No family hx of        Social History:  Marital Status:   [2]  Social History     Tobacco Use    Smoking status: Every Day     Packs/day: 0.50     Years: 20.00     Pack years: 10.00     Types: Cigarettes    Smokeless tobacco: Never   Substance Use Topics    Alcohol use: No    Drug use: No     Comment: Denies, past marijuana        Medications:    cephALEXin (KEFLEX) 500 MG capsule  albuterol (PROAIR  "HFA/PROVENTIL HFA/VENTOLIN HFA) 108 (90 Base) MCG/ACT inhaler          Review of Systems   Constitutional: Negative.    HENT: Negative.     Eyes: Negative.    Respiratory: Negative.     Gastrointestinal:  Positive for abdominal pain and nausea.   Endocrine: Negative.    Genitourinary:  Positive for flank pain.   Allergic/Immunologic: Negative.    Neurological: Negative.    Hematological: Negative.    Psychiatric/Behavioral: Negative.     All other systems reviewed and are negative.      Physical Exam   BP: (!) 113/97  Pulse: 87  Temp: 98.1  F (36.7  C)  Resp: 16  Height: 157.5 cm (5' 2\")  Weight: 57.6 kg (127 lb)  SpO2: 97 %      Physical Exam  Constitutional:       General: She is not in acute distress.     Appearance: Normal appearance. She is not ill-appearing, toxic-appearing or diaphoretic.   HENT:      Head: Normocephalic and atraumatic.      Nose: Nose normal.   Eyes:      Extraocular Movements: Extraocular movements intact.      Pupils: Pupils are equal, round, and reactive to light.   Cardiovascular:      Rate and Rhythm: Normal rate and regular rhythm.   Pulmonary:      Effort: Pulmonary effort is normal. No respiratory distress.      Breath sounds: Normal breath sounds. No stridor. No wheezing, rhonchi or rales.   Chest:      Chest wall: No tenderness.   Abdominal:       Musculoskeletal:         General: No swelling, tenderness, deformity or signs of injury.      Cervical back: Normal range of motion and neck supple.        Back:       Right lower leg: No edema.      Left lower leg: No edema.   Skin:     Capillary Refill: Capillary refill takes less than 2 seconds.      Coloration: Skin is not jaundiced or pale.      Findings: No bruising, erythema, lesion or rash.   Neurological:      General: No focal deficit present.      Mental Status: She is alert and oriented to person, place, and time.      Cranial Nerves: No cranial nerve deficit.      Sensory: No sensory deficit.      Motor: No weakness.      " "Coordination: Coordination normal.      Gait: Gait normal.      Deep Tendon Reflexes: Reflexes normal.   Psychiatric:         Mood and Affect: Mood normal.         Behavior: Behavior normal.         Thought Content: Thought content normal.         Judgment: Judgment normal.         ED Course                 Procedures              Critical Care time:  none             ED medications:  Medications   0.9% sodium chloride BOLUS (0 mLs Intravenous Stopped 8/15/23 1228)   morphine (PF) injection 4 mg (4 mg Intravenous $Given 8/15/23 1228)   ondansetron (ZOFRAN) injection 4 mg (4 mg Intravenous $Given 8/15/23 1017)      ED Vitals:  Vitals:    08/15/23 0946 08/15/23 0955 08/15/23 1010   BP: (!) 113/97 119/87 (!) 134/104   Pulse: 87     Resp: 16     Temp: 98.1  F (36.7  C)     TempSrc: Oral     SpO2: 97% 96% 98%   Weight: 57.6 kg (127 lb)     Height: 1.575 m (5' 2\")        ED labs and imaging:  Results for orders placed or performed during the hospital encounter of 08/15/23   Abdomen, flat/upright (2 views)     Status: None    Narrative    ABDOMEN TWO VIEWS August 15, 2023 10:36 AM    HISTORY: Acute on chronic left-sided abdominal pain. Last imaging  8/2/23. Evaluate for constipation versus other intra-abdominal  process.    COMPARISON: 8/2/2023.    FINDINGS/    Impression    IMPRESSION: No signs of bowel obstruction, pneumatosis, or  pneumoperitoneum. A mild amount of formed stool is seen throughout the  colon, most notable in the right hemicolon. No pathologic  calcifications or signs of organomegaly. Suspected phlebolith in the  right hemipelvis. Surgical clips are noted in the right upper  quadrant. Imaged lung bases are clear. No acute osseous abnormality.    WYATT DONALDSON MD         SYSTEM ID:  [id number]   [place] Draw     Status: None    Narrative    The following orders were created for panel order [place] Draw.  Procedure                               Abnormality         Status                     ---------      "                          -----------         ------                     Extra Blue Top Tube[[id number]]                              Final result               Extra Red Top Tube[[id number]]                               Final result               Extra Green Top (Lithium...[[id number]]                      Final result               Extra Purple Top Tube[[id number]]                            Final result                 Please view results for these tests on the individual orders.   Extra Blue Top Tube     Status: None   Result Value Ref Range    Hold Specimen JIC    Extra Red Top Tube     Status: None   Result Value Ref Range    Hold Specimen JIC    Extra Green Top (Lithium Heparin) Tube     Status: None   Result Value Ref Range    Hold Specimen JIC    Extra Purple Top Tube     Status: None   Result Value Ref Range    Hold Specimen JIC    UA with Microscopic reflex to Culture     Status: Abnormal    Specimen: Urine, Midstream   Result Value Ref Range    Color Urine Yellow Colorless, Straw, Light Yellow, Yellow    Appearance Urine Clear Clear    Glucose Urine Negative Negative mg/dL    Bilirubin Urine Negative Negative    Ketones Urine Negative Negative mg/dL    Specific Gravity Urine 1.012 1.003 - 1.035    Blood Urine Large (A) Negative    pH Urine 6.0 5.0 - 7.0    Protein Albumin Urine Negative Negative mg/dL    Urobilinogen Urine Normal Normal, 2.0 mg/dL    Nitrite Urine Negative Negative    Leukocyte Esterase Urine Small (A) Negative    Bacteria Urine Few (A) None Seen /HPF    Mucus Urine Present (A) None Seen /LPF    RBC Urine >182 (H) <=2 /HPF    WBC Urine 30 (H) <=5 /HPF    Squamous Epithelials Urine 1 <=1 /HPF    Narrative    Urine Culture ordered based on laboratory criteria   Comprehensive metabolic panel     Status: Abnormal   Result Value Ref Range    Sodium 138 136 - 145 mmol/L    Potassium 4.0 3.4 - 5.3 mmol/L    Chloride 105 98 - 107 mmol/L    Carbon Dioxide (CO2) 22 22 - 29 mmol/L    Anion Gap 11 7 -  15 mmol/L    Urea Nitrogen 5.8 (L) 6.0 - 20.0 mg/dL    Creatinine 0.67 0.51 - 0.95 mg/dL    Calcium 9.0 8.6 - 10.0 mg/dL    Glucose 104 (H) 70 - 99 mg/dL    Alkaline Phosphatase 75 35 - 104 U/L    AST 15 0 - 45 U/L    ALT 8 0 - 50 U/L    Protein Total 6.3 (L) 6.4 - 8.3 g/dL    Albumin 4.2 3.5 - 5.2 g/dL    Bilirubin Total 0.6 <=1.2 mg/dL    GFR Estimate >90 >60 mL/min/1.73m2   CRP inflammation     Status: Normal   Result Value Ref Range    CRP Inflammation <3.00 <5.00 mg/L   CBC with platelets and differential     Status: Abnormal   Result Value Ref Range    WBC Count 8.7 4.0 - 11.0 10e3/uL    RBC Count 4.28 3.80 - 5.20 10e6/uL    Hemoglobin 14.5 11.7 - 15.7 g/dL    Hematocrit 41.5 35.0 - 47.0 %    MCV 97 78 - 100 fL    MCH 33.9 (H) 26.5 - 33.0 pg    MCHC 34.9 31.5 - 36.5 g/dL    RDW 13.8 10.0 - 15.0 %    Platelet Count 221 150 - 450 10e3/uL    % Neutrophils 50 %    % Lymphocytes 37 %    % Monocytes 11 %    % Eosinophils 1 %    % Basophils 1 %    % Immature Granulocytes 0 %    NRBCs per 100 WBC 0 <1 /100    Absolute Neutrophils 4.4 1.6 - 8.3 10e3/uL    Absolute Lymphocytes 3.2 0.8 - 5.3 10e3/uL    Absolute Monocytes 1.0 0.0 - 1.3 10e3/uL    Absolute Eosinophils 0.1 0.0 - 0.7 10e3/uL    Absolute Basophils 0.0 0.0 - 0.2 10e3/uL    Absolute Immature Granulocytes 0.0 <=0.4 10e3/uL    Absolute NRBCs 0.0 10e3/uL   CBC with platelets differential     Status: Abnormal    Narrative    The following orders were created for panel order CBC with platelets differential.  Procedure                               Abnormality         Status                     ---------                               -----------         ------                     CBC with platelets and d...[[id number]]  Abnormal            Final result                 Please view results for these tests on the individual orders.              Assessments & Plan (with Medical Decision Making)   Assessment Summary and Clinical Impression: 47-year-old female who presented  with flank pain and discomfort by EMS.  She expressed concern about being labeled a drug seeker and concern about unexpected abnormal findings involving tiny cysts in the kidney and inferior right lobe of the liver on imaging 13 days prior.  She was reassured after our discussion that the cysts are unlikely to have ruptured or contribute to her pain and discomfort which reported began 4 days prior.  She noted pain in the left upper quadrant and left flank.  There is no rash or bruising or ecchymosis on exam she was otherwise hemodynamically normal.  She bargained for pain medication with morphine and was offered 2 doses and we agreed to work-up to ensure there is no new findings that explain her discomfort in light of her recent evaluation there was some abnormal findings on her urinalysis history.  She was treated empirically with cephalexin pending urine culture results although she reported no true urinary symptoms.     ED course and plan:  Reviewed the medical record.  Hospital course from 7/10/2023.  Visit on 8/2/2023, and 8/6/2023.  Imaging from 8 2/23/2023 chest abdomen pelvis with contrast-see details in the medical record  We discussed pain management plan in light of her hospital course 2 months ago.  She was offered morphine for pain as requested and work-up was undertaken to ensure there was no infectious process or other intra-abdominal process that would contribute to her pain.    Urinalysis-revealed some bacteria 182 red cells per high-power field and 30 white cells per high-power field.  Small leukocyte Estrace negative nitrites.  Normal CRP normal white count.  Patient not reporting urinary symptoms were reported there was some color change to her urine.  X-ray abdomen pelvis reviewed independently and the radiology report was also reviewed.    We discussed empiric treatment for possibility of bladder infection given bacteria in the urine although she did not report any urinary urgency or frequency.   She was informed that if there is any to change treatment plan based on urine culture results should be notified by the ED follow-up nurses.  She was given cephalexin twice daily x5 days.      Disclaimer: This note consists of symbols derived from keyboarding, dictation and/or voice recognition software. As a result, there may be errors in the script that have gone undetected. Please consider this when interpreting information found in this chart.   I have reviewed the nursing notes.    I have reviewed the findings, diagnosis, plan and need for follow up with the patient.           Medical Decision Making  The patient's presentation was of moderate complexity (an acute illness with systemic symptoms).    The patient's evaluation involved:  ordering and/or review of 2 test(s) in this encounter (diagnostic imaging and labs)    The patient's management necessitated moderate risk (prescription drug management including medications given in the ED).        Discharge Medication List as of 8/15/2023  1:02 PM          Final diagnoses:   Left sided abdominal pain   Left flank pain   Abnormal urinalysis       8/15/2023   [facility] EMERGENCY DEPT       Robert Hernández MD  08/15/23 [id number]

## 2023-08-16 LAB — BACTERIA UR CULT: NO GROWTH

## 2023-08-18 NOTE — RESULT ENCOUNTER NOTE
Notified of culture result  Consider discontinuing antibiotic  She continues to have the flank pain.  Encouraged to discuss with her PCp

## 2023-09-12 ENCOUNTER — HOSPITAL ENCOUNTER (OUTPATIENT)
Facility: CLINIC | Age: 48
Setting detail: OBSERVATION
Discharge: HOME | End: 2023-09-14
Attending: EMERGENCY MEDICINE | Admitting: INTERNAL MEDICINE
Payer: COMMERCIAL

## 2023-09-12 ENCOUNTER — APPOINTMENT (OUTPATIENT)
Dept: CT IMAGING | Facility: CLINIC | Age: 48
End: 2023-09-12
Attending: EMERGENCY MEDICINE
Payer: COMMERCIAL

## 2023-09-12 DIAGNOSIS — K52.9 GASTROENTERITIS: ICD-10-CM

## 2023-09-12 LAB
ALBUMIN SERPL BCG-MCNC: 4.5 G/DL (ref 3.5–5.2)
ALBUMIN UR-MCNC: NEGATIVE MG/DL
ALP SERPL-CCNC: 72 U/L (ref 35–104)
ALT SERPL W P-5'-P-CCNC: 10 U/L (ref 0–50)
ANION GAP SERPL CALCULATED.3IONS-SCNC: 14 MMOL/L (ref 7–15)
APPEARANCE UR: CLEAR
AST SERPL W P-5'-P-CCNC: 21 U/L (ref 0–45)
BASOPHILS # BLD AUTO: 0 10E3/UL (ref 0–0.2)
BASOPHILS NFR BLD AUTO: 0 %
BILIRUB SERPL-MCNC: 1.3 MG/DL
BILIRUB UR QL STRIP: NEGATIVE
BUN SERPL-MCNC: 6.7 MG/DL (ref 6–20)
CALCIUM SERPL-MCNC: 9.9 MG/DL (ref 8.6–10)
CHLORIDE SERPL-SCNC: 102 MMOL/L (ref 98–107)
COLOR UR AUTO: YELLOW
CREAT SERPL-MCNC: 0.7 MG/DL (ref 0.51–0.95)
DEPRECATED HCO3 PLAS-SCNC: 21 MMOL/L (ref 22–29)
EGFRCR SERPLBLD CKD-EPI 2021: >90 ML/MIN/1.73M2
EOSINOPHIL # BLD AUTO: 0.1 10E3/UL (ref 0–0.7)
EOSINOPHIL NFR BLD AUTO: 1 %
ERYTHROCYTE [DISTWIDTH] IN BLOOD BY AUTOMATED COUNT: 13.2 % (ref 10–15)
GLUCOSE SERPL-MCNC: 102 MG/DL (ref 70–99)
GLUCOSE UR STRIP-MCNC: NEGATIVE MG/DL
HCG UR QL: NEGATIVE
HCT VFR BLD AUTO: 43 % (ref 35–47)
HGB BLD-MCNC: 14.9 G/DL (ref 11.7–15.7)
HGB UR QL STRIP: ABNORMAL
HOLD SPECIMEN: NORMAL
HOLD SPECIMEN: NORMAL
IMM GRANULOCYTES # BLD: 0 10E3/UL
IMM GRANULOCYTES NFR BLD: 0 %
KETONES UR STRIP-MCNC: NEGATIVE MG/DL
LEUKOCYTE ESTERASE UR QL STRIP: ABNORMAL
LYMPHOCYTES # BLD AUTO: 3.8 10E3/UL (ref 0.8–5.3)
LYMPHOCYTES NFR BLD AUTO: 33 %
MCH RBC QN AUTO: 32.9 PG (ref 26.5–33)
MCHC RBC AUTO-ENTMCNC: 34.7 G/DL (ref 31.5–36.5)
MCV RBC AUTO: 95 FL (ref 78–100)
MONOCYTES # BLD AUTO: 1.1 10E3/UL (ref 0–1.3)
MONOCYTES NFR BLD AUTO: 9 %
MUCOUS THREADS #/AREA URNS LPF: PRESENT /LPF
NEUTROPHILS # BLD AUTO: 6.3 10E3/UL (ref 1.6–8.3)
NEUTROPHILS NFR BLD AUTO: 57 %
NITRATE UR QL: NEGATIVE
NRBC # BLD AUTO: 0 10E3/UL
NRBC BLD AUTO-RTO: 0 /100
PH UR STRIP: 6 [PH] (ref 5–7)
PLATELET # BLD AUTO: 218 10E3/UL (ref 150–450)
POTASSIUM SERPL-SCNC: 3.9 MMOL/L (ref 3.4–5.3)
PROT SERPL-MCNC: 6.9 G/DL (ref 6.4–8.3)
RBC # BLD AUTO: 4.53 10E6/UL (ref 3.8–5.2)
RBC URINE: 0 /HPF
SODIUM SERPL-SCNC: 137 MMOL/L (ref 136–145)
SP GR UR STRIP: 1.01 (ref 1–1.03)
SQUAMOUS EPITHELIAL: <1 /HPF
UROBILINOGEN UR STRIP-MCNC: NORMAL MG/DL
WBC # BLD AUTO: 11.3 10E3/UL (ref 4–11)
WBC URINE: 1 /HPF

## 2023-09-12 PROCEDURE — 96374 THER/PROPH/DIAG INJ IV PUSH: CPT | Performed by: EMERGENCY MEDICINE

## 2023-09-12 PROCEDURE — G0378 HOSPITAL OBSERVATION PER HR: HCPCS

## 2023-09-12 PROCEDURE — 96361 HYDRATE IV INFUSION ADD-ON: CPT

## 2023-09-12 PROCEDURE — 99222 1ST HOSP IP/OBS MODERATE 55: CPT | Performed by: INTERNAL MEDICINE

## 2023-09-12 PROCEDURE — 74176 CT ABD & PELVIS W/O CONTRAST: CPT

## 2023-09-12 PROCEDURE — 99285 EMERGENCY DEPT VISIT HI MDM: CPT | Performed by: EMERGENCY MEDICINE

## 2023-09-12 PROCEDURE — 80053 COMPREHEN METABOLIC PANEL: CPT | Performed by: EMERGENCY MEDICINE

## 2023-09-12 PROCEDURE — 96376 TX/PRO/DX INJ SAME DRUG ADON: CPT

## 2023-09-12 PROCEDURE — 250N000011 HC RX IP 250 OP 636: Performed by: EMERGENCY MEDICINE

## 2023-09-12 PROCEDURE — 81001 URINALYSIS AUTO W/SCOPE: CPT | Performed by: EMERGENCY MEDICINE

## 2023-09-12 PROCEDURE — 80307 DRUG TEST PRSMV CHEM ANLYZR: CPT | Performed by: INTERNAL MEDICINE

## 2023-09-12 PROCEDURE — 258N000003 HC RX IP 258 OP 636: Performed by: EMERGENCY MEDICINE

## 2023-09-12 PROCEDURE — 96376 TX/PRO/DX INJ SAME DRUG ADON: CPT | Performed by: EMERGENCY MEDICINE

## 2023-09-12 PROCEDURE — 85025 COMPLETE CBC W/AUTO DIFF WBC: CPT | Performed by: EMERGENCY MEDICINE

## 2023-09-12 PROCEDURE — 81025 URINE PREGNANCY TEST: CPT | Performed by: EMERGENCY MEDICINE

## 2023-09-12 PROCEDURE — 99285 EMERGENCY DEPT VISIT HI MDM: CPT | Mod: 25 | Performed by: EMERGENCY MEDICINE

## 2023-09-12 PROCEDURE — 36415 COLL VENOUS BLD VENIPUNCTURE: CPT | Performed by: EMERGENCY MEDICINE

## 2023-09-12 PROCEDURE — C9113 INJ PANTOPRAZOLE SODIUM, VIA: HCPCS | Mod: JZ | Performed by: EMERGENCY MEDICINE

## 2023-09-12 PROCEDURE — 96375 TX/PRO/DX INJ NEW DRUG ADDON: CPT | Performed by: EMERGENCY MEDICINE

## 2023-09-12 RX ORDER — HYDROMORPHONE HYDROCHLORIDE 1 MG/ML
0.3 INJECTION, SOLUTION INTRAMUSCULAR; INTRAVENOUS; SUBCUTANEOUS
Status: DISCONTINUED | OUTPATIENT
Start: 2023-09-12 | End: 2023-09-13

## 2023-09-12 RX ORDER — NALOXONE HYDROCHLORIDE 0.4 MG/ML
0.4 INJECTION, SOLUTION INTRAMUSCULAR; INTRAVENOUS; SUBCUTANEOUS
Status: DISCONTINUED | OUTPATIENT
Start: 2023-09-12 | End: 2023-09-13

## 2023-09-12 RX ORDER — DEXTROSE MONOHYDRATE, SODIUM CHLORIDE, AND POTASSIUM CHLORIDE 50; 1.49; 4.5 G/1000ML; G/1000ML; G/1000ML
INJECTION, SOLUTION INTRAVENOUS CONTINUOUS
Status: DISCONTINUED | OUTPATIENT
Start: 2023-09-12 | End: 2023-09-13

## 2023-09-12 RX ORDER — NALOXONE HYDROCHLORIDE 0.4 MG/ML
0.2 INJECTION, SOLUTION INTRAMUSCULAR; INTRAVENOUS; SUBCUTANEOUS
Status: DISCONTINUED | OUTPATIENT
Start: 2023-09-12 | End: 2023-09-13

## 2023-09-12 RX ORDER — ONDANSETRON 4 MG/1
4 TABLET, ORALLY DISINTEGRATING ORAL EVERY 6 HOURS PRN
Status: DISCONTINUED | OUTPATIENT
Start: 2023-09-12 | End: 2023-09-13

## 2023-09-12 RX ORDER — HYDROMORPHONE HYDROCHLORIDE 1 MG/ML
0.5 INJECTION, SOLUTION INTRAMUSCULAR; INTRAVENOUS; SUBCUTANEOUS
Status: COMPLETED | OUTPATIENT
Start: 2023-09-12 | End: 2023-09-13

## 2023-09-12 RX ORDER — ONDANSETRON 2 MG/ML
4 INJECTION INTRAMUSCULAR; INTRAVENOUS EVERY 6 HOURS PRN
Status: DISCONTINUED | OUTPATIENT
Start: 2023-09-12 | End: 2023-09-13

## 2023-09-12 RX ADMIN — PANTOPRAZOLE SODIUM 40 MG: 40 INJECTION, POWDER, FOR SOLUTION INTRAVENOUS at 20:01

## 2023-09-12 RX ADMIN — HYDROMORPHONE HYDROCHLORIDE 0.3 MG: 1 INJECTION, SOLUTION INTRAMUSCULAR; INTRAVENOUS; SUBCUTANEOUS at 22:49

## 2023-09-12 RX ADMIN — HYDROMORPHONE HYDROCHLORIDE 0.5 MG: 1 INJECTION, SOLUTION INTRAMUSCULAR; INTRAVENOUS; SUBCUTANEOUS at 19:11

## 2023-09-12 RX ADMIN — HYDROMORPHONE HYDROCHLORIDE 0.5 MG: 1 INJECTION, SOLUTION INTRAMUSCULAR; INTRAVENOUS; SUBCUTANEOUS at 17:51

## 2023-09-12 RX ADMIN — POTASSIUM CHLORIDE, DEXTROSE MONOHYDRATE AND SODIUM CHLORIDE: 150; 5; 450 INJECTION, SOLUTION INTRAVENOUS at 22:58

## 2023-09-12 ASSESSMENT — ENCOUNTER SYMPTOMS
CHILLS: 0
WEAKNESS: 0
NAUSEA: 1
VOMITING: 1
FREQUENCY: 0
FEVER: 1
ABDOMINAL DISTENTION: 1
COUGH: 0
EYE DISCHARGE: 0
ADENOPATHY: 0
SHORTNESS OF BREATH: 0
LIGHT-HEADEDNESS: 0
SORE THROAT: 0
ABDOMINAL PAIN: 1
CONFUSION: 0
AGITATION: 0
DIARRHEA: 0
COLOR CHANGE: 0
MYALGIAS: 0
HEADACHES: 0
DYSURIA: 0

## 2023-09-12 ASSESSMENT — ACTIVITIES OF DAILY LIVING (ADL)
PATIENT'S_PREFERRED_MEANS_OF_COMMUNICATION: OTHER
DIFFICULTY_EATING/SWALLOWING: NO
CONCENTRATING,_REMEMBERING_OR_MAKING_DECISIONS_DIFFICULTY: YES
DRESSING/BATHING_DIFFICULTY: NO
DESCRIBE_HEARING_LOSS: BILATERAL HEARING LOSS
WALKING_OR_CLIMBING_STAIRS_DIFFICULTY: NO
WEAR_GLASSES_OR_BLIND: YES
DIFFICULTY_COMMUNICATING: NO
ADLS_ACUITY_SCORE: 35
ADLS_ACUITY_SCORE: 35
ADLS_ACUITY_SCORE: 23
TOILETING_ISSUES: NO
DOING_ERRANDS_INDEPENDENTLY_DIFFICULTY: NO
WERE_AUXILIARY_AIDS_OFFERED?: YES
FALL_HISTORY_WITHIN_LAST_SIX_MONTHS: NO
HEARING_DIFFICULTY_OR_DEAF: YES
ADLS_ACUITY_SCORE: 35
CHANGE_IN_FUNCTIONAL_STATUS_SINCE_ONSET_OF_CURRENT_ILLNESS/INJURY: NO

## 2023-09-12 NOTE — ED PROVIDER NOTES
History     Chief Complaint   Patient presents with     Abdominal Pain     Pt coming in via EMS with abdominal and back pain x 3 days.     DEN Rodriguez is a 48 year old female who with history of abdominal pain who presents with a 3-day history of pain which worsened last evening and now is radiating to bilateral flank region.  She reports the back pain is new for her.  She began having vomiting about 2:00 this morning.  She reports having bowel movements whenever she tries to eat.  She reports a low-grade fever.  She has no dysuria or urinary frequency.  She has no melena or bright red blood per rectum.  She has no chest pain or shortness of breath.    Allergies:  Allergies   Allergen Reactions     Demerol Itching     Droperidol Other (See Comments)     twitching       Ketorolac Tromethamine Itching     Nsaids Other (See Comments)     Gastric ulcer     Adhesive Tape Rash     Compazine [Prochlorperazine] Rash     Arm red and rash     Diphenhydramine Anxiety     Jittery and jumpy       Problem List:    Patient Active Problem List    Diagnosis Date Noted     Gastroenteritis 09/12/2023     Priority: Medium     Lower abdominal pain 07/10/2023     Priority: Medium     Hematemesis with nausea 07/10/2023     Priority: Medium     Borderline intellectual functioning 11/06/2015     Priority: Medium     ADD (attention deficit disorder) 02/18/2014     Priority: Medium     Chondromalacia of patella 12/11/2013     Priority: Medium     Drug-seeking behavior 12/06/2013     Priority: Medium     Health Care Home 12/03/2013     Priority: Medium       Status:  Unable to reach    -See Letters for [facility] Care Plan - Emergency Care Plan           Cannabis abuse 07/03/2013     Priority: Medium     Polysubstance abuse (H) 12/04/2012     Priority: Medium     Social phobia: with panic attacks 07/23/2012     Priority: Medium     Moderate recurrent major depression: per psychiatry consult 07/23/2012     Priority: Medium     Prescription  Opioid and benzodiazepine abuse 07/23/2012     Priority: Medium     Contusion of duodenum 06/26/2012     Priority: Medium     Vitamin D deficiency 06/26/2012     Priority: Medium     At risk for osteoporosis 06/26/2012     Priority: Medium     Hematochezia 06/26/2012     Priority: Medium     Abdominal pain, unspecified abdominal location 06/25/2012     Priority: Medium     Problem list name updated by automated process. Provider to review       Panic attack 05/22/2012     Priority: Medium     Acquired absence of both cervix and uterus 09/16/2011     Priority: Medium     Colitis 05/16/2011     Priority: Medium     Outbursts of anger 12/14/2009     Priority: Medium     Gastroesophageal reflux disease 06/02/2009     Priority: Medium     Formatting of this note might be different from the original.  Esophagitis- EGD 2017. PPI not helpful. No further follow up.       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     Bipolar 2 disorder (H)      Carpal tunnel syndrome      Colitis      Depression      Depressive disorder      Drug-seeking behavior      Drug-seeking behavior      GERD (gastroesophageal reflux disease)      Head injury      Irritable bowel syndrome (IBS)      Kidney infection      Lower urinary tract infection 2/18/2011     Migraine      Migraine      Polysubstance abuse (H) 12/4/2012     PTSD (post-traumatic stress disorder)      PTSD (post-traumatic stress disorder)      Thyroid disease      Ulcer      Urinary retention        Past Surgical History:    Past Surgical History:   Procedure Laterality Date     APPENDECTOMY       APPENDECTOMY        CHOLECYSTECTOMY  01/01/2005     CHOLECYSTECTOMY       HYSTERECTOMY      with ophorectomy bilateral     HYSTERECTOMY TOTAL ABDOMINAL, BILATERAL SALPINGO-OOPHORECTOMY, COMBINED  01/01/2001    bleeding ovarian cysts     HYSTERECTOMY, PAP NO LONGER INDICATED       HYSTERECTOMY, PAP NO LONGER INDICATED       TONSILLECTOMY, ADENOIDECTOMY, MYRINGOTOMY, INSERT TUBE BILATERAL, COMBINED       TOTAL VAGINAL HYSTERECTOMY       URETHRA SURGERY      temporary stent     URETHRA SURGERY         Family History:    Family History   Problem Relation Age of Onset     Unknown/Adopted Father      Chronic Obstructive Pulmonary Disease Mother      Aneurysm Maternal Grandmother      Cerebrovascular Disease Maternal Grandfather      Breast Cancer No family hx of      Cancer No family hx of      Diabetes No family hx of        Social History:  Marital Status:   [2]  Social History     Tobacco Use     Smoking status: Every Day     Packs/day: 0.50     Years: 20.00     Pack years: 10.00     Types: Cigarettes     Smokeless tobacco: Never   Substance Use Topics     Alcohol use: No     Drug use: No     Comment: Denies, past marijuana        Medications:    No current outpatient medications on file.        Review of Systems   Constitutional:  Positive for fever. Negative for chills.   HENT:  Negative for congestion and sore throat.    Eyes:  Negative for discharge.   Respiratory:  Negative for cough and shortness of breath.    Cardiovascular:  Negative for chest pain and leg swelling.   Gastrointestinal:  Positive for abdominal distention, abdominal pain, nausea and vomiting. Negative for diarrhea.   Genitourinary:  Negative for dysuria and frequency.   Musculoskeletal:  Negative for myalgias.   Skin:  Negative for color change and rash.   Neurological:  Negative for weakness, light-headedness and headaches.   Hematological:  Negative for adenopathy.   Psychiatric/Behavioral:  Negative for agitation, behavioral problems and confusion.   "      Physical Exam   BP: 105/74  Pulse: 100  Temp: 98.6  F (37  C)  Resp: 22  Height: 157.5 cm (5' 2\")  Weight: 59 kg (130 lb)  SpO2: 96 %      Physical Exam  Constitutional:       General: She is in acute distress.      Appearance: She is well-developed.   HENT:      Head: Normocephalic and atraumatic.   Eyes:      Conjunctiva/sclera: Conjunctivae normal.      Pupils: Pupils are equal, round, and reactive to light.   Neck:      Thyroid: No thyromegaly.      Trachea: No tracheal deviation.   Cardiovascular:      Rate and Rhythm: Normal rate and regular rhythm.      Heart sounds: Normal heart sounds. No murmur heard.  Pulmonary:      Effort: Pulmonary effort is normal. No respiratory distress.      Breath sounds: Normal breath sounds. No wheezing.   Chest:      Chest wall: No tenderness.   Abdominal:      General: There is no distension.      Palpations: Abdomen is soft.      Tenderness: There is generalized abdominal tenderness.   Musculoskeletal:         General: No tenderness.      Cervical back: Normal range of motion and neck supple.   Skin:     General: Skin is warm.      Findings: No rash.   Neurological:      Mental Status: She is alert and oriented to person, place, and time.      Sensory: No sensory deficit.   Psychiatric:         Behavior: Behavior normal.         ED Course             Procedures                Results for orders placed or performed during the hospital encounter of 09/12/23 (from the past 24 hour(s))   Comprehensive metabolic panel   Result Value Ref Range    Sodium 137 136 - 145 mmol/L    Potassium 3.9 3.4 - 5.3 mmol/L    Chloride 102 98 - 107 mmol/L    Carbon Dioxide (CO2) 21 (L) 22 - 29 mmol/L    Anion Gap 14 7 - 15 mmol/L    Urea Nitrogen 6.7 6.0 - 20.0 mg/dL    Creatinine 0.70 0.51 - 0.95 mg/dL    Calcium 9.9 8.6 - 10.0 mg/dL    Glucose 102 (H) 70 - 99 mg/dL    Alkaline Phosphatase 72 35 - 104 U/L    AST 21 0 - 45 U/L    ALT 10 0 - 50 U/L    Protein Total 6.9 6.4 - 8.3 g/dL    " Albumin 4.5 3.5 - 5.2 g/dL    Bilirubin Total 1.3 (H) <=1.2 mg/dL    GFR Estimate >90 >60 mL/min/1.73m2   CBC with platelets and differential   Result Value Ref Range    WBC Count 11.3 (H) 4.0 - 11.0 10e3/uL    RBC Count 4.53 3.80 - 5.20 10e6/uL    Hemoglobin 14.9 11.7 - 15.7 g/dL    Hematocrit 43.0 35.0 - 47.0 %    MCV 95 78 - 100 fL    MCH 32.9 26.5 - 33.0 pg    MCHC 34.7 31.5 - 36.5 g/dL    RDW 13.2 10.0 - 15.0 %    Platelet Count 218 150 - 450 10e3/uL    % Neutrophils 57 %    % Lymphocytes 33 %    % Monocytes 9 %    % Eosinophils 1 %    % Basophils 0 %    % Immature Granulocytes 0 %    NRBCs per 100 WBC 0 <1 /100    Absolute Neutrophils 6.3 1.6 - 8.3 10e3/uL    Absolute Lymphocytes 3.8 0.8 - 5.3 10e3/uL    Absolute Monocytes 1.1 0.0 - 1.3 10e3/uL    Absolute Eosinophils 0.1 0.0 - 0.7 10e3/uL    Absolute Basophils 0.0 0.0 - 0.2 10e3/uL    Absolute Immature Granulocytes 0.0 <=0.4 10e3/uL    Absolute NRBCs 0.0 10e3/uL   UA with Microscopic reflex to Culture    Specimen: Urine, Clean Catch   Result Value Ref Range    Color Urine Yellow Colorless, Straw, Light Yellow, Yellow    Appearance Urine Clear Clear    Glucose Urine Negative Negative mg/dL    Bilirubin Urine Negative Negative    Ketones Urine Negative Negative mg/dL    Specific Gravity Urine 1.011 1.003 - 1.035    Blood Urine Small (A) Negative    pH Urine 6.0 5.0 - 7.0    Protein Albumin Urine Negative Negative mg/dL    Urobilinogen Urine Normal Normal, 2.0 mg/dL    Nitrite Urine Negative Negative    Leukocyte Esterase Urine Trace (A) Negative    Mucus Urine Present (A) None Seen /LPF    RBC Urine 0 <=2 /HPF    WBC Urine 1 <=5 /HPF    Squamous Epithelials Urine <1 <=1 /HPF    Narrative    Urine Culture not indicated   HCG qualitative urine (UPT)   Result Value Ref Range    hCG Urine Qualitative Negative Negative   CT Abdomen Pelvis w/o Contrast    Narrative    EXAM: CT ABDOMEN PELVIS W/O CONTRAST  LOCATION: [facility]  DATE:  9/12/2023    INDICATION: back pain; abdominal pain radiating to the bilateral flank region. Vomiting. Low-grade fever.  COMPARISON: 10/19/2022  TECHNIQUE: CT scan of the abdomen and pelvis was performed without IV contrast. Multiplanar reformats were obtained. Dose reduction techniques were used.  CONTRAST: None.    FINDINGS:   LOWER CHEST: No basilar infiltrates.    HEPATOBILIARY: Cholecystectomy.    PANCREAS: Unremarkable    SPLEEN: Unremarkable    ADRENAL GLANDS: Unremarkable    KIDNEYS/BLADDER: No hydronephrosis. No nephroureterolithiasis. Bladder is normal in contour without stones.    BOWEL: No bowel obstruction. Mild generalized colonic wall thickening..    LYMPH NODES: No significant retroperitoneal adenopathy    VASCULATURE: No abdominal aortic aneurysm    PELVIC ORGANS: Hysterectomy. No free fluid.    MUSCULOSKELETAL: No acute bony abnormalities.      Impression    IMPRESSION:   1.  Mild colonic wall thickening compatible with colitis. Infectious or inflammatory etiologies favored.  2.  No evidence of nephroureterolithiasis.         Medications   HYDROmorphone (PF) (DILAUDID) injection 0.5 mg (0.5 mg Intravenous $Given 9/12/23 1911)   dextrose 5% and 0.45% NaCl + KCl 20 mEq/L infusion (has no administration in time range)   ondansetron (ZOFRAN ODT) ODT tab 4 mg (has no administration in time range)     Or   ondansetron (ZOFRAN) injection 4 mg (has no administration in time range)   naloxone (NARCAN) injection 0.2 mg (has no administration in time range)     Or   naloxone (NARCAN) injection 0.4 mg (has no administration in time range)     Or   naloxone (NARCAN) injection 0.2 mg (has no administration in time range)     Or   naloxone (NARCAN) injection 0.4 mg (has no administration in time range)   HYDROmorphone (PF) (DILAUDID) injection 0.3 mg (has no administration in time range)   pantoprazole (PROTONIX) IV push injection 40 mg (40 mg Intravenous $Given 9/12/23 2001)       Assessments & Plan (with Medical  Decision Making)     I have reviewed the nursing notes.    I have reviewed the findings, diagnosis, plan and need for follow up with the patient.    48 year old female who with history of abdominal pain who presents with a 3-day history of pain which worsened last evening and now is radiating to bilateral flank region.  She reports the back pain is new for her.  She began having vomiting about 2:00 this morning.  She reports having bowel movements whenever she tries to eat.  She reports a low-grade fever.  She presented via EMS and received Zofran in route.  She presented with an emesis bag with about 100 cc of fluid.    On exam, patient blood pressure is 105/74 with pulse rate of 100.  Her temperature is 98.6.  Respirations are 22.  O2 saturations are 96%.  She appears uncomfortable and has diffuse abdominal pain.  Labs were performed and CT scan was ordered.  Patient was given Dilaudid for pain management as well as IV fluids.    Patient's white count 11.3 and was normal 1 month ago.    Comprehensive metabolic panel showed a bicarb of 21 and glucose of 102.  LFTs were normal.    Urinalysis showed no evidence of infection.  Urine pregnancy test was negative.  C. difficile and enteric bacteria cultures were ordered.    Urine culture from 8/15/2023 showed no growth.  Her last CT of the abdomen and pelvis was performed on 8/2/2023.  That showed colonic submucosal fat deposition which was nonspecific but could be seen with inflammatory bowel disease.    Patient received doses of parenteral narcotics with transient improvement.    CT of the abdomen and pelvis obtained and independently read by me.  I saw no evidence of free air or obstruction.  I saw no abnormal calcifications.  Formal read showed mild colonic thickening compatible with colitis.  Either infectious or inflammatory etiologies are favored.      I do not see a recent colonoscopy.    Vital signs were repeated and showed blood pressure 119/77 with a pulse  rate 77.  Patient also stated on Protonix.  Patient do not feel that she can be discharged with her current pain regimen.  She is restricted to [facility].  I discussed the case and plan of care with Vin Byrd MD who accepted the patient for admission.  Patient continued to receive IV fluids, antiemetics, and pain management.      Medical Decision Making  The patient's presentation was of high complexity (a chronic illness severe exacerbation, progression, or side effect of treatment).    The patient's evaluation involved:  review of 3+ test result(s) ordered prior to this encounter (see separate area of note for details)  ordering and/or review of 3+ test(s) in this encounter (see separate area of note for details)  independent interpretation of testing performed by another health professional (see separate area of note for details)  discussion of management or test interpretation with another health professional (see separate area of note for details)    The patient's management necessitated high risk (a decision regarding hospitalization).        There are no discharge medications for this patient.      Final diagnoses:   Gastroenteritis       9/12/2023   [facility] EMERGENCY DEPT       Dami Collins MD  09/13/23 1035

## 2023-09-12 NOTE — ED TRIAGE NOTES
Pt presents with abdominal pain radiating to the back. Pt states bowel movements have been normal and urinating as normal. Pain for three days and woke up with this out of sleep. Vomiting and decreased appetite. Hx of abdominal problems and testing always comes back normal. Pt states pain has never radiated to the back before. Pt throwing up immediately after eating.      Triage Assessment       Row Name 09/12/23 1609       Triage Assessment (Adult)    Airway WDL WDL       Respiratory WDL    Respiratory WDL WDL       Skin Circulation/Temperature WDL    Skin Circulation/Temperature WDL WDL       Cardiac WDL    Cardiac WDL WDL       Peripheral/Neurovascular WDL    Peripheral Neurovascular WDL WDL       Cognitive/Neuro/Behavioral WDL    Cognitive/Neuro/Behavioral WDL X;mood/behavior    Mood/Behavior restless

## 2023-09-13 LAB
AMPHETAMINES UR QL SCN: ABNORMAL
ANION GAP SERPL CALCULATED.3IONS-SCNC: 10 MMOL/L (ref 7–15)
BARBITURATES UR QL SCN: ABNORMAL
BENZODIAZ UR QL SCN: ABNORMAL
BUN SERPL-MCNC: 5.9 MG/DL (ref 6–20)
BZE UR QL SCN: ABNORMAL
CALCIUM SERPL-MCNC: 9.4 MG/DL (ref 8.6–10)
CANNABINOIDS UR QL SCN: ABNORMAL
CHLORIDE SERPL-SCNC: 102 MMOL/L (ref 98–107)
CREAT SERPL-MCNC: 0.72 MG/DL (ref 0.51–0.95)
DEPRECATED HCO3 PLAS-SCNC: 25 MMOL/L (ref 22–29)
EGFRCR SERPLBLD CKD-EPI 2021: >90 ML/MIN/1.73M2
ERYTHROCYTE [DISTWIDTH] IN BLOOD BY AUTOMATED COUNT: 13.5 % (ref 10–15)
FENTANYL UR QL: ABNORMAL
GLUCOSE SERPL-MCNC: 120 MG/DL (ref 70–99)
HCT VFR BLD AUTO: 43.6 % (ref 35–47)
HGB BLD-MCNC: 14.6 G/DL (ref 11.7–15.7)
MCH RBC QN AUTO: 32.8 PG (ref 26.5–33)
MCHC RBC AUTO-ENTMCNC: 33.5 G/DL (ref 31.5–36.5)
MCV RBC AUTO: 98 FL (ref 78–100)
OPIATES UR QL SCN: ABNORMAL
PCP QUAL URINE (ROCHE): ABNORMAL
PLATELET # BLD AUTO: 188 10E3/UL (ref 150–450)
POTASSIUM SERPL-SCNC: 3.9 MMOL/L (ref 3.4–5.3)
RBC # BLD AUTO: 4.45 10E6/UL (ref 3.8–5.2)
SODIUM SERPL-SCNC: 137 MMOL/L (ref 136–145)
WBC # BLD AUTO: 10.1 10E3/UL (ref 4–11)

## 2023-09-13 PROCEDURE — 36415 COLL VENOUS BLD VENIPUNCTURE: CPT | Performed by: INTERNAL MEDICINE

## 2023-09-13 PROCEDURE — 96375 TX/PRO/DX INJ NEW DRUG ADDON: CPT

## 2023-09-13 PROCEDURE — 80048 BASIC METABOLIC PNL TOTAL CA: CPT | Performed by: INTERNAL MEDICINE

## 2023-09-13 PROCEDURE — 250N000013 HC RX MED GY IP 250 OP 250 PS 637: Performed by: INTERNAL MEDICINE

## 2023-09-13 PROCEDURE — 85027 COMPLETE CBC AUTOMATED: CPT | Performed by: INTERNAL MEDICINE

## 2023-09-13 PROCEDURE — 250N000011 HC RX IP 250 OP 636: Mod: JZ | Performed by: EMERGENCY MEDICINE

## 2023-09-13 PROCEDURE — 250N000011 HC RX IP 250 OP 636: Mod: JZ | Performed by: INTERNAL MEDICINE

## 2023-09-13 PROCEDURE — 96361 HYDRATE IV INFUSION ADD-ON: CPT

## 2023-09-13 PROCEDURE — 93005 ELECTROCARDIOGRAM TRACING: CPT

## 2023-09-13 PROCEDURE — 99233 SBSQ HOSP IP/OBS HIGH 50: CPT | Performed by: INTERNAL MEDICINE

## 2023-09-13 PROCEDURE — 258N000003 HC RX IP 258 OP 636: Performed by: INTERNAL MEDICINE

## 2023-09-13 PROCEDURE — C9113 INJ PANTOPRAZOLE SODIUM, VIA: HCPCS | Mod: JZ | Performed by: INTERNAL MEDICINE

## 2023-09-13 PROCEDURE — G0378 HOSPITAL OBSERVATION PER HR: HCPCS

## 2023-09-13 PROCEDURE — 96376 TX/PRO/DX INJ SAME DRUG ADON: CPT

## 2023-09-13 RX ORDER — AMOXICILLIN 250 MG
2 CAPSULE ORAL 2 TIMES DAILY PRN
Status: DISCONTINUED | OUTPATIENT
Start: 2023-09-13 | End: 2023-09-14 | Stop reason: HOSPADM

## 2023-09-13 RX ORDER — ONDANSETRON 2 MG/ML
4 INJECTION INTRAMUSCULAR; INTRAVENOUS EVERY 6 HOURS PRN
Status: DISCONTINUED | OUTPATIENT
Start: 2023-09-13 | End: 2023-09-14 | Stop reason: HOSPADM

## 2023-09-13 RX ORDER — BISACODYL 10 MG
10 SUPPOSITORY, RECTAL RECTAL DAILY PRN
Status: DISCONTINUED | OUTPATIENT
Start: 2023-09-13 | End: 2023-09-14 | Stop reason: HOSPADM

## 2023-09-13 RX ORDER — NALOXONE HYDROCHLORIDE 0.4 MG/ML
0.2 INJECTION, SOLUTION INTRAMUSCULAR; INTRAVENOUS; SUBCUTANEOUS
Status: DISCONTINUED | OUTPATIENT
Start: 2023-09-13 | End: 2023-09-14 | Stop reason: HOSPADM

## 2023-09-13 RX ORDER — OXYCODONE HYDROCHLORIDE 5 MG/1
5 TABLET ORAL EVERY 6 HOURS PRN
Status: DISCONTINUED | OUTPATIENT
Start: 2023-09-13 | End: 2023-09-14 | Stop reason: HOSPADM

## 2023-09-13 RX ORDER — AMOXICILLIN 250 MG
1 CAPSULE ORAL 2 TIMES DAILY PRN
Status: DISCONTINUED | OUTPATIENT
Start: 2023-09-13 | End: 2023-09-14 | Stop reason: HOSPADM

## 2023-09-13 RX ORDER — ONDANSETRON 4 MG/1
4 TABLET, ORALLY DISINTEGRATING ORAL EVERY 6 HOURS PRN
Status: DISCONTINUED | OUTPATIENT
Start: 2023-09-13 | End: 2023-09-14 | Stop reason: HOSPADM

## 2023-09-13 RX ORDER — SODIUM CHLORIDE 9 MG/ML
INJECTION, SOLUTION INTRAVENOUS CONTINUOUS
Status: DISCONTINUED | OUTPATIENT
Start: 2023-09-13 | End: 2023-09-14 | Stop reason: HOSPADM

## 2023-09-13 RX ORDER — PROMETHAZINE HYDROCHLORIDE 25 MG/1
25 TABLET ORAL EVERY 6 HOURS PRN
Status: DISCONTINUED | OUTPATIENT
Start: 2023-09-13 | End: 2023-09-14 | Stop reason: HOSPADM

## 2023-09-13 RX ORDER — ACETAMINOPHEN 325 MG/1
650 TABLET ORAL EVERY 6 HOURS PRN
Status: DISCONTINUED | OUTPATIENT
Start: 2023-09-13 | End: 2023-09-14 | Stop reason: HOSPADM

## 2023-09-13 RX ORDER — NALOXONE HYDROCHLORIDE 0.4 MG/ML
0.4 INJECTION, SOLUTION INTRAMUSCULAR; INTRAVENOUS; SUBCUTANEOUS
Status: DISCONTINUED | OUTPATIENT
Start: 2023-09-13 | End: 2023-09-14 | Stop reason: HOSPADM

## 2023-09-13 RX ADMIN — PANTOPRAZOLE SODIUM 40 MG: 40 INJECTION, POWDER, FOR SOLUTION INTRAVENOUS at 05:45

## 2023-09-13 RX ADMIN — PANTOPRAZOLE SODIUM 40 MG: 40 INJECTION, POWDER, FOR SOLUTION INTRAVENOUS at 16:52

## 2023-09-13 RX ADMIN — ONDANSETRON 4 MG: 2 INJECTION INTRAMUSCULAR; INTRAVENOUS at 01:57

## 2023-09-13 RX ADMIN — OXYCODONE HYDROCHLORIDE 5 MG: 5 TABLET ORAL at 16:52

## 2023-09-13 RX ADMIN — ACETAMINOPHEN 650 MG: 325 TABLET, FILM COATED ORAL at 04:36

## 2023-09-13 RX ADMIN — ACETAMINOPHEN 650 MG: 325 TABLET, FILM COATED ORAL at 22:55

## 2023-09-13 RX ADMIN — SODIUM CHLORIDE: 9 INJECTION, SOLUTION INTRAVENOUS at 22:29

## 2023-09-13 RX ADMIN — ONDANSETRON 4 MG: 2 INJECTION INTRAMUSCULAR; INTRAVENOUS at 10:54

## 2023-09-13 RX ADMIN — HYDROMORPHONE HYDROCHLORIDE 0.5 MG: 1 INJECTION, SOLUTION INTRAMUSCULAR; INTRAVENOUS; SUBCUTANEOUS at 02:01

## 2023-09-13 RX ADMIN — SODIUM CHLORIDE: 9 INJECTION, SOLUTION INTRAVENOUS at 04:14

## 2023-09-13 RX ADMIN — PROMETHAZINE HYDROCHLORIDE 25 MG: 25 TABLET ORAL at 20:56

## 2023-09-13 RX ADMIN — ACETAMINOPHEN 650 MG: 325 TABLET, FILM COATED ORAL at 16:52

## 2023-09-13 RX ADMIN — SODIUM CHLORIDE: 9 INJECTION, SOLUTION INTRAVENOUS at 12:35

## 2023-09-13 RX ADMIN — ONDANSETRON 4 MG: 2 INJECTION INTRAMUSCULAR; INTRAVENOUS at 16:52

## 2023-09-13 RX ADMIN — ONDANSETRON 4 MG: 2 INJECTION INTRAMUSCULAR; INTRAVENOUS at 22:56

## 2023-09-13 RX ADMIN — OXYCODONE HYDROCHLORIDE 5 MG: 5 TABLET ORAL at 10:49

## 2023-09-13 ASSESSMENT — ACTIVITIES OF DAILY LIVING (ADL)
ADLS_ACUITY_SCORE: 23

## 2023-09-13 NOTE — H&P
[facility]    History and Physical - Hospitalist Service       Date of Admission:  9/12/2023    Assessment & Plan   Meli Rodriguez is a 48 year old female admitted on 9/12/2023. She presents with abdominal and back pain.    Possible infectious gastroenteritis  Possible cannabis hyperemesis syndrome  CT abdomen pelvis without contrast demonstrates mild colonic wall thickening compatible with colitis.  Infectious or inflammatory etiology is favored.  No evidence of nephroureterolithiasis.  - C. difficile toxin and enteric panel have been ordered  - Urine tox screen  - Volume resuscitate with IV fluids  - No indication for antibiotics  - IV Protonix twice daily  - Zofran    Asthma  No evidence of acute exacerbation.  - Albuterol as needed     Diet:  Clear liquids  DVT Prophylaxis: Low Risk/Ambulatory with no VTE prophylaxis indicated  Patel Catheter: Not present  Code Status:  Full    Disposition Plan   Anticipate 1 to 2 days for resolution of nausea vomiting diarrhea    Entered: Vin Byrd MD 09/12/2023, 9:38 PM       Vin Byrd MD  [facility]    ______________________________________________________________________    Chief Complaint   Chief Complaint   Patient presents with    Abdominal Pain     Pt coming in via EMS with abdominal and back pain x 3 days.        History is obtained from the patient    History of Present Illness   Meli Rodriguez is a 48 year old female who presented to the emergency department with abdominal pain for the past 3 days.  Yesterday evening, patient began radiating around to her flanks bilaterally.  Overnight, she began vomiting.  Patient states that she has vomited 8 times.  She denies fevers.  She denies hematemesis or hematochezia.  She denies dysuria or hematuria.  She denies sick contacts.  Her only recent travel is to [place].  Patient states that she has chronic diarrhea which is unchanged.  Patient admits to  smoking marijuana multiple times per day, but insists that this is not cannabis hyperemesis syndrome, because she has done this for years.    Patient was recently hospitalized at [facility] on 7/10/2023 with hematemesis and hematochezia.  She was treated with IV fluids and IV Protonix.  There is suspicion for cannabis hyperemesis syndrome and chronic abdominal pain.  When she did not receive pain medication, patient left AMA later that evening.  Patient presented to emergency department on 8/2/2023 with abdominal pain, nausea and vomiting.  No etiology was identified for the patient's pain.  Patient presented on 8/15/2023 with flank pain; she was concerned about an infected a ruptured kidney cyst.  Urinalysis demonstrated a small amount of hematuria so the patient was given a 5-day course of Keflex.    The patient denies nasal congestion, runny nose, sore throat, wheezing, cough, or shortness of breath.  Patient denies chest pain, chest pressure, palpitations.  Patient denies syncope, falls, or trauma.  Patient denies rash, muscle aches, joint pain, or edema.  Patient denies headache, neck pain, or back pain.  Patient denies diplopia, dysarthria, dysphagia, incoordination, focal numbness, or unilateral weakness.    Review of Systems    The 10 point Review of Systems is negative other than noted in the HPI or here.     Past Medical History    I have reviewed this patient's medical history and updated it with pertinent information if needed.   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     Priority: Medium    ADD (attention deficit disorder) 02/18/2014     Priority: Medium    Chondromalacia of patella 12/11/2013     Priority: Medium    Drug-seeking behavior 12/06/2013     Priority: Medium    Health Care Home 12/03/2013     Priority: Medium       Status:  Unable to reach    -See Letters for [facility] Care Plan - Emergency Care Plan          Cannabis abuse 07/03/2013     Priority: Medium    Polysubstance abuse (H) 12/04/2012     Priority: Medium    Social phobia: with panic attacks 07/23/2012     Priority: Medium    Moderate recurrent major depression: per psychiatry consult 07/23/2012     Priority: Medium    Prescription Opioid and benzodiazepine abuse 07/23/2012     Priority: Medium    Contusion of duodenum 06/26/2012     Priority: Medium    Vitamin D deficiency 06/26/2012     Priority: Medium    At risk for osteoporosis 06/26/2012     Priority: Medium    Hematochezia 06/26/2012     Priority: Medium    Abdominal pain, unspecified abdominal location 06/25/2012     Priority: Medium     Problem list name updated by automated process. Provider to review      Panic attack 05/22/2012     Priority: Medium    Acquired absence of both cervix and uterus 09/16/2011     Priority: Medium    Colitis 05/16/2011      Priority: Medium    Outbursts of anger 12/14/2009     Priority: Medium    Gastroesophageal reflux disease 06/02/2009     Priority: Medium     Formatting of this note might be different from the original.  Esophagitis- EGD 2017. PPI not helpful. No further follow up.      Other irritable bowel syndrome 03/28/2009     Priority: Medium        Past Surgical History   I have reviewed this patient's surgical history and updated it with pertinent information if needed.  Past Surgical History:   Procedure Laterality Date    APPENDECTOMY      APPENDECTOMY      CHOLECYSTECTOMY  01/01/2005    CHOLECYSTECTOMY      HYSTERECTOMY      with ophorectomy bilateral    HYSTERECTOMY TOTAL ABDOMINAL, BILATERAL SALPINGO-OOPHORECTOMY, COMBINED  01/01/2001    bleeding ovarian cysts    HYSTERECTOMY, PAP NO LONGER INDICATED      HYSTERECTOMY, PAP NO LONGER INDICATED      TONSILLECTOMY, ADENOIDECTOMY, MYRINGOTOMY, INSERT TUBE BILATERAL, COMBINED      TOTAL VAGINAL HYSTERECTOMY      URETHRA SURGERY      temporary stent    URETHRA SURGERY         Social History   I have reviewed this patient's social history and updated it with pertinent information if needed.  Social History     Tobacco Use    Smoking status: Every Day     Packs/day: 0.50     Years: 20.00     Pack years: 10.00     Types: Cigarettes    Smokeless tobacco: Never   Substance Use Topics    Alcohol use: No    Drug use: No     Comment: Denies, past marijuana       Family History   I have reviewed this patient's family history and updated it with pertinent information if needed.   Family History   Problem Relation Age of Onset    Unknown/Adopted Father     Chronic Obstructive Pulmonary Disease Mother     Aneurysm Maternal Grandmother     Cerebrovascular Disease Maternal Grandfather     Breast Cancer No family hx of     Cancer No family hx of     Diabetes No family hx of        Prior to Admission Medications   None     Allergies   Allergies   Allergen Reactions    Demerol Itching     Droperidol Other (See Comments)     twitching      Ketorolac Tromethamine Itching    Nsaids Other (See Comments)     Gastric ulcer    Adhesive Tape Rash    Compazine [Prochlorperazine] Rash     Arm red and rash    Diphenhydramine Anxiety     Jittery and jumpy       Physical Exam   Vital Signs: Temp: 98.6  F (37  C) Temp src: Oral BP: 121/75 Pulse: 73   Resp: 22 SpO2: 92 % O2 Device: Nasal cannula Oxygen Delivery: 2 LPM  Weight: 130 lbs 0 oz    Gen: Well nourished, well developed, resting comfortably in bed, no acute distress  Head: atraumatic normocephalic; sclera non-injected, anicterric; oral mucosa moist, no lesions, no exudates  Neck: supple without spinal abnormality  Chest: clear to auscultation bilaterally, no wheezes, no rhonchi, no rales.  Cardiovascular: regular rate and rhythm, no gallops or rubs, no murmurs, no edema  Abdomen: bowel sounds normal, no hepatosplenomegaly, no masses, diffusely tender, non-distended, no guarding, no rebound tenderness  Musculoskeletal: normal muscle mass, normal muscle tone  Skin: no rashes, no chronic venous stasis  Lymph: no lymphadenopathy.  Neuro: cranial nerves II-XII intact, strength in all four extremities normal, reflexes normal, coordination normal.    Data   Data reviewed today: I reviewed all medications, new labs and imaging results over the last 24 hours. I personally reviewed no images or EKG's today.    Recent Labs   Lab 09/12/23  1708   WBC 11.3*   HGB 14.9   MCV 95         POTASSIUM 3.9   CHLORIDE 102   CO2 21*   BUN 6.7   CR 0.70   ANIONGAP 14   SG 9.9   *   ALBUMIN 4.5   PROTTOTAL 6.9   BILITOTAL 1.3*   ALKPHOS 72   ALT 10   AST 21         Recent Results (from the past 24 hour(s))   CT Abdomen Pelvis w/o Contrast    Narrative    EXAM: CT ABDOMEN PELVIS W/O CONTRAST  LOCATION: [facility]  DATE: 9/12/2023    INDICATION: back pain; abdominal pain radiating to the bilateral flank region. Vomiting. Low-grade  fever.  COMPARISON: 10/19/2022  TECHNIQUE: CT scan of the abdomen and pelvis was performed without IV contrast. Multiplanar reformats were obtained. Dose reduction techniques were used.  CONTRAST: None.    FINDINGS:   LOWER CHEST: No basilar infiltrates.    HEPATOBILIARY: Cholecystectomy.    PANCREAS: Unremarkable    SPLEEN: Unremarkable    ADRENAL GLANDS: Unremarkable    KIDNEYS/BLADDER: No hydronephrosis. No nephroureterolithiasis. Bladder is normal in contour without stones.    BOWEL: No bowel obstruction. Mild generalized colonic wall thickening..    LYMPH NODES: No significant retroperitoneal adenopathy    VASCULATURE: No abdominal aortic aneurysm    PELVIC ORGANS: Hysterectomy. No free fluid.    MUSCULOSKELETAL: No acute bony abnormalities.      Impression    IMPRESSION:   1.  Mild colonic wall thickening compatible with colitis. Infectious or inflammatory etiologies favored.  2.  No evidence of nephroureterolithiasis.

## 2023-09-13 NOTE — PLAN OF CARE
"Patient is alert and oriented x 4.  Patient requested \"pain medication\" this morning for lower back pain.  Medicated with oral oxycodone 5 mg oraly at 1049 for pain rated \"7.\"  Also medicated with IV zofran 4 mg at 1054; patient reports \"nausea from the pain medication.\"  Patient was frustrated and reported \"I'm not going to eat any of the clear liquids except water; why can't I eat regular food?\"  Call to Dr. Alberts; he ordered clear liquid diet with advancement to low fiber diet.  Patient only wanted ice cream when offered food choices available on the unit.  The kitchen will bring low fiber diet for supper.    Patient did not have any emesis or bowel movement from 4011-3526.    Notified Dr. Alberts of patient's heart rate in the 40's-50's at times; MD stated that he was not concerned about that.        Goal Outcome Evaluation:      Plan of Care Reviewed With: patient                 "

## 2023-09-13 NOTE — PROGRESS NOTES
"[place] [facility] ADMISSION NOTE    Patient admitted to room 310 at approximately 2200 via wheel chair from emergency room. Patient was accompanied by transport tech.     Verbal SBAR report received from MIRI Thomason prior to patient arrival.     Patient ambulated to bed independently. Patient alert and oriented X 3. Pain is not well controlled.  Medication(s) being used: awaiting orders.  . Admission vital signs: Blood pressure 109/71, pulse 74, temperature 98.4  F (36.9  C), temperature source Oral, resp. rate 20, height 1.575 m (5' 2\"), weight 61.8 kg (136 lb 3.9 oz), SpO2 95 %, not currently breastfeeding. Patient was oriented to plan of care, call light, bed controls, tv, telephone, bathroom, and visiting hours.     Risk Assessment    The following safety risks were identified during admission: none. Yellow risk band applied: NO.     Skin Initial Assessment    This writer admitted this patient and completed a full skin assessment and Reynold score in the Adult PCS flowsheet. Appropriate interventions initiated as needed.     Secondary skin check completed by tamiko, patient refused.         Education    Patient has a [place] to Observation order: Yes  Observation education completed and documented: Yes      Leslie Vickers RN      "

## 2023-09-13 NOTE — PROGRESS NOTES
[facility]    Medicine Progress Note - Hospitalist Service    Date of Admission:  9/12/2023    Assessment & Plan   Meli Rodriguez is a 48 year old female admitted on 9/12/2023. She presents with abdominal and back pain.    Possible infectious gastroenteritis  Possible cannabis hyperemesis syndrome  CT abdomen pelvis without contrast demonstrates mild colonic wall thickening compatible with colitis.  Infectious or inflammatory etiology is favored.  No evidence of nephroureterolithiasis.  - C. difficile toxin and enteric panel have been ordered  - Urine tox screen + cannabinoids.  - Volume resuscitate with IV fluids  - No indication for antibiotics  - IV Protonix twice daily  - Zofran    Asthma  No evidence of acute exacerbation.  - Albuterol as needed       Diet: Clear Liquid Diet    DVT Prophylaxis: Pneumatic Compression Devices  Patel Catheter: Not present  Lines: None     Cardiac Monitoring: None  Code Status: Full Code      Clinically Significant Risk Factors Present on Admission                                  Disposition Plan      Expected Discharge Date: 09/14/2023                  Jose Alberts MD  Hospitalist Service  [facility]  Securely message with [employer] (more info)  Text page via [employer] Paging/Directory   ______________________________________________________________________    Interval History     No fevers/chills. + emesis. No diarrhea.    Physical Exam   Vital Signs: Temp: (!) 96.4  F (35.8  C) Temp src: Oral BP: 110/72 Pulse: 61   Resp: 20 SpO2: 97 % O2 Device: Nasal cannula Oxygen Delivery: 1.5 LPM  Weight: 136 lbs 3.91 oz    Gen - AAO x 3 in NAD.  Lungs - CTA B.  Heart - RR,S1+S2 nml, no m/g/r.  Abd - soft, + diffuse ttp, ND, + BS.  Ext - no edema.    Medical Decision Making       60 MINUTES SPENT BY ME on the date of service doing chart review, history, exam, documentation & further activities per the note.      Data     I have personally  reviewed the following data over the past 24 hrs:    10.1  \   14.6   / 188     137 102 5.9 (L) /  120 (H)   3.9 25 0.72 \     ALT: 10 AST: 21 AP: 72 TBILI: 1.3 (H)   ALB: 4.5 TOT PROTEIN: 6.9 LIPASE: N/A       Imaging results reviewed over the past 24 hrs:   Recent Results (from the past 24 hour(s))   CT Abdomen Pelvis w/o Contrast    Narrative    EXAM: CT ABDOMEN PELVIS W/O CONTRAST  LOCATION: [facility]  DATE: 9/12/2023    INDICATION: back pain; abdominal pain radiating to the bilateral flank region. Vomiting. Low-grade fever.  COMPARISON: 10/19/2022  TECHNIQUE: CT scan of the abdomen and pelvis was performed without IV contrast. Multiplanar reformats were obtained. Dose reduction techniques were used.  CONTRAST: None.    FINDINGS:   LOWER CHEST: No basilar infiltrates.    HEPATOBILIARY: Cholecystectomy.    PANCREAS: Unremarkable    SPLEEN: Unremarkable    ADRENAL GLANDS: Unremarkable    KIDNEYS/BLADDER: No hydronephrosis. No nephroureterolithiasis. Bladder is normal in contour without stones.    BOWEL: No bowel obstruction. Mild generalized colonic wall thickening..    LYMPH NODES: No significant retroperitoneal adenopathy    VASCULATURE: No abdominal aortic aneurysm    PELVIC ORGANS: Hysterectomy. No free fluid.    MUSCULOSKELETAL: No acute bony abnormalities.      Impression    IMPRESSION:   1.  Mild colonic wall thickening compatible with colitis. Infectious or inflammatory etiologies favored.  2.  No evidence of nephroureterolithiasis.

## 2023-09-13 NOTE — PLAN OF CARE
"Reason for Admission: Possible infection gastroenteritis     Activity: SBA      Neuro: A&O x 4     Cardiac: WNL     Respiratory: Required 2 lpm while sleeping to keep SpO2 >92%     GI/: Pt reported ongoing nausea and abdominal pain; no BM for stool sample. Pt had multiple episodes of emesis, Zofran given x 1 without relief.     Skin: WNL     Diet: Clear liquids     Lines/Drains: PIV with fluids running at 125 ml/hr    Vitals: /63   Pulse 55   Temp 98  F (36.7  C) (Oral)   Resp 18   Ht 1.575 m (5' 2\")   Wt 61.8 kg (136 lb 3.9 oz)   SpO2 97%   BMI 24.92 kg/m       Pain: Abdominal pain that improves with IV dilaudid     Plan: Pain management, fluids, stool sample, antiemetic PRN                        "

## 2023-09-13 NOTE — ED NOTES
"[facility]   Admission Handoff    The patient is Meli Rodriguez, 48 year old who arrived in the ED by AMBULANCE from home with a complaint of Abdominal Pain (Pt coming in via EMS with abdominal and back pain x 3 days.)  . The patient's current symptoms are new and during this time the symptoms have remained the same. In the ED, patient was diagnosed with   Final diagnoses:   Gastroenteritis         Needed?: No    Allergies:    Allergies   Allergen Reactions    Demerol Itching    Droperidol Other (See Comments)     twitching      Ketorolac Tromethamine Itching    Nsaids Other (See Comments)     Gastric ulcer    Adhesive Tape Rash    Compazine [Prochlorperazine] Rash     Arm red and rash    Diphenhydramine Anxiety     Jittery and jumpy       Past Medical Hx:   Past Medical History:   Diagnosis Date    ADD (attention deficit disorder with hyperactivity)     ADD (attention deficit disorder)     ADHD (attention deficit hyperactivity disorder)     Agoraphobia     pt reported    Allergic state     Antisocial personality disorder (H)     Anxiety     Anxiety     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     Migraine     Migraine     Polysubstance abuse (H) 12/4/2012    PTSD (post-traumatic stress disorder)     PTSD (post-traumatic stress disorder)     Thyroid disease     Ulcer     Urinary retention        Initial vitals were: BP: 105/74  Pulse: 100  Temp: 98.6  F (37  C)  Resp: 22  Height: 157.5 cm (5' 2\")  Weight: 59 kg (130 lb)  SpO2: 96 %   Recent vital Signs: /80   Pulse 77   Temp 98.6  F (37  C) (Oral)   " "Resp 22   Ht 1.575 m (5' 2\")   Wt 59 kg (130 lb)   SpO2 92%   BMI 23.78 kg/m      Elimination Status: Continent: Yes     Activity Level: Independent    Fall Status: Reason for falls risk: Other- n/a  patient and family education and assistive device/personal items within reach    Baseline Mental status: WDL  Current Mental Status changes: at basesline    Infection present or suspected this encounter: yes enteric  Sepsis suspected: No    Isolation type: n/a Stool sample orders in for possible c-diff    Bariatric equipment needed?: No    In the ED these meds were given:   Medications   HYDROmorphone (PF) (DILAUDID) injection 0.5 mg (0.5 mg Intravenous $Given 9/12/23 1911)   pantoprazole (PROTONIX) IV push injection 40 mg (40 mg Intravenous $Given 9/12/23 2001)       Drips running?  No    Home pump  No    Current LDAs: Peripheral IV: Site Left AC ; Gauge 20  none     Results:   Labs/Imaging  Ordered and Resulted from Time of ED Arrival Up to the Time of Departure from the ED  Results for orders placed or performed during the hospital encounter of 09/12/23 (from the past 24 hour(s))   CBC with platelets, differential    Narrative    The following orders were created for panel order CBC with platelets, differential.  Procedure                               Abnormality         Status                     ---------                               -----------         ------                     CBC with platelets and d...[[id number]]  Abnormal            Final result                 Please view results for these tests on the individual orders.   Comprehensive metabolic panel   Result Value Ref Range    Sodium 137 136 - 145 mmol/L    Potassium 3.9 3.4 - 5.3 mmol/L    Chloride 102 98 - 107 mmol/L    Carbon Dioxide (CO2) 21 (L) 22 - 29 mmol/L    Anion Gap 14 7 - 15 mmol/L    Urea Nitrogen 6.7 6.0 - 20.0 mg/dL    Creatinine 0.70 0.51 - 0.95 mg/dL    Calcium 9.9 8.6 - 10.0 mg/dL    Glucose 102 (H) 70 - 99 mg/dL    Alkaline " Phosphatase 72 35 - 104 U/L    AST 21 0 - 45 U/L    ALT 10 0 - 50 U/L    Protein Total 6.9 6.4 - 8.3 g/dL    Albumin 4.5 3.5 - 5.2 g/dL    Bilirubin Total 1.3 (H) <=1.2 mg/dL    GFR Estimate >90 >60 mL/min/1.73m2   [place] Draw    Narrative    The following orders were created for panel order [place] Draw.  Procedure                               Abnormality         Status                     ---------                               -----------         ------                     Extra Blue Top Tube[[id number]]                              Final result               Extra Red Top Tube[[id number]]                               Final result                 Please view results for these tests on the individual orders.   CBC with platelets and differential   Result Value Ref Range    WBC Count 11.3 (H) 4.0 - 11.0 10e3/uL    RBC Count 4.53 3.80 - 5.20 10e6/uL    Hemoglobin 14.9 11.7 - 15.7 g/dL    Hematocrit 43.0 35.0 - 47.0 %    MCV 95 78 - 100 fL    MCH 32.9 26.5 - 33.0 pg    MCHC 34.7 31.5 - 36.5 g/dL    RDW 13.2 10.0 - 15.0 %    Platelet Count 218 150 - 450 10e3/uL    % Neutrophils 57 %    % Lymphocytes 33 %    % Monocytes 9 %    % Eosinophils 1 %    % Basophils 0 %    % Immature Granulocytes 0 %    NRBCs per 100 WBC 0 <1 /100    Absolute Neutrophils 6.3 1.6 - 8.3 10e3/uL    Absolute Lymphocytes 3.8 0.8 - 5.3 10e3/uL    Absolute Monocytes 1.1 0.0 - 1.3 10e3/uL    Absolute Eosinophils 0.1 0.0 - 0.7 10e3/uL    Absolute Basophils 0.0 0.0 - 0.2 10e3/uL    Absolute Immature Granulocytes 0.0 <=0.4 10e3/uL    Absolute NRBCs 0.0 10e3/uL   Extra Blue Top Tube   Result Value Ref Range    Hold Specimen JIC    Extra Red Top Tube   Result Value Ref Range    Hold Specimen JIC    UA with Microscopic reflex to Culture    Specimen: Urine, Clean Catch   Result Value Ref Range    Color Urine Yellow Colorless, Straw, Light Yellow, Yellow    Appearance Urine Clear Clear    Glucose Urine Negative Negative mg/dL    Bilirubin Urine Negative  Negative    Ketones Urine Negative Negative mg/dL    Specific Gravity Urine 1.011 1.003 - 1.035    Blood Urine Small (A) Negative    pH Urine 6.0 5.0 - 7.0    Protein Albumin Urine Negative Negative mg/dL    Urobilinogen Urine Normal Normal, 2.0 mg/dL    Nitrite Urine Negative Negative    Leukocyte Esterase Urine Trace (A) Negative    Mucus Urine Present (A) None Seen /LPF    RBC Urine 0 <=2 /HPF    WBC Urine 1 <=5 /HPF    Squamous Epithelials Urine <1 <=1 /HPF    Narrative    Urine Culture not indicated   HCG qualitative urine (UPT)   Result Value Ref Range    hCG Urine Qualitative Negative Negative   CT Abdomen Pelvis w/o Contrast    Narrative    EXAM: CT ABDOMEN PELVIS W/O CONTRAST  LOCATION: [facility]  DATE: 9/12/2023    INDICATION: back pain; abdominal pain radiating to the bilateral flank region. Vomiting. Low-grade fever.  COMPARISON: 10/19/2022  TECHNIQUE: CT scan of the abdomen and pelvis was performed without IV contrast. Multiplanar reformats were obtained. Dose reduction techniques were used.  CONTRAST: None.    FINDINGS:   LOWER CHEST: No basilar infiltrates.    HEPATOBILIARY: Cholecystectomy.    PANCREAS: Unremarkable    SPLEEN: Unremarkable    ADRENAL GLANDS: Unremarkable    KIDNEYS/BLADDER: No hydronephrosis. No nephroureterolithiasis. Bladder is normal in contour without stones.    BOWEL: No bowel obstruction. Mild generalized colonic wall thickening..    LYMPH NODES: No significant retroperitoneal adenopathy    VASCULATURE: No abdominal aortic aneurysm    PELVIC ORGANS: Hysterectomy. No free fluid.    MUSCULOSKELETAL: No acute bony abnormalities.      Impression    IMPRESSION:   1.  Mild colonic wall thickening compatible with colitis. Infectious or inflammatory etiologies favored.  2.  No evidence of nephroureterolithiasis.         For the majority of the shift this patient's behavior was Green     Cardiac Rhythm: Normal Sinus  Pt needs tele? No  Skin/wound Issues:  None    Code Status: Full Code    Pain control: fair    Nausea control: fair    Abnormal labs/tests/findings requiring intervention: See lab values     Patient tested for COVID 19 prior to admission: NO     OBS brochure/video discussed/provided to patient/family: Yes     Family present during ED course? Yes     Family Comments/Social Situation comments: n/a    Tasks needing completion:  Stool cultures     Katelin Rodriguez RN

## 2023-09-13 NOTE — MEDICATION SCRIBE - ADMISSION MEDICATION HISTORY
Medication Scribe Admission Medication History    Admission medication history is complete. The information provided in this note is only as accurate as the sources available at the time of the update.    Medication reconciliation/reorder completed by provider prior to medication history? No    Information Source(s): Patient and CareEverywhere/SureScripts via  in room with patient.    Pertinent Information: Patient takes no medicines at home.    Changes made to PTA medication list:  Added: None  Deleted: Albuterol Inhaler from June 2023 for bronchitis.  Changed: None    Medication Affordability:  Not including over the counter (OTC) medications, was there a time in the past 3 months when you did not take your medications as prescribed because of cost?: No    Allergies reviewed with patient and updates made in EHR: yes, no changes.    Medication History Completed By: Cecilia Jewell 9/12/2023 9:23 PM    Prior to Admission medications    Not on File

## 2023-09-14 VITALS
OXYGEN SATURATION: 93 % | TEMPERATURE: 97.7 F | DIASTOLIC BLOOD PRESSURE: 90 MMHG | SYSTOLIC BLOOD PRESSURE: 129 MMHG | HEART RATE: 54 BPM | BODY MASS INDEX: 25.07 KG/M2 | HEIGHT: 62 IN | WEIGHT: 136.24 LBS | RESPIRATION RATE: 16 BRPM

## 2023-09-14 PROCEDURE — 250N000011 HC RX IP 250 OP 636: Mod: JZ | Performed by: INTERNAL MEDICINE

## 2023-09-14 PROCEDURE — 99239 HOSP IP/OBS DSCHRG MGMT >30: CPT | Performed by: INTERNAL MEDICINE

## 2023-09-14 PROCEDURE — C9113 INJ PANTOPRAZOLE SODIUM, VIA: HCPCS | Mod: JZ | Performed by: INTERNAL MEDICINE

## 2023-09-14 PROCEDURE — 250N000013 HC RX MED GY IP 250 OP 250 PS 637: Performed by: INTERNAL MEDICINE

## 2023-09-14 PROCEDURE — 258N000003 HC RX IP 258 OP 636: Performed by: INTERNAL MEDICINE

## 2023-09-14 PROCEDURE — 96376 TX/PRO/DX INJ SAME DRUG ADON: CPT

## 2023-09-14 PROCEDURE — G0378 HOSPITAL OBSERVATION PER HR: HCPCS

## 2023-09-14 RX ADMIN — PANTOPRAZOLE SODIUM 40 MG: 40 INJECTION, POWDER, FOR SOLUTION INTRAVENOUS at 06:33

## 2023-09-14 RX ADMIN — OXYCODONE HYDROCHLORIDE 5 MG: 5 TABLET ORAL at 08:21

## 2023-09-14 RX ADMIN — SODIUM CHLORIDE: 9 INJECTION, SOLUTION INTRAVENOUS at 09:59

## 2023-09-14 ASSESSMENT — ACTIVITIES OF DAILY LIVING (ADL)
ADLS_ACUITY_SCORE: 23

## 2023-09-14 NOTE — DISCHARGE SUMMARY
[facility]  Hospitalist Discharge Summary      Date of Admission:  9/12/2023  Date of Discharge:  9/14/2023  Discharging Provider: Jose Hoang DO  Discharge Service: Hospitalist Service    Discharge Diagnoses   Possible infectious gastroenteritis  Possible cannabinoid hyperemesis syndrome  Asthma    Clinically Significant Risk Factors          Follow-ups Needed After Discharge   Follow-up Appointments     Follow-up and recommended labs and tests       Please follow-up with primary care provider at patient's earliest   convenience.          Unresulted Labs Ordered in the Past 30 Days of this Admission       No orders found from 8/13/2023 to 9/13/2023.          Discharge Disposition   Discharged to home  Condition at discharge: Stable    Hospital Course   Meli Rodriguez is a 48 year old female admitted on 9/12/2023. She presents with abdominal and back pain.    Possible infectious gastroenteritis  Possible cannabis hyperemesis syndrome  CT abdomen pelvis without contrast demonstrates mild colonic wall thickening compatible with colitis.  Infectious or inflammatory etiology is favored.  No evidence of nephroureterolithiasis.  - C. difficile toxin and enteric panel have been ordered  - Urine tox screen + cannabinoids.  - Volume resuscitate with IV fluids  - No indication for antibiotics  - IV Protonix twice daily  - Zofran    Asthma  No evidence of acute exacerbation.  - Albuterol as needed    Consultations This Hospital Stay   None    Code Status   Full Code    Time Spent on this Encounter   I, Jose Hoang DO, personally saw the patient today and spent greater than 30 minutes discharging this patient.       DO [facility] MEDICAL SURGICAL  [address] [address]  Phone: [phone]  Fax: [phone]  ______________________________________________________________________       Primary Care Physician   Physician No Ref-Primary    Discharge Orders       Reason for your hospital stay    Patient was reportedly admitted to the hospital for abdominal pain and back pain.     Follow-up and recommended labs and tests     Please follow-up with primary care provider at patient's earliest convenience.     Activity    Your activity upon discharge: activity as tolerated     Diet    Follow this diet upon discharge: Orders Placed This Encounter      Advance Diet as Tolerated: Low Fiber; Low Fiber       Significant Results and Procedures   Results for orders placed or performed during the hospital encounter of 09/12/23   CT Abdomen Pelvis w/o Contrast    Narrative    EXAM: CT ABDOMEN PELVIS W/O CONTRAST  LOCATION: [facility]  DATE: 9/12/2023    INDICATION: back pain; abdominal pain radiating to the bilateral flank region. Vomiting. Low-grade fever.  COMPARISON: 10/19/2022  TECHNIQUE: CT scan of the abdomen and pelvis was performed without IV contrast. Multiplanar reformats were obtained. Dose reduction techniques were used.  CONTRAST: None.    FINDINGS:   LOWER CHEST: No basilar infiltrates.    HEPATOBILIARY: Cholecystectomy.    PANCREAS: Unremarkable    SPLEEN: Unremarkable    ADRENAL GLANDS: Unremarkable    KIDNEYS/BLADDER: No hydronephrosis. No nephroureterolithiasis. Bladder is normal in contour without stones.    BOWEL: No bowel obstruction. Mild generalized colonic wall thickening..    LYMPH NODES: No significant retroperitoneal adenopathy    VASCULATURE: No abdominal aortic aneurysm    PELVIC ORGANS: Hysterectomy. No free fluid.    MUSCULOSKELETAL: No acute bony abnormalities.      Impression    IMPRESSION:   1.  Mild colonic wall thickening compatible with colitis. Infectious or inflammatory etiologies favored.  2.  No evidence of nephroureterolithiasis.         Discharge Medications   There are no discharge medications for this patient.    Allergies   Allergies   Allergen Reactions    Demerol Itching    Droperidol Other (See Comments)      twitching      Ketorolac Tromethamine Itching    Nsaids Other (See Comments)     Gastric ulcer    Adhesive Tape Rash    Compazine [Prochlorperazine] Rash     Arm red and rash    Diphenhydramine Anxiety     Jittery and jumpy

## 2023-09-14 NOTE — PROGRESS NOTES
Patient alert and oriented. Patient remains bradycardic with an irregular pulse; admit shift provider notified and EKG ordered. Telemed provider ordered telemetry. Severe pain in back/abdomen partially relieved with PRN oxycodone and tylenol. Nausea partially relieved with PRN zofran and phenergan. Ambulates with SBA and IV pole. Patient only able to tolerate a few bites of dinner; 1 small episode of emesis following dinner. Voiding spontaneously. PIV infusing.    Temp: 97.9  F (36.6  C) Temp src: Oral BP: 107/53 Pulse: 61   Resp: 15 SpO2: 97 % O2 Device: None (Room air)

## 2023-09-14 NOTE — PLAN OF CARE
"Patient is alert and oriented x 4.  Patient requested \"pain medication\" this morning \"for my back and it goes to the front.\"  Medicated with oxycodone 5 mg oraly at 0821.  Patient denies nausea.  Patient only ate scrambled eggs from breakfast tray; sipping on sprite.  Patient states \"I want to go home; I can do this at home.\"  Patient has not had a bowel movement during this admission; last bowel movement 9/12.    Call from tele hub that patient's heart rate was in the 30's.  Notified Dr. Hoang at 0835.  No new orders.    Goal Outcome Evaluation:      Plan of Care Reviewed With: patient                 "

## 2023-09-14 NOTE — PROGRESS NOTES
"Time: Assumed care of patient from [id number]    Reason for Admission: Gastroenteritis    Activity: Independent in room    Neuro: alert and oriented, able to make needs known and uses call light when needed    Cardiac: Vic, on tele       Resp: WNL       GI/:  Continent with bladder and bowels    Skin: See flow sheet    Lines/Drains: PIV infusing, patent       Vitals: /55 (BP Location: Right arm)   Pulse 51   Temp 97.5  F (36.4  C) (Oral)   Resp 16   Ht 1.575 m (5' 2\")   Wt 61.8 kg (136 lb 3.9 oz)   SpO2 97%   BMI 24.92 kg/m      Pain: Complaints of pain in abdomen radiating to flank and back    Plan: Continue with plan of care    Madison Lunsford RN on 9/14/2023 at 6:59 AM      "

## 2023-09-17 ENCOUNTER — PATIENT OUTREACH (OUTPATIENT)
Dept: CARE COORDINATION | Facility: CLINIC | Age: 48
End: 2023-09-17
Payer: COMMERCIAL

## 2023-09-17 NOTE — PROGRESS NOTES
Connected Care Resource Center Contact  [facility]/Voicemail     Clinical Data: Post-Discharge Outreach     Outreach attempted x 2.  Left message on patient's voicemail, providing [facility]'s 24/7 scheduling and nurse triage phone number [phone] ([phone]) for questions/concerns and/or to schedule an appt with an [facility] provider, if they do not have a PCP.      Plan:  [facility] will do no further outreaches at this time.       Michell Arango MA  Connected [facility] Resource Center, [facility]    *Connected Care Resource Team does NOT follow patient ongoing. Referrals are identified based on internal discharge reports and the outreach is to ensure patient has an understanding of their discharge instructions.

## 2023-10-06 ENCOUNTER — APPOINTMENT (OUTPATIENT)
Dept: CT IMAGING | Facility: CLINIC | Age: 48
End: 2023-10-06
Attending: EMERGENCY MEDICINE
Payer: COMMERCIAL

## 2023-10-06 ENCOUNTER — HOSPITAL ENCOUNTER (EMERGENCY)
Facility: CLINIC | Age: 48
Discharge: HOME | End: 2023-10-06
Attending: EMERGENCY MEDICINE | Admitting: EMERGENCY MEDICINE
Payer: COMMERCIAL

## 2023-10-06 VITALS
RESPIRATION RATE: 20 BRPM | OXYGEN SATURATION: 93 % | TEMPERATURE: 97.6 F | SYSTOLIC BLOOD PRESSURE: 98 MMHG | HEART RATE: 56 BPM | DIASTOLIC BLOOD PRESSURE: 66 MMHG

## 2023-10-06 DIAGNOSIS — F41.9 ANXIETY: ICD-10-CM

## 2023-10-06 DIAGNOSIS — R10.11 ABDOMINAL PAIN, RIGHT UPPER QUADRANT: ICD-10-CM

## 2023-10-06 DIAGNOSIS — F12.10 CANNABIS ABUSE: ICD-10-CM

## 2023-10-06 DIAGNOSIS — R10.9 ACUTE RIGHT FLANK PAIN: ICD-10-CM

## 2023-10-06 DIAGNOSIS — F12.188 CANNABIS HYPEREMESIS SYNDROME CONCURRENT WITH AND DUE TO CANNABIS ABUSE (H): ICD-10-CM

## 2023-10-06 LAB
ALBUMIN SERPL BCG-MCNC: 4.5 G/DL (ref 3.5–5.2)
ALP SERPL-CCNC: 72 U/L (ref 35–104)
ALT SERPL W P-5'-P-CCNC: 7 U/L (ref 0–50)
ANION GAP SERPL CALCULATED.3IONS-SCNC: 14 MMOL/L (ref 7–15)
AST SERPL W P-5'-P-CCNC: 15 U/L (ref 0–45)
BASO+EOS+MONOS # BLD AUTO: NORMAL 10*3/UL
BASO+EOS+MONOS NFR BLD AUTO: NORMAL %
BASOPHILS # BLD AUTO: 0 10E3/UL (ref 0–0.2)
BASOPHILS NFR BLD AUTO: 0 %
BILIRUB SERPL-MCNC: 0.8 MG/DL
BUN SERPL-MCNC: 5.3 MG/DL (ref 6–20)
CALCIUM SERPL-MCNC: 9.2 MG/DL (ref 8.6–10)
CHLORIDE SERPL-SCNC: 102 MMOL/L (ref 98–107)
CREAT SERPL-MCNC: 0.69 MG/DL (ref 0.51–0.95)
DEPRECATED HCO3 PLAS-SCNC: 22 MMOL/L (ref 22–29)
EGFRCR SERPLBLD CKD-EPI 2021: >90 ML/MIN/1.73M2
EOSINOPHIL # BLD AUTO: 0.1 10E3/UL (ref 0–0.7)
EOSINOPHIL NFR BLD AUTO: 2 %
ERYTHROCYTE [DISTWIDTH] IN BLOOD BY AUTOMATED COUNT: 12.7 % (ref 10–15)
GLUCOSE SERPL-MCNC: 115 MG/DL (ref 70–99)
HCT VFR BLD AUTO: 41.8 % (ref 35–47)
HGB BLD-MCNC: 14.6 G/DL (ref 11.7–15.7)
HOLD SPECIMEN: NORMAL
IMM GRANULOCYTES # BLD: 0 10E3/UL
IMM GRANULOCYTES NFR BLD: 0 %
LIPASE SERPL-CCNC: 17 U/L (ref 13–60)
LYMPHOCYTES # BLD AUTO: 3.8 10E3/UL (ref 0.8–5.3)
LYMPHOCYTES NFR BLD AUTO: 45 %
MAGNESIUM SERPL-MCNC: 1.6 MG/DL (ref 1.7–2.3)
MCH RBC QN AUTO: 33 PG (ref 26.5–33)
MCHC RBC AUTO-ENTMCNC: 34.9 G/DL (ref 31.5–36.5)
MCV RBC AUTO: 94 FL (ref 78–100)
MONOCYTES # BLD AUTO: 0.9 10E3/UL (ref 0–1.3)
MONOCYTES NFR BLD AUTO: 10 %
NEUTROPHILS # BLD AUTO: 3.6 10E3/UL (ref 1.6–8.3)
NEUTROPHILS NFR BLD AUTO: 43 %
NRBC # BLD AUTO: 0 10E3/UL
NRBC BLD AUTO-RTO: 0 /100
PLATELET # BLD AUTO: 213 10E3/UL (ref 150–450)
POTASSIUM SERPL-SCNC: 3 MMOL/L (ref 3.4–5.3)
PROT SERPL-MCNC: 6.9 G/DL (ref 6.4–8.3)
RBC # BLD AUTO: 4.43 10E6/UL (ref 3.8–5.2)
SODIUM SERPL-SCNC: 138 MMOL/L (ref 135–145)
WBC # BLD AUTO: 8.4 10E3/UL (ref 4–11)

## 2023-10-06 PROCEDURE — 83690 ASSAY OF LIPASE: CPT | Performed by: EMERGENCY MEDICINE

## 2023-10-06 PROCEDURE — 250N000009 HC RX 250: Performed by: EMERGENCY MEDICINE

## 2023-10-06 PROCEDURE — 96374 THER/PROPH/DIAG INJ IV PUSH: CPT | Mod: 59

## 2023-10-06 PROCEDURE — 74177 CT ABD & PELVIS W/CONTRAST: CPT

## 2023-10-06 PROCEDURE — 96361 HYDRATE IV INFUSION ADD-ON: CPT

## 2023-10-06 PROCEDURE — 85025 COMPLETE CBC W/AUTO DIFF WBC: CPT | Performed by: EMERGENCY MEDICINE

## 2023-10-06 PROCEDURE — 83735 ASSAY OF MAGNESIUM: CPT | Performed by: EMERGENCY MEDICINE

## 2023-10-06 PROCEDURE — 99285 EMERGENCY DEPT VISIT HI MDM: CPT | Mod: 25

## 2023-10-06 PROCEDURE — 258N000003 HC RX IP 258 OP 636: Performed by: EMERGENCY MEDICINE

## 2023-10-06 PROCEDURE — 93010 ELECTROCARDIOGRAM REPORT: CPT | Performed by: EMERGENCY MEDICINE

## 2023-10-06 PROCEDURE — 93005 ELECTROCARDIOGRAM TRACING: CPT

## 2023-10-06 PROCEDURE — 36415 COLL VENOUS BLD VENIPUNCTURE: CPT | Performed by: EMERGENCY MEDICINE

## 2023-10-06 PROCEDURE — 250N000013 HC RX MED GY IP 250 OP 250 PS 637: Performed by: EMERGENCY MEDICINE

## 2023-10-06 PROCEDURE — 250N000011 HC RX IP 250 OP 636: Mod: JZ | Performed by: EMERGENCY MEDICINE

## 2023-10-06 PROCEDURE — 250N000011 HC RX IP 250 OP 636: Performed by: EMERGENCY MEDICINE

## 2023-10-06 PROCEDURE — 80053 COMPREHEN METABOLIC PANEL: CPT | Performed by: EMERGENCY MEDICINE

## 2023-10-06 PROCEDURE — 96375 TX/PRO/DX INJ NEW DRUG ADDON: CPT

## 2023-10-06 PROCEDURE — 99284 EMERGENCY DEPT VISIT MOD MDM: CPT | Mod: 25 | Performed by: EMERGENCY MEDICINE

## 2023-10-06 RX ORDER — IOPAMIDOL 755 MG/ML
67 INJECTION, SOLUTION INTRAVASCULAR ONCE
Status: COMPLETED | OUTPATIENT
Start: 2023-10-06 | End: 2023-10-06

## 2023-10-06 RX ORDER — OLANZAPINE 10 MG/1
5 INJECTION, POWDER, LYOPHILIZED, FOR SOLUTION INTRAMUSCULAR ONCE
Status: COMPLETED | OUTPATIENT
Start: 2023-10-06 | End: 2023-10-06

## 2023-10-06 RX ORDER — ONDANSETRON 4 MG/1
4 TABLET, ORALLY DISINTEGRATING ORAL EVERY 8 HOURS PRN
Qty: 15 TABLET | Refills: 2 | Status: ON HOLD | OUTPATIENT
Start: 2023-10-06 | End: 2024-04-19

## 2023-10-06 RX ORDER — TRAMADOL HYDROCHLORIDE 50 MG/1
50 TABLET ORAL EVERY 6 HOURS PRN
Qty: 10 TABLET | Refills: 0 | Status: SHIPPED | OUTPATIENT
Start: 2023-10-06 | End: 2023-10-09

## 2023-10-06 RX ORDER — CAPSAICIN 0.025 %
CREAM (GRAM) TOPICAL ONCE
Status: COMPLETED | OUTPATIENT
Start: 2023-10-06 | End: 2023-10-06

## 2023-10-06 RX ORDER — OLANZAPINE 5 MG/1
10 TABLET, ORALLY DISINTEGRATING ORAL ONCE
Status: DISCONTINUED | OUTPATIENT
Start: 2023-10-06 | End: 2023-10-06

## 2023-10-06 RX ADMIN — IOPAMIDOL 67 ML: 755 INJECTION, SOLUTION INTRAVENOUS at 13:03

## 2023-10-06 RX ADMIN — SODIUM CHLORIDE 57 ML: 9 INJECTION, SOLUTION INTRAVENOUS at 13:04

## 2023-10-06 RX ADMIN — OLANZAPINE 5 MG: 10 INJECTION, POWDER, LYOPHILIZED, FOR SOLUTION INTRAMUSCULAR at 12:49

## 2023-10-06 RX ADMIN — SODIUM CHLORIDE, POTASSIUM CHLORIDE, SODIUM LACTATE AND CALCIUM CHLORIDE 1000 ML: 600; 310; 30; 20 INJECTION, SOLUTION INTRAVENOUS at 12:22

## 2023-10-06 RX ADMIN — CAPSAICIN: 0.25 CREAM TOPICAL at 12:48

## 2023-10-06 RX ADMIN — FAMOTIDINE 20 MG: 10 INJECTION INTRAVENOUS at 12:23

## 2023-10-06 ASSESSMENT — ACTIVITIES OF DAILY LIVING (ADL)
ADLS_ACUITY_SCORE: 35
ADLS_ACUITY_SCORE: 35

## 2023-10-06 NOTE — ED TRIAGE NOTES
Pt arrives via EMS from home. Pt c/o 7/10 RUQ abd pain starting at midnight last night. Pt has tried tylenol and ibuprofen at home without relief. Total of 2mg IV dilaudid and 4mg IV zofran given by EMS.     Triage Assessment       Row Name 10/06/23 1208       Triage Assessment (Adult)    Airway WDL WDL       Respiratory WDL    Respiratory WDL WDL       Skin Circulation/Temperature WDL    Skin Circulation/Temperature WDL WDL       Cardiac WDL    Cardiac WDL WDL       Peripheral/Neurovascular WDL    Peripheral Neurovascular WDL WDL       Cognitive/Neuro/Behavioral WDL    Cognitive/Neuro/Behavioral WDL WDL

## 2023-10-06 NOTE — ED PROVIDER NOTES
History     Chief Complaint   Patient presents with    Abdominal Pain     HPI  History per patient, EMS and review of [facility] EMR and [facility] Everywhere EMR, including extensive chart review of multiple prior imaging studies, multiple clinic notes and multiple prior laboratory evaluations.     Meli Rodriguez is a 48 year old female with history of chronic recurrent abdominal pain, depression, anxiety, IBS, mild generalized colonic wall thickening/colitis on CT scan last month (9/12/2023), cyclic vomiting and possible cannabis hyperemesis syndrome, drug-seeking behavior, polysubstance abuse including cannabis, opiate and benzodiazepine abuse and borderline intellectual functioning who presents emergency department for right upper quadrant and right flank pain of sudden onset at approximately midnight, associated with severe nausea and dry heaves.  No signs or symptoms of GI bleeding.  Last BM this morning was normal.  No diarrhea or constipation.  No fever or chills.  No UTI signs or symptoms or hematuria.  Continues to use cannabis regularly and was oppositional and defensive when I question her if she was using cannabis. She was given a total of 2 mg Dilaudid and 4 mg Zofran IV by EMS prior to arrival.  Last episode of abdominal pain was approximately 1 month ago with admission to [facility] 9/12-9/14/2023 was eval remarkable for mild colitis.  No other pertinent history or acute complaints or concerns.      Previous Records Reviewed:  [facility]  Hospitalist Discharge Summary       Date of Admission:  9/12/2023  Date of Discharge:  9/14/2023     Discharge Diagnoses  Possible infectious gastroenteritis  Possible cannabinoid hyperemesis syndrome  Asthma     Hospital Course  Meli Rodriguez is a 48 year old female admitted on 9/12/2023. She presents with abdominal and back pain.     Possible infectious gastroenteritis  Possible cannabis hyperemesis syndrome  CT abdomen pelvis  without contrast demonstrates mild colonic wall thickening compatible with colitis.  Infectious or inflammatory etiology is favored.  No evidence of nephroureterolithiasis.  - C. difficile toxin and enteric panel have been ordered  - Urine tox screen + cannabinoids.  - Volume resuscitate with IV fluids  - No indication for antibiotics  - IV Protonix twice daily  - Zofran     9/12/2023 urine tox screen: positive for cannabinoids  Allergies:    Narrative & Impression   EXAM: CT ABDOMEN PELVIS W/O CONTRAST  LOCATION: [facility]  DATE: 9/12/2023     INDICATION: back pain; abdominal pain radiating to the bilateral flank region. Vomiting. Low-grade fever.  COMPARISON: 10/19/2022     HEPATOBILIARY: Cholecystectomy.  BOWEL: No bowel obstruction. Mild generalized colonic wall thickening.  PELVIC ORGANS: Hysterectomy. No free fluid.                                                                   IMPRESSION:   1.  Mild colonic wall thickening compatible with colitis. Infectious or inflammatory etiologies favored.  2.  No evidence of nephroureterolithiasis.       Allergies   Allergen Reactions    Demerol Itching    Droperidol Other (See Comments)     twitching      Ketorolac Tromethamine Itching    Nsaids Other (See Comments)     Gastric ulcer    Adhesive Tape Rash    Compazine [Prochlorperazine] Rash     Arm red and rash    Diphenhydramine Anxiety     Jittery and jumpy       Problem List:    Patient Active Problem List    Diagnosis Date Noted    Gastroenteritis 09/12/2023     Priority: Medium    Lower abdominal pain 07/10/2023     Priority: Medium    Hematemesis with nausea 07/10/2023     Priority: Medium    Borderline intellectual functioning 11/06/2015     Priority: Medium    ADD (attention deficit disorder) 02/18/2014     Priority: Medium    Chondromalacia of patella 12/11/2013     Priority: Medium    Drug-seeking behavior 12/06/2013     Priority: Medium    Health Care Home 12/03/2013      Priority: Medium       Status:  Unable to reach    -See Letters for [facility] Care Plan - Emergency Care Plan          Cannabis abuse 07/03/2013     Priority: Medium    Polysubstance abuse (H) 12/04/2012     Priority: Medium    Social phobia: with panic attacks 07/23/2012     Priority: Medium    Moderate recurrent major depression: per psychiatry consult 07/23/2012     Priority: Medium    Prescription Opioid and benzodiazepine abuse 07/23/2012     Priority: Medium    Contusion of duodenum 06/26/2012     Priority: Medium    Vitamin D deficiency 06/26/2012     Priority: Medium    At risk for osteoporosis 06/26/2012     Priority: Medium    Hematochezia 06/26/2012     Priority: Medium    Abdominal pain, unspecified abdominal location 06/25/2012     Priority: Medium     Problem list name updated by automated process. Provider to review      Panic attack 05/22/2012     Priority: Medium    Acquired absence of both cervix and uterus 09/16/2011     Priority: Medium    Colitis 05/16/2011     Priority: Medium    Outbursts of anger 12/14/2009     Priority: Medium    Gastroesophageal reflux disease 06/02/2009     Priority: Medium     Formatting of this note might be different from the original.  Esophagitis- EGD 2017. PPI not helpful. No further follow up.      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    Bipolar 2 disorder (H)     Carpal tunnel syndrome     Colitis     Depression     Depressive disorder     Drug-seeking behavior     Drug-seeking behavior     GERD (gastroesophageal reflux disease)     Head injury     Irritable bowel syndrome (IBS)     Kidney infection      Lower urinary tract infection 2/18/2011    Migraine     Migraine     Polysubstance abuse (H) 12/4/2012    PTSD (post-traumatic stress disorder)     PTSD (post-traumatic stress disorder)     Thyroid disease     Ulcer     Urinary retention        Past Surgical History:    Past Surgical History:   Procedure Laterality Date    APPENDECTOMY      APPENDECTOMY      CHOLECYSTECTOMY  01/01/2005    CHOLECYSTECTOMY      HYSTERECTOMY      with ophorectomy bilateral    HYSTERECTOMY TOTAL ABDOMINAL, BILATERAL SALPINGO-OOPHORECTOMY, COMBINED  01/01/2001    bleeding ovarian cysts    HYSTERECTOMY, PAP NO LONGER INDICATED      HYSTERECTOMY, PAP NO LONGER INDICATED      TONSILLECTOMY, ADENOIDECTOMY, MYRINGOTOMY, INSERT TUBE BILATERAL, COMBINED      TOTAL VAGINAL HYSTERECTOMY      URETHRA SURGERY      temporary stent    URETHRA SURGERY         Family History:    Family History   Problem Relation Age of Onset    Unknown/Adopted Father     Chronic Obstructive Pulmonary Disease Mother     Aneurysm Maternal Grandmother     Cerebrovascular Disease Maternal Grandfather     Breast Cancer No family hx of     Cancer No family hx of     Diabetes No family hx of        Social History:  Marital Status:   [2]  Social History     Tobacco Use    Smoking status: Every Day     Packs/day: 0.50     Years: 20.00     Pack years: 10.00     Types: Cigarettes    Smokeless tobacco: Never   Substance Use Topics    Alcohol use: No    Drug use: No     Comment: Denies, past marijuana        Medications:    ondansetron (ZOFRAN ODT) 4 MG ODT tab  traMADol (ULTRAM) 50 MG tablet          Review of Systems  As mentioned in the HPI, in addition focused review of systems was negative.    Physical Exam   BP: 108/67  Pulse: 76  Temp: 97.6  F (36.4  C)  Resp: 20  SpO2: 98 %      Physical Exam  Vitals and nursing note reviewed.   Constitutional:       General: She is in acute distress (Anxious, dry heaving).      Appearance: Normal appearance. She is well-developed.  She is not ill-appearing or diaphoretic.   HENT:      Head: Normocephalic and atraumatic.      Mouth/Throat:      Mouth: Mucous membranes are dry.   Eyes:      General: No scleral icterus.     Extraocular Movements: Extraocular movements intact.      Conjunctiva/sclera: Conjunctivae normal.   Neck:      Trachea: No tracheal deviation.   Cardiovascular:      Rate and Rhythm: Normal rate and regular rhythm.      Heart sounds: Normal heart sounds. No murmur heard.     No friction rub. No gallop.   Pulmonary:      Effort: Pulmonary effort is normal. No respiratory distress.      Breath sounds: Normal breath sounds. No wheezing, rhonchi or rales.       Abdominal:      General: Bowel sounds are normal. There is no distension or abdominal bruit.      Palpations: Abdomen is soft. There is no mass or pulsatile mass.      Tenderness: There is no abdominal tenderness. There is no right CVA tenderness or guarding. Negative signs include McBurney's sign.      Hernia: No hernia is present.       Musculoskeletal:         General: Normal range of motion.      Cervical back: Normal range of motion and neck supple.      Right lower leg: No edema.      Left lower leg: No edema.   Skin:     General: Skin is warm and dry.      Coloration: Skin is not pale.      Findings: No erythema or rash (No evidence of zoster or acute rash).   Neurological:      Mental Status: She is alert.   Psychiatric:         Behavior: Behavior normal.      Comments: Anxious affect.         ED Course           Procedures              EKG Interpretation:      Interpreted by Fredo Lee MD  Time reviewed: Upon completion  Symptoms at time of EKG: Abdominal pain, nausea  Rhythm: Sinus rhythm with first-degree AV block  Rate: normal  Axis: normal  Ectopy: none  Conduction: normal  ST Segments/ T Waves: Non-specific ST-T wave, diffuse, flattening   Q Waves: none  Comparison to prior: 6/23/2023  Clinical Impression: sinus/atrial rhythm with first-degree AV block,  diffuse nonspecific ST-T wave flattening         Results for orders placed or performed during the hospital encounter of 10/06/23 (from the past 24 hour(s))   [place] Draw    Narrative    The following orders were created for panel order [place] Draw.  Procedure                               Abnormality         Status                     ---------                               -----------         ------                     Extra Blue Top Tube[[id number]]                              Final result               Extra Red Top Tube[[id number]]                               Final result               Extra Green Top (Lithium...[[id number]]                      Final result               Extra Purple Top Tube[[id number]]                            Final result                 Please view results for these tests on the individual orders.   Extra Blue Top Tube   Result Value Ref Range    Hold Specimen JIC    Extra Red Top Tube   Result Value Ref Range    Hold Specimen JIC    Extra Green Top (Lithium Heparin) Tube   Result Value Ref Range    Hold Specimen JIC    Extra Purple Top Tube   Result Value Ref Range    Hold Specimen JIC    CBC with platelets differential    Narrative    The following orders were created for panel order CBC with platelets differential.  Procedure                               Abnormality         Status                     ---------                               -----------         ------                     CBC with platelets and d...[[id number]]                      Final result                 Please view results for these tests on the individual orders.   Comprehensive metabolic panel   Result Value Ref Range    Sodium 138 135 - 145 mmol/L    Potassium 3.0 (L) 3.4 - 5.3 mmol/L    Carbon Dioxide (CO2) 22 22 - 29 mmol/L    Anion Gap 14 7 - 15 mmol/L    Urea Nitrogen 5.3 (L) 6.0 - 20.0 mg/dL    Creatinine 0.69 0.51 - 0.95 mg/dL    GFR Estimate >90 >60 mL/min/1.73m2    Calcium 9.2 8.6 - 10.0 mg/dL     Chloride 102 98 - 107 mmol/L    Glucose 115 (H) 70 - 99 mg/dL    Alkaline Phosphatase 72 35 - 104 U/L    AST 15 0 - 45 U/L    ALT 7 0 - 50 U/L    Protein Total 6.9 6.4 - 8.3 g/dL    Albumin 4.5 3.5 - 5.2 g/dL    Bilirubin Total 0.8 <=1.2 mg/dL   Lipase   Result Value Ref Range    Lipase 17 13 - 60 U/L   CBC with platelets and differential   Result Value Ref Range    WBC Count 8.4 4.0 - 11.0 10e3/uL    RBC Count 4.43 3.80 - 5.20 10e6/uL    Hemoglobin 14.6 11.7 - 15.7 g/dL    Hematocrit 41.8 35.0 - 47.0 %    MCV 94 78 - 100 fL    MCH 33.0 26.5 - 33.0 pg    MCHC 34.9 31.5 - 36.5 g/dL    RDW 12.7 10.0 - 15.0 %    Platelet Count 213 150 - 450 10e3/uL    % Neutrophils 43 %    % Lymphocytes 45 %    % Monocytes 10 %    Mids % (Monos, Eos, Basos)      % Eosinophils 2 %    % Basophils 0 %    % Immature Granulocytes 0 %    NRBCs per 100 WBC 0 <1 /100    Absolute Neutrophils 3.6 1.6 - 8.3 10e3/uL    Absolute Lymphocytes 3.8 0.8 - 5.3 10e3/uL    Absolute Monocytes 0.9 0.0 - 1.3 10e3/uL    Mids Abs (Monos, Eos, Basos)      Absolute Eosinophils 0.1 0.0 - 0.7 10e3/uL    Absolute Basophils 0.0 0.0 - 0.2 10e3/uL    Absolute Immature Granulocytes 0.0 <=0.4 10e3/uL    Absolute NRBCs 0.0 10e3/uL   Magnesium   Result Value Ref Range    Magnesium 1.6 (L) 1.7 - 2.3 mg/dL   CT Abdomen Pelvis w Contrast    Narrative    CT ABDOMEN AND PELVIS WITH CONTRAST 10/6/2023 1:16 PM    CLINICAL HISTORY: Right upper quadrant and right flank pain.    TECHNIQUE: CT scan of the abdomen and pelvis was performed following  injection of IV contrast. Multiplanar reformats were obtained. Dose  reduction techniques were used.  CONTRAST: 67 mL Isovue-370    COMPARISON: 9/12/2023    FINDINGS:   LOWER CHEST: Normal.    HEPATOBILIARY: Cholecystectomy.    PANCREAS: Normal.    SPLEEN: Normal.    ADRENAL GLANDS: Normal.    KIDNEYS/BLADDER: Normal.    BOWEL: Normal.    PELVIC ORGANS: Hysterectomy.    ADDITIONAL FINDINGS: None.    MUSCULOSKELETAL: Normal.      Impression     IMPRESSION: Unremarkable CT of the abdomen and pelvis, status post  cholecystectomy and hysterectomy.    JEREMIAH CRUZ MD         SYSTEM ID:  [id number]       Medications   lactated ringers BOLUS 1,000 mL (0 mLs Intravenous Stopped 10/6/23 1258)   famotidine (PEPCID) injection 20 mg (20 mg Intravenous $Given 10/6/23 1223)   capsaicin (ZOSTRIX) 0.025 % cream ( Topical $Given 10/6/23 1248)   OLANZapine (zyPREXA) IV injection 5 mg (5 mg Intravenous $Given 10/6/23 1249)   iopamidol (ISOVUE-370) solution 67 mL (67 mLs Intravenous $Given 10/6/23 1303)   sodium chloride 0.9 % bag 500mL for CT scan flush use (57 mLs Intravenous $Given 10/6/23 1304)     Declining antacid.  She takes Tums at bedtime is adamant that her symptoms are not due to her GERD/gastritis.    2:58 PM - drowsy, difficulty staying awake, appears much improved and would like to try ice chips.    Resting comfortably, will continue to observe.  Awaiting urine specimen for UA and urine drug screen.     4:34 PM -reports she still has pain but is comfortable with discharge home.  She is still not provided a urine for evaluation.      Assessments & Plan (with Medical Decision Making)   48 year old female with history of chronic recurrent abdominal pain, depression, anxiety, IBS, mild generalized colonic wall thickening/colitis on CT scan last month (9/12/2023), cyclic vomiting and possible cannabis hyperemesis syndrome, drug-seeking behavior, polysubstance abuse including cannabis, opiate and benzodiazepine abuse and borderline intellectual functioning who presents emergency department for right upper quadrant and right flank pain of sudden onset at approximately midnight, associated with severe nausea and dry heaves.  No signs or symptoms of GI bleeding.  No emesis, only dry heaving.  No fever.  She continues to use cannabis regularly and was oppositional and defensive when I questioned her if she was using cannabis.  She did not provide a urine specimen for  UA or urine drug screen.  She was given a total of 2 mg Dilaudid and 4 mg Zofran IV by EMS prior to arrival.  I did not give an opiate analgesic as work-up was unrevealing and without clear cause of this pain and her abdomen is benign.  She has significant anxiety and complaining of pain but she had no peritoneal signs and normal bowel sounds.  I suspect her chronic recurring symptoms of abdominal pain and vomiting are secondary to cannabis hyperemesis syndrome.  She was given Zyprexa, IV fluids, IV Pepcid and capsaicin cream to the abdominal wall.  She rested comfortably, slept for a while and after waking stated she still had significant abdominal pain but was comfortable with, and requested, discharge home.  I prescribed Zofran to use for nausea, recommend continuation of Protonix and antacid, and will prescribe a small number of tramadol for use if needed for pain in the coming 1-2 days..  I strongly urged her to stop using cannabis and asked her to follow-up in primary care clinic as soon as possible.  I will make a primary care referral to facilitate her outpatient follow-up.  Unfortunately after her hospitalization for abdominal pain at [facility] last month, out reach was attempted twice to assist her with establishing primary care and she did not answer or return voicemail messages.    I have reviewed the nursing notes.    I have reviewed the findings, diagnosis, plan and need for follow up with the patient.  Medical Decision Making: High complexity  Hospitalization for observation, continued antiemetic therapy and IV fluid administration was considered and deferred. Currently appears stable and appropriate for outpatient management with close f/u.        Discharge Medication List as of 10/6/2023  4:48 PM        START taking these medications    Details   ondansetron (ZOFRAN ODT) 4 MG ODT tab Take 1 tablet (4 mg) by mouth every 8 hours as needed for nausea, Disp-15 tablet, R-2, E-Prescribe       traMADol (ULTRAM) 50 MG tablet Take 1 tablet (50 mg) by mouth every 6 hours as needed for severe pain, Disp-10 tablet, R-0, E-Prescribe             Final diagnoses:   Abdominal pain, right upper quadrant   Acute right flank pain   Cannabis hyperemesis syndrome concurrent with and due to cannabis abuse (H)   Anxiety       10/6/2023   [facility] EMERGENCY DEPT       Fredo Lee MD  10/06/23 3307

## 2023-10-10 ENCOUNTER — HOSPITAL ENCOUNTER (EMERGENCY)
Facility: CLINIC | Age: 48
Discharge: HOME | End: 2023-10-10
Attending: FAMILY MEDICINE | Admitting: FAMILY MEDICINE
Payer: COMMERCIAL

## 2023-10-10 ENCOUNTER — APPOINTMENT (OUTPATIENT)
Dept: GENERAL RADIOLOGY | Facility: CLINIC | Age: 48
End: 2023-10-10
Attending: FAMILY MEDICINE
Payer: COMMERCIAL

## 2023-10-10 VITALS
TEMPERATURE: 98.3 F | RESPIRATION RATE: 12 BRPM | OXYGEN SATURATION: 95 % | HEART RATE: 59 BPM | HEIGHT: 62 IN | WEIGHT: 125 LBS | DIASTOLIC BLOOD PRESSURE: 67 MMHG | SYSTOLIC BLOOD PRESSURE: 94 MMHG | BODY MASS INDEX: 23 KG/M2

## 2023-10-10 DIAGNOSIS — R07.9 CHEST PAIN, UNSPECIFIED TYPE: ICD-10-CM

## 2023-10-10 LAB
ALBUMIN SERPL BCG-MCNC: 4.4 G/DL (ref 3.5–5.2)
ALBUMIN UR-MCNC: NEGATIVE MG/DL
ALP SERPL-CCNC: 83 U/L (ref 35–104)
ALT SERPL W P-5'-P-CCNC: 7 U/L (ref 0–50)
ANION GAP SERPL CALCULATED.3IONS-SCNC: 13 MMOL/L (ref 7–15)
APPEARANCE UR: CLEAR
AST SERPL W P-5'-P-CCNC: 17 U/L (ref 0–45)
BACTERIA #/AREA URNS HPF: ABNORMAL /HPF
BASO+EOS+MONOS # BLD AUTO: ABNORMAL 10*3/UL
BASO+EOS+MONOS NFR BLD AUTO: ABNORMAL %
BASOPHILS # BLD AUTO: 0.1 10E3/UL (ref 0–0.2)
BASOPHILS NFR BLD AUTO: 1 %
BILIRUB SERPL-MCNC: 0.2 MG/DL
BILIRUB UR QL STRIP: NEGATIVE
BUN SERPL-MCNC: 6.8 MG/DL (ref 6–20)
CALCIUM SERPL-MCNC: 9.2 MG/DL (ref 8.6–10)
CHLORIDE SERPL-SCNC: 102 MMOL/L (ref 98–107)
COLOR UR AUTO: YELLOW
CREAT SERPL-MCNC: 0.8 MG/DL (ref 0.51–0.95)
D DIMER PPP FEU-MCNC: 0.27 UG/ML FEU (ref 0–0.5)
DEPRECATED HCO3 PLAS-SCNC: 23 MMOL/L (ref 22–29)
EGFRCR SERPLBLD CKD-EPI 2021: >90 ML/MIN/1.73M2
EOSINOPHIL # BLD AUTO: 0.1 10E3/UL (ref 0–0.7)
EOSINOPHIL NFR BLD AUTO: 2 %
ERYTHROCYTE [DISTWIDTH] IN BLOOD BY AUTOMATED COUNT: 13.1 % (ref 10–15)
GLUCOSE SERPL-MCNC: 146 MG/DL (ref 70–99)
GLUCOSE UR STRIP-MCNC: NEGATIVE MG/DL
HCT VFR BLD AUTO: 39.9 % (ref 35–47)
HGB BLD-MCNC: 13.9 G/DL (ref 11.7–15.7)
HGB UR QL STRIP: NEGATIVE
HOLD SPECIMEN: NORMAL
HOLD SPECIMEN: NORMAL
IMM GRANULOCYTES # BLD: 0 10E3/UL
IMM GRANULOCYTES NFR BLD: 0 %
KETONES UR STRIP-MCNC: NEGATIVE MG/DL
LEUKOCYTE ESTERASE UR QL STRIP: ABNORMAL
LYMPHOCYTES # BLD AUTO: 3.7 10E3/UL (ref 0.8–5.3)
LYMPHOCYTES NFR BLD AUTO: 38 %
MCH RBC QN AUTO: 33.6 PG (ref 26.5–33)
MCHC RBC AUTO-ENTMCNC: 34.8 G/DL (ref 31.5–36.5)
MCV RBC AUTO: 96 FL (ref 78–100)
MONOCYTES # BLD AUTO: 0.8 10E3/UL (ref 0–1.3)
MONOCYTES NFR BLD AUTO: 9 %
MUCOUS THREADS #/AREA URNS LPF: PRESENT /LPF
NEUTROPHILS # BLD AUTO: 4.9 10E3/UL (ref 1.6–8.3)
NEUTROPHILS NFR BLD AUTO: 50 %
NITRATE UR QL: NEGATIVE
NRBC # BLD AUTO: 0 10E3/UL
NRBC BLD AUTO-RTO: 0 /100
PH UR STRIP: 5 [PH] (ref 5–7)
PLATELET # BLD AUTO: 201 10E3/UL (ref 150–450)
POTASSIUM SERPL-SCNC: 3.4 MMOL/L (ref 3.4–5.3)
PROT SERPL-MCNC: 6.8 G/DL (ref 6.4–8.3)
RBC # BLD AUTO: 4.14 10E6/UL (ref 3.8–5.2)
RBC URINE: 4 /HPF
SODIUM SERPL-SCNC: 138 MMOL/L (ref 135–145)
SP GR UR STRIP: 1.02 (ref 1–1.03)
SQUAMOUS EPITHELIAL: 1 /HPF
TROPONIN T SERPL HS-MCNC: <6 NG/L
UROBILINOGEN UR STRIP-MCNC: NORMAL MG/DL
WBC # BLD AUTO: 9.6 10E3/UL (ref 4–11)
WBC URINE: 8 /HPF

## 2023-10-10 PROCEDURE — 96375 TX/PRO/DX INJ NEW DRUG ADDON: CPT

## 2023-10-10 PROCEDURE — 71046 X-RAY EXAM CHEST 2 VIEWS: CPT

## 2023-10-10 PROCEDURE — 87086 URINE CULTURE/COLONY COUNT: CPT | Performed by: FAMILY MEDICINE

## 2023-10-10 PROCEDURE — 250N000011 HC RX IP 250 OP 636: Mod: JZ | Performed by: FAMILY MEDICINE

## 2023-10-10 PROCEDURE — 93010 ELECTROCARDIOGRAM REPORT: CPT | Performed by: FAMILY MEDICINE

## 2023-10-10 PROCEDURE — 99285 EMERGENCY DEPT VISIT HI MDM: CPT | Mod: 25

## 2023-10-10 PROCEDURE — 81001 URINALYSIS AUTO W/SCOPE: CPT | Performed by: FAMILY MEDICINE

## 2023-10-10 PROCEDURE — 80053 COMPREHEN METABOLIC PANEL: CPT | Performed by: FAMILY MEDICINE

## 2023-10-10 PROCEDURE — 36415 COLL VENOUS BLD VENIPUNCTURE: CPT | Performed by: FAMILY MEDICINE

## 2023-10-10 PROCEDURE — 96374 THER/PROPH/DIAG INJ IV PUSH: CPT

## 2023-10-10 PROCEDURE — C9113 INJ PANTOPRAZOLE SODIUM, VIA: HCPCS | Mod: JZ | Performed by: FAMILY MEDICINE

## 2023-10-10 PROCEDURE — 85379 FIBRIN DEGRADATION QUANT: CPT | Performed by: FAMILY MEDICINE

## 2023-10-10 PROCEDURE — 99285 EMERGENCY DEPT VISIT HI MDM: CPT | Mod: 25 | Performed by: FAMILY MEDICINE

## 2023-10-10 PROCEDURE — 93005 ELECTROCARDIOGRAM TRACING: CPT

## 2023-10-10 PROCEDURE — 84484 ASSAY OF TROPONIN QUANT: CPT | Performed by: FAMILY MEDICINE

## 2023-10-10 PROCEDURE — 96376 TX/PRO/DX INJ SAME DRUG ADON: CPT

## 2023-10-10 PROCEDURE — 250N000013 HC RX MED GY IP 250 OP 250 PS 637: Performed by: FAMILY MEDICINE

## 2023-10-10 PROCEDURE — 85025 COMPLETE CBC W/AUTO DIFF WBC: CPT | Performed by: FAMILY MEDICINE

## 2023-10-10 RX ORDER — ONDANSETRON 2 MG/ML
4 INJECTION INTRAMUSCULAR; INTRAVENOUS ONCE
Status: COMPLETED | OUTPATIENT
Start: 2023-10-10 | End: 2023-10-10

## 2023-10-10 RX ORDER — HYDROMORPHONE HYDROCHLORIDE 1 MG/ML
0.3 INJECTION, SOLUTION INTRAMUSCULAR; INTRAVENOUS; SUBCUTANEOUS
Status: DISCONTINUED | OUTPATIENT
Start: 2023-10-10 | End: 2023-10-10 | Stop reason: HOSPADM

## 2023-10-10 RX ORDER — MAGNESIUM HYDROXIDE/ALUMINUM HYDROXICE/SIMETHICONE 120; 1200; 1200 MG/30ML; MG/30ML; MG/30ML
15 SUSPENSION ORAL ONCE
Status: COMPLETED | OUTPATIENT
Start: 2023-10-10 | End: 2023-10-10

## 2023-10-10 RX ADMIN — ONDANSETRON 4 MG: 2 INJECTION INTRAMUSCULAR; INTRAVENOUS at 13:49

## 2023-10-10 RX ADMIN — ALUMINUM HYDROXIDE, MAGNESIUM HYDROXIDE, AND SIMETHICONE 15 ML: 200; 200; 20 SUSPENSION ORAL at 14:27

## 2023-10-10 RX ADMIN — PANTOPRAZOLE SODIUM 40 MG: 40 INJECTION, POWDER, FOR SOLUTION INTRAVENOUS at 14:28

## 2023-10-10 RX ADMIN — HYDROMORPHONE HYDROCHLORIDE 0.3 MG: 1 INJECTION, SOLUTION INTRAMUSCULAR; INTRAVENOUS; SUBCUTANEOUS at 14:28

## 2023-10-10 RX ADMIN — HYDROMORPHONE HYDROCHLORIDE 0.3 MG: 1 INJECTION, SOLUTION INTRAMUSCULAR; INTRAVENOUS; SUBCUTANEOUS at 15:19

## 2023-10-10 ASSESSMENT — ACTIVITIES OF DAILY LIVING (ADL): ADLS_ACUITY_SCORE: 35

## 2023-10-10 NOTE — ED TRIAGE NOTES
Pt states has had heart burn x 3 days, last night c/o severe pain radiating down her left arm. Pt had EMS at house to eval drove self to ED.   Triage Assessment       Row Name 10/10/23 1320       Triage Assessment (Adult)    Airway WDL WDL       Respiratory WDL    Respiratory WDL WDL       Skin Circulation/Temperature WDL    Skin Circulation/Temperature WDL WDL       Cardiac WDL    Cardiac WDL X;chest pain       Peripheral/Neurovascular WDL    Peripheral Neurovascular WDL WDL       Cognitive/Neuro/Behavioral WDL    Cognitive/Neuro/Behavioral WDL WDL

## 2023-10-10 NOTE — ED NOTES
Pt c/o chest pain radiating to left side. Pt states pain is stabbing in nature and is constant. It started at 1430 today.

## 2023-10-10 NOTE — ED NOTES
Pt ambulated to bathroom, UA sent to lab. Provided pt cup of ice chips per request. Pt is in pleasant spirits in the room.

## 2023-10-10 NOTE — ED NOTES
Pt states pain started suddenly at 0230 this morning, she was sleeping when pain started. Pt is unable to sit still due to pain. Pt c/o nausea without vomiting.

## 2023-10-10 NOTE — DISCHARGE INSTRUCTIONS
RETURN TO THE EMERGENCY ROOM FOR THE FOLLOWING:    Severely worsened pain with fever >101, worsened breathing, or at anytime for any concern.    FOLLOW UP:    With your primary clinic for reevaluation.    TREATMENT RECOMMENDATIONS:    Prilosec 20 mg twice daily x 30 days.  Maalox/Mylanta/Tums for acute pain.  Avoid caffeine, smoking, chewing tobacco, ibuprofen/advil/aleve, alcohol, eating within 5 hours of bed.    NURSE ADVICE LINE:  [phone] or [phone]

## 2023-10-10 NOTE — ED PROVIDER NOTES
"  HPI   Patient is a 48-year-old female presenting by private car with her advocate for left-sided chest pain.  She has a known history of IBS and anxiety.  She has had a TBI.  She smokes marijuana regularly.  She smokes tobacco.    The patient had onset of severe left-sided chest pain starting at about 2:30 AM this morning.  This is severe and worsened with deep breathing or movement.  She points toward her midline chest, left chest, and up toward her anterior and left neck.  She tells me that her left trapezius region, left axilla, and left lateral chest also are uncomfortable and these have worsened over the day today.  No new cough or congestion.  No fever.  She does not feel short of breath.  No hemoptysis.  No obvious trauma or injury.  No new leg pain or swelling.  She does experience reflux on a regular basis.  She takes Tums at night along with melatonin and this seems to help.  She does use Tylenol regularly, no changes in doses recently.  She has been using ibuprofen \"like candy\" over the past 3 days.  She has been experiencing left-sided chest pain over the past 3 days but worsened since earlier this morning, as above.      Allergies:  Allergies   Allergen Reactions    Butalbital-Aspirin-Caffeine      Increases headaches    Demerol Itching    Droperidol Other (See Comments)     twitching      Hydroxyzine Unknown     Refuses IM numerous times stating that it does not work    Ketorolac Tromethamine Itching    Methocarbamol Hives    Metoclopramide Other (See Comments)     Cause acute dystonic reaction    Nicotine      Intolerant to    Nsaids Other (See Comments)     Gastric ulcer    Pramipexole      Ill feel    Rizatriptan      Ill feel    Ropinirole      Increases RLS    Trazodone Other (See Comments)     Out of body sense     Bad dreams    Triptans Itching     vomits    Adhesive Tape Rash    Compazine [Prochlorperazine] Rash     Arm red and rash    Diphenhydramine Anxiety     Jittery and jumpy     Problem " List:    Patient Active Problem List    Diagnosis Date Noted    Gastroenteritis 09/12/2023     Priority: Medium    Lower abdominal pain 07/10/2023     Priority: Medium    Hematemesis with nausea 07/10/2023     Priority: Medium    Borderline intellectual functioning 11/06/2015     Priority: Medium    ADD (attention deficit disorder) 02/18/2014     Priority: Medium    Chondromalacia of patella 12/11/2013     Priority: Medium    Drug-seeking behavior 12/06/2013     Priority: Medium    Health Care Home 12/03/2013     Priority: Medium       Status:  Unable to reach    -See Letters for [facility] Care Plan - Emergency Care Plan          Cannabis abuse 07/03/2013     Priority: Medium    Polysubstance abuse (H) 12/04/2012     Priority: Medium    Social phobia: with panic attacks 07/23/2012     Priority: Medium    Moderate recurrent major depression: per psychiatry consult 07/23/2012     Priority: Medium    Prescription Opioid and benzodiazepine abuse 07/23/2012     Priority: Medium    Contusion of duodenum 06/26/2012     Priority: Medium    Vitamin D deficiency 06/26/2012     Priority: Medium    At risk for osteoporosis 06/26/2012     Priority: Medium    Hematochezia 06/26/2012     Priority: Medium    Abdominal pain, unspecified abdominal location 06/25/2012     Priority: Medium     Problem list name updated by automated process. Provider to review      Panic attack 05/22/2012     Priority: Medium    Acquired absence of both cervix and uterus 09/16/2011     Priority: Medium    Colitis 05/16/2011     Priority: Medium    Outbursts of anger 12/14/2009     Priority: Medium    Gastroesophageal reflux disease 06/02/2009     Priority: Medium     Formatting of this note might be different from the original.  Esophagitis- EGD 2017. PPI not helpful. No further follow up.      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    Bipolar 2 disorder (H)     Carpal tunnel syndrome     Colitis     Depression     Depressive disorder     Drug-seeking behavior     Drug-seeking behavior     GERD (gastroesophageal reflux disease)     Head injury     Irritable bowel syndrome (IBS)     Kidney infection     Lower urinary tract infection 2/18/2011    Migraine     Migraine     Polysubstance abuse (H) 12/4/2012    PTSD (post-traumatic stress disorder)     PTSD (post-traumatic stress disorder)     Thyroid disease     Ulcer     Urinary retention      Past Surgical History:    Past Surgical History:   Procedure Laterality Date    APPENDECTOMY      APPENDECTOMY      CHOLECYSTECTOMY  01/01/2005    CHOLECYSTECTOMY      HYSTERECTOMY      with ophorectomy bilateral    HYSTERECTOMY TOTAL ABDOMINAL, BILATERAL SALPINGO-OOPHORECTOMY, COMBINED  01/01/2001    bleeding ovarian cysts    HYSTERECTOMY, PAP NO LONGER INDICATED      HYSTERECTOMY, PAP NO LONGER INDICATED      TONSILLECTOMY, ADENOIDECTOMY, MYRINGOTOMY, INSERT TUBE BILATERAL, COMBINED      TOTAL VAGINAL HYSTERECTOMY      URETHRA SURGERY      temporary stent    URETHRA SURGERY       Family History:    Family History   Problem Relation Age of Onset    Unknown/Adopted Father     Chronic Obstructive Pulmonary Disease Mother     Aneurysm Maternal Grandmother     Cerebrovascular Disease Maternal Grandfather     Breast Cancer No family hx of     Cancer No family hx of     Diabetes No family hx of      Social History:  Marital Status:   [2]  Social History     Tobacco Use    Smoking status: Every Day     Packs/day: 0.50     Years: 20.00     Pack years: 10.00     Types: Cigarettes    Smokeless tobacco: Never   Substance Use Topics    Alcohol use: No    Drug use: No      "Comment: Denies, past marijuana      Medications:    omeprazole (PRILOSEC) 20 MG DR capsule  ondansetron (ZOFRAN ODT) 4 MG ODT tab      Review of Systems   All other systems reviewed and are negative.      PE   BP: (!) 134/91  Pulse: 83  Temp: 98.3  F (36.8  C)  Resp: 18  Height: 157.5 cm (5' 2\")  Weight: 56.7 kg (125 lb)  SpO2: 99 %  Physical Exam  Vitals reviewed.   Constitutional:       Appearance: She is well-developed.      Comments: Patient is intermittent episodes of severe pain where she will yell out to grimace.   HENT:      Head: Normocephalic and atraumatic.      Right Ear: External ear normal.      Left Ear: External ear normal.      Nose: Nose normal.      Mouth/Throat:      Mouth: Mucous membranes are moist.      Pharynx: Oropharynx is clear.   Eyes:      Extraocular Movements: Extraocular movements intact.      Conjunctiva/sclera: Conjunctivae normal.      Pupils: Pupils are equal, round, and reactive to light.   Cardiovascular:      Rate and Rhythm: Normal rate and regular rhythm.   Pulmonary:      Effort: Pulmonary effort is normal.   Musculoskeletal:         General: Normal range of motion.      Cervical back: Normal range of motion.   Skin:     General: Skin is warm and dry.   Neurological:      Mental Status: She is alert and oriented to person, place, and time.   Psychiatric:         Behavior: Behavior normal.         ED COURSE and MDM   1415.  Patient has symptoms and signs as described above.  EKG documented below, unremarkable.  Lab values reviewed.  Troponin negative.  D-dimer added.  Imaging based on results.    1616.  Negative work up.  Images looked at and report reviewed.  Lab work reviewed with the patient and her advocate.  Esophagitis?  Muscle tension involving the neck, upper back, and lateral chest wall?  She does not want medication for muscle related pain.  She is willing to avoid risk factors as much as possible and to take Prilosec 20 mg twice a day for 30 days.  Perhaps upper " endoscopy is appropriate as well at some point, follow-up recommended.    EKG  (1416)   Interpretation performed by me.  Rate: 69     Rhythm: sinus     Axis: nl  Intervals: RI (12-2) 262, QRS (<12) 86, QTc (>5) 397  P wave: nl     QRS complex: nl   ST segment / T-wave: nl  Conclusion: First-degree AV block.    Electronic medical chart reviewed, including medical problems, medications, medical allergies, social history.  Recent hospitalizations and surgical procedures reviewed.  Recent clinic visits and consultations reviewed.  Recent labs and test results reviewed.  Nursing notes reviewed.    The patient, their parent if applicable, and/or their medical decision maker(s) and I have reviewed all of the available historical information, applicable PMH, physical exam findings, and objective diagnostic data gathered during this ED visit.  We then discussed all work-up options and then together agreed upon the course taken during this visit.  The ultimate disposition and plan was a cooperative decision made between myself and the patient, their parent if applicable, and/or their legal decision maker(s).  The risks and benefits of all decisions made during this visit were discussed to the best of my abilities given the circumstances, and all parties are understanding of the pertinent ramifications of these decisions.      LABS  Labs Ordered and Resulted from Time of ED Arrival to Time of ED Departure   COMPREHENSIVE METABOLIC PANEL - Abnormal       Result Value    Sodium 138      Potassium 3.4      Carbon Dioxide (CO2) 23      Anion Gap 13      Urea Nitrogen 6.8      Creatinine 0.80      GFR Estimate >90      Calcium 9.2      Chloride 102      Glucose 146 (*)     Alkaline Phosphatase 83      AST 17      ALT 7      Protein Total 6.8      Albumin 4.4      Bilirubin Total 0.2     CBC WITH PLATELETS AND DIFFERENTIAL - Abnormal    WBC Count 9.6      RBC Count 4.14      Hemoglobin 13.9      Hematocrit 39.9      MCV 96      MCH  33.6 (*)     MCHC 34.8      RDW 13.1      Platelet Count 201      % Neutrophils 50      % Lymphocytes 38      % Monocytes 9      Mids % (Monos, Eos, Basos)        % Eosinophils 2      % Basophils 1      % Immature Granulocytes 0      NRBCs per 100 WBC 0      Absolute Neutrophils 4.9      Absolute Lymphocytes 3.7      Absolute Monocytes 0.8      Mids Abs (Monos, Eos, Basos)        Absolute Eosinophils 0.1      Absolute Basophils 0.1      Absolute Immature Granulocytes 0.0      Absolute NRBCs 0.0     ROUTINE UA WITH MICROSCOPIC REFLEX TO CULTURE - Abnormal    Color Urine Yellow      Appearance Urine Clear      Glucose Urine Negative      Bilirubin Urine Negative      Ketones Urine Negative      Specific Gravity Urine 1.016      Blood Urine Negative      pH Urine 5.0      Protein Albumin Urine Negative      Urobilinogen Urine Normal      Nitrite Urine Negative      Leukocyte Esterase Urine Large (*)     Bacteria Urine Few (*)     Mucus Urine Present (*)     RBC Urine 4 (*)     WBC Urine 8 (*)     Squamous Epithelials Urine 1     TROPONIN T, HIGH SENSITIVITY - Normal    Troponin T, High Sensitivity <6     D DIMER QUANTITATIVE - Normal    D-Dimer Quantitative 0.27     URINE CULTURE       IMAGING  Images reviewed by me.  Radiology report also reviewed.  XR Chest 2 Views   Preliminary Result   IMPRESSION: No focal consolidation, pleural effusion or pneumothorax.   Cardiomediastinal silhouette is unremarkable.           Procedures    Medications   HYDROmorphone (PF) (DILAUDID) injection 0.3 mg (0.3 mg Intravenous $Given 10/10/23 4142)   ondansetron (ZOFRAN) injection 4 mg (4 mg Intravenous $Given 10/10/23 1349)   pantoprazole (PROTONIX) IV push injection 40 mg (40 mg Intravenous $Given 10/10/23 1428)   alum & mag hydroxide-simethicone (MAALOX) suspension 15 mL (15 mLs Oral $Given 10/10/23 1421)         IMPRESSION       ICD-10-CM    1. Chest pain, unspecified type  R07.9                Medication List        Started       omeprazole 20 MG DR capsule  Commonly known as: PriLOSEC  20 mg, Oral, 2 TIMES DAILY            ASK your doctor about these medications      traMADol 50 MG tablet  Commonly known as: ULTRAM  50 mg, Oral, EVERY 6 HOURS PRN  Ask about: Should I take this medication?                          Carlos Enrique Fontana MD  10/10/23 1470

## 2023-10-12 ENCOUNTER — HOSPITAL ENCOUNTER (EMERGENCY)
Facility: CLINIC | Age: 48
Discharge: HOME | End: 2023-10-12
Attending: EMERGENCY MEDICINE | Admitting: EMERGENCY MEDICINE
Payer: COMMERCIAL

## 2023-10-12 VITALS
WEIGHT: 127 LBS | HEIGHT: 62 IN | OXYGEN SATURATION: 96 % | SYSTOLIC BLOOD PRESSURE: 111 MMHG | DIASTOLIC BLOOD PRESSURE: 99 MMHG | HEART RATE: 76 BPM | RESPIRATION RATE: 22 BRPM | BODY MASS INDEX: 23.37 KG/M2 | TEMPERATURE: 98.1 F

## 2023-10-12 DIAGNOSIS — R10.13 ABDOMINAL PAIN, EPIGASTRIC: ICD-10-CM

## 2023-10-12 DIAGNOSIS — R11.15 CYCLIC VOMITING SYNDROME: ICD-10-CM

## 2023-10-12 LAB
ALBUMIN SERPL BCG-MCNC: 4.2 G/DL (ref 3.5–5.2)
ALP SERPL-CCNC: 72 U/L (ref 35–104)
ALT SERPL W P-5'-P-CCNC: 8 U/L (ref 0–50)
ANION GAP SERPL CALCULATED.3IONS-SCNC: 9 MMOL/L (ref 7–15)
AST SERPL W P-5'-P-CCNC: 17 U/L (ref 0–45)
BACTERIA UR CULT: NO GROWTH
BASO+EOS+MONOS # BLD AUTO: NORMAL 10*3/UL
BASO+EOS+MONOS NFR BLD AUTO: NORMAL %
BASOPHILS # BLD AUTO: 0 10E3/UL (ref 0–0.2)
BASOPHILS NFR BLD AUTO: 1 %
BILIRUB SERPL-MCNC: 0.3 MG/DL
BUN SERPL-MCNC: 3.5 MG/DL (ref 6–20)
CALCIUM SERPL-MCNC: 8.8 MG/DL (ref 8.6–10)
CHLORIDE SERPL-SCNC: 104 MMOL/L (ref 98–107)
CREAT SERPL-MCNC: 0.72 MG/DL (ref 0.51–0.95)
DEPRECATED HCO3 PLAS-SCNC: 26 MMOL/L (ref 22–29)
EGFRCR SERPLBLD CKD-EPI 2021: >90 ML/MIN/1.73M2
EOSINOPHIL # BLD AUTO: 0.1 10E3/UL (ref 0–0.7)
EOSINOPHIL NFR BLD AUTO: 1 %
ERYTHROCYTE [DISTWIDTH] IN BLOOD BY AUTOMATED COUNT: 13.2 % (ref 10–15)
GLUCOSE SERPL-MCNC: 92 MG/DL (ref 70–99)
HCT VFR BLD AUTO: 39.3 % (ref 35–47)
HGB BLD-MCNC: 13.6 G/DL (ref 11.7–15.7)
HOLD SPECIMEN: NORMAL
HOLD SPECIMEN: NORMAL
IMM GRANULOCYTES # BLD: 0 10E3/UL
IMM GRANULOCYTES NFR BLD: 0 %
LIPASE SERPL-CCNC: 11 U/L (ref 13–60)
LYMPHOCYTES # BLD AUTO: 2.6 10E3/UL (ref 0.8–5.3)
LYMPHOCYTES NFR BLD AUTO: 31 %
MCH RBC QN AUTO: 32.9 PG (ref 26.5–33)
MCHC RBC AUTO-ENTMCNC: 34.6 G/DL (ref 31.5–36.5)
MCV RBC AUTO: 95 FL (ref 78–100)
MONOCYTES # BLD AUTO: 0.7 10E3/UL (ref 0–1.3)
MONOCYTES NFR BLD AUTO: 9 %
NEUTROPHILS # BLD AUTO: 4.8 10E3/UL (ref 1.6–8.3)
NEUTROPHILS NFR BLD AUTO: 58 %
NRBC # BLD AUTO: 0 10E3/UL
NRBC BLD AUTO-RTO: 0 /100
PLATELET # BLD AUTO: 201 10E3/UL (ref 150–450)
POTASSIUM SERPL-SCNC: 3.9 MMOL/L (ref 3.4–5.3)
PROT SERPL-MCNC: 6.5 G/DL (ref 6.4–8.3)
RBC # BLD AUTO: 4.13 10E6/UL (ref 3.8–5.2)
SODIUM SERPL-SCNC: 139 MMOL/L (ref 135–145)
WBC # BLD AUTO: 8.3 10E3/UL (ref 4–11)

## 2023-10-12 PROCEDURE — 83690 ASSAY OF LIPASE: CPT | Performed by: EMERGENCY MEDICINE

## 2023-10-12 PROCEDURE — 258N000003 HC RX IP 258 OP 636: Performed by: EMERGENCY MEDICINE

## 2023-10-12 PROCEDURE — 250N000011 HC RX IP 250 OP 636: Mod: JZ | Performed by: EMERGENCY MEDICINE

## 2023-10-12 PROCEDURE — 36415 COLL VENOUS BLD VENIPUNCTURE: CPT | Performed by: EMERGENCY MEDICINE

## 2023-10-12 PROCEDURE — 96361 HYDRATE IV INFUSION ADD-ON: CPT | Performed by: EMERGENCY MEDICINE

## 2023-10-12 PROCEDURE — 96374 THER/PROPH/DIAG INJ IV PUSH: CPT | Performed by: EMERGENCY MEDICINE

## 2023-10-12 PROCEDURE — 99284 EMERGENCY DEPT VISIT MOD MDM: CPT | Mod: 25 | Performed by: EMERGENCY MEDICINE

## 2023-10-12 PROCEDURE — 85025 COMPLETE CBC W/AUTO DIFF WBC: CPT | Performed by: EMERGENCY MEDICINE

## 2023-10-12 PROCEDURE — 99284 EMERGENCY DEPT VISIT MOD MDM: CPT | Performed by: EMERGENCY MEDICINE

## 2023-10-12 PROCEDURE — 80053 COMPREHEN METABOLIC PANEL: CPT | Performed by: EMERGENCY MEDICINE

## 2023-10-12 RX ORDER — OLANZAPINE 10 MG/1
5 INJECTION, POWDER, LYOPHILIZED, FOR SOLUTION INTRAMUSCULAR ONCE
Status: COMPLETED | OUTPATIENT
Start: 2023-10-12 | End: 2023-10-12

## 2023-10-12 RX ORDER — ONDANSETRON 2 MG/ML
4 INJECTION INTRAMUSCULAR; INTRAVENOUS ONCE
Status: COMPLETED | OUTPATIENT
Start: 2023-10-12 | End: 2023-10-12

## 2023-10-12 RX ADMIN — SODIUM CHLORIDE 1000 ML: 9 INJECTION, SOLUTION INTRAVENOUS at 14:25

## 2023-10-12 RX ADMIN — ONDANSETRON 4 MG: 2 INJECTION INTRAMUSCULAR; INTRAVENOUS at 12:33

## 2023-10-12 ASSESSMENT — ACTIVITIES OF DAILY LIVING (ADL)
ADLS_ACUITY_SCORE: 35
ADLS_ACUITY_SCORE: 35

## 2023-10-12 NOTE — DISCHARGE INSTRUCTIONS
Drink plenty of fluids    Follow-up in clinic    Take ondansetron for nausea and vomiting.    Stop using cannabis

## 2023-10-12 NOTE — ED NOTES
Pt upset that she is not getting pain medication prior to xray, refuses to go to xray without.  MD aware.  Patient relations called on behalf of patient, no answer.  Patient relation contact card given to patient for her to call.

## 2023-10-12 NOTE — ED TRIAGE NOTES
N/V at 0400 today. Pt states pain is like a knife in epigastric area. EMS gave 4 mg of Zofran en route.

## 2023-10-12 NOTE — ED TRIAGE NOTES
Triage Assessment (Adult)       Row Name 10/12/23 1227          Triage Assessment    Airway WDL WDL        Respiratory WDL    Respiratory WDL WDL        Skin Circulation/Temperature WDL    Skin Circulation/Temperature WDL WDL        Cardiac WDL    Cardiac WDL WDL        Peripheral/Neurovascular WDL    Peripheral Neurovascular WDL WDL        Cognitive/Neuro/Behavioral WDL    Cognitive/Neuro/Behavioral WDL WDL        Paxton Coma Scale    Best Eye Response 4-->(E4) spontaneous     Best Motor Response 6-->(M6) obeys commands     Best Verbal Response 5-->(V5) oriented     Liana Coma Scale Score 15

## 2023-10-12 NOTE — ED PROVIDER NOTES
History     Chief Complaint   Patient presents with    Nausea & Vomiting     HPI  Meli Rodriguez is a 48 year old female who presents by EMS from home secondary to vomiting and epigastric abdominal pain described as sharp and severe.  Describes chronic pain this is worse than usual.  No significant hematemesis, describes loose stools without black or bloody component.  Was seen here on 10/6 and 10/10, notes from those visits are reviewed in epic at time of presentation, her work-ups were essentially unremarkable.  History of prescription opiate and benzodiazepine abuse, drug-seeking behavior, ADD, borderline intellectual functioning, chronic cannabis abuse, GERD, nonspecific colitis on CT.  Reports last cannabis use 3 days ago, does not believe she has cannabis hyperemesis syndrome.  Ondansetron in route with minimal relief.    Narrative & Impression   EXAM: CT ABDOMEN PELVIS W/O CONTRAST  LOCATION: [facility]  DATE: 9/12/2023     INDICATION: back pain; abdominal pain radiating to the bilateral flank region. Vomiting. Low-grade fever.  COMPARISON: 10/19/2022  TECHNIQUE: CT scan of the abdomen and pelvis was performed without IV contrast. Multiplanar reformats were obtained. Dose reduction techniques were used.  CONTRAST: None.     FINDINGS:   LOWER CHEST: No basilar infiltrates.     HEPATOBILIARY: Cholecystectomy.     PANCREAS: Unremarkable     SPLEEN: Unremarkable     ADRENAL GLANDS: Unremarkable     KIDNEYS/BLADDER: No hydronephrosis. No nephroureterolithiasis. Bladder is normal in contour without stones.     BOWEL: No bowel obstruction. Mild generalized colonic wall thickening..     LYMPH NODES: No significant retroperitoneal adenopathy     VASCULATURE: No abdominal aortic aneurysm     PELVIC ORGANS: Hysterectomy. No free fluid.     MUSCULOSKELETAL: No acute bony abnormalities.                                                                      IMPRESSION:   1.  Mild colonic wall  thickening compatible with colitis. Infectious or inflammatory etiologies favored.  2.  No evidence of nephroureterolithiasis.    Narrative & Impression   CT ABDOMEN AND PELVIS WITH CONTRAST 10/6/2023 1:16 PM     CLINICAL HISTORY: Right upper quadrant and right flank pain.     TECHNIQUE: CT scan of the abdomen and pelvis was performed following  injection of IV contrast. Multiplanar reformats were obtained. Dose  reduction techniques were used.  CONTRAST: 67 mL Isovue-370     COMPARISON: 9/12/2023     FINDINGS:   LOWER CHEST: Normal.     HEPATOBILIARY: Cholecystectomy.     PANCREAS: Normal.     SPLEEN: Normal.     ADRENAL GLANDS: Normal.     KIDNEYS/BLADDER: Normal.     BOWEL: Normal.     PELVIC ORGANS: Hysterectomy.     ADDITIONAL FINDINGS: None.     MUSCULOSKELETAL: Normal.                                                                      IMPRESSION: Unremarkable CT of the abdomen and pelvis, status post  cholecystectomy and hysterectomy.     JEREMIAH CRUZ MD                    Allergies:  Allergies   Allergen Reactions    Butalbital-Aspirin-Caffeine      Increases headaches    Demerol Itching    Droperidol Other (See Comments)     twitching      Hydroxyzine Unknown     Refuses IM numerous times stating that it does not work    Ketorolac Tromethamine Itching    Methocarbamol Hives    Metoclopramide Other (See Comments)     Cause acute dystonic reaction    Nicotine      Intolerant to    Nsaids Other (See Comments)     Gastric ulcer    Pramipexole      Ill feel    Rizatriptan      Ill feel    Ropinirole      Increases RLS    Trazodone Other (See Comments)     Out of body sense     Bad dreams    Triptans Itching     vomits    Adhesive Tape Rash    Compazine [Prochlorperazine] Rash     Arm red and rash    Diphenhydramine Anxiety     Jittery and jumpy       Problem List:    Patient Active Problem List    Diagnosis Date Noted    Gastroenteritis 09/12/2023     Priority: Medium    Lower abdominal pain 07/10/2023      Priority: Medium    Hematemesis with nausea 07/10/2023     Priority: Medium    Borderline intellectual functioning 11/06/2015     Priority: Medium    ADD (attention deficit disorder) 02/18/2014     Priority: Medium    Chondromalacia of patella 12/11/2013     Priority: Medium    Drug-seeking behavior 12/06/2013     Priority: Medium    Health Care Home 12/03/2013     Priority: Medium       Status:  Unable to reach    -See Letters for [facility] - Emergency Care Plan          Cannabis abuse 07/03/2013     Priority: Medium    Polysubstance abuse (H) 12/04/2012     Priority: Medium    Social phobia: with panic attacks 07/23/2012     Priority: Medium    Moderate recurrent major depression: per psychiatry consult 07/23/2012     Priority: Medium    Prescription Opioid and benzodiazepine abuse 07/23/2012     Priority: Medium    Contusion of duodenum 06/26/2012     Priority: Medium    Vitamin D deficiency 06/26/2012     Priority: Medium    At risk for osteoporosis 06/26/2012     Priority: Medium    Hematochezia 06/26/2012     Priority: Medium    Abdominal pain, unspecified abdominal location 06/25/2012     Priority: Medium     Problem list name updated by automated process. Provider to review      Panic attack 05/22/2012     Priority: Medium    Acquired absence of both cervix and uterus 09/16/2011     Priority: Medium    Colitis 05/16/2011     Priority: Medium    Outbursts of anger 12/14/2009     Priority: Medium    Gastroesophageal reflux disease 06/02/2009     Priority: Medium     Formatting of this note might be different from the original.  Esophagitis- EGD 2017. PPI not helpful. No further follow up.      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    Bipolar 2 disorder (H)     Carpal tunnel syndrome     Colitis     Depression     Depressive disorder     Drug-seeking behavior     Drug-seeking behavior     GERD (gastroesophageal reflux disease)     Head injury     Irritable bowel syndrome (IBS)     Kidney infection     Lower urinary tract infection 2/18/2011    Migraine     Migraine     Polysubstance abuse (H) 12/4/2012    PTSD (post-traumatic stress disorder)     PTSD (post-traumatic stress disorder)     Thyroid disease     Ulcer     Urinary retention        Past Surgical History:    Past Surgical History:   Procedure Laterality Date    APPENDECTOMY      APPENDECTOMY      CHOLECYSTECTOMY  01/01/2005    CHOLECYSTECTOMY      HYSTERECTOMY      with ophorectomy bilateral    HYSTERECTOMY TOTAL ABDOMINAL, BILATERAL SALPINGO-OOPHORECTOMY, COMBINED  01/01/2001    bleeding ovarian cysts    HYSTERECTOMY, PAP NO LONGER INDICATED      HYSTERECTOMY, PAP NO LONGER INDICATED      TONSILLECTOMY, ADENOIDECTOMY, MYRINGOTOMY, INSERT TUBE BILATERAL, COMBINED      TOTAL VAGINAL HYSTERECTOMY      URETHRA SURGERY      temporary stent    URETHRA SURGERY         Family History:    Family History   Problem Relation Age of Onset    Unknown/Adopted Father     Chronic Obstructive Pulmonary Disease Mother     Aneurysm Maternal Grandmother     Cerebrovascular Disease Maternal Grandfather     Breast Cancer No family hx of     Cancer No family hx of     Diabetes No family hx of        Social History:  Marital Status:   [2]  Social History     Tobacco Use    Smoking status: Every Day     Packs/day: 0.50     Years: 20.00     Additional pack years: 0.00     Total pack years: 10.00     Types: Cigarettes    Smokeless tobacco: Never   Substance Use Topics    Alcohol use: No    Drug use: No     Comment: Denies, past marijuana        Medications:    omeprazole (PRILOSEC) 20 MG DR capsule  ondansetron  "(ZOFRAN ODT) 4 MG ODT tab          Review of Systems    Physical Exam   BP: (!) 111/99  Pulse: 76  Temp: 98.1  F (36.7  C)  Resp: 22  Height: 157.5 cm (5' 2\")  Weight: 57.6 kg (127 lb)  SpO2: 96 %      Physical Exam  Chronically ill-appearing alert and oriented angry, mildly agitated, able to cooperate with exam  Head atraumatic normocephalic  Oropharynx shows extremely poor dentition with most teeth rotted off at the gumline no facial swelling  Lungs clear  Heart regular  Abdomen soft nondistended bowel sounds diminished epigastric tenderness with voluntary guarding no rebound appreciated  Strength and sensation grossly intact throughout the extremities  Skin Pink warm and dry  ED Course                 Procedures         Results for orders placed or performed during the hospital encounter of 10/12/23 (from the past 24 hour(s))   CBC with platelets differential    Narrative    The following orders were created for panel order CBC with platelets differential.  Procedure                               Abnormality         Status                     ---------                               -----------         ------                     CBC with platelets and d...[[id number]]                      Final result                 Please view results for these tests on the individual orders.   Comprehensive metabolic panel   Result Value Ref Range    Sodium 139 135 - 145 mmol/L    Potassium 3.9 3.4 - 5.3 mmol/L    Carbon Dioxide (CO2) 26 22 - 29 mmol/L    Anion Gap 9 7 - 15 mmol/L    Urea Nitrogen 3.5 (L) 6.0 - 20.0 mg/dL    Creatinine 0.72 0.51 - 0.95 mg/dL    GFR Estimate >90 >60 mL/min/1.73m2    Calcium 8.8 8.6 - 10.0 mg/dL    Chloride 104 98 - 107 mmol/L    Glucose 92 70 - 99 mg/dL    Alkaline Phosphatase 72 35 - 104 U/L    AST 17 0 - 45 U/L    ALT 8 0 - 50 U/L    Protein Total 6.5 6.4 - 8.3 g/dL    Albumin 4.2 3.5 - 5.2 g/dL    Bilirubin Total 0.3 <=1.2 mg/dL   Lipase   Result Value Ref Range    Lipase 11 (L) 13 - 60 U/L "   [place] Draw    Narrative    The following orders were created for panel order [place] Draw.  Procedure                               Abnormality         Status                     ---------                               -----------         ------                     Extra Blue Top Tube[[id number]]                              In process                 Extra Red Top Tube[[id number]]                               In process                   Please view results for these tests on the individual orders.   CBC with platelets and differential   Result Value Ref Range    WBC Count 8.3 4.0 - 11.0 10e3/uL    RBC Count 4.13 3.80 - 5.20 10e6/uL    Hemoglobin 13.6 11.7 - 15.7 g/dL    Hematocrit 39.3 35.0 - 47.0 %    MCV 95 78 - 100 fL    MCH 32.9 26.5 - 33.0 pg    MCHC 34.6 31.5 - 36.5 g/dL    RDW 13.2 10.0 - 15.0 %    Platelet Count 201 150 - 450 10e3/uL    % Neutrophils 58 %    % Lymphocytes 31 %    % Monocytes 9 %    Mids % (Monos, Eos, Basos)      % Eosinophils 1 %    % Basophils 1 %    % Immature Granulocytes 0 %    NRBCs per 100 WBC 0 <1 /100    Absolute Neutrophils 4.8 1.6 - 8.3 10e3/uL    Absolute Lymphocytes 2.6 0.8 - 5.3 10e3/uL    Absolute Monocytes 0.7 0.0 - 1.3 10e3/uL    Mids Abs (Monos, Eos, Basos)      Absolute Eosinophils 0.1 0.0 - 0.7 10e3/uL    Absolute Basophils 0.0 0.0 - 0.2 10e3/uL    Absolute Immature Granulocytes 0.0 <=0.4 10e3/uL    Absolute NRBCs 0.0 10e3/uL       Medications   ondansetron (ZOFRAN) injection 4 mg (4 mg Intravenous $Given 10/12/23 1233)   sodium chloride 0.9% BOLUS 1,000 mL (0 mLs Intravenous Stopped 10/12/23 4887)   OLANZapine (zyPREXA) IV injection 5 mg (5 mg Intravenous Not Given 10/12/23 1443)       Assessments & Plan (with Medical Decision Making)  48-year-old female who presents by ambulance for the third time since 10/6.  She has long history of chronic abdominal pain and cyclic vomiting.  Work-ups over the last couple days as above were extensive and unrevealing.  Continues  to use cannabis and adamantly denies that this is contributing to her symptom complex.  Multiple intolerances to antiemetics, and unwilling to try olanzapine or droperidol for her symptom control.  When she is reevaluated at 1450, she appears to be very comfortable with her headphones on working on her iPad.  She becomes very agitated and profane when I tell her her labs are normal, and I am not willing to treat her with narcotic pain medication.  At that point in time she wishes to be discharged, I wrote discharge orders, recommend ondansetron for nausea recommend avoiding cannabis recommend follow-up GI, return criteria reviewed.     I have reviewed the nursing notes.    I have reviewed the findings, diagnosis, plan and need for follow up with the patient.          New Prescriptions    No medications on file       Final diagnoses:   Abdominal pain, epigastric   Cyclic vomiting syndrome       10/12/2023   [facility] EMERGENCY DEPT       Jose Rodriguez MD  10/12/23 9439

## 2023-10-13 ENCOUNTER — HOSPITAL ENCOUNTER (EMERGENCY)
Facility: CLINIC | Age: 48
Discharge: HOME | End: 2023-10-13
Attending: FAMILY MEDICINE | Admitting: FAMILY MEDICINE
Payer: COMMERCIAL

## 2023-10-13 ENCOUNTER — TELEPHONE (OUTPATIENT)
Dept: NURSING | Facility: CLINIC | Age: 48
End: 2023-10-13

## 2023-10-13 VITALS
HEIGHT: 62 IN | HEART RATE: 74 BPM | SYSTOLIC BLOOD PRESSURE: 133 MMHG | DIASTOLIC BLOOD PRESSURE: 109 MMHG | BODY MASS INDEX: 23.37 KG/M2 | TEMPERATURE: 98.8 F | RESPIRATION RATE: 18 BRPM | WEIGHT: 127 LBS | OXYGEN SATURATION: 97 %

## 2023-10-13 DIAGNOSIS — R11.2 CANNABINOID HYPEREMESIS SYNDROME: ICD-10-CM

## 2023-10-13 DIAGNOSIS — F12.90 CANNABINOID HYPEREMESIS SYNDROME: ICD-10-CM

## 2023-10-13 LAB
ALBUMIN SERPL BCG-MCNC: 4.1 G/DL (ref 3.5–5.2)
ALP SERPL-CCNC: 75 U/L (ref 35–104)
ALT SERPL W P-5'-P-CCNC: 8 U/L (ref 0–50)
ANION GAP SERPL CALCULATED.3IONS-SCNC: 11 MMOL/L (ref 7–15)
AST SERPL W P-5'-P-CCNC: 20 U/L (ref 0–45)
BASO+EOS+MONOS # BLD AUTO: NORMAL 10*3/UL
BASO+EOS+MONOS NFR BLD AUTO: NORMAL %
BASOPHILS # BLD AUTO: 0.1 10E3/UL (ref 0–0.2)
BASOPHILS NFR BLD AUTO: 1 %
BILIRUB SERPL-MCNC: 0.2 MG/DL
BUN SERPL-MCNC: 4 MG/DL (ref 6–20)
CALCIUM SERPL-MCNC: 8.6 MG/DL (ref 8.6–10)
CHLORIDE SERPL-SCNC: 106 MMOL/L (ref 98–107)
CREAT SERPL-MCNC: 0.74 MG/DL (ref 0.51–0.95)
DEPRECATED HCO3 PLAS-SCNC: 23 MMOL/L (ref 22–29)
EGFRCR SERPLBLD CKD-EPI 2021: >90 ML/MIN/1.73M2
EOSINOPHIL # BLD AUTO: 0.2 10E3/UL (ref 0–0.7)
EOSINOPHIL NFR BLD AUTO: 2 %
ERYTHROCYTE [DISTWIDTH] IN BLOOD BY AUTOMATED COUNT: 13.3 % (ref 10–15)
GLUCOSE SERPL-MCNC: 83 MG/DL (ref 70–99)
HCT VFR BLD AUTO: 40.4 % (ref 35–47)
HGB BLD-MCNC: 13.6 G/DL (ref 11.7–15.7)
HOLD SPECIMEN: NORMAL
HOLD SPECIMEN: NORMAL
IMM GRANULOCYTES # BLD: 0 10E3/UL
IMM GRANULOCYTES NFR BLD: 0 %
LYMPHOCYTES # BLD AUTO: 3 10E3/UL (ref 0.8–5.3)
LYMPHOCYTES NFR BLD AUTO: 38 %
MCH RBC QN AUTO: 32.8 PG (ref 26.5–33)
MCHC RBC AUTO-ENTMCNC: 33.7 G/DL (ref 31.5–36.5)
MCV RBC AUTO: 97 FL (ref 78–100)
MONOCYTES # BLD AUTO: 0.8 10E3/UL (ref 0–1.3)
MONOCYTES NFR BLD AUTO: 10 %
NEUTROPHILS # BLD AUTO: 3.7 10E3/UL (ref 1.6–8.3)
NEUTROPHILS NFR BLD AUTO: 49 %
NRBC # BLD AUTO: 0 10E3/UL
NRBC BLD AUTO-RTO: 0 /100
PLATELET # BLD AUTO: 195 10E3/UL (ref 150–450)
POTASSIUM SERPL-SCNC: 4.1 MMOL/L (ref 3.4–5.3)
PROT SERPL-MCNC: 6.5 G/DL (ref 6.4–8.3)
RBC # BLD AUTO: 4.15 10E6/UL (ref 3.8–5.2)
SODIUM SERPL-SCNC: 140 MMOL/L (ref 135–145)
WBC # BLD AUTO: 7.7 10E3/UL (ref 4–11)

## 2023-10-13 PROCEDURE — 80053 COMPREHEN METABOLIC PANEL: CPT | Performed by: FAMILY MEDICINE

## 2023-10-13 PROCEDURE — 250N000013 HC RX MED GY IP 250 OP 250 PS 637: Performed by: FAMILY MEDICINE

## 2023-10-13 PROCEDURE — 36415 COLL VENOUS BLD VENIPUNCTURE: CPT | Performed by: FAMILY MEDICINE

## 2023-10-13 PROCEDURE — 258N000003 HC RX IP 258 OP 636: Performed by: FAMILY MEDICINE

## 2023-10-13 PROCEDURE — 85025 COMPLETE CBC W/AUTO DIFF WBC: CPT | Performed by: FAMILY MEDICINE

## 2023-10-13 PROCEDURE — 99284 EMERGENCY DEPT VISIT MOD MDM: CPT | Performed by: FAMILY MEDICINE

## 2023-10-13 PROCEDURE — 99283 EMERGENCY DEPT VISIT LOW MDM: CPT | Performed by: FAMILY MEDICINE

## 2023-10-13 RX ORDER — CAPSAICIN 0.025 %
CREAM (GRAM) TOPICAL 3 TIMES DAILY
Status: DISCONTINUED | OUTPATIENT
Start: 2023-10-13 | End: 2023-10-13 | Stop reason: HOSPADM

## 2023-10-13 RX ORDER — SODIUM CHLORIDE 9 MG/ML
1000 INJECTION, SOLUTION INTRAVENOUS CONTINUOUS
Status: DISCONTINUED | OUTPATIENT
Start: 2023-10-13 | End: 2023-10-13 | Stop reason: HOSPADM

## 2023-10-13 RX ORDER — HALOPERIDOL 5 MG/ML
2 INJECTION INTRAMUSCULAR ONCE
Status: COMPLETED | OUTPATIENT
Start: 2023-10-13 | End: 2023-10-13

## 2023-10-13 RX ADMIN — SODIUM CHLORIDE 1000 ML: 9 INJECTION, SOLUTION INTRAVENOUS at 14:20

## 2023-10-13 ASSESSMENT — ENCOUNTER SYMPTOMS
DIARRHEA: 0
SORE THROAT: 0
WHEEZING: 0
CHILLS: 0
SINUS PRESSURE: 0
HEADACHES: 0
PALPITATIONS: 0
DYSURIA: 0
FEVER: 0
FREQUENCY: 0
DIAPHORESIS: 0
VOMITING: 1
CONSTIPATION: 0
NAUSEA: 1
COUGH: 0
SHORTNESS OF BREATH: 0
BLOOD IN STOOL: 0
ABDOMINAL PAIN: 1

## 2023-10-13 ASSESSMENT — ACTIVITIES OF DAILY LIVING (ADL): ADLS_ACUITY_SCORE: 35

## 2023-10-13 NOTE — ED NOTES
"Zaira KATHLEEN RN and writer went into room to give pt's medications.  Pt refused medications and was getting loud and upset.   Provider  came into pt's room, pt's advocate in room while provider is in room.   Provider offered pt medications and pt continued to refuse medications and that \" we were not treating her pain\"   provider discussed discharging pt if pt was not wanting to take medications prescribed.   Pt stated \"just take my IV out then and I am leaving, I'm not staying for discharge paperwork\"  "

## 2023-10-13 NOTE — ED TRIAGE NOTES
Pt presents with nausea, vomiting and epigastric pain. Received 8 mg zofran, 500 ml of NS and 1 mg of Dilaudid via EMS with some relief. Was seen here yesterday for the same symptoms and continues to be unable to keep anything down.      Triage Assessment (Adult)       Row Name 10/13/23 1309          Triage Assessment    Airway WDL WDL        Respiratory WDL    Respiratory WDL WDL        Skin Circulation/Temperature WDL    Skin Circulation/Temperature WDL WDL        Cardiac WDL    Cardiac WDL WDL        Peripheral/Neurovascular WDL    Peripheral Neurovascular WDL WDL        Cognitive/Neuro/Behavioral WDL    Cognitive/Neuro/Behavioral WDL WDL        [place] Coma Scale    Best Eye Response 4-->(E4) spontaneous     Best Motor Response 6-->(M6) obeys commands     Best Verbal Response 5-->(V5) oriented     [place] Coma Scale Score 15

## 2023-10-13 NOTE — ED PROVIDER NOTES
History     Chief Complaint   Patient presents with    Abdominal Pain     HPI  Meli Rodriguez is a 48 year old female who presents with a history of abdominal pain, prior hematochezia, prescription opioid and benzodiazepine abuse and history of cyclical emesis.  History of drug-seeking behavior.  He has multiple medication allergies.  17 to be exact.    She was seen yesterday afternoon October 13..  At that time she had was experiencing epigastric abdominal pain sharp and severe.  No significant hematemesis.  There is no obvious signs of GI bleed.  Pain was similar to prior work-ups.   She underwent a CT.  Mild findings of possible colitis.  She was unwilling to try olanzapine or droperidol.  Yesterday she was abusive of the provider who saw her when he confronted her on medications.    She was also seen on October 10.  She was given IV Dilaudid.  She uses frequent ibuprofen.  Her evaluation at that point was also negative.  She was hospitalized here for a possible infectious gastroenteritis at this facility as well as cannabinol hyperemesis.  She spent 2 days in the hospital here September 12 to September 14.    She arrived here by EMS.  Was given Dilaudid 1 mg in the ambulance.  She is frustrated that she feels that no one is listening to her.  She has the persistent pain and vomiting.  She believes this is something different than what she is being treated for.  She continues to use daily marijuana.  She refuses Zyprexa and droperidol.  She has an allergy to listed to Reglan.  Her pain and nausea vomiting has not changed.  Is not bilious or bloody.  There is no blood in the stool or black tarry stools.      Allergies:  Allergies   Allergen Reactions    Butalbital-Aspirin-Caffeine      Increases headaches    Demerol Itching    Droperidol Other (See Comments)     twitching      Hydroxyzine Unknown     Refuses IM numerous times stating that it does not work    Ketorolac Tromethamine Itching    Methocarbamol Hives     Metoclopramide Other (See Comments)     Cause acute dystonic reaction    Nicotine      Intolerant to    Nsaids Other (See Comments)     Gastric ulcer    Pramipexole      Ill feel    Rizatriptan      Ill feel    Ropinirole      Increases RLS    Trazodone Other (See Comments)     Out of body sense     Bad dreams    Triptans Itching     vomits    Adhesive Tape Rash    Compazine [Prochlorperazine] Rash     Arm red and rash    Diphenhydramine Anxiety     Jittery and jumpy       Problem List:    Patient Active Problem List    Diagnosis Date Noted    Gastroenteritis 09/12/2023     Priority: Medium    Lower abdominal pain 07/10/2023     Priority: Medium    Hematemesis with nausea 07/10/2023     Priority: Medium    Borderline intellectual functioning 11/06/2015     Priority: Medium    ADD (attention deficit disorder) 02/18/2014     Priority: Medium    Chondromalacia of patella 12/11/2013     Priority: Medium    Drug-seeking behavior 12/06/2013     Priority: Medium    Health Care Home 12/03/2013     Priority: Medium       Status:  Unable to reach    -See Letters for H Care Plan - Emergency Care Plan          Cannabis abuse 07/03/2013     Priority: Medium    Polysubstance abuse (H) 12/04/2012     Priority: Medium    Social phobia: with panic attacks 07/23/2012     Priority: Medium    Moderate recurrent major depression: per psychiatry consult 07/23/2012     Priority: Medium    Prescription Opioid and benzodiazepine abuse 07/23/2012     Priority: Medium    Contusion of duodenum 06/26/2012     Priority: Medium    Vitamin D deficiency 06/26/2012     Priority: Medium    At risk for osteoporosis 06/26/2012     Priority: Medium    Hematochezia 06/26/2012     Priority: Medium    Abdominal pain, unspecified abdominal location 06/25/2012     Priority: Medium     Problem list name updated by automated process. Provider to review      Panic attack 05/22/2012     Priority: Medium    Acquired absence of both cervix and uterus  09/16/2011     Priority: Medium    Colitis 05/16/2011     Priority: Medium    Outbursts of anger 12/14/2009     Priority: Medium    Gastroesophageal reflux disease 06/02/2009     Priority: Medium     Formatting of this note might be different from the original.  Esophagitis- EGD 2017. PPI not helpful. No further follow up.      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    Bipolar 2 disorder (H)     Carpal tunnel syndrome     Colitis     Depression     Depressive disorder     Drug-seeking behavior     Drug-seeking behavior     GERD (gastroesophageal reflux disease)     Head injury     Irritable bowel syndrome (IBS)     Kidney infection     Lower urinary tract infection 2/18/2011    Migraine     Migraine     Polysubstance abuse (H) 12/4/2012    PTSD (post-traumatic stress disorder)     PTSD (post-traumatic stress disorder)     Thyroid disease     Ulcer     Urinary retention        Past Surgical History:    Past Surgical History:   Procedure Laterality Date    APPENDECTOMY      APPENDECTOMY      CHOLECYSTECTOMY  01/01/2005    CHOLECYSTECTOMY      HYSTERECTOMY      with ophorectomy bilateral    HYSTERECTOMY TOTAL ABDOMINAL, BILATERAL SALPINGO-OOPHORECTOMY, COMBINED  01/01/2001    bleeding ovarian cysts    HYSTERECTOMY, PAP NO LONGER INDICATED      HYSTERECTOMY, PAP NO LONGER INDICATED      TONSILLECTOMY, ADENOIDECTOMY, MYRINGOTOMY, INSERT TUBE BILATERAL, COMBINED      TOTAL VAGINAL HYSTERECTOMY      URETHRA SURGERY      temporary stent    URETHRA SURGERY         Family History:    Family History   Problem Relation Age of Onset    Unknown/Adopted Father      "Chronic Obstructive Pulmonary Disease Mother     Aneurysm Maternal Grandmother     Cerebrovascular Disease Maternal Grandfather     Breast Cancer No family hx of     Cancer No family hx of     Diabetes No family hx of        Social History:  Marital Status:   [2]  Social History     Tobacco Use    Smoking status: Every Day     Packs/day: 0.50     Years: 20.00     Additional pack years: 0.00     Total pack years: 10.00     Types: Cigarettes    Smokeless tobacco: Never   Substance Use Topics    Alcohol use: No    Drug use: No     Comment: Denies, past marijuana        Medications:    omeprazole (PRILOSEC) 20 MG DR capsule  ondansetron (ZOFRAN ODT) 4 MG ODT tab          Review of Systems   Constitutional:  Negative for chills, diaphoresis and fever.   HENT:  Negative for ear pain, sinus pressure and sore throat.    Eyes:  Negative for visual disturbance.   Respiratory:  Negative for cough, shortness of breath and wheezing.    Cardiovascular:  Negative for chest pain and palpitations.   Gastrointestinal:  Positive for abdominal pain, nausea and vomiting. Negative for blood in stool, constipation and diarrhea.   Genitourinary:  Negative for dysuria, frequency and urgency.   Skin:  Negative for rash.   Neurological:  Negative for headaches.   All other systems reviewed and are negative.      Physical Exam   BP: (!) 133/109  Pulse: 74  Temp: 98.8  F (37.1  C)  Resp: 18  Height: 157.5 cm (5' 2\")  Weight: 57.6 kg (127 lb)  SpO2: 97 %      Physical Exam  Constitutional:       General: She is in acute distress.      Appearance: She is not diaphoretic.   Eyes:      Conjunctiva/sclera: Conjunctivae normal.   Cardiovascular:      Rate and Rhythm: Normal rate and regular rhythm.      Heart sounds: No murmur heard.  Pulmonary:      Effort: No respiratory distress.      Breath sounds: No stridor. No wheezing or rhonchi.   Abdominal:      General: Abdomen is flat. There is no distension.      Palpations: There is no mass.      " Tenderness: There is abdominal tenderness. There is guarding.   Musculoskeletal:      Cervical back: Neck supple.      Right lower leg: No edema.      Left lower leg: No edema.   Skin:     Findings: No rash.   Neurological:      Mental Status: She is alert.      Motor: No weakness.         ED Course                 Procedures              Critical Care time:  none               Results for orders placed or performed during the hospital encounter of 10/13/23 (from the past 24 hour(s))   [facility] Draw    Narrative    The following orders were created for panel order [facility] Draw.  Procedure                               Abnormality         Status                     ---------                               -----------         ------                     Extra Green Top (Lithium...[[id number]]                      Final result               Extra Purple Top Tube[[id number]]                            Final result                 Please view results for these tests on the individual orders.   Extra Green Top (Lithium Heparin) Tube   Result Value Ref Range    Hold Specimen JIC    Extra Purple Top Tube   Result Value Ref Range    Hold Specimen JIC    CBC with platelets, differential    Narrative    The following orders were created for panel order CBC with platelets, differential.  Procedure                               Abnormality         Status                     ---------                               -----------         ------                     CBC with platelets and d...[[id number]]                      Final result                 Please view results for these tests on the individual orders.   Comprehensive metabolic panel   Result Value Ref Range    Sodium 140 135 - 145 mmol/L    Potassium 4.1 3.4 - 5.3 mmol/L    Carbon Dioxide (CO2) 23 22 - 29 mmol/L    Anion Gap 11 7 - 15 mmol/L    Urea Nitrogen 4.0 (L) 6.0 - 20.0 mg/dL    Creatinine 0.74 0.51 - 0.95 mg/dL    GFR Estimate >90 >60 mL/min/1.73m2    Calcium 8.6 8.6 -  10.0 mg/dL    Chloride 106 98 - 107 mmol/L    Glucose 83 70 - 99 mg/dL    Alkaline Phosphatase 75 35 - 104 U/L    AST 20 0 - 45 U/L    ALT 8 0 - 50 U/L    Protein Total 6.5 6.4 - 8.3 g/dL    Albumin 4.1 3.5 - 5.2 g/dL    Bilirubin Total 0.2 <=1.2 mg/dL   CBC with platelets and differential   Result Value Ref Range    WBC Count 7.7 4.0 - 11.0 10e3/uL    RBC Count 4.15 3.80 - 5.20 10e6/uL    Hemoglobin 13.6 11.7 - 15.7 g/dL    Hematocrit 40.4 35.0 - 47.0 %    MCV 97 78 - 100 fL    MCH 32.8 26.5 - 33.0 pg    MCHC 33.7 31.5 - 36.5 g/dL    RDW 13.3 10.0 - 15.0 %    Platelet Count 195 150 - 450 10e3/uL    % Neutrophils 49 %    % Lymphocytes 38 %    % Monocytes 10 %    Mids % (Monos, Eos, Basos)      % Eosinophils 2 %    % Basophils 1 %    % Immature Granulocytes 0 %    NRBCs per 100 WBC 0 <1 /100    Absolute Neutrophils 3.7 1.6 - 8.3 10e3/uL    Absolute Lymphocytes 3.0 0.8 - 5.3 10e3/uL    Absolute Monocytes 0.8 0.0 - 1.3 10e3/uL    Mids Abs (Monos, Eos, Basos)      Absolute Eosinophils 0.2 0.0 - 0.7 10e3/uL    Absolute Basophils 0.1 0.0 - 0.2 10e3/uL    Absolute Immature Granulocytes 0.0 <=0.4 10e3/uL    Absolute NRBCs 0.0 10e3/uL       Medications - No data to display    Assessments & Plan (with Medical Decision Making)     MDM; Meli Rodriguez is a 48 year old female presents with a history of chronic recurrent abdominal pain nausea vomiting and thorough work-ups over the last month including hospital stay.  Reassuring evaluations but still with persistent symptoms likely related to cannabinol hyperemesis.  Patient received Dilaudid in the ambulance.  I had a lengthy discussion with her bedside after performing an evaluation.  This was in the presence of MIRI Henry.  We discussed that appropriate medication for her condition or medication such as droperidol and Zyprexa or haloperidol which are appropriate for cyclical hyperemesis.  She however declines droperidol and Zyprexa has not had haloperidol.  She tells me  that this is because she had twitching with the meds in the past.  I spoke with her about this as the ideal management.  I also discussed capsaicin.  Which she really wants is Dilaudid she tells me.  I expressed that this was not the appropriate treatment for her condition.  I have recheck CBC comprehensive panel today to confirm no changes.  Her vital signs are reassuring and she has no fever.  I have personally viewed at least 3 other notes and laboratory testing from prior.    2:30 PM   I met again with the patient and she now refuses the haloperidol.  Again she had previously refused other agents that are appropriate.  Also offered topical capsaicin.  She is frustrated and she raises her voice in the room.  She continually tells me that she wants someone to admit her to the hospital and figure out what is going on.  Her evaluation is reassuring including repeat labs today.  I once again reiterated that I was willing to offer appropriate medications.  She only wishes to have pain medications and has particularly noted Dilaudid which were relieved  pain previously.  We discussed that this was the most appropriate medication.  This conversation was witnessed by nursing includingSonido Henry and Zaira Melissa.      With the patient refusing treatment I recommended that she be discharged home as I have no other treatment to offer at this time.  Recommendations as below.     I have reviewed the nursing notes.    I have reviewed the findings, diagnosis, plan and need for follow up with the patient.           Medical Decision Making  The patient's presentation was of moderate complexity (a chronic illness mild to moderate exacerbation, progression, or side effect of treatment).    The patient's evaluation involved:  review of external note(s) from 3+ sources (see separate area of note for details)  review of 3+ test result(s) ordered prior to this encounter (see separate area of note for details)  ordering and/or review of 3+  test(s) in this encounter (see separate area of note for details)    The patient's management necessitated moderate risk (prescription drug management including medications given in the ED).        New Prescriptions    No medications on file       Final diagnoses:   Cannabinoid hyperemesis syndrome - I suspect this is the cause of your pain,. Please follow-up in clinic - the clinic should make a ptrovider available for you in the absence of your desiugnated provider.  Consider gastroenterology in follow-up.       10/13/2023   [facility] EMERGENCY DEPT       Jose Dallas MD  10/13/23 [id number]

## 2023-10-13 NOTE — ED NOTES
"Pt used called light and writer went into pt's room.  Pt knitting.  Pt offered medication prescribed by provider. pt states \" it's not FDA approved for pain\"   pt states \" it won't be good for you or I if my  comes in here\"   Advised pt that provider spoke to pt about not treating cyclic vomiting symptoms with narcotics.    Pt states \" you all treat me like a drug seeker and that's the only thing will work\"    "

## 2023-10-13 NOTE — DISCHARGE INSTRUCTIONS
ICD-10-CM    1. Cannabinoid hyperemesis syndrome  R11.2     F12.90     I suspect this is the cause of your pain,. Please follow-up in clinic - the clinic should make a ptrovider available for you in the absence of your desiugnated provider.  Consider gastroenterology in follow-up.

## 2023-10-13 NOTE — TELEPHONE ENCOUNTER
Pt calling to report continual vomiting. Pt stated that she was seen in [facility] ED and was discharged home 10/12/23.    Pt reports hiatal hernia pain when vomiting and chest pain while vomiting, dizziness/lightheadedness, nausea. Pt also reports that she has not been eating. Pt can be heard vomiting/gagging over the phone with RN. Pt advised to be seen in nearest ED to be further evaluated as her symptoms are persistent and have not resolved.     Pt verbalized good understanding and will be seen at nearest ED to be further evaluated.     Ambreen Yanes RN on 10/13/2023 at 11:58 AM

## 2023-11-04 ENCOUNTER — HEALTH MAINTENANCE LETTER (OUTPATIENT)
Age: 48
End: 2023-11-04

## 2023-11-07 ENCOUNTER — HOSPITAL ENCOUNTER (EMERGENCY)
Facility: CLINIC | Age: 48
Discharge: HOME | End: 2023-11-07
Attending: EMERGENCY MEDICINE | Admitting: EMERGENCY MEDICINE
Payer: COMMERCIAL

## 2023-11-07 VITALS
RESPIRATION RATE: 16 BRPM | TEMPERATURE: 98 F | OXYGEN SATURATION: 93 % | WEIGHT: 125 LBS | DIASTOLIC BLOOD PRESSURE: 81 MMHG | HEART RATE: 77 BPM | SYSTOLIC BLOOD PRESSURE: 120 MMHG | BODY MASS INDEX: 23 KG/M2 | HEIGHT: 62 IN

## 2023-11-07 DIAGNOSIS — R10.9 CHRONIC ABDOMINAL PAIN: ICD-10-CM

## 2023-11-07 DIAGNOSIS — G89.29 CHRONIC ABDOMINAL PAIN: ICD-10-CM

## 2023-11-07 DIAGNOSIS — K92.0 HEMATEMESIS WITH NAUSEA: ICD-10-CM

## 2023-11-07 LAB
ALBUMIN SERPL BCG-MCNC: 4.7 G/DL (ref 3.5–5.2)
ALP SERPL-CCNC: 82 U/L (ref 35–104)
ALT SERPL W P-5'-P-CCNC: 8 U/L (ref 0–50)
ANION GAP SERPL CALCULATED.3IONS-SCNC: 15 MMOL/L (ref 7–15)
AST SERPL W P-5'-P-CCNC: 15 U/L (ref 0–45)
BASOPHILS # BLD AUTO: 0 10E3/UL (ref 0–0.2)
BASOPHILS NFR BLD AUTO: 0 %
BILIRUB SERPL-MCNC: 0.4 MG/DL
BUN SERPL-MCNC: 8.6 MG/DL (ref 6–20)
CALCIUM SERPL-MCNC: 8.9 MG/DL (ref 8.6–10)
CHLORIDE SERPL-SCNC: 104 MMOL/L (ref 98–107)
CREAT SERPL-MCNC: 0.67 MG/DL (ref 0.51–0.95)
DEPRECATED HCO3 PLAS-SCNC: 18 MMOL/L (ref 22–29)
EGFRCR SERPLBLD CKD-EPI 2021: >90 ML/MIN/1.73M2
EOSINOPHIL # BLD AUTO: 0.1 10E3/UL (ref 0–0.7)
EOSINOPHIL NFR BLD AUTO: 1 %
ERYTHROCYTE [DISTWIDTH] IN BLOOD BY AUTOMATED COUNT: 13.2 % (ref 10–15)
GLUCOSE SERPL-MCNC: 96 MG/DL (ref 70–99)
HCT VFR BLD AUTO: 44.7 % (ref 35–47)
HGB BLD-MCNC: 15.6 G/DL (ref 11.7–15.7)
HOLD SPECIMEN: NORMAL
HOLD SPECIMEN: NORMAL
IMM GRANULOCYTES # BLD: 0 10E3/UL
IMM GRANULOCYTES NFR BLD: 0 %
LIPASE SERPL-CCNC: 23 U/L (ref 13–60)
LYMPHOCYTES # BLD AUTO: 3.9 10E3/UL (ref 0.8–5.3)
LYMPHOCYTES NFR BLD AUTO: 38 %
MCH RBC QN AUTO: 32.9 PG (ref 26.5–33)
MCHC RBC AUTO-ENTMCNC: 34.9 G/DL (ref 31.5–36.5)
MCV RBC AUTO: 94 FL (ref 78–100)
MONOCYTES # BLD AUTO: 0.7 10E3/UL (ref 0–1.3)
MONOCYTES NFR BLD AUTO: 7 %
NEUTROPHILS # BLD AUTO: 5.4 10E3/UL (ref 1.6–8.3)
NEUTROPHILS NFR BLD AUTO: 54 %
NRBC # BLD AUTO: 0 10E3/UL
NRBC BLD AUTO-RTO: 0 /100
PLATELET # BLD AUTO: 255 10E3/UL (ref 150–450)
POTASSIUM SERPL-SCNC: 3.7 MMOL/L (ref 3.4–5.3)
PROT SERPL-MCNC: 7.2 G/DL (ref 6.4–8.3)
RBC # BLD AUTO: 4.74 10E6/UL (ref 3.8–5.2)
SODIUM SERPL-SCNC: 137 MMOL/L (ref 135–145)
WBC # BLD AUTO: 10.2 10E3/UL (ref 4–11)

## 2023-11-07 PROCEDURE — 258N000003 HC RX IP 258 OP 636: Performed by: EMERGENCY MEDICINE

## 2023-11-07 PROCEDURE — 99284 EMERGENCY DEPT VISIT MOD MDM: CPT | Mod: 25

## 2023-11-07 PROCEDURE — 96374 THER/PROPH/DIAG INJ IV PUSH: CPT

## 2023-11-07 PROCEDURE — 96361 HYDRATE IV INFUSION ADD-ON: CPT

## 2023-11-07 PROCEDURE — 36415 COLL VENOUS BLD VENIPUNCTURE: CPT | Performed by: EMERGENCY MEDICINE

## 2023-11-07 PROCEDURE — 250N000011 HC RX IP 250 OP 636: Mod: JZ | Performed by: EMERGENCY MEDICINE

## 2023-11-07 PROCEDURE — 80053 COMPREHEN METABOLIC PANEL: CPT | Performed by: EMERGENCY MEDICINE

## 2023-11-07 PROCEDURE — C9113 INJ PANTOPRAZOLE SODIUM, VIA: HCPCS | Mod: JZ | Performed by: EMERGENCY MEDICINE

## 2023-11-07 PROCEDURE — 85025 COMPLETE CBC W/AUTO DIFF WBC: CPT | Performed by: EMERGENCY MEDICINE

## 2023-11-07 PROCEDURE — 99285 EMERGENCY DEPT VISIT HI MDM: CPT | Performed by: EMERGENCY MEDICINE

## 2023-11-07 PROCEDURE — 96375 TX/PRO/DX INJ NEW DRUG ADDON: CPT

## 2023-11-07 PROCEDURE — 83690 ASSAY OF LIPASE: CPT | Performed by: EMERGENCY MEDICINE

## 2023-11-07 RX ORDER — MORPHINE SULFATE 4 MG/ML
4 INJECTION, SOLUTION INTRAMUSCULAR; INTRAVENOUS ONCE
Status: COMPLETED | OUTPATIENT
Start: 2023-11-07 | End: 2023-11-07

## 2023-11-07 RX ADMIN — PANTOPRAZOLE SODIUM 40 MG: 40 INJECTION, POWDER, FOR SOLUTION INTRAVENOUS at 16:43

## 2023-11-07 RX ADMIN — PROMETHAZINE HYDROCHLORIDE 12.5 MG: 25 INJECTION INTRAMUSCULAR; INTRAVENOUS at 17:08

## 2023-11-07 RX ADMIN — SODIUM CHLORIDE, POTASSIUM CHLORIDE, SODIUM LACTATE AND CALCIUM CHLORIDE 1000 ML: 600; 310; 30; 20 INJECTION, SOLUTION INTRAVENOUS at 16:40

## 2023-11-07 RX ADMIN — MORPHINE SULFATE 4 MG: 4 INJECTION, SOLUTION INTRAMUSCULAR; INTRAVENOUS at 16:41

## 2023-11-07 ASSESSMENT — ACTIVITIES OF DAILY LIVING (ADL): ADLS_ACUITY_SCORE: 35

## 2023-11-07 NOTE — ED PROVIDER NOTES
History     Chief Complaint   Patient presents with    Abdominal Pain     Abd pain started last night about midnight, nausea and vomiting started at about 6 am today, now vomiting up pea sized blood clots.      HPI  Meli Rodriguez is a 48 year old female with complex past medical history notable for acute on chronic abdominal pain, nausea, vomiting, hematemesis, polysubstance abuse, cyclical vomiting, 17 medication allergies, with abdominal pain in the epigastric right upper quadrant which began last night and persistent nausea vomiting and small blood clots.  She did not want to come in but her  called the ambulance.  She is very frustrated with care in emergency department.  Says that she has restricted to a primary care provider and she is on maternity leave and not be able to see her until January.  Has been taking metronidazole but she thinks they are viral.  Because she cannot get any medications accepted her primary provider.  Hoping to get her nausea under control and her pain under control and would like to go home.    EGD in 2017 w/ LA grade A esophagitis. No mention of varices. Numerous unremarkable abdominal CTs.      Rare alcohol use, none recently. Stopped using NSAIDS in 2017 ulceration. Lives at home with .      GI clinic note from 5/2/23 reviewed. recommended salon pas or voltaren 1% to upper abdomen.     The patient's PMHx, Surgical Hx, Allergies, and Medications were all reviewed with the patient.    Allergies:  Allergies   Allergen Reactions    Butalbital-Aspirin-Caffeine      Increases headaches    Demerol Itching    Droperidol Other (See Comments)     twitching      Hydroxyzine Unknown     Refuses IM numerous times stating that it does not work    Ketorolac Tromethamine Itching    Methocarbamol Hives    Metoclopramide Other (See Comments)     Cause acute dystonic reaction    Nicotine      Intolerant to    Nsaids Other (See Comments)     Gastric ulcer    Pramipexole      Ill feel     Rizatriptan      Ill feel    Ropinirole      Increases RLS    Trazodone Other (See Comments)     Out of body sense     Bad dreams    Triptans Itching     vomits    Adhesive Tape Rash    Compazine [Prochlorperazine] Rash     Arm red and rash    Diphenhydramine Anxiety     Jittery and jumpy       Problem List:    Patient Active Problem List    Diagnosis Date Noted    Gastroenteritis 09/12/2023     Priority: Medium    Lower abdominal pain 07/10/2023     Priority: Medium    Hematemesis with nausea 07/10/2023     Priority: Medium    Borderline intellectual functioning 11/06/2015     Priority: Medium    ADD (attention deficit disorder) 02/18/2014     Priority: Medium    Chondromalacia of patella 12/11/2013     Priority: Medium    Drug-seeking behavior 12/06/2013     Priority: Medium    Health Care Home 12/03/2013     Priority: Medium       Status:  Unable to reach    -See Letters for H Care Plan - Emergency Care Plan          Cannabis abuse 07/03/2013     Priority: Medium    Polysubstance abuse (H) 12/04/2012     Priority: Medium    Social phobia: with panic attacks 07/23/2012     Priority: Medium    Moderate recurrent major depression: per psychiatry consult 07/23/2012     Priority: Medium    Prescription Opioid and benzodiazepine abuse 07/23/2012     Priority: Medium    Contusion of duodenum 06/26/2012     Priority: Medium    Vitamin D deficiency 06/26/2012     Priority: Medium    At risk for osteoporosis 06/26/2012     Priority: Medium    Hematochezia 06/26/2012     Priority: Medium    Abdominal pain, unspecified abdominal location 06/25/2012     Priority: Medium     Problem list name updated by automated process. Provider to review      Panic attack 05/22/2012     Priority: Medium    Acquired absence of both cervix and uterus 09/16/2011     Priority: Medium    Colitis 05/16/2011     Priority: Medium    Outbursts of anger 12/14/2009     Priority: Medium    Gastroesophageal reflux disease 06/02/2009     Priority:  Medium     Formatting of this note might be different from the original.  Esophagitis- EGD 2017. PPI not helpful. No further follow up.      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    Bipolar 2 disorder (H)     Carpal tunnel syndrome     Colitis     Depression     Depressive disorder     Drug-seeking behavior     Drug-seeking behavior     GERD (gastroesophageal reflux disease)     Head injury     Irritable bowel syndrome (IBS)     Kidney infection     Lower urinary tract infection 2/18/2011    Migraine     Migraine     Polysubstance abuse (H) 12/4/2012    PTSD (post-traumatic stress disorder)     PTSD (post-traumatic stress disorder)     Thyroid disease     Ulcer     Urinary retention        Past Surgical History:    Past Surgical History:   Procedure Laterality Date    APPENDECTOMY      APPENDECTOMY      CHOLECYSTECTOMY  01/01/2005    CHOLECYSTECTOMY      HYSTERECTOMY      with ophorectomy bilateral    HYSTERECTOMY TOTAL ABDOMINAL, BILATERAL SALPINGO-OOPHORECTOMY, COMBINED  01/01/2001    bleeding ovarian cysts    HYSTERECTOMY, PAP NO LONGER INDICATED      HYSTERECTOMY, PAP NO LONGER INDICATED      TONSILLECTOMY, ADENOIDECTOMY, MYRINGOTOMY, INSERT TUBE BILATERAL, COMBINED      TOTAL VAGINAL HYSTERECTOMY      URETHRA SURGERY      temporary stent    URETHRA SURGERY         Family History:    Family History   Problem Relation Age of Onset    Unknown/Adopted Father     Chronic Obstructive Pulmonary Disease Mother     Aneurysm Maternal Grandmother     Cerebrovascular Disease Maternal Grandfather     Breast Cancer No family hx of     Cancer No family hx of   "   Diabetes No family hx of        Social History:  Marital Status:   [2]  Social History     Tobacco Use    Smoking status: Every Day     Packs/day: 0.50     Years: 20.00     Additional pack years: 0.00     Total pack years: 10.00     Types: Cigarettes    Smokeless tobacco: Never   Substance Use Topics    Alcohol use: No    Drug use: No     Comment: Denies, past marijuana        Medications:    omeprazole (PRILOSEC) 20 MG DR capsule  ondansetron (ZOFRAN ODT) 4 MG ODT tab          Review of Systems  Pertinent positives and negatives mentioned in HPI    Physical Exam   BP: (!) 127/91  Pulse: 89  Temp: 98  F (36.7  C)  Resp: 16  Height: 157.5 cm (5' 2\")  Weight: 56.7 kg (125 lb)  SpO2: 97 %    Physical Exam  GEN: acutely distressed  HENT: MMM. Dental caries  EYES: EOM intact. Conjunctiva clear.  CV : Regular rate and rhythm.  PULM: Normal effort. Speaking in full sentences,   ABD: epigastric/ruq tenderness. No rebound or guarding.   NEURO: Normal speech. Following commands. CN II-XII grossly intact. Answering questions and interacting appropriately.   EXT: No gross deformity. Warm and well perfused.  INT: Warm. No diaphoresis. Normal color.     ED Course        Procedures                 Critical Care time:  none               Results for orders placed or performed during the hospital encounter of 11/07/23 (from the past 24 hour(s))   CBC with platelets, differential    Narrative    The following orders were created for panel order CBC with platelets, differential.  Procedure                               Abnormality         Status                     ---------                               -----------         ------                     CBC with platelets and d...[[id number]]                      Final result                 Please view results for these tests on the individual orders.   Comprehensive metabolic panel   Result Value Ref Range    Sodium 137 135 - 145 mmol/L    Potassium 3.7 3.4 - 5.3 mmol/L    " Carbon Dioxide (CO2) 18 (L) 22 - 29 mmol/L    Anion Gap 15 7 - 15 mmol/L    Urea Nitrogen 8.6 6.0 - 20.0 mg/dL    Creatinine 0.67 0.51 - 0.95 mg/dL    GFR Estimate >90 >60 mL/min/1.73m2    Calcium 8.9 8.6 - 10.0 mg/dL    Chloride 104 98 - 107 mmol/L    Glucose 96 70 - 99 mg/dL    Alkaline Phosphatase 82 35 - 104 U/L    AST 15 0 - 45 U/L    ALT 8 0 - 50 U/L    Protein Total 7.2 6.4 - 8.3 g/dL    Albumin 4.7 3.5 - 5.2 g/dL    Bilirubin Total 0.4 <=1.2 mg/dL   Lipase   Result Value Ref Range    Lipase 23 13 - 60 U/L   [facility] Draw    Narrative    The following orders were created for panel order [facility] Draw.  Procedure                               Abnormality         Status                     ---------                               -----------         ------                     Extra Blue Top Tube[[id number]]                              Final result               Extra Red Top Tube[[id number]]                               Final result                 Please view results for these tests on the individual orders.   CBC with platelets and differential   Result Value Ref Range    WBC Count 10.2 4.0 - 11.0 10e3/uL    RBC Count 4.74 3.80 - 5.20 10e6/uL    Hemoglobin 15.6 11.7 - 15.7 g/dL    Hematocrit 44.7 35.0 - 47.0 %    MCV 94 78 - 100 fL    MCH 32.9 26.5 - 33.0 pg    MCHC 34.9 31.5 - 36.5 g/dL    RDW 13.2 10.0 - 15.0 %    Platelet Count 255 150 - 450 10e3/uL    % Neutrophils 54 %    % Lymphocytes 38 %    % Monocytes 7 %    % Eosinophils 1 %    % Basophils 0 %    % Immature Granulocytes 0 %    NRBCs per 100 WBC 0 <1 /100    Absolute Neutrophils 5.4 1.6 - 8.3 10e3/uL    Absolute Lymphocytes 3.9 0.8 - 5.3 10e3/uL    Absolute Monocytes 0.7 0.0 - 1.3 10e3/uL    Absolute Eosinophils 0.1 0.0 - 0.7 10e3/uL    Absolute Basophils 0.0 0.0 - 0.2 10e3/uL    Absolute Immature Granulocytes 0.0 <=0.4 10e3/uL    Absolute NRBCs 0.0 10e3/uL   Extra Blue Top Tube   Result Value Ref Range    Hold Specimen J[facility]    Extra Red Top Tube    Result Value Ref Range    Hold Specimen [facility]        Medications   pantoprazole (PROTONIX) IV push injection 40 mg (40 mg Intravenous $Given 11/7/23 1643)   lactated ringers BOLUS 1,000 mL (1,000 mLs Intravenous $New Bag 11/7/23 1640)   morphine (PF) injection 4 mg (4 mg Intravenous $Given 11/7/23 1641)   promethazine (PHENERGAN) 12.5 mg in sodium chloride 0.9 % 55 mL intermittent infusion (12.5 mg Intravenous $Given 11/7/23 1708)       Assessments & Plan (with Medical Decision Making)   48 year old female with past medical history notable for polysubstance abuse, acute on chronic abdominal pain, cyclical vomiting, multiple medication allergies with 1 day of epigastric/right quadrant pain nausea vomiting and hematemesis.  Has been seen many times in this department for similar.  Appears distressed secondary to pain and vomiting.  Has been treated for hematemesis previously unfortunately patient is restricted and has limited providers available.  Seeing GI through [facility].  Do not see recent EGD most recently from 2017 with esophagitis.  Has had numerous CT scans which have been unremarkable.  Symptoms controlled with 1 L lactated Ringer's solution, 4 mg IV of morphine and 12.5 mg IV Phenergan.  This was listed on her allergy list but I see where she has received and seemed to tolerate it okay.  This worked well for her and symptomatically significantly improved.  Was comfortable going home and following up with her primary provider as she previously planned.  I did speak to our pharmacy to see if it would be possible to prescribe her medications from the ED given her restrictions but was told insurance would not cover that she has not had 6 previous refill to her pharmacy.  Recommend that she continues to take omeprazole.  ED return precautions discussed.         I have reviewed the nursing notes.         New Prescriptions    No medications on file       Final diagnoses:   Hematemesis with nausea    Chronic abdominal pain     Nicholas Núñez MD        11/7/2023   [facility] EMERGENCY DEPT    Disclaimer: This note consists of words and symbols derived from keyboarding and dictation using voice recognition software.  As a result, there may be errors that have gone undetected.  Please consider this when interpreting information found in this note.               Nicholas Núñez MD  11/07/23 6779

## 2023-11-07 NOTE — ED TRIAGE NOTES
Abd pain started last night about midnight, nausea and vomiting started at about 6 am today, now vomiting up pea sized blood clots.      Triage Assessment (Adult)       Row Name 11/07/23 1522          Triage Assessment    Airway WDL WDL        Cardiac WDL    Cardiac WDL WDL        Cognitive/Neuro/Behavioral WDL    Cognitive/Neuro/Behavioral WDL WDL

## 2023-11-08 NOTE — DISCHARGE INSTRUCTIONS
Phenergan seemed to work well for your nausea. This is on your allergy list but you seemed to tolerate it well.     I wish that I could prescribe this for you but as you pointed out, your provider and pharmacy restriction are troublesome.     Please follow up with your primary provider as planned.     If your symptoms worsen or you develop new or concerning symptoms, please return to the Emergency Department for further evaluation and treatment.      I hope you feel better soon.

## 2023-12-25 ENCOUNTER — HOSPITAL ENCOUNTER (EMERGENCY)
Facility: CLINIC | Age: 48
Discharge: HOME | End: 2023-12-25
Attending: EMERGENCY MEDICINE | Admitting: EMERGENCY MEDICINE
Payer: COMMERCIAL

## 2023-12-25 VITALS
RESPIRATION RATE: 30 BRPM | OXYGEN SATURATION: 98 % | DIASTOLIC BLOOD PRESSURE: 81 MMHG | BODY MASS INDEX: 22.15 KG/M2 | HEART RATE: 75 BPM | WEIGHT: 125 LBS | TEMPERATURE: 98.3 F | HEIGHT: 63 IN | SYSTOLIC BLOOD PRESSURE: 116 MMHG

## 2023-12-25 DIAGNOSIS — R07.9 ACUTE CHEST PAIN: ICD-10-CM

## 2023-12-25 LAB
ALBUMIN SERPL BCG-MCNC: 3.8 G/DL (ref 3.5–5.2)
ALP SERPL-CCNC: 62 U/L (ref 40–150)
ALT SERPL W P-5'-P-CCNC: 6 U/L (ref 0–50)
ANION GAP SERPL CALCULATED.3IONS-SCNC: 8 MMOL/L (ref 7–15)
AST SERPL W P-5'-P-CCNC: 14 U/L (ref 0–45)
BASOPHILS # BLD AUTO: 0 10E3/UL (ref 0–0.2)
BASOPHILS NFR BLD AUTO: 1 %
BILIRUB SERPL-MCNC: 0.3 MG/DL
BUN SERPL-MCNC: 7.7 MG/DL (ref 6–20)
CALCIUM SERPL-MCNC: 8.6 MG/DL (ref 8.6–10)
CHLORIDE SERPL-SCNC: 102 MMOL/L (ref 98–107)
CREAT SERPL-MCNC: 0.64 MG/DL (ref 0.51–0.95)
DEPRECATED HCO3 PLAS-SCNC: 27 MMOL/L (ref 22–29)
EGFRCR SERPLBLD CKD-EPI 2021: >90 ML/MIN/1.73M2
EOSINOPHIL # BLD AUTO: 0.1 10E3/UL (ref 0–0.7)
EOSINOPHIL NFR BLD AUTO: 2 %
ERYTHROCYTE [DISTWIDTH] IN BLOOD BY AUTOMATED COUNT: 13.2 % (ref 10–15)
GLUCOSE SERPL-MCNC: 107 MG/DL (ref 70–99)
HCT VFR BLD AUTO: 37.9 % (ref 35–47)
HGB BLD-MCNC: 13.1 G/DL (ref 11.7–15.7)
HOLD SPECIMEN: NORMAL
IMM GRANULOCYTES # BLD: 0 10E3/UL
IMM GRANULOCYTES NFR BLD: 0 %
LIPASE SERPL-CCNC: 12 U/L (ref 13–60)
LYMPHOCYTES # BLD AUTO: 2.4 10E3/UL (ref 0.8–5.3)
LYMPHOCYTES NFR BLD AUTO: 31 %
MCH RBC QN AUTO: 32.9 PG (ref 26.5–33)
MCHC RBC AUTO-ENTMCNC: 34.6 G/DL (ref 31.5–36.5)
MCV RBC AUTO: 95 FL (ref 78–100)
MONOCYTES # BLD AUTO: 0.8 10E3/UL (ref 0–1.3)
MONOCYTES NFR BLD AUTO: 10 %
NEUTROPHILS # BLD AUTO: 4.5 10E3/UL (ref 1.6–8.3)
NEUTROPHILS NFR BLD AUTO: 56 %
NRBC # BLD AUTO: 0 10E3/UL
NRBC BLD AUTO-RTO: 0 /100
PLATELET # BLD AUTO: 206 10E3/UL (ref 150–450)
POTASSIUM SERPL-SCNC: 4.1 MMOL/L (ref 3.4–5.3)
PROT SERPL-MCNC: 6.1 G/DL (ref 6.4–8.3)
RBC # BLD AUTO: 3.98 10E6/UL (ref 3.8–5.2)
SODIUM SERPL-SCNC: 137 MMOL/L (ref 135–145)
TROPONIN T SERPL HS-MCNC: <6 NG/L
WBC # BLD AUTO: 7.9 10E3/UL (ref 4–11)

## 2023-12-25 PROCEDURE — 85025 COMPLETE CBC W/AUTO DIFF WBC: CPT | Performed by: EMERGENCY MEDICINE

## 2023-12-25 PROCEDURE — 80053 COMPREHEN METABOLIC PANEL: CPT | Performed by: EMERGENCY MEDICINE

## 2023-12-25 PROCEDURE — 99283 EMERGENCY DEPT VISIT LOW MDM: CPT | Performed by: EMERGENCY MEDICINE

## 2023-12-25 PROCEDURE — 83690 ASSAY OF LIPASE: CPT | Performed by: EMERGENCY MEDICINE

## 2023-12-25 PROCEDURE — 84484 ASSAY OF TROPONIN QUANT: CPT | Performed by: EMERGENCY MEDICINE

## 2023-12-25 PROCEDURE — 99284 EMERGENCY DEPT VISIT MOD MDM: CPT | Performed by: EMERGENCY MEDICINE

## 2023-12-25 PROCEDURE — 36415 COLL VENOUS BLD VENIPUNCTURE: CPT | Performed by: EMERGENCY MEDICINE

## 2023-12-25 ASSESSMENT — ACTIVITIES OF DAILY LIVING (ADL): ADLS_ACUITY_SCORE: 35

## 2023-12-25 NOTE — ED TRIAGE NOTES
Patient reports chest pain since ~ 10 AM today. Patient believed it was just from smoking. ~ 1400 after dinner vomited x 3, and chest pain got worse. Can't take a deep breath.      Triage Assessment (Adult)       Row Name 12/25/23 3339          Triage Assessment    Airway WDL WDL        Respiratory WDL    Respiratory WDL WDL        Skin Circulation/Temperature WDL    Skin Circulation/Temperature WDL WDL        Cardiac WDL    Cardiac WDL X;chest pain     Cardiac Rhythm NSR        Chest Pain Assessment    Chest Pain Location anterior chest, left;anterior chest, right     Character aching     Chest Pain Intervention 12-lead ECG obtained        Peripheral/Neurovascular WDL    Peripheral Neurovascular WDL WDL        Cognitive/Neuro/Behavioral WDL    Cognitive/Neuro/Behavioral WDL WDL

## 2023-12-26 NOTE — ED PROVIDER NOTES
History     Chief Complaint   Patient presents with    Chest Pain     HPI  History per patient, review of [facility] EMR and Care Everywhere EMR.    Meli Rodriguez is a 48 year old female with history of depression, anxiety, bipolar affective disorder, antisocial personality disorder, ADHD, polysubstance abuse (including opioid and benzodiazepine abuse), drug-seeking behavior, borderline intellectual functioning, GERD and irritable bowel syndrome who presents by EMS for acute on chronic recurring chest pain.  Onset of chest pain at approximately 10 AM this morning and then associated with vomiting x 3 after eating dinner, with worsening chest pain.  She is anxious and having a hard time breathing, but has had no URI signs or symptoms, cough, hemoptysis or leg pain or leg swelling.  No back or flank pain.  No abdominal pain.  No fever or chills. She did not take anything for pain relief.  She has had numerous emergency department evaluations for chest pain and abdominal pain with extensive evaluations including multiple CT imaging studies    Previous abdominal surgeries: Status postcholecystectomy, status post appendectomy    Previous Records Reviewed:  CT chest/abdomen/pelvis with IV contrast - 8/2/2023  IMPRESSION:  1.  No etiology for symptoms evident.  2.  Prior cholecystectomy with hepatobiliary system otherwise unremarkable.  3.  Right kidney negative. No acute inflammatory changes of bowel.  4.  Colonic submucosal fat deposition nonspecific but can be seen in patients with history of inflammatory bowel disease.    CTA CHEST ABDOMEN PELVIS - 2/21/2019      INDICATION: Chest pain. Evaluate for aortic dissection.     FINDINGS:  CT ANGIOGRAM CHEST, ABDOMEN, AND PELVIS: The thoracic and abdominal aorta are negative for dissection or aneurysm. Incidental aberrant right subclavian artery. Minimal atherosclerotic disease abdominal aorta. The celiac trunk, superior mesenteric   artery,   both renal arteries, the inferior  mesenteric artery, and the right and left common iliac arteries are all widely patent. The right and left main pulmonary arteries and segmental vessels are patent. No evidence for emboli.     CHEST:  LUNGS AND PLEURA: Very mild emphysema with a few tiny subpleural blebs. The lungs otherwise are clear. No infiltrates or effusions.     MEDIASTINUM: No enlarged mediastinal or hilar lymph nodes.     ABDOMEN: The liver, spleen, pancreas, adrenal glands and kidneys are unremarkable. Cholecystectomy. No adenopathy.     PELVIS: No ascites or adenopathy. No evidence of bowel obstruction.     MUSCULOSKELETAL: Negative.     IMPRESSION:  CONCLUSION:  1.  No evidence for aortic dissection.  2.  No evidence for pulmonary emboli.  3.  No evidence for acute pulmonary disease.    Allergies:  Allergies   Allergen Reactions    Butalbital-Aspirin-Caffeine      Increases headaches    Demerol Itching    Droperidol Other (See Comments)     twitching      Hydroxyzine Unknown     Refuses IM numerous times stating that it does not work    Ketorolac Tromethamine Itching    Methocarbamol Hives    Metoclopramide Other (See Comments)     Cause acute dystonic reaction    Nicotine      Intolerant to    Nsaids Other (See Comments)     Gastric ulcer    Pramipexole      Ill feel    Rizatriptan      Ill feel    Ropinirole      Increases RLS    Trazodone Other (See Comments)     Out of body sense     Bad dreams    Triptans Itching     vomits    Adhesive Tape Rash    Compazine [Prochlorperazine] Rash     Arm red and rash    Diphenhydramine Anxiety     Jittery and jumpy       Problem List:    Patient Active Problem List    Diagnosis Date Noted    Gastroenteritis 09/12/2023     Priority: Medium    Lower abdominal pain 07/10/2023     Priority: Medium    Hematemesis with nausea 07/10/2023     Priority: Medium    Borderline intellectual functioning 11/06/2015     Priority: Medium    ADD (attention deficit disorder) 02/18/2014     Priority: Medium     Chondromalacia of patella 12/11/2013     Priority: Medium    Drug-seeking behavior 12/06/2013     Priority: Medium    Health Care Home 12/03/2013     Priority: Medium       Status:  Unable to reach    -See Letters for H Care Plan - Emergency Care Plan          Cannabis abuse 07/03/2013     Priority: Medium    Polysubstance abuse (H) 12/04/2012     Priority: Medium    Social phobia: with panic attacks 07/23/2012     Priority: Medium    Moderate recurrent major depression: per psychiatry consult 07/23/2012     Priority: Medium    Prescription Opioid and benzodiazepine abuse 07/23/2012     Priority: Medium    Contusion of duodenum 06/26/2012     Priority: Medium    Vitamin D deficiency 06/26/2012     Priority: Medium    At risk for osteoporosis 06/26/2012     Priority: Medium    Hematochezia 06/26/2012     Priority: Medium    Abdominal pain, unspecified abdominal location 06/25/2012     Priority: Medium     Problem list name updated by automated process. Provider to review      Panic attack 05/22/2012     Priority: Medium    Acquired absence of both cervix and uterus 09/16/2011     Priority: Medium    Colitis 05/16/2011     Priority: Medium    Outbursts of anger 12/14/2009     Priority: Medium    Gastroesophageal reflux disease 06/02/2009     Priority: Medium     Formatting of this note might be different from the original.  Esophagitis- EGD 2017. PPI not helpful. No further follow up.      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    Bipolar 2 disorder (H)     Carpal tunnel syndrome     Colitis     Depression     Depressive disorder     Drug-seeking  behavior     Drug-seeking behavior     GERD (gastroesophageal reflux disease)     Head injury     Irritable bowel syndrome (IBS)     Kidney infection     Lower urinary tract infection 2/18/2011    Migraine     Migraine     Polysubstance abuse (H) 12/4/2012    PTSD (post-traumatic stress disorder)     PTSD (post-traumatic stress disorder)     Thyroid disease     Ulcer     Urinary retention    Depression, anxiety, antisocial personality disorder, ADHD, polysubstance abuse, drug-seeking behavior, GERD, irritable bowel syndrome,    Past Surgical History:    Past Surgical History:   Procedure Laterality Date    APPENDECTOMY      APPENDECTOMY      CHOLECYSTECTOMY  01/01/2005    CHOLECYSTECTOMY      HYSTERECTOMY      with ophorectomy bilateral    HYSTERECTOMY TOTAL ABDOMINAL, BILATERAL SALPINGO-OOPHORECTOMY, COMBINED  01/01/2001    bleeding ovarian cysts    HYSTERECTOMY, PAP NO LONGER INDICATED      HYSTERECTOMY, PAP NO LONGER INDICATED      TONSILLECTOMY, ADENOIDECTOMY, MYRINGOTOMY, INSERT TUBE BILATERAL, COMBINED      TOTAL VAGINAL HYSTERECTOMY      URETHRA SURGERY      temporary stent    URETHRA SURGERY         Family History:    Family History   Problem Relation Age of Onset    Unknown/Adopted Father     Chronic Obstructive Pulmonary Disease Mother     Aneurysm Maternal Grandmother     Cerebrovascular Disease Maternal Grandfather     Breast Cancer No family hx of     Cancer No family hx of     Diabetes No family hx of        Social History:  Marital Status:   [2]  Social History     Tobacco Use    Smoking status: Every Day     Packs/day: 0.50     Years: 20.00     Additional pack years: 0.00     Total pack years: 10.00     Types: Cigarettes    Smokeless tobacco: Never   Substance Use Topics    Alcohol use: No    Drug use: No     Comment: Denies, past marijuana        Medications:    ondansetron (ZOFRAN ODT) 4 MG ODT tab        Review of Systems  As mentioned in the HPI, in addition focused review of systems was  "negative.     Physical Exam   BP: 116/81  Pulse: 75  Temp: 98.3  F (36.8  C)  Resp: 30  Height: 158.8 cm (5' 2.5\")  Weight: 56.7 kg (125 lb)  SpO2: 98 %      Physical Exam  Vitals and nursing note reviewed.   Constitutional:       General: She is not in acute distress.     Appearance: Normal appearance. She is well-developed. She is not ill-appearing or diaphoretic.      Comments: Anxious appearing   HENT:      Nose: Nose normal.   Eyes:      General: No scleral icterus.     Extraocular Movements: Extraocular movements intact.      Conjunctiva/sclera: Conjunctivae normal.   Neck:      Trachea: No tracheal deviation.   Cardiovascular:      Rate and Rhythm: Normal rate and regular rhythm.      Pulses: Normal pulses.      Heart sounds: Normal heart sounds. No murmur heard.     No friction rub. No gallop.   Pulmonary:      Effort: Pulmonary effort is normal. No respiratory distress.      Breath sounds: Normal breath sounds. No stridor. No wheezing, rhonchi or rales.   Musculoskeletal:         General: No swelling or tenderness. Normal range of motion.      Cervical back: Normal range of motion and neck supple.      Right lower leg: No edema.      Left lower leg: No edema.   Skin:     General: Skin is warm and dry.      Coloration: Skin is not jaundiced or pale.      Findings: No erythema or rash.   Neurological:      General: No focal deficit present.      Mental Status: She is alert and oriented to person, place, and time.   Psychiatric:         Mood and Affect: Mood normal.         Behavior: Behavior normal.       ED Course           Procedures              EKG Interpretation:      Interpreted by Fredo Lee MD  Time reviewed: Upon completion  Symptoms at time of EKG: Chest pain  Rhythm: Sinus rhythm with first-degree AV block  Rate: normal  Axis: Right axis deviation  Ectopy: none  Conduction: Prominent R in V1 and V2 normal  ST Segments/ T Waves: No acute ST-T wave changes  Q Waves: none  Comparison to prior: " EKG is unchanged from most recent EKG 10/10/2023  Clinical Impression: No acute EKG changes          Results for orders placed or performed during the hospital encounter of 12/25/23 (from the past 24 hour(s))   [place] Draw    Narrative    The following orders were created for panel order [place] Draw.  Procedure                               Abnormality         Status                     ---------                               -----------         ------                     Extra Blue Top Tube[[id number]]                              Final result               Extra Red Top Tube[[id number]]                               Final result               Extra Green Top (Lithium...[[id number]]                      Final result               Extra Purple Top Tube[[id number]]                            Final result                 Please view results for these tests on the individual orders.   Extra Blue Top Tube   Result Value Ref Range    Hold Specimen JIC    Extra Red Top Tube   Result Value Ref Range    Hold Specimen JIC    Extra Green Top (Lithium Heparin) Tube   Result Value Ref Range    Hold Specimen JIC    Extra Purple Top Tube   Result Value Ref Range    Hold Specimen JIC    CBC with platelets, differential    Narrative    The following orders were created for panel order CBC with platelets, differential.  Procedure                               Abnormality         Status                     ---------                               -----------         ------                     CBC with platelets and d...[[id number]]                      Final result                 Please view results for these tests on the individual orders.   Comprehensive metabolic panel   Result Value Ref Range    Sodium 137 135 - 145 mmol/L    Potassium 4.1 3.4 - 5.3 mmol/L    Carbon Dioxide (CO2) 27 22 - 29 mmol/L    Anion Gap 8 7 - 15 mmol/L    Urea Nitrogen 7.7 6.0 - 20.0 mg/dL    Creatinine 0.64 0.51 - 0.95 mg/dL    GFR Estimate >90 >60  mL/min/1.73m2    Calcium 8.6 8.6 - 10.0 mg/dL    Chloride 102 98 - 107 mmol/L    Glucose 107 (H) 70 - 99 mg/dL    Alkaline Phosphatase 62 40 - 150 U/L    AST 14 0 - 45 U/L    ALT 6 0 - 50 U/L    Protein Total 6.1 (L) 6.4 - 8.3 g/dL    Albumin 3.8 3.5 - 5.2 g/dL    Bilirubin Total 0.3 <=1.2 mg/dL   Troponin T, High Sensitivity   Result Value Ref Range    Troponin T, High Sensitivity <6 <=14 ng/L   CBC with platelets and differential   Result Value Ref Range    WBC Count 7.9 4.0 - 11.0 10e3/uL    RBC Count 3.98 3.80 - 5.20 10e6/uL    Hemoglobin 13.1 11.7 - 15.7 g/dL    Hematocrit 37.9 35.0 - 47.0 %    MCV 95 78 - 100 fL    MCH 32.9 26.5 - 33.0 pg    MCHC 34.6 31.5 - 36.5 g/dL    RDW 13.2 10.0 - 15.0 %    Platelet Count 206 150 - 450 10e3/uL    % Neutrophils 56 %    % Lymphocytes 31 %    % Monocytes 10 %    % Eosinophils 2 %    % Basophils 1 %    % Immature Granulocytes 0 %    NRBCs per 100 WBC 0 <1 /100    Absolute Neutrophils 4.5 1.6 - 8.3 10e3/uL    Absolute Lymphocytes 2.4 0.8 - 5.3 10e3/uL    Absolute Monocytes 0.8 0.0 - 1.3 10e3/uL    Absolute Eosinophils 0.1 0.0 - 0.7 10e3/uL    Absolute Basophils 0.0 0.0 - 0.2 10e3/uL    Absolute Immature Granulocytes 0.0 <=0.4 10e3/uL    Absolute NRBCs 0.0 10e3/uL   Lipase   Result Value Ref Range    Lipase 12 (L) 13 - 60 U/L       Medications - No data to display    She has multiple medication allergies, total 17 medication allergies or adverse reactions making it very difficult to treat her pain other than treating her with opiate analgesics.  I do not feel comfortable giving her an opiate analgesic without clear or identifiable cause of this pain.    EKG and laboratory evaluation unremarkable, I am going to add a lipase level.  I strongly considered imaging evaluation, particularly CT chest PE protocol imaging or CT with IV contrast imaging to evaluate for PE or aneurysm/dissection but my clinical gestalt for these disease processes are extremely low and I feel the risk of  "radiation exposure outweighs the benefits at present time.  I will discuss imaging evaluation with her.    6:00 PM - she is more calm, less anxious, vital signs normal, respiratory rate and heart rate normal, O2 saturation 96% on room air.    Lipase level normal.  She is upset and unhappy that her pain has not been treated and declined imaging evaluation, chest x-ray or CT chest study, and wants to go home (\"if you are not going to do anything for my pain I am going home\").  She appears inappropriately angry and not able to be redirected or reassured or discuss alternative management other than use of opiate analgesics. Her EMR reflects that she has a history of episodes of outbursts of anger, documented in her past medical history.    Assessments & Plan (with Medical Decision Making)   48 year old female with history of depression, anxiety, bipolar affective disorder, antisocial personality disorder, ADHD, polysubstance abuse (including opioid and benzodiazepine abuse), drug-seeking behavior, borderline intellectual functioning, GERD and irritable bowel syndrome who presents by EMS for acute on chronic recurring chest pain.  Onset of chest pain at approximately 10 AM this morning and then associated with vomiting x 3 after eating dinner, with worsening chest pain.  She is anxious and having a hard time breathing, but has had no URI signs or symptoms, cough, hemoptysis or leg pain or leg swelling.  No back or flank pain.  No abdominal pain.  No fever or chills. She did not take anything for pain relief.  She has had numerous emergency department evaluations for chest pain and abdominal pain with extensive evaluations including multiple CT imaging studies.  Most recent CT chest study was a CT chest/abdomen/pelvis with IV contrast 8/2/2023 which showed was unremarkable.  EKG and laboratory evaluation unremarkable. Doubt ACS/atypical ACS, aneurysm/dissection, pericarditis, PE/DVT, PTX, pneumonia or emergent GI or " hepatobiliary disease process as a cause of the pain. ?  GERD/esophagitis and/or anxiety, or musculoskeletal pain.  Chest pain appears to be a benign nature.  She is angry she did not receive an opiate analgesic in the emergency department, but has multiple allergies which prohibit use of many alternative therapies for treatment of pain for her.  Do not feel comfortable providing her an opiate analgesic and she left in an open inappropriately angry state.  She has a history of episodes of outbursts of anger documented in her past medical history.  I recommended she follow-up in her primary care clinic later this coming week and return should her symptoms worsen, or should she change her mind and wish to have further evaluation.      I have reviewed the nursing notes.    I have reviewed the findings, diagnosis, plan and need for follow up with the patient.    Medical Decision Making: Moderate complexity      Discharge Medication List as of 12/25/2023  8:46 PM          Final diagnoses:   Acute chest pain       12/25/2023   [facility] EMERGENCY DEPT       Fredo Lee MD  12/25/23 2100

## 2024-03-03 ENCOUNTER — HOSPITAL ENCOUNTER (EMERGENCY)
Facility: CLINIC | Age: 49
Discharge: HOME | End: 2024-03-03
Attending: FAMILY MEDICINE | Admitting: FAMILY MEDICINE

## 2024-03-03 ENCOUNTER — APPOINTMENT (OUTPATIENT)
Dept: CT IMAGING | Facility: CLINIC | Age: 49
End: 2024-03-03
Attending: FAMILY MEDICINE

## 2024-03-03 VITALS
DIASTOLIC BLOOD PRESSURE: 85 MMHG | SYSTOLIC BLOOD PRESSURE: 121 MMHG | OXYGEN SATURATION: 96 % | BODY MASS INDEX: 23.92 KG/M2 | WEIGHT: 130 LBS | HEIGHT: 62 IN | HEART RATE: 84 BPM | TEMPERATURE: 98.9 F | RESPIRATION RATE: 16 BRPM

## 2024-03-03 DIAGNOSIS — R10.9 ABDOMINAL PAIN, UNSPECIFIED ABDOMINAL LOCATION: ICD-10-CM

## 2024-03-03 LAB
ALBUMIN SERPL BCG-MCNC: 4.1 G/DL (ref 3.5–5.2)
ALBUMIN UR-MCNC: NEGATIVE MG/DL
ALP SERPL-CCNC: 74 U/L (ref 40–150)
ALT SERPL W P-5'-P-CCNC: 8 U/L (ref 0–50)
ANION GAP SERPL CALCULATED.3IONS-SCNC: 12 MMOL/L (ref 7–15)
APPEARANCE UR: CLEAR
AST SERPL W P-5'-P-CCNC: 15 U/L (ref 0–45)
BACTERIA #/AREA URNS HPF: ABNORMAL /HPF
BASOPHILS # BLD AUTO: 0 10E3/UL (ref 0–0.2)
BASOPHILS NFR BLD AUTO: 0 %
BILIRUB SERPL-MCNC: 0.4 MG/DL
BILIRUB UR QL STRIP: NEGATIVE
BUN SERPL-MCNC: 10 MG/DL (ref 6–20)
CALCIUM SERPL-MCNC: 8.5 MG/DL (ref 8.6–10)
CHLORIDE SERPL-SCNC: 103 MMOL/L (ref 98–107)
COLOR UR AUTO: YELLOW
CREAT SERPL-MCNC: 0.77 MG/DL (ref 0.51–0.95)
DEPRECATED HCO3 PLAS-SCNC: 23 MMOL/L (ref 22–29)
EGFRCR SERPLBLD CKD-EPI 2021: >90 ML/MIN/1.73M2
EOSINOPHIL # BLD AUTO: 0.2 10E3/UL (ref 0–0.7)
EOSINOPHIL NFR BLD AUTO: 2 %
ERYTHROCYTE [DISTWIDTH] IN BLOOD BY AUTOMATED COUNT: 13.3 % (ref 10–15)
GLUCOSE SERPL-MCNC: 88 MG/DL (ref 70–99)
GLUCOSE UR STRIP-MCNC: NEGATIVE MG/DL
HCT VFR BLD AUTO: 40.1 % (ref 35–47)
HGB BLD-MCNC: 13.7 G/DL (ref 11.7–15.7)
HGB UR QL STRIP: NEGATIVE
HOLD SPECIMEN: NORMAL
HOLD SPECIMEN: NORMAL
IMM GRANULOCYTES # BLD: 0 10E3/UL
IMM GRANULOCYTES NFR BLD: 0 %
KETONES UR STRIP-MCNC: NEGATIVE MG/DL
LEUKOCYTE ESTERASE UR QL STRIP: ABNORMAL
LIPASE SERPL-CCNC: 28 U/L (ref 13–60)
LYMPHOCYTES # BLD AUTO: 4.1 10E3/UL (ref 0–5.3)
LYMPHOCYTES NFR BLD AUTO: 38 %
MCH RBC QN AUTO: 32 PG (ref 26.5–33)
MCHC RBC AUTO-ENTMCNC: 34.2 G/DL (ref 31.5–36.5)
MCV RBC AUTO: 94 FL (ref 78–100)
MONOCYTES # BLD AUTO: 0.9 10E3/UL (ref 0–1.3)
MONOCYTES NFR BLD AUTO: 8 %
NEUTROPHILS # BLD AUTO: 5.7 10E3/UL (ref 1.6–8.3)
NEUTROPHILS NFR BLD AUTO: 52 %
NITRATE UR QL: NEGATIVE
NRBC # BLD AUTO: 0 10E3/UL
NRBC BLD AUTO-RTO: 0 /100
PH UR STRIP: 6 [PH] (ref 5–7)
PLATELET # BLD AUTO: 214 10E3/UL (ref 150–450)
POTASSIUM SERPL-SCNC: 3.9 MMOL/L (ref 3.4–5.3)
PROT SERPL-MCNC: 6.4 G/DL (ref 6.4–8.3)
RBC # BLD AUTO: 4.28 10E6/UL (ref 3.8–5.2)
RBC URINE: 2 /HPF
SODIUM SERPL-SCNC: 138 MMOL/L (ref 135–145)
SP GR UR STRIP: 1.01 (ref 1–1.03)
SQUAMOUS EPITHELIAL: 1 /HPF
UROBILINOGEN UR STRIP-MCNC: NORMAL MG/DL
WBC # BLD AUTO: 10.9 10E3/UL (ref 4–11)
WBC URINE: 12 /HPF

## 2024-03-03 PROCEDURE — 250N000011 HC RX IP 250 OP 636: Performed by: FAMILY MEDICINE

## 2024-03-03 PROCEDURE — 258N000003 HC RX IP 258 OP 636: Performed by: FAMILY MEDICINE

## 2024-03-03 PROCEDURE — 250N000013 HC RX MED GY IP 250 OP 250 PS 637: Performed by: FAMILY MEDICINE

## 2024-03-03 PROCEDURE — 96374 THER/PROPH/DIAG INJ IV PUSH: CPT | Mod: 59 | Performed by: FAMILY MEDICINE

## 2024-03-03 PROCEDURE — C9113 INJ PANTOPRAZOLE SODIUM, VIA: HCPCS | Performed by: FAMILY MEDICINE

## 2024-03-03 PROCEDURE — 74177 CT ABD & PELVIS W/CONTRAST: CPT

## 2024-03-03 PROCEDURE — 87086 URINE CULTURE/COLONY COUNT: CPT | Performed by: FAMILY MEDICINE

## 2024-03-03 PROCEDURE — 81001 URINALYSIS AUTO W/SCOPE: CPT | Performed by: FAMILY MEDICINE

## 2024-03-03 PROCEDURE — 99284 EMERGENCY DEPT VISIT MOD MDM: CPT | Performed by: FAMILY MEDICINE

## 2024-03-03 PROCEDURE — 96361 HYDRATE IV INFUSION ADD-ON: CPT | Performed by: FAMILY MEDICINE

## 2024-03-03 PROCEDURE — 83690 ASSAY OF LIPASE: CPT | Performed by: FAMILY MEDICINE

## 2024-03-03 PROCEDURE — 250N000009 HC RX 250: Performed by: FAMILY MEDICINE

## 2024-03-03 PROCEDURE — 99285 EMERGENCY DEPT VISIT HI MDM: CPT | Mod: 25 | Performed by: FAMILY MEDICINE

## 2024-03-03 PROCEDURE — 85025 COMPLETE CBC W/AUTO DIFF WBC: CPT | Performed by: FAMILY MEDICINE

## 2024-03-03 PROCEDURE — 96375 TX/PRO/DX INJ NEW DRUG ADDON: CPT | Performed by: FAMILY MEDICINE

## 2024-03-03 PROCEDURE — 36415 COLL VENOUS BLD VENIPUNCTURE: CPT | Performed by: FAMILY MEDICINE

## 2024-03-03 PROCEDURE — 80053 COMPREHEN METABOLIC PANEL: CPT | Performed by: FAMILY MEDICINE

## 2024-03-03 RX ORDER — IOPAMIDOL 755 MG/ML
64 INJECTION, SOLUTION INTRAVASCULAR ONCE
Status: COMPLETED | OUTPATIENT
Start: 2024-03-03 | End: 2024-03-03

## 2024-03-03 RX ORDER — ONDANSETRON 4 MG/1
4 TABLET, ORALLY DISINTEGRATING ORAL EVERY 8 HOURS PRN
Qty: 10 TABLET | Refills: 0 | Status: SHIPPED | OUTPATIENT
Start: 2024-03-03

## 2024-03-03 RX ORDER — MORPHINE SULFATE 4 MG/ML
5 INJECTION, SOLUTION INTRAMUSCULAR; INTRAVENOUS EVERY 30 MIN PRN
Status: DISCONTINUED | OUTPATIENT
Start: 2024-03-03 | End: 2024-03-03 | Stop reason: HOSPADM

## 2024-03-03 RX ORDER — SUCRALFATE ORAL 1 G/10ML
1 SUSPENSION ORAL ONCE
Status: COMPLETED | OUTPATIENT
Start: 2024-03-03 | End: 2024-03-03

## 2024-03-03 RX ORDER — SODIUM CHLORIDE 9 MG/ML
1000 INJECTION, SOLUTION INTRAVENOUS CONTINUOUS
Status: DISCONTINUED | OUTPATIENT
Start: 2024-03-03 | End: 2024-03-03 | Stop reason: HOSPADM

## 2024-03-03 RX ADMIN — SODIUM CHLORIDE 56 ML: 9 INJECTION, SOLUTION INTRAVENOUS at 18:42

## 2024-03-03 RX ADMIN — IOPAMIDOL 64 ML: 755 INJECTION, SOLUTION INTRAVENOUS at 18:42

## 2024-03-03 RX ADMIN — PANTOPRAZOLE SODIUM 40 MG: 40 INJECTION, POWDER, FOR SOLUTION INTRAVENOUS at 17:10

## 2024-03-03 RX ADMIN — SUCRALFATE 1 G: 1 SUSPENSION ORAL at 17:09

## 2024-03-03 RX ADMIN — SODIUM CHLORIDE 1000 ML: 9 INJECTION, SOLUTION INTRAVENOUS at 17:09

## 2024-03-03 RX ADMIN — MORPHINE SULFATE 5 MG: 4 INJECTION, SOLUTION INTRAMUSCULAR; INTRAVENOUS at 17:11

## 2024-03-03 RX ADMIN — METOCLOPRAMIDE HYDROCHLORIDE 10 MG: 5 INJECTION INTRAMUSCULAR; INTRAVENOUS at 17:11

## 2024-03-03 ASSESSMENT — ENCOUNTER SYMPTOMS
CONSTIPATION: 0
PALPITATIONS: 0
NAUSEA: 1
ABDOMINAL PAIN: 1
SHORTNESS OF BREATH: 0
FREQUENCY: 0
DYSURIA: 0
COUGH: 0
DIARRHEA: 0
SINUS PRESSURE: 0
SORE THROAT: 0
VOMITING: 1
HEADACHES: 0
CHILLS: 0
DIAPHORESIS: 0
WHEEZING: 0
FEVER: 0
BLOOD IN STOOL: 0

## 2024-03-03 ASSESSMENT — COLUMBIA-SUICIDE SEVERITY RATING SCALE - C-SSRS
1. IN THE PAST MONTH, HAVE YOU WISHED YOU WERE DEAD OR WISHED YOU COULD GO TO SLEEP AND NOT WAKE UP?: NO
2. HAVE YOU ACTUALLY HAD ANY THOUGHTS OF KILLING YOURSELF IN THE PAST MONTH?: NO
6. HAVE YOU EVER DONE ANYTHING, STARTED TO DO ANYTHING, OR PREPARED TO DO ANYTHING TO END YOUR LIFE?: NO

## 2024-03-03 ASSESSMENT — ACTIVITIES OF DAILY LIVING (ADL)
ADLS_ACUITY_SCORE: 38

## 2024-03-03 NOTE — ED PROVIDER NOTES
History     Chief Complaint   Patient presents with    Abdominal Pain     HPI  Meli Rodriguez is a 48 year old female who presents with a history of abdominal pain hematochezia osteoporosis.  History of opioid and benzodiazepine abuse.  She has attention deficit disorder gastroesophageal reflux.  Irritable bowel syndrome.  Cannabis abuse.  Polysubstance abuse.  Multiple medication allergies.  She has been seen in February twice for chest pain in December for abdominal pain.  I saw her in October 2023 with cannabinol hyperemesis.    I reviewed her [place] monitoring program data and at least under her current name and record there are no matches for controlled substances in the last year.      She is here today for left upper quadrant abdominal pain and nausea.  This was sudden onset today.  It was very focused and sharp in the left upper quadrant.  It radiated to her back.  There was no hematemesis but there was emesis.  She has never had a small bowel obstruction.  She had multiple abdominal surgeries including a hysterectomy cholecystectomy and appendectomy.  She has multiple medication intolerances that she refuses.  She does refuse any of the antipsychotics including Inapsine, Zyprexa and haloperidol.  She is willing to trial on Reglan which she has had previously.  She has no associated fever cough congestion that is new.  She does have a chronic cough likely she attributes to COPD.  She does continue to use marijuana and recognizes that this can cause cannabinol hyperemesis.  Tells me her current pain is new and different from prior episodes.     She has had a prior history of peptic ulcer disease.  She has had no blood in the stool or black tarry stools.        Allergies:  Allergies   Allergen Reactions    Butalbital-Aspirin-Caffeine      Increases headaches    Demerol Itching    Droperidol Other (See Comments)     twitching      Hydroxyzine Unknown     Refuses IM numerous times stating that it does not  work    Ketorolac Tromethamine Itching    Methocarbamol Hives    Metoclopramide Other (See Comments)     Cause acute dystonic reaction    Nicotine      Intolerant to    Nsaids Other (See Comments)     Gastric ulcer    Pramipexole      Ill feel    Rizatriptan      Ill feel    Ropinirole      Increases RLS    Trazodone Other (See Comments)     Out of body sense     Bad dreams    Triptans Itching     vomits    Adhesive Tape Rash    Compazine [Prochlorperazine] Rash     Arm red and rash    Diphenhydramine Anxiety     Jittery and jumpy       Problem List:    Patient Active Problem List    Diagnosis Date Noted    Gastroenteritis 09/12/2023     Priority: Medium    Lower abdominal pain 07/10/2023     Priority: Medium    Hematemesis with nausea 07/10/2023     Priority: Medium    Borderline intellectual functioning 11/06/2015     Priority: Medium    ADD (attention deficit disorder) 02/18/2014     Priority: Medium    Chondromalacia of patella 12/11/2013     Priority: Medium    Drug-seeking behavior 12/06/2013     Priority: Medium    Health Care Home 12/03/2013     Priority: Medium       Status:  Unable to reach    -See Letters for [facility] Care Plan - Emergency Care Plan          Cannabis abuse 07/03/2013     Priority: Medium    Polysubstance abuse (H) 12/04/2012     Priority: Medium    Social phobia: with panic attacks 07/23/2012     Priority: Medium    Moderate recurrent major depression: per psychiatry consult 07/23/2012     Priority: Medium    Prescription Opioid and benzodiazepine abuse 07/23/2012     Priority: Medium    Contusion of duodenum 06/26/2012     Priority: Medium    Vitamin D deficiency 06/26/2012     Priority: Medium    At risk for osteoporosis 06/26/2012     Priority: Medium    Hematochezia 06/26/2012     Priority: Medium    Abdominal pain, unspecified abdominal location 06/25/2012     Priority: Medium     Problem list name updated by automated process. Provider to review      Panic attack 05/22/2012     Priority:  Medium    Acquired absence of both cervix and uterus 09/16/2011     Priority: Medium    Colitis 05/16/2011     Priority: Medium    Outbursts of anger 12/14/2009     Priority: Medium    Gastroesophageal reflux disease 06/02/2009     Priority: Medium     Formatting of this note might be different from the original.  Esophagitis- EGD 2017. PPI not helpful. No further follow up.      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    Bipolar 2 disorder (H)     Carpal tunnel syndrome     Colitis     Depression     Depressive disorder     Drug-seeking behavior     Drug-seeking behavior     GERD (gastroesophageal reflux disease)     Head injury     Irritable bowel syndrome (IBS)     Kidney infection     Lower urinary tract infection 2/18/2011    Migraine     Migraine     Polysubstance abuse (H) 12/4/2012    PTSD (post-traumatic stress disorder)     PTSD (post-traumatic stress disorder)     Thyroid disease     Ulcer     Urinary retention        Past Surgical History:    Past Surgical History:   Procedure Laterality Date    APPENDECTOMY      APPENDECTOMY      CHOLECYSTECTOMY  01/01/2005    CHOLECYSTECTOMY      HYSTERECTOMY      with ophorectomy bilateral    HYSTERECTOMY TOTAL ABDOMINAL, BILATERAL SALPINGO-OOPHORECTOMY, COMBINED  01/01/2001    bleeding ovarian cysts    HYSTERECTOMY, PAP NO LONGER INDICATED      HYSTERECTOMY, PAP NO LONGER INDICATED      TONSILLECTOMY, ADENOIDECTOMY, MYRINGOTOMY, INSERT TUBE BILATERAL, COMBINED      TOTAL VAGINAL HYSTERECTOMY      URETHRA SURGERY      temporary stent    URETHRA SURGERY         Family History:    Family History   Problem  Relation Age of Onset    Unknown/Adopted Father     Chronic Obstructive Pulmonary Disease Mother     Aneurysm Maternal Grandmother     Cerebrovascular Disease Maternal Grandfather     Breast Cancer No family hx of     Cancer No family hx of     Diabetes No family hx of        Social History:  Marital Status:   [2]  Social History     Tobacco Use    Smoking status: Every Day     Packs/day: 0.50     Years: 20.00     Additional pack years: 0.00     Total pack years: 10.00     Types: Cigarettes    Smokeless tobacco: Never   Substance Use Topics    Alcohol use: No    Drug use: No     Comment: Denies, past marijuana        Medications:    ondansetron (ZOFRAN ODT) 4 MG ODT tab          Review of Systems   Constitutional:  Negative for chills, diaphoresis and fever.   HENT:  Negative for ear pain, sinus pressure and sore throat.    Eyes:  Negative for visual disturbance.   Respiratory:  Negative for cough, shortness of breath and wheezing.    Cardiovascular:  Negative for chest pain and palpitations.   Gastrointestinal:  Positive for abdominal pain, nausea and vomiting. Negative for blood in stool, constipation and diarrhea.   Genitourinary:  Negative for dysuria, frequency and urgency.   Skin:  Negative for rash.   Neurological:  Negative for headaches.   All other systems reviewed and are negative.      Physical Exam          Physical Exam  Constitutional:       General: She is in acute distress.      Appearance: She is not diaphoretic.   Eyes:      Conjunctiva/sclera: Conjunctivae normal.   Cardiovascular:      Rate and Rhythm: Normal rate and regular rhythm.      Heart sounds: No murmur heard.  Pulmonary:      Effort: Pulmonary effort is normal. No respiratory distress.      Breath sounds: No stridor. No wheezing or rhonchi.   Abdominal:      General: Abdomen is flat. There is no distension.      Palpations: Abdomen is soft. There is no mass.      Tenderness: There is abdominal tenderness. There is no guarding.    Musculoskeletal:      Cervical back: Neck supple.      Right lower leg: No edema.      Left lower leg: No edema.   Skin:     Coloration: Skin is not pale.      Findings: No rash.   Neurological:      General: No focal deficit present.      Mental Status: She is alert.      Motor: No weakness.       Pain is primarily in the left upper quadrant and across to the midepigastrium and periumbilical region.  Tender to touch CVA tenderness.      ED Course        Procedures              Critical Care time:  none               No results found for this or any previous visit (from the past 24 hour(s)).    Medications - No data to display    Assessments & Plan (with Medical Decision Making)     Meli Rodriguez is a 48 year old female   MDM:Meli Rodriguez is a 48 year old female who presents with abdominal pain and nausea acute onset that was severe.  She had no associated trauma.  There is been no blood in the stool.  There are some looser stools today.  No recent constipation does use chronic marijuana and does have the risk for cannabinol hyperemesis which she recognizes.  She has numerous medication intolerances.  Her pain was severe on presentation.  I told her I would treat the pain as an undiagnosed cause in the emergency department and would pursue a CT of the abdomen pelvis.  Her differential diagnosis includes small bowel obstruction,  peptic ulcer disease and diverticulitis.  This pain was acute and she tells me different than prior.  Nausea vomiting was not a prominent feature of this although had 1 episode.  She tells me this is different than her prior cannabinol hyperemesis.  I have ordered an initial opioid with pain but also she agreed to try Reglan which should be tolerated.  Will also run IV fluids.  I reviewed the imaging with the patient.  She wishes to return home.     I have reviewed the nursing notes.    I have reviewed the findings, diagnosis, plan and need for follow up with the patient.            Medical Decision Making  The patient's presentation was of moderate complexity (an acute illness with systemic symptoms).    The patient's evaluation involved:      The patient's management necessitated only low risk treatment.        New Prescriptions    No medications on file       Final diagnoses:   None       3/3/2024   [facility] EMERGENCY DEPT       Jose Dallas MD  03/04/24 0226

## 2024-03-03 NOTE — ED TRIAGE NOTES
Pt states sudden onset of left sided upper quad abd pain, radiates to back, vomiting.      Triage Assessment (Adult)       Row Name 03/03/24 1626          Triage Assessment    Airway WDL WDL        Respiratory WDL    Respiratory WDL WDL        Skin Circulation/Temperature WDL    Skin Circulation/Temperature WDL WDL        Cardiac WDL    Cardiac WDL WDL        Peripheral/Neurovascular WDL    Peripheral Neurovascular WDL WDL        Cognitive/Neuro/Behavioral WDL    Cognitive/Neuro/Behavioral WDL WDL

## 2024-03-04 NOTE — DISCHARGE INSTRUCTIONS
ICD-10-CM    1. Abdominal pain, unspecified abdominal location  R10.9     stay hydrated.  expect diarrhea.  stay hydrated.  take zofran as needed for nausea.

## 2024-03-05 LAB — BACTERIA UR CULT: NORMAL

## 2024-03-13 ENCOUNTER — HOSPITAL ENCOUNTER (EMERGENCY)
Facility: CLINIC | Age: 49
Discharge: LEFT BEFORE DISCHARGE | End: 2024-03-13
Attending: EMERGENCY MEDICINE | Admitting: EMERGENCY MEDICINE

## 2024-03-13 VITALS
DIASTOLIC BLOOD PRESSURE: 88 MMHG | OXYGEN SATURATION: 95 % | RESPIRATION RATE: 18 BRPM | WEIGHT: 125 LBS | TEMPERATURE: 98.2 F | HEART RATE: 85 BPM | HEIGHT: 62 IN | BODY MASS INDEX: 23 KG/M2 | SYSTOLIC BLOOD PRESSURE: 120 MMHG

## 2024-03-13 DIAGNOSIS — R10.84 ABDOMINAL PAIN, GENERALIZED: ICD-10-CM

## 2024-03-13 LAB
ALBUMIN SERPL BCG-MCNC: 4.6 G/DL (ref 3.5–5.2)
ALP SERPL-CCNC: 79 U/L (ref 40–150)
ALT SERPL W P-5'-P-CCNC: 8 U/L (ref 0–50)
ANION GAP SERPL CALCULATED.3IONS-SCNC: 12 MMOL/L (ref 7–15)
AST SERPL W P-5'-P-CCNC: 20 U/L (ref 0–45)
BASOPHILS # BLD AUTO: 0 10E3/UL (ref 0–0.2)
BASOPHILS NFR BLD AUTO: 0 %
BILIRUB SERPL-MCNC: 1.1 MG/DL
BUN SERPL-MCNC: 5.7 MG/DL (ref 6–20)
CALCIUM SERPL-MCNC: 9.6 MG/DL (ref 8.6–10)
CHLORIDE SERPL-SCNC: 96 MMOL/L (ref 98–107)
CREAT SERPL-MCNC: 0.77 MG/DL (ref 0.51–0.95)
DEPRECATED HCO3 PLAS-SCNC: 27 MMOL/L (ref 22–29)
EGFRCR SERPLBLD CKD-EPI 2021: >90 ML/MIN/1.73M2
EOSINOPHIL # BLD AUTO: 0.1 10E3/UL (ref 0–0.7)
EOSINOPHIL NFR BLD AUTO: 1 %
ERYTHROCYTE [DISTWIDTH] IN BLOOD BY AUTOMATED COUNT: 13.1 % (ref 10–15)
GLUCOSE SERPL-MCNC: 97 MG/DL (ref 70–99)
HCT VFR BLD AUTO: 44.3 % (ref 35–47)
HGB BLD-MCNC: 15.4 G/DL (ref 11.7–15.7)
HOLD SPECIMEN: NORMAL
IMM GRANULOCYTES # BLD: 0 10E3/UL
IMM GRANULOCYTES NFR BLD: 0 %
LIPASE SERPL-CCNC: 10 U/L (ref 13–60)
LYMPHOCYTES # BLD AUTO: 2.9 10E3/UL (ref 0–5.3)
LYMPHOCYTES NFR BLD AUTO: 31 %
MCH RBC QN AUTO: 32.4 PG (ref 26.5–33)
MCHC RBC AUTO-ENTMCNC: 34.8 G/DL (ref 31.5–36.5)
MCV RBC AUTO: 93 FL (ref 78–100)
MONOCYTES # BLD AUTO: 0.9 10E3/UL (ref 0–1.3)
MONOCYTES NFR BLD AUTO: 10 %
NEUTROPHILS # BLD AUTO: 5.3 10E3/UL (ref 1.6–8.3)
NEUTROPHILS NFR BLD AUTO: 57 %
NRBC # BLD AUTO: 0 10E3/UL
NRBC BLD AUTO-RTO: 0 /100
PLATELET # BLD AUTO: 253 10E3/UL (ref 150–450)
POTASSIUM SERPL-SCNC: 3.8 MMOL/L (ref 3.4–5.3)
PROT SERPL-MCNC: 7.4 G/DL (ref 6.4–8.3)
RBC # BLD AUTO: 4.75 10E6/UL (ref 3.8–5.2)
SODIUM SERPL-SCNC: 135 MMOL/L (ref 135–145)
WBC # BLD AUTO: 9.2 10E3/UL (ref 4–11)

## 2024-03-13 PROCEDURE — 83690 ASSAY OF LIPASE: CPT | Performed by: EMERGENCY MEDICINE

## 2024-03-13 PROCEDURE — 85025 COMPLETE CBC W/AUTO DIFF WBC: CPT | Performed by: FAMILY MEDICINE

## 2024-03-13 PROCEDURE — 258N000003 HC RX IP 258 OP 636: Performed by: EMERGENCY MEDICINE

## 2024-03-13 PROCEDURE — 250N000011 HC RX IP 250 OP 636: Mod: JZ | Performed by: EMERGENCY MEDICINE

## 2024-03-13 PROCEDURE — 85025 COMPLETE CBC W/AUTO DIFF WBC: CPT | Performed by: EMERGENCY MEDICINE

## 2024-03-13 PROCEDURE — 99285 EMERGENCY DEPT VISIT HI MDM: CPT | Mod: 25 | Performed by: EMERGENCY MEDICINE

## 2024-03-13 PROCEDURE — 80053 COMPREHEN METABOLIC PANEL: CPT | Performed by: EMERGENCY MEDICINE

## 2024-03-13 PROCEDURE — 99284 EMERGENCY DEPT VISIT MOD MDM: CPT | Performed by: EMERGENCY MEDICINE

## 2024-03-13 PROCEDURE — 36415 COLL VENOUS BLD VENIPUNCTURE: CPT | Performed by: FAMILY MEDICINE

## 2024-03-13 PROCEDURE — 96374 THER/PROPH/DIAG INJ IV PUSH: CPT | Mod: 59 | Performed by: EMERGENCY MEDICINE

## 2024-03-13 PROCEDURE — 96375 TX/PRO/DX INJ NEW DRUG ADDON: CPT | Performed by: EMERGENCY MEDICINE

## 2024-03-13 PROCEDURE — 250N000011 HC RX IP 250 OP 636: Performed by: FAMILY MEDICINE

## 2024-03-13 RX ORDER — ONDANSETRON 2 MG/ML
4 INJECTION INTRAMUSCULAR; INTRAVENOUS ONCE
Status: COMPLETED | OUTPATIENT
Start: 2024-03-13 | End: 2024-03-13

## 2024-03-13 RX ORDER — DIPHENHYDRAMINE HYDROCHLORIDE 50 MG/ML
25 INJECTION INTRAMUSCULAR; INTRAVENOUS ONCE
Status: COMPLETED | OUTPATIENT
Start: 2024-03-13 | End: 2024-03-13

## 2024-03-13 RX ORDER — HYDROMORPHONE HYDROCHLORIDE 1 MG/ML
0.3 INJECTION, SOLUTION INTRAMUSCULAR; INTRAVENOUS; SUBCUTANEOUS ONCE
Status: COMPLETED | OUTPATIENT
Start: 2024-03-13 | End: 2024-03-13

## 2024-03-13 RX ORDER — IOPAMIDOL 755 MG/ML
61 INJECTION, SOLUTION INTRAVASCULAR ONCE
Status: DISCONTINUED | OUTPATIENT
Start: 2024-03-13 | End: 2024-03-13

## 2024-03-13 RX ORDER — METOCLOPRAMIDE HYDROCHLORIDE 5 MG/ML
10 INJECTION INTRAMUSCULAR; INTRAVENOUS ONCE
Status: COMPLETED | OUTPATIENT
Start: 2024-03-13 | End: 2024-03-13

## 2024-03-13 RX ADMIN — SODIUM CHLORIDE 500 ML: 9 INJECTION, SOLUTION INTRAVENOUS at 12:59

## 2024-03-13 RX ADMIN — ONDANSETRON 4 MG: 2 INJECTION INTRAMUSCULAR; INTRAVENOUS at 11:30

## 2024-03-13 RX ADMIN — METOCLOPRAMIDE HYDROCHLORIDE 10 MG: 5 INJECTION INTRAMUSCULAR; INTRAVENOUS at 13:03

## 2024-03-13 RX ADMIN — HYDROMORPHONE HYDROCHLORIDE 0.3 MG: 1 INJECTION, SOLUTION INTRAMUSCULAR; INTRAVENOUS; SUBCUTANEOUS at 12:59

## 2024-03-13 ASSESSMENT — COLUMBIA-SUICIDE SEVERITY RATING SCALE - C-SSRS
2. HAVE YOU ACTUALLY HAD ANY THOUGHTS OF KILLING YOURSELF IN THE PAST MONTH?: NO
6. HAVE YOU EVER DONE ANYTHING, STARTED TO DO ANYTHING, OR PREPARED TO DO ANYTHING TO END YOUR LIFE?: NO
1. IN THE PAST MONTH, HAVE YOU WISHED YOU WERE DEAD OR WISHED YOU COULD GO TO SLEEP AND NOT WAKE UP?: NO

## 2024-03-13 ASSESSMENT — ACTIVITIES OF DAILY LIVING (ADL)
ADLS_ACUITY_SCORE: 38
ADLS_ACUITY_SCORE: 38

## 2024-03-13 NOTE — ED TRIAGE NOTES
"Pt reports ongoing abdominal pain and has not followed up with primary due to health insurance.   Pt requesting to be admitted to see GI specialist for nausea and pain medications.   \" I'm not here for pain medications, I'm here to get answers\"     Triage Assessment (Adult)       Row Name 03/13/24 1123          Triage Assessment    Airway WDL WDL        Respiratory WDL    Respiratory WDL WDL                     "

## 2024-03-13 NOTE — ED PROVIDER NOTES
History     Chief Complaint   Patient presents with    Abdominal Pain     HPI  Meli Rodriguez is a 48 year old female with past medical history significant for chronic abdominal pain social phobia depression only.  Benzoin abuse drug-seeking behavior borderline intellectual functioning gastroesophageal reflux disease and polysubstance abuse who presents emergency department complaining of epigastric abdominal pain radiating to back.  Patient has had chronic pain and states she has been unable to get to GI clinic because she does not have a ride.  Patient states the pain in her abdomen has been going on for some time she was seen earlier this month for similar complaints states it is not improved.  Has been taking medications without results.  She denies any blood in her stool or loose stools at this time.  She has not any fevers or chills denies any chest pain shortness of breath has not any swelling in her calves denies any focal numbness weakness any extremity or bowel or bladder dysfunction.    Allergies:  Allergies   Allergen Reactions    Butalbital-Aspirin-Caffeine      Increases headaches    Demerol Itching    Droperidol Other (See Comments)     twitching      Hydroxyzine Unknown     Refuses IM numerous times stating that it does not work    Ketorolac Tromethamine Itching    Methocarbamol Hives    Metoclopramide Other (See Comments)     Cause acute dystonic reaction    Nicotine      Intolerant to    Nsaids Other (See Comments)     Gastric ulcer    Pramipexole      Ill feel    Rizatriptan      Ill feel    Ropinirole      Increases RLS    Trazodone Other (See Comments)     Out of body sense     Bad dreams    Triptans Itching     vomits    Adhesive Tape Rash    Compazine [Prochlorperazine] Rash     Arm red and rash    Diphenhydramine Anxiety     Jittery and jumpy       Problem List:    Patient Active Problem List    Diagnosis Date Noted    Gastroenteritis 09/12/2023     Priority: Medium    Lower abdominal pain  07/10/2023     Priority: Medium    Hematemesis with nausea 07/10/2023     Priority: Medium    Borderline intellectual functioning 11/06/2015     Priority: Medium    ADD (attention deficit disorder) 02/18/2014     Priority: Medium    Chondromalacia of patella 12/11/2013     Priority: Medium    Drug-seeking behavior 12/06/2013     Priority: Medium    Health Care Home 12/03/2013     Priority: Medium       Status:  Unable to reach    -See Letters for [facility] Care Plan - Emergency Care Plan          Cannabis abuse 07/03/2013     Priority: Medium    Polysubstance abuse (H) 12/04/2012     Priority: Medium    Social phobia: with panic attacks 07/23/2012     Priority: Medium    Moderate recurrent major depression: per psychiatry consult 07/23/2012     Priority: Medium    Prescription Opioid and benzodiazepine abuse 07/23/2012     Priority: Medium    Contusion of duodenum 06/26/2012     Priority: Medium    Vitamin D deficiency 06/26/2012     Priority: Medium    At risk for osteoporosis 06/26/2012     Priority: Medium    Hematochezia 06/26/2012     Priority: Medium    Abdominal pain, unspecified abdominal location 06/25/2012     Priority: Medium     Problem list name updated by automated process. Provider to review      Panic attack 05/22/2012     Priority: Medium    Acquired absence of both cervix and uterus 09/16/2011     Priority: Medium    Colitis 05/16/2011     Priority: Medium    Outbursts of anger 12/14/2009     Priority: Medium    Gastroesophageal reflux disease 06/02/2009     Priority: Medium     Formatting of this note might be different from the original.  Esophagitis- EGD 2017. PPI not helpful. No further follow up.      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    Bipolar 2 disorder (H)     Carpal tunnel syndrome     Colitis     Depression     Depressive disorder     Drug-seeking behavior     Drug-seeking behavior     GERD (gastroesophageal reflux disease)     Head injury     Irritable bowel syndrome (IBS)     Kidney infection     Lower urinary tract infection 2/18/2011    Migraine     Migraine     Polysubstance abuse (H) 12/4/2012    PTSD (post-traumatic stress disorder)     PTSD (post-traumatic stress disorder)     Thyroid disease     Ulcer     Urinary retention        Past Surgical History:    Past Surgical History:   Procedure Laterality Date    APPENDECTOMY      APPENDECTOMY      CHOLECYSTECTOMY  01/01/2005    CHOLECYSTECTOMY      HYSTERECTOMY      with ophorectomy bilateral    HYSTERECTOMY TOTAL ABDOMINAL, BILATERAL SALPINGO-OOPHORECTOMY, COMBINED  01/01/2001    bleeding ovarian cysts    HYSTERECTOMY, PAP NO LONGER INDICATED      HYSTERECTOMY, PAP NO LONGER INDICATED      TONSILLECTOMY, ADENOIDECTOMY, MYRINGOTOMY, INSERT TUBE BILATERAL, COMBINED      TOTAL VAGINAL HYSTERECTOMY      URETHRA SURGERY      temporary stent    URETHRA SURGERY         Family History:    Family History   Problem Relation Age of Onset    Unknown/Adopted Father     Chronic Obstructive Pulmonary Disease Mother     Aneurysm Maternal Grandmother     Cerebrovascular Disease Maternal Grandfather     Breast Cancer No family hx of     Cancer No family hx of     Diabetes No family hx of        Social History:  Marital Status:   [2]  Social History     Tobacco Use    Smoking status: Every Day     Packs/day: 0.50     Years: 20.00     Additional pack years: 0.00     Total pack years: 10.00     Types: Cigarettes    Smokeless tobacco: Never   Substance Use Topics    Alcohol use: No    Drug use: No     Comment: Denies, past marijuana        Medications:    ondansetron (ZOFRAN ODT) 4 MG ODT  "tab  ondansetron (ZOFRAN ODT) 4 MG ODT tab          Review of Systems  As per HPI.  Physical Exam   BP: 120/88  Pulse: 83  Temp: 98.2  F (36.8  C)  Resp: 18  Height: 157.5 cm (5' 2\")  Weight: 56.7 kg (125 lb)  SpO2: 96 %      Physical Exam  Vitals and nursing note reviewed.   Constitutional:       Appearance: She is well-developed. She is not toxic-appearing.      Comments: Mild distress secondary to abdominal pain.   HENT:      Head: Normocephalic and atraumatic.      Mouth/Throat:      Mouth: Mucous membranes are moist.   Cardiovascular:      Rate and Rhythm: Normal rate.      Heart sounds: Normal heart sounds.   Pulmonary:      Effort: Pulmonary effort is normal.      Breath sounds: No wheezing or rhonchi.   Abdominal:      Comments: Soft, nondistended tender to palpation epigastric and left upper quadrant with some left flank tenderness no guarding or rebound present bowel sounds are positive.  No masses or hernia are noted.   Musculoskeletal:         General: No swelling or tenderness. Normal range of motion.      Right lower leg: No edema.      Left lower leg: No edema.   Skin:     General: Skin is warm and dry.      Findings: No rash.   Neurological:      General: No focal deficit present.      Mental Status: She is alert.   Psychiatric:         Mood and Affect: Mood normal.         Behavior: Behavior normal.         ED Course        Procedures              Critical Care time:  none               Results for orders placed or performed during the hospital encounter of 03/13/24 (from the past 24 hour(s))   [place] Draw    Narrative    The following orders were created for panel order [place] Draw.  Procedure                               Abnormality         Status                     ---------                               -----------         ------                     Extra Blue Top Tube[[id number]]                              Final result               Extra Red Top Tube[[id number]]                             "   Final result               Extra Green Top (Lithium...[[id number]]                      Final result               Extra Purple Top Tube[[id number]]                            Final result                 Please view results for these tests on the individual orders.   Extra Blue Top Tube   Result Value Ref Range    Hold Specimen JIC    Extra Red Top Tube   Result Value Ref Range    Hold Specimen JIC    Extra Green Top (Lithium Heparin) Tube   Result Value Ref Range    Hold Specimen JIC    Extra Purple Top Tube   Result Value Ref Range    Hold Specimen JIC    CBC with platelets, differential    Narrative    The following orders were created for panel order CBC with platelets, differential.  Procedure                               Abnormality         Status                     ---------                               -----------         ------                     CBC with platelets and d...[[id number]]                      Final result                 Please view results for these tests on the individual orders.   Comprehensive metabolic panel   Result Value Ref Range    Sodium 135 135 - 145 mmol/L    Potassium 3.8 3.4 - 5.3 mmol/L    Carbon Dioxide (CO2) 27 22 - 29 mmol/L    Anion Gap 12 7 - 15 mmol/L    Urea Nitrogen 5.7 (L) 6.0 - 20.0 mg/dL    Creatinine 0.77 0.51 - 0.95 mg/dL    GFR Estimate >90 >60 mL/min/1.73m2    Calcium 9.6 8.6 - 10.0 mg/dL    Chloride 96 (L) 98 - 107 mmol/L    Glucose 97 70 - 99 mg/dL    Alkaline Phosphatase 79 40 - 150 U/L    AST 20 0 - 45 U/L    ALT 8 0 - 50 U/L    Protein Total 7.4 6.4 - 8.3 g/dL    Albumin 4.6 3.5 - 5.2 g/dL    Bilirubin Total 1.1 <=1.2 mg/dL   CBC with platelets and differential   Result Value Ref Range    WBC Count 9.2 4.0 - 11.0 10e3/uL    RBC Count 4.75 3.80 - 5.20 10e6/uL    Hemoglobin 15.4 11.7 - 15.7 g/dL    Hematocrit 44.3 35.0 - 47.0 %    MCV 93 78 - 100 fL    MCH 32.4 26.5 - 33.0 pg    MCHC 34.8 31.5 - 36.5 g/dL    RDW 13.1 10.0 - 15.0 %    Platelet Count 253 150  - 450 10e3/uL    % Neutrophils 57 %    % Lymphocytes 31 %    % Monocytes 10 %    % Eosinophils 1 %    % Basophils 0 %    % Immature Granulocytes 0 %    NRBCs per 100 WBC 0 <1 /100    Absolute Neutrophils 5.3 1.6 - 8.3 10e3/uL    Absolute Lymphocytes 2.9 0.0 - 5.3 10e3/uL    Absolute Monocytes 0.9 0.0 - 1.3 10e3/uL    Absolute Eosinophils 0.1 0.0 - 0.7 10e3/uL    Absolute Basophils 0.0 0.0 - 0.2 10e3/uL    Absolute Immature Granulocytes 0.0 <=0.4 10e3/uL    Absolute NRBCs 0.0 10e3/uL       Medications   ondansetron (ZOFRAN) injection 4 mg (4 mg Intravenous $Given 3/13/24 1130)   sodium chloride 0.9% BOLUS 500 mL (0 mLs Intravenous Stopped 3/13/24 1324)   metoclopramide (REGLAN) injection 10 mg (10 mg Intravenous $Given 3/13/24 1303)   diphenhydrAMINE (BENADRYL) injection 25 mg (25 mg Intravenous Not Given 3/13/24 1254)   HYDROmorphone (PF) (DILAUDID) injection 0.3 mg (0.3 mg Intravenous $Given 3/13/24 1259)       Assessments & Plan (with Medical Decision Making) records reviewed.  ED visit on 3/3/2024 was reviewed.  ED visits on 2/14/2024 and 2/15/2024 reviewed.  Due to patient's pain patient was given Reglan Benadryl and Dilaudid with a fluid bolus.  Labs were obtained.  I independently reviewed labs.  CBC without significant abnormality.  Comprehensive metabolic panel is unremarkable.  Lipase was 10.  Shortly after receiving her medications patient requested to be discharged.  She was told without further evaluation including possible CT scan abdomen pelvis we cannot rule out causes of her significant pain.  Patient understood this and still requested to leave.  She signed out AMA.  She is of sound mind and body and I do not think I can hold her.  She is advised she can return at any time for further evaluation and care.     I have reviewed the nursing notes.    I have reviewed the findings, diagnosis, plan and need for follow up with the patient.           New Prescriptions    No medications on file       Final  diagnoses:   Abdominal pain, generalized       3/13/2024   [facility] EMERGENCY DEPT       Dami Carrington MD  03/15/24 2780

## 2024-04-18 ENCOUNTER — HOSPITAL ENCOUNTER (INPATIENT)
Facility: CLINIC | Age: 49
LOS: 1 days | Discharge: HOME | DRG: 190 | End: 2024-04-19
Attending: EMERGENCY MEDICINE | Admitting: INTERNAL MEDICINE

## 2024-04-18 DIAGNOSIS — R09.02 HYPOXIA: ICD-10-CM

## 2024-04-18 DIAGNOSIS — J44.1 COPD EXACERBATION (H): Primary | ICD-10-CM

## 2024-04-18 DIAGNOSIS — R06.2 WHEEZING: ICD-10-CM

## 2024-04-18 DIAGNOSIS — E87.6 HYPOKALEMIA: ICD-10-CM

## 2024-04-18 DIAGNOSIS — R06.02 SOB (SHORTNESS OF BREATH): ICD-10-CM

## 2024-04-18 LAB
BASOPHILS # BLD AUTO: 0 10E3/UL (ref 0–0.2)
BASOPHILS NFR BLD AUTO: 0 %
EOSINOPHIL # BLD AUTO: 0.1 10E3/UL (ref 0–0.7)
EOSINOPHIL NFR BLD AUTO: 2 %
ERYTHROCYTE [DISTWIDTH] IN BLOOD BY AUTOMATED COUNT: 12.7 % (ref 10–15)
HCT VFR BLD AUTO: 41.2 % (ref 35–47)
HGB BLD-MCNC: 14.2 G/DL (ref 11.7–15.7)
IMM GRANULOCYTES # BLD: 0 10E3/UL
IMM GRANULOCYTES NFR BLD: 0 %
LYMPHOCYTES # BLD AUTO: 2.9 10E3/UL (ref 0.8–5.3)
LYMPHOCYTES NFR BLD AUTO: 30 %
MCH RBC QN AUTO: 31.8 PG (ref 26.5–33)
MCHC RBC AUTO-ENTMCNC: 34.5 G/DL (ref 31.5–36.5)
MCV RBC AUTO: 92 FL (ref 78–100)
MONOCYTES # BLD AUTO: 0.8 10E3/UL (ref 0–1.3)
MONOCYTES NFR BLD AUTO: 9 %
NEUTROPHILS # BLD AUTO: 5.6 10E3/UL (ref 1.6–8.3)
NEUTROPHILS NFR BLD AUTO: 59 %
NRBC # BLD AUTO: 0 10E3/UL
NRBC BLD AUTO-RTO: 0 /100
PLATELET # BLD AUTO: 184 10E3/UL (ref 150–450)
RBC # BLD AUTO: 4.47 10E6/UL (ref 3.8–5.2)
TROPONIN T SERPL HS-MCNC: <6 NG/L
WBC # BLD AUTO: 9.5 10E3/UL (ref 4–11)

## 2024-04-18 PROCEDURE — 80053 COMPREHEN METABOLIC PANEL: CPT | Performed by: EMERGENCY MEDICINE

## 2024-04-18 PROCEDURE — 94640 AIRWAY INHALATION TREATMENT: CPT

## 2024-04-18 PROCEDURE — 85379 FIBRIN DEGRADATION QUANT: CPT | Performed by: EMERGENCY MEDICINE

## 2024-04-18 PROCEDURE — 36415 COLL VENOUS BLD VENIPUNCTURE: CPT | Performed by: EMERGENCY MEDICINE

## 2024-04-18 PROCEDURE — 83880 ASSAY OF NATRIURETIC PEPTIDE: CPT | Performed by: EMERGENCY MEDICINE

## 2024-04-18 PROCEDURE — 84484 ASSAY OF TROPONIN QUANT: CPT | Performed by: EMERGENCY MEDICINE

## 2024-04-18 PROCEDURE — 999N000157 HC STATISTIC RCP TIME EA 10 MIN

## 2024-04-18 PROCEDURE — 83735 ASSAY OF MAGNESIUM: CPT | Performed by: INTERNAL MEDICINE

## 2024-04-18 PROCEDURE — 250N000009 HC RX 250: Performed by: EMERGENCY MEDICINE

## 2024-04-18 PROCEDURE — 99285 EMERGENCY DEPT VISIT HI MDM: CPT | Mod: 25 | Performed by: EMERGENCY MEDICINE

## 2024-04-18 PROCEDURE — 85025 COMPLETE CBC W/AUTO DIFF WBC: CPT | Performed by: EMERGENCY MEDICINE

## 2024-04-18 PROCEDURE — 99285 EMERGENCY DEPT VISIT HI MDM: CPT | Mod: 25

## 2024-04-18 PROCEDURE — 93005 ELECTROCARDIOGRAM TRACING: CPT

## 2024-04-18 PROCEDURE — 93010 ELECTROCARDIOGRAM REPORT: CPT | Performed by: EMERGENCY MEDICINE

## 2024-04-18 RX ORDER — IPRATROPIUM BROMIDE AND ALBUTEROL SULFATE 2.5; .5 MG/3ML; MG/3ML
3 SOLUTION RESPIRATORY (INHALATION) ONCE
Status: COMPLETED | OUTPATIENT
Start: 2024-04-18 | End: 2024-04-18

## 2024-04-18 RX ORDER — ACETAMINOPHEN 500 MG
1000 TABLET ORAL ONCE
Status: COMPLETED | OUTPATIENT
Start: 2024-04-18 | End: 2024-04-18

## 2024-04-18 RX ADMIN — IPRATROPIUM BROMIDE AND ALBUTEROL SULFATE 3 ML: 2.5; .5 SOLUTION RESPIRATORY (INHALATION) at 23:54

## 2024-04-18 ASSESSMENT — COLUMBIA-SUICIDE SEVERITY RATING SCALE - C-SSRS
6. HAVE YOU EVER DONE ANYTHING, STARTED TO DO ANYTHING, OR PREPARED TO DO ANYTHING TO END YOUR LIFE?: NO
2. HAVE YOU ACTUALLY HAD ANY THOUGHTS OF KILLING YOURSELF IN THE PAST MONTH?: NO
1. IN THE PAST MONTH, HAVE YOU WISHED YOU WERE DEAD OR WISHED YOU COULD GO TO SLEEP AND NOT WAKE UP?: NO

## 2024-04-19 ENCOUNTER — APPOINTMENT (OUTPATIENT)
Dept: GENERAL RADIOLOGY | Facility: CLINIC | Age: 49
DRG: 190 | End: 2024-04-19
Attending: EMERGENCY MEDICINE

## 2024-04-19 VITALS
HEIGHT: 62 IN | HEART RATE: 68 BPM | OXYGEN SATURATION: 95 % | RESPIRATION RATE: 14 BRPM | TEMPERATURE: 97.8 F | DIASTOLIC BLOOD PRESSURE: 74 MMHG | WEIGHT: 126.1 LBS | BODY MASS INDEX: 23.21 KG/M2 | SYSTOLIC BLOOD PRESSURE: 106 MMHG

## 2024-04-19 PROBLEM — R10.30 LOWER ABDOMINAL PAIN: Status: RESOLVED | Noted: 2023-07-10 | Resolved: 2024-04-19

## 2024-04-19 PROBLEM — M65.4 DE QUERVAIN'S DISEASE (TENOSYNOVITIS): Status: ACTIVE | Noted: 2021-12-01

## 2024-04-19 PROBLEM — K92.0 HEMATEMESIS WITH NAUSEA: Status: RESOLVED | Noted: 2023-07-10 | Resolved: 2024-04-19

## 2024-04-19 PROBLEM — R06.02 SOB (SHORTNESS OF BREATH): Status: ACTIVE | Noted: 2024-04-19

## 2024-04-19 PROBLEM — E87.6 HYPOKALEMIA: Status: ACTIVE | Noted: 2024-04-19

## 2024-04-19 PROBLEM — D62 ACUTE BLOOD LOSS ANEMIA: Status: ACTIVE | Noted: 2017-06-21

## 2024-04-19 PROBLEM — R09.02 HYPOXIA: Status: ACTIVE | Noted: 2024-04-19

## 2024-04-19 PROBLEM — J45.901 ASTHMA EXACERBATION: Status: ACTIVE | Noted: 2024-04-19

## 2024-04-19 PROBLEM — M65.4 DE QUERVAIN'S DISEASE (TENOSYNOVITIS): Status: RESOLVED | Noted: 2021-12-01 | Resolved: 2024-04-19

## 2024-04-19 PROBLEM — J45.909 ASTHMA: Status: ACTIVE | Noted: 2024-04-19

## 2024-04-19 PROBLEM — D62 ACUTE BLOOD LOSS ANEMIA: Status: RESOLVED | Noted: 2017-06-21 | Resolved: 2024-04-19

## 2024-04-19 PROBLEM — R06.2 WHEEZING: Status: ACTIVE | Noted: 2024-04-19

## 2024-04-19 PROBLEM — J96.01 ACUTE RESPIRATORY FAILURE WITH HYPOXIA (H): Status: ACTIVE | Noted: 2024-04-19

## 2024-04-19 PROBLEM — K52.9 GASTROENTERITIS: Status: RESOLVED | Noted: 2023-09-12 | Resolved: 2024-04-19

## 2024-04-19 LAB
ALBUMIN SERPL BCG-MCNC: 3.7 G/DL (ref 3.5–5.2)
ALBUMIN UR-MCNC: NEGATIVE MG/DL
ALP SERPL-CCNC: 68 U/L (ref 40–150)
ALT SERPL W P-5'-P-CCNC: 7 U/L (ref 0–50)
ANION GAP SERPL CALCULATED.3IONS-SCNC: 10 MMOL/L (ref 7–15)
ANION GAP SERPL CALCULATED.3IONS-SCNC: 12 MMOL/L (ref 7–15)
APPEARANCE UR: ABNORMAL
AST SERPL W P-5'-P-CCNC: 15 U/L (ref 0–45)
BASE EXCESS BLDV CALC-SCNC: 1.6 MMOL/L (ref -3–3)
BILIRUB SERPL-MCNC: 0.8 MG/DL
BILIRUB UR QL STRIP: NEGATIVE
BUN SERPL-MCNC: 4.4 MG/DL (ref 6–20)
BUN SERPL-MCNC: 4.6 MG/DL (ref 6–20)
CALCIUM SERPL-MCNC: 8 MG/DL (ref 8.6–10)
CALCIUM SERPL-MCNC: 8.5 MG/DL (ref 8.6–10)
CHLORIDE SERPL-SCNC: 102 MMOL/L (ref 98–107)
CHLORIDE SERPL-SCNC: 103 MMOL/L (ref 98–107)
COLOR UR AUTO: YELLOW
CREAT SERPL-MCNC: 0.6 MG/DL (ref 0.51–0.95)
CREAT SERPL-MCNC: 0.6 MG/DL (ref 0.51–0.95)
D DIMER PPP FEU-MCNC: 0.28 UG/ML FEU (ref 0–0.5)
DEPRECATED HCO3 PLAS-SCNC: 24 MMOL/L (ref 22–29)
DEPRECATED HCO3 PLAS-SCNC: 25 MMOL/L (ref 22–29)
EGFRCR SERPLBLD CKD-EPI 2021: >90 ML/MIN/1.73M2
EGFRCR SERPLBLD CKD-EPI 2021: >90 ML/MIN/1.73M2
GLUCOSE SERPL-MCNC: 136 MG/DL (ref 70–99)
GLUCOSE SERPL-MCNC: 158 MG/DL (ref 70–99)
GLUCOSE UR STRIP-MCNC: NEGATIVE MG/DL
HCO3 BLDV-SCNC: 28 MMOL/L (ref 21–28)
HGB UR QL STRIP: NEGATIVE
HOLD SPECIMEN: NORMAL
HYALINE CASTS: 3 /LPF
KETONES UR STRIP-MCNC: NEGATIVE MG/DL
LEUKOCYTE ESTERASE UR QL STRIP: ABNORMAL
MAGNESIUM SERPL-MCNC: 1.3 MG/DL (ref 1.7–2.3)
MAGNESIUM SERPL-MCNC: 2.3 MG/DL (ref 1.7–2.3)
MUCOUS THREADS #/AREA URNS LPF: PRESENT /LPF
NITRATE UR QL: NEGATIVE
NT-PROBNP SERPL-MCNC: <36 PG/ML (ref 0–450)
O2/TOTAL GAS SETTING VFR VENT: 29 %
OXYHGB MFR BLDV: 71 % (ref 70–75)
PCO2 BLDV: 52 MM HG (ref 40–50)
PH BLDV: 7.34 [PH] (ref 7.32–7.43)
PH UR STRIP: 6 [PH] (ref 5–7)
PO2 BLDV: 41 MM HG (ref 25–47)
POTASSIUM SERPL-SCNC: 2.6 MMOL/L (ref 3.4–5.3)
POTASSIUM SERPL-SCNC: 3.8 MMOL/L (ref 3.4–5.3)
PROT SERPL-MCNC: 6 G/DL (ref 6.4–8.3)
RBC URINE: 16 /HPF
SAO2 % BLDV: 76.7 % (ref 70–75)
SODIUM SERPL-SCNC: 138 MMOL/L (ref 135–145)
SODIUM SERPL-SCNC: 138 MMOL/L (ref 135–145)
SP GR UR STRIP: 1.02 (ref 1–1.03)
SQUAMOUS EPITHELIAL: 3 /HPF
UROBILINOGEN UR STRIP-MCNC: 4 MG/DL
WBC URINE: 17 /HPF

## 2024-04-19 PROCEDURE — 87086 URINE CULTURE/COLONY COUNT: CPT | Performed by: EMERGENCY MEDICINE

## 2024-04-19 PROCEDURE — 96375 TX/PRO/DX INJ NEW DRUG ADDON: CPT

## 2024-04-19 PROCEDURE — 83735 ASSAY OF MAGNESIUM: CPT | Performed by: INTERNAL MEDICINE

## 2024-04-19 PROCEDURE — 36415 COLL VENOUS BLD VENIPUNCTURE: CPT | Performed by: INTERNAL MEDICINE

## 2024-04-19 PROCEDURE — 99222 1ST HOSP IP/OBS MODERATE 55: CPT | Performed by: INTERNAL MEDICINE

## 2024-04-19 PROCEDURE — 250N000013 HC RX MED GY IP 250 OP 250 PS 637: Performed by: EMERGENCY MEDICINE

## 2024-04-19 PROCEDURE — 258N000003 HC RX IP 258 OP 636: Performed by: INTERNAL MEDICINE

## 2024-04-19 PROCEDURE — 250N000011 HC RX IP 250 OP 636: Performed by: EMERGENCY MEDICINE

## 2024-04-19 PROCEDURE — 99207 PR APP CREDIT; MD BILLING SHARED VISIT: CPT | Performed by: INTERNAL MEDICINE

## 2024-04-19 PROCEDURE — 120N000004 HC R&B MS OVERFLOW

## 2024-04-19 PROCEDURE — 82805 BLOOD GASES W/O2 SATURATION: CPT | Performed by: EMERGENCY MEDICINE

## 2024-04-19 PROCEDURE — 250N000011 HC RX IP 250 OP 636: Performed by: INTERNAL MEDICINE

## 2024-04-19 PROCEDURE — 81001 URINALYSIS AUTO W/SCOPE: CPT | Performed by: EMERGENCY MEDICINE

## 2024-04-19 PROCEDURE — 71046 X-RAY EXAM CHEST 2 VIEWS: CPT

## 2024-04-19 PROCEDURE — 36415 COLL VENOUS BLD VENIPUNCTURE: CPT | Performed by: EMERGENCY MEDICINE

## 2024-04-19 PROCEDURE — 250N000012 HC RX MED GY IP 250 OP 636 PS 637: Performed by: INTERNAL MEDICINE

## 2024-04-19 PROCEDURE — 80048 BASIC METABOLIC PNL TOTAL CA: CPT | Performed by: INTERNAL MEDICINE

## 2024-04-19 PROCEDURE — 96365 THER/PROPH/DIAG IV INF INIT: CPT

## 2024-04-19 PROCEDURE — G0378 HOSPITAL OBSERVATION PER HR: HCPCS

## 2024-04-19 RX ORDER — LIDOCAINE 40 MG/G
CREAM TOPICAL
Status: DISCONTINUED | OUTPATIENT
Start: 2024-04-19 | End: 2024-04-19 | Stop reason: HOSPADM

## 2024-04-19 RX ORDER — AMOXICILLIN 250 MG
2 CAPSULE ORAL 2 TIMES DAILY PRN
Status: DISCONTINUED | OUTPATIENT
Start: 2024-04-19 | End: 2024-04-19 | Stop reason: HOSPADM

## 2024-04-19 RX ORDER — HYDROMORPHONE HYDROCHLORIDE 1 MG/ML
0.3 INJECTION, SOLUTION INTRAMUSCULAR; INTRAVENOUS; SUBCUTANEOUS ONCE
Status: COMPLETED | OUTPATIENT
Start: 2024-04-19 | End: 2024-04-19

## 2024-04-19 RX ORDER — PREDNISONE 20 MG/1
40 TABLET ORAL DAILY
Status: DISCONTINUED | OUTPATIENT
Start: 2024-04-19 | End: 2024-04-19 | Stop reason: HOSPADM

## 2024-04-19 RX ORDER — ALBUTEROL SULFATE 90 UG/1
2 AEROSOL, METERED RESPIRATORY (INHALATION) EVERY 6 HOURS PRN
Qty: 18 G | Refills: 1 | Status: SHIPPED | OUTPATIENT
Start: 2024-04-19

## 2024-04-19 RX ORDER — CALCIUM CARBONATE 500 MG/1
1000 TABLET, CHEWABLE ORAL 4 TIMES DAILY PRN
Status: DISCONTINUED | OUTPATIENT
Start: 2024-04-19 | End: 2024-04-19 | Stop reason: HOSPADM

## 2024-04-19 RX ORDER — MAGNESIUM SULFATE HEPTAHYDRATE 40 MG/ML
4 INJECTION, SOLUTION INTRAVENOUS ONCE
Status: COMPLETED | OUTPATIENT
Start: 2024-04-19 | End: 2024-04-19

## 2024-04-19 RX ORDER — POTASSIUM CHLORIDE 7.45 MG/ML
10 INJECTION INTRAVENOUS
Status: COMPLETED | OUTPATIENT
Start: 2024-04-19 | End: 2024-04-19

## 2024-04-19 RX ORDER — ONDANSETRON 2 MG/ML
4 INJECTION INTRAMUSCULAR; INTRAVENOUS EVERY 6 HOURS PRN
Status: DISCONTINUED | OUTPATIENT
Start: 2024-04-19 | End: 2024-04-19 | Stop reason: HOSPADM

## 2024-04-19 RX ORDER — POTASSIUM CHLORIDE 1500 MG/1
40 TABLET, EXTENDED RELEASE ORAL ONCE
Status: COMPLETED | OUTPATIENT
Start: 2024-04-19 | End: 2024-04-19

## 2024-04-19 RX ORDER — IPRATROPIUM BROMIDE AND ALBUTEROL SULFATE 2.5; .5 MG/3ML; MG/3ML
3 SOLUTION RESPIRATORY (INHALATION) EVERY 4 HOURS PRN
Status: DISCONTINUED | OUTPATIENT
Start: 2024-04-19 | End: 2024-04-19 | Stop reason: HOSPADM

## 2024-04-19 RX ORDER — METHYLPREDNISOLONE SODIUM SUCCINATE 125 MG/2ML
125 INJECTION, POWDER, LYOPHILIZED, FOR SOLUTION INTRAMUSCULAR; INTRAVENOUS ONCE
Status: COMPLETED | OUTPATIENT
Start: 2024-04-19 | End: 2024-04-19

## 2024-04-19 RX ORDER — ONDANSETRON 4 MG/1
4 TABLET, ORALLY DISINTEGRATING ORAL EVERY 6 HOURS PRN
Status: DISCONTINUED | OUTPATIENT
Start: 2024-04-19 | End: 2024-04-19 | Stop reason: HOSPADM

## 2024-04-19 RX ORDER — ACETAMINOPHEN 325 MG/1
650 TABLET ORAL EVERY 6 HOURS PRN
Status: DISCONTINUED | OUTPATIENT
Start: 2024-04-19 | End: 2024-04-19 | Stop reason: HOSPADM

## 2024-04-19 RX ORDER — AMOXICILLIN 250 MG
1 CAPSULE ORAL 2 TIMES DAILY PRN
Status: DISCONTINUED | OUTPATIENT
Start: 2024-04-19 | End: 2024-04-19 | Stop reason: HOSPADM

## 2024-04-19 RX ORDER — TRAMADOL HYDROCHLORIDE 50 MG/1
50 TABLET ORAL
Status: COMPLETED | OUTPATIENT
Start: 2024-04-19 | End: 2024-04-19

## 2024-04-19 RX ORDER — SODIUM CHLORIDE 9 MG/ML
INJECTION, SOLUTION INTRAVENOUS CONTINUOUS
Status: DISCONTINUED | OUTPATIENT
Start: 2024-04-19 | End: 2024-04-19 | Stop reason: HOSPADM

## 2024-04-19 RX ORDER — POTASSIUM CHLORIDE 7.45 MG/ML
10 INJECTION INTRAVENOUS ONCE
Status: COMPLETED | OUTPATIENT
Start: 2024-04-19 | End: 2024-04-19

## 2024-04-19 RX ORDER — PREDNISONE 10 MG/1
TABLET ORAL
Qty: 20 TABLET | Refills: 0 | Status: SHIPPED | OUTPATIENT
Start: 2024-04-19

## 2024-04-19 RX ADMIN — PREDNISONE 40 MG: 20 TABLET ORAL at 07:46

## 2024-04-19 RX ADMIN — METHYLPREDNISOLONE SODIUM SUCCINATE 125 MG: 125 INJECTION, POWDER, FOR SOLUTION INTRAMUSCULAR; INTRAVENOUS at 00:51

## 2024-04-19 RX ADMIN — MAGNESIUM SULFATE HEPTAHYDRATE 4 G: 4 INJECTION, SOLUTION INTRAVENOUS at 04:28

## 2024-04-19 RX ADMIN — POTASSIUM CHLORIDE 40 MEQ: 1500 TABLET, EXTENDED RELEASE ORAL at 01:44

## 2024-04-19 RX ADMIN — POTASSIUM CHLORIDE 10 MEQ: 7.46 INJECTION, SOLUTION INTRAVENOUS at 07:47

## 2024-04-19 RX ADMIN — POTASSIUM CHLORIDE 10 MEQ: 7.46 INJECTION, SOLUTION INTRAVENOUS at 09:58

## 2024-04-19 RX ADMIN — SODIUM CHLORIDE: 9 INJECTION, SOLUTION INTRAVENOUS at 04:29

## 2024-04-19 RX ADMIN — POTASSIUM CHLORIDE 10 MEQ: 7.46 INJECTION, SOLUTION INTRAVENOUS at 00:29

## 2024-04-19 RX ADMIN — HYDROMORPHONE HYDROCHLORIDE 0.3 MG: 1 INJECTION, SOLUTION INTRAMUSCULAR; INTRAVENOUS; SUBCUTANEOUS at 01:06

## 2024-04-19 ASSESSMENT — ACTIVITIES OF DAILY LIVING (ADL)
ADLS_ACUITY_SCORE: 27
ADLS_ACUITY_SCORE: 38
ADLS_ACUITY_SCORE: 38
ADLS_ACUITY_SCORE: 27
ADLS_ACUITY_SCORE: 38
ADLS_ACUITY_SCORE: 27

## 2024-04-19 NOTE — ED NOTES
[facility]   Admission Handoff    The patient is Meli Rodriguez, 48 year old who arrived in the ED by AMBULANCE from home with a complaint of Shortness of Breath (Arriving via [facility]  for wheezing coarse lung sounds. Much improved post duoneb. )  . The patient's current symptoms are new and during this time the symptoms have remained the same. In the ED, patient was diagnosed with   Final diagnoses:   SOB (shortness of breath)   Wheezing   Hypoxia   Hypokalemia         Needed?: No    Allergies:    Allergies   Allergen Reactions    Butalbital-Aspirin-Caffeine      Increases headaches    Demerol Itching    Droperidol Other (See Comments)     twitching      Hydroxyzine Unknown     Refuses IM numerous times stating that it does not work    Ketorolac Tromethamine Itching    Methocarbamol Hives    Metoclopramide Other (See Comments)     Cause acute dystonic reaction; Tolerated 3/3/24    Nicotine      Intolerant to    Nsaids Other (See Comments)     Gastric ulcer    Pramipexole      Ill feel    Rizatriptan      Ill feel    Ropinirole      Increases RLS    Trazodone Other (See Comments)     Out of body sense     Bad dreams    Triptans Itching     vomits    Adhesive Tape Rash    Compazine [Prochlorperazine] Rash     Arm red and rash    Diphenhydramine Anxiety     Jittery and jumpy       Past Medical Hx:   Past Medical History:   Diagnosis Date    ADD (attention deficit disorder with hyperactivity)     ADD (attention deficit disorder)     ADHD (attention deficit hyperactivity disorder)     Agoraphobia     pt reported    Allergic state     Antisocial personality disorder (H)     Anxiety     Anxiety     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     Migraine     Migraine     Polysubstance abuse (H) 12/4/2012    PTSD (post-traumatic stress disorder)     PTSD (post-traumatic stress disorder)     Thyroid disease     Ulcer     Urinary retention        Initial vitals were: BP: 123/88  Pulse: 88  Temp: 98.3  F (36.8  C)  Resp: 18  Height: 157.5 cm (5' 2\")  Weight: 56.7 kg (125 lb)  SpO2: 93 %   Recent vital Signs: /88   Pulse 70   Temp 98.3  F (36.8  C) (Oral)   Resp 14   Ht 1.575 m (5' 2\")   Wt 56.7 kg (125 lb)   SpO2 96%   BMI 22.86 kg/m      Elimination Status: Continent: Yes     Activity Level: SBA    Fall Status: Reason for falls risk: Other- none identified  nonskid shoes/slippers when out of bed    Baseline Mental status: WDL  Current Mental Status changes: at basesline    Infection present or suspected this encounter: no  Sepsis suspected: No    Isolation type: None    Bariatric equipment needed?: No    In the ED these meds were given:   Medications   ipratropium - albuterol 0.5 mg/2.5 mg/3 mL (DUONEB) neb solution 3 mL (has no administration in time range)   ipratropium - albuterol 0.5 mg/2.5 mg/3 mL (DUONEB) neb solution 3 mL (3 mLs Nebulization $Given 4/18/24 2354)   acetaminophen (TYLENOL) tablet 1,000 mg (1,000 mg Oral Not Given 4/18/24 2353)   potassium chloride 10 mEq in 100 mL sterile water infusion (0 mEq Intravenous Stopped 4/19/24 0131)   methylPREDNISolone sodium succinate (solu-MEDROL) injection 125 mg (125 mg Intravenous $Given 4/19/24 0051)   HYDROmorphone (PF) (DILAUDID) injection 0.3 mg (0.3 mg Intravenous $Given 4/19/24 0106)   potassium chloride gloria ER (KLOR-CON M20) CR tablet 40 mEq (40 mEq Oral $Given 4/19/24 0144)       Drips running?  No    Home pump  No    Current LDAs: Peripheral IV: Site L AC; Gauge 20  none     Results:   Labs/Imaging  Ordered and Resulted from Time of ED Arrival Up to " the Time of Departure from the ED  Results for orders placed or performed during the hospital encounter of 04/18/24 (from the past 24 hour(s))   CBC with platelets, differential    Narrative    The following orders were created for panel order CBC with platelets, differential.  Procedure                               Abnormality         Status                     ---------                               -----------         ------                     CBC with platelets and d...[[id number]]                      Final result                 Please view results for these tests on the individual orders.   Comprehensive metabolic panel   Result Value Ref Range    Sodium 138 135 - 145 mmol/L    Potassium 2.6 (LL) 3.4 - 5.3 mmol/L    Carbon Dioxide (CO2) 25 22 - 29 mmol/L    Anion Gap 10 7 - 15 mmol/L    Urea Nitrogen 4.4 (L) 6.0 - 20.0 mg/dL    Creatinine 0.60 0.51 - 0.95 mg/dL    GFR Estimate >90 >60 mL/min/1.73m2    Calcium 8.0 (L) 8.6 - 10.0 mg/dL    Chloride 103 98 - 107 mmol/L    Glucose 136 (H) 70 - 99 mg/dL    Alkaline Phosphatase 68 40 - 150 U/L    AST 15 0 - 45 U/L    ALT 7 0 - 50 U/L    Protein Total 6.0 (L) 6.4 - 8.3 g/dL    Albumin 3.7 3.5 - 5.2 g/dL    Bilirubin Total 0.8 <=1.2 mg/dL   Troponin T, High Sensitivity   Result Value Ref Range    Troponin T, High Sensitivity <6 <=14 ng/L   CBC with platelets and differential   Result Value Ref Range    WBC Count 9.5 4.0 - 11.0 10e3/uL    RBC Count 4.47 3.80 - 5.20 10e6/uL    Hemoglobin 14.2 11.7 - 15.7 g/dL    Hematocrit 41.2 35.0 - 47.0 %    MCV 92 78 - 100 fL    MCH 31.8 26.5 - 33.0 pg    MCHC 34.5 31.5 - 36.5 g/dL    RDW 12.7 10.0 - 15.0 %    Platelet Count 184 150 - 450 10e3/uL    % Neutrophils 59 %    % Lymphocytes 30 %    % Monocytes 9 %    % Eosinophils 2 %    % Basophils 0 %    % Immature Granulocytes 0 %    NRBCs per 100 WBC 0 <1 /100    Absolute Neutrophils 5.6 1.6 - 8.3 10e3/uL    Absolute Lymphocytes 2.9 0.8 - 5.3 10e3/uL    Absolute Monocytes 0.8 0.0 -  1.3 10e3/uL    Absolute Eosinophils 0.1 0.0 - 0.7 10e3/uL    Absolute Basophils 0.0 0.0 - 0.2 10e3/uL    Absolute Immature Granulocytes 0.0 <=0.4 10e3/uL    Absolute NRBCs 0.0 10e3/uL   Nt probnp inpatient (BNP)   Result Value Ref Range    N terminal Pro BNP Inpatient <36 0 - 450 pg/mL   Magnesium   Result Value Ref Range    Magnesium 1.3 (L) 1.7 - 2.3 mg/dL   D dimer quantitative   Result Value Ref Range    D-Dimer Quantitative 0.28 0.00 - 0.50 ug/mL FEU    Narrative    This D-dimer assay is intended for use in conjunction with a clinical pretest probability assessment model to exclude pulmonary embolism (PE) and deep venous thrombosis (DVT) in outpatients suspected of PE or DVT. The cut-off value is 0.50 ug/mL FEU.   Blood gas venous   Result Value Ref Range    pH Venous 7.34 7.32 - 7.43    pCO2 Venous 52 (H) 40 - 50 mm Hg    pO2 Venous 41 25 - 47 mm Hg    Bicarbonate Venous 28 21 - 28 mmol/L    Base Excess/Deficit Venous 1.6 -3.0 - 3.0 mmol/L    FIO2 29     Oxyhemoglobin Venous 71 70 - 75 %    O2 Sat, Venous 76.7 (H) 70.0 - 75.0 %    Narrative    In healthy individuals, oxyhemoglobin (O2Hb) and oxygen saturation (SO2) are approximately equal. In the presence of dyshemoglobins, oxyhemoglobin can be considerably lower than oxygen saturation.   Chest XR,  PA & LAT    Narrative    EXAM: XR CHEST 2 VIEWS  LOCATION: [facility]  DATE: 4/19/2024    INDICATION: Shortness of breath  COMPARISON: None.      Impression    IMPRESSION: No focal airspace opacities, pleural effusion or pneumothorax. Heart size and pulmonary vascularity are normal.       For the majority of the shift this patient's behavior was Green     Cardiac Rhythm: Normal Sinus  Pt needs tele? Yes  Skin/wound Issues: None    Code Status: Full Code    Pain control: fair    Nausea control: good    Abnormal labs/tests/findings requiring intervention: See above    Patient tested for COVID 19 prior to admission: NO     OBS  brochure/video discussed/provided to patient/family: Yes     Family present during ED course? No     Family Comments/Social Situation comments: Lives at home with     Tasks needing completion:  Potassium replacement    Tracy Soni RN

## 2024-04-19 NOTE — PROGRESS NOTES
"[facility] [place] ADMISSION NOTE    Patient admitted to room 1009 at approximately 0300   via cart from emergency room. Patient was accompanied by nurse.     Verbal SBAR report received from Trayc CHERY prior to patient arrival.     Patient ambulated to bed with stand-by assist. Patient alert and oriented X 3. Pain is not well controlled.  Medication(s) being used: narcotic analgesics including hydromorphone (Dilaudid) in ED, patient declined tylenol, stating it does not help, declined ice pack, declined heat pack, declined essential oil. Patient requests more Dilaudid for pain control, patient is falling asleep during conversation.  . Admission vital signs: Blood pressure 123/81, pulse 65, temperature 97.9  F (36.6  C), temperature source Oral, resp. rate 12, height 1.575 m (5' 2\"), weight 57.2 kg (126 lb 1.7 oz), SpO2 100%, not currently breastfeeding. Patient was oriented to plan of care, call light, bed controls, tv, telephone, bathroom, and visiting hours.     Risk Assessment    The following safety risks were identified during admission: fall. Yellow risk band applied: YES.     Skin Initial Assessment    This writer admitted this patient and completed a full skin assessment and Reynold score in the Adult PCS flowsheet. Appropriate interventions initiated as needed.     Secondary skin check completed by Hilda CHERY.    Reynold Risk Assessment  Sensory Perception: 4-->no impairment  Moisture: 4-->rarely moist  Activity: 3-->walks occasionally  Mobility: 4-->no limitation  Nutrition: 2-->probably inadequate  Friction and Shear: 2-->potential problem  Reynold Score: 19  Nutrition Interventions: Maintain adequate hydration  Friction/Shear Interventions: HOB 30 degrees or less  Mattress: Low air loss (NOELLE pump, Isolibrium, Skin Guard, Pulsate, Isoair, etc.)  Bed Frame: Standard width and length    Education    Patient has a [place] to Observation order: Yes  Observation education completed and documented: No      Francisca Burnett RN      " : Yes

## 2024-04-19 NOTE — ED PROVIDER NOTES
History     Chief Complaint   Patient presents with    Shortness of Breath     Arriving via [facility]  for wheezing coarse lung sounds. Much improved post duoneb.      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  Patient states she is has been short of breath for 3 months but over the past few weeks has been getting worse.  She states she gets chest pain occasionally as it comes and goes.  She has been nauseated and feels like she cannot get a deep breath.  He when EMS arrived patient's sats were dropping to the high 80s at rest she was given a DuoNeb.  She is having generalized chest pain over the entire chest she describes as an aching pain.  This has been present for several days.  Denies any fevers or chills has not had any headache or visual changes.  She denies any neck pain has not had any abdominal pain she is nauseated but has not vomited denies any bowel or bladder dysfunction has not had any significant calf swelling and pain.    Allergies:  Allergies   Allergen Reactions    Butalbital-Aspirin-Caffeine      Increases headaches    Demerol Itching    Droperidol Other (See Comments)     twitching      Hydroxyzine Unknown     Refuses IM numerous times stating that it does not work    Ketorolac Tromethamine Itching    Methocarbamol Hives    Metoclopramide Other (See Comments)     Cause acute dystonic reaction; Tolerated 3/3/24    Nicotine      Intolerant to    Nsaids Other (See Comments)     Gastric ulcer    Pramipexole      Ill feel    Rizatriptan      Ill feel    Ropinirole      Increases RLS    Trazodone Other (See Comments)     Out of body sense     Bad dreams    Triptans Itching     vomits     Adhesive Tape Rash    Compazine [Prochlorperazine] Rash     Arm red and rash    Diphenhydramine Anxiety     Jittery and jumpy       Problem List:    Patient Active Problem List    Diagnosis Date Noted    Gastroenteritis 09/12/2023     Priority: Medium    Lower abdominal pain 07/10/2023     Priority: Medium    Hematemesis with nausea 07/10/2023     Priority: Medium    Borderline intellectual functioning 11/06/2015     Priority: Medium    ADD (attention deficit disorder) 02/18/2014     Priority: Medium    Chondromalacia of patella 12/11/2013     Priority: Medium    Drug-seeking behavior 12/06/2013     Priority: Medium    Health Care Home 12/03/2013     Priority: Medium       Status:  Unable to reach    -See Letters for H Care Plan - Emergency Care Plan          Cannabis abuse 07/03/2013     Priority: Medium    Polysubstance abuse (H) 12/04/2012     Priority: Medium    Social phobia: with panic attacks 07/23/2012     Priority: Medium    Moderate recurrent major depression: per psychiatry consult 07/23/2012     Priority: Medium    Prescription Opioid and benzodiazepine abuse 07/23/2012     Priority: Medium    Contusion of duodenum 06/26/2012     Priority: Medium    Vitamin D deficiency 06/26/2012     Priority: Medium    At risk for osteoporosis 06/26/2012     Priority: Medium    Hematochezia 06/26/2012     Priority: Medium    Abdominal pain, unspecified abdominal location 06/25/2012     Priority: Medium     Problem list name updated by automated process. Provider to review      Panic attack 05/22/2012     Priority: Medium    Acquired absence of both cervix and uterus 09/16/2011     Priority: Medium    Colitis 05/16/2011     Priority: Medium    Outbursts of anger 12/14/2009     Priority: Medium    Gastroesophageal reflux disease 06/02/2009     Priority: Medium     Formatting of this note might be different from the original.  Esophagitis- EGD 2017. PPI not helpful. No further follow up.      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    Bipolar 2 disorder (H)     Carpal tunnel syndrome     Colitis     Depression     Depressive disorder     Drug-seeking behavior     Drug-seeking behavior     GERD (gastroesophageal reflux disease)     Head injury     Irritable bowel syndrome (IBS)     Kidney infection     Lower urinary tract infection 2/18/2011    Migraine     Migraine     Polysubstance abuse (H) 12/4/2012    PTSD (post-traumatic stress disorder)     PTSD (post-traumatic stress disorder)     Thyroid disease     Ulcer     Urinary retention        Past Surgical History:    Past Surgical History:   Procedure Laterality Date    APPENDECTOMY      APPENDECTOMY      CHOLECYSTECTOMY  01/01/2005    CHOLECYSTECTOMY      HYSTERECTOMY      with ophorectomy bilateral    HYSTERECTOMY TOTAL ABDOMINAL, BILATERAL SALPINGO-OOPHORECTOMY, COMBINED  01/01/2001    bleeding ovarian cysts    HYSTERECTOMY, PAP NO LONGER INDICATED      HYSTERECTOMY, PAP NO LONGER INDICATED      TONSILLECTOMY, ADENOIDECTOMY, MYRINGOTOMY, INSERT TUBE BILATERAL, COMBINED      TOTAL VAGINAL HYSTERECTOMY      URETHRA SURGERY      temporary stent    URETHRA SURGERY         Family History:    Family History   Problem Relation Age of Onset    Unknown/Adopted Father     Chronic Obstructive Pulmonary Disease Mother     Aneurysm Maternal Grandmother     Cerebrovascular Disease Maternal Grandfather     Breast Cancer No family hx of     Cancer No family hx of     Diabetes No family hx of        Social History:  Marital Status:   [2]  Social History     Tobacco Use    Smoking status: Every Day     Current  "packs/day: 0.50     Average packs/day: 0.5 packs/day for 20.0 years (10.0 ttl pk-yrs)     Types: Cigarettes    Smokeless tobacco: Never   Substance Use Topics    Alcohol use: No    Drug use: No     Comment: Denies, past marijuana        Medications:    ondansetron (ZOFRAN ODT) 4 MG ODT tab  ondansetron (ZOFRAN ODT) 4 MG ODT tab          Review of Systems    Physical Exam   BP: 123/88  Pulse: 88  Temp: 98.3  F (36.8  C)  Resp: 18  Height: 157.5 cm (5' 2\")  Weight: 56.7 kg (125 lb)  SpO2: 93 %      Physical Exam  Vitals and nursing note reviewed.   Constitutional:       Appearance: She is well-developed. She is not ill-appearing, toxic-appearing or diaphoretic.      Comments: Mild respiratory distress.   HENT:      Head: Normocephalic and atraumatic.      Nose: Nose normal.      Mouth/Throat:      Mouth: Mucous membranes are moist.      Pharynx: Oropharynx is clear.   Eyes:      Conjunctiva/sclera: Conjunctivae normal.   Cardiovascular:      Rate and Rhythm: Normal rate and regular rhythm.      Pulses: Normal pulses.      Heart sounds: Normal heart sounds. No murmur heard.  Pulmonary:      Comments: Numerous shallow breaths and wheezing heard in upper lung fields with breath sounds significantly decreased at bases bilaterally.  There is generalized tenderness palpation of the anterior chest wall left greater than right without crepitance erythema or swelling noted.  Abdominal:      General: Abdomen is flat. Bowel sounds are normal. There is no distension.      Palpations: Abdomen is soft.      Tenderness: There is no abdominal tenderness. There is no right CVA tenderness or left CVA tenderness.   Musculoskeletal:         General: No swelling or tenderness.      Cervical back: Normal range of motion and neck supple.      Right lower leg: No edema.      Left lower leg: No edema.   Skin:     General: Skin is warm and dry.      Findings: No rash.   Neurological:      General: No focal deficit present.      Mental Status: " She is alert and oriented to person, place, and time.      Sensory: No sensory deficit.      Motor: No weakness.      Coordination: Coordination normal.   Psychiatric:         Mood and Affect: Mood normal.         ED Course        Procedures              EKG Interpretation:      Interpreted by Dami Carrington MD  Rhythm: normal sinus first-degree AV block  Rate: Normal  Axis: Normal  Ectopy: none  Conduction: normal  ST Segments/ T Waves: T wave inversion Anterior  Q Waves: none  Comparison to prior: Unchanged from 12/25/23    Clinical Impression: Normal sinus rhythm with first-degree AV block and T wave inversion anteriorly.            Critical Care time:  none               Results for orders placed or performed during the hospital encounter of 04/18/24 (from the past 24 hour(s))   CBC with platelets, differential    Narrative    The following orders were created for panel order CBC with platelets, differential.  Procedure                               Abnormality         Status                     ---------                               -----------         ------                     CBC with platelets and d...[[id number]]                      Final result                 Please view results for these tests on the individual orders.   Comprehensive metabolic panel   Result Value Ref Range    Sodium 138 135 - 145 mmol/L    Potassium 2.6 (LL) 3.4 - 5.3 mmol/L    Carbon Dioxide (CO2) 25 22 - 29 mmol/L    Anion Gap 10 7 - 15 mmol/L    Urea Nitrogen 4.4 (L) 6.0 - 20.0 mg/dL    Creatinine 0.60 0.51 - 0.95 mg/dL    GFR Estimate >90 >60 mL/min/1.73m2    Calcium 8.0 (L) 8.6 - 10.0 mg/dL    Chloride 103 98 - 107 mmol/L    Glucose 136 (H) 70 - 99 mg/dL    Alkaline Phosphatase 68 40 - 150 U/L    AST 15 0 - 45 U/L    ALT 7 0 - 50 U/L    Protein Total 6.0 (L) 6.4 - 8.3 g/dL    Albumin 3.7 3.5 - 5.2 g/dL    Bilirubin Total 0.8 <=1.2 mg/dL   Troponin T, High Sensitivity   Result Value Ref Range    Troponin T, High Sensitivity  <6 <=14 ng/L   CBC with platelets and differential   Result Value Ref Range    WBC Count 9.5 4.0 - 11.0 10e3/uL    RBC Count 4.47 3.80 - 5.20 10e6/uL    Hemoglobin 14.2 11.7 - 15.7 g/dL    Hematocrit 41.2 35.0 - 47.0 %    MCV 92 78 - 100 fL    MCH 31.8 26.5 - 33.0 pg    MCHC 34.5 31.5 - 36.5 g/dL    RDW 12.7 10.0 - 15.0 %    Platelet Count 184 150 - 450 10e3/uL    % Neutrophils 59 %    % Lymphocytes 30 %    % Monocytes 9 %    % Eosinophils 2 %    % Basophils 0 %    % Immature Granulocytes 0 %    NRBCs per 100 WBC 0 <1 /100    Absolute Neutrophils 5.6 1.6 - 8.3 10e3/uL    Absolute Lymphocytes 2.9 0.8 - 5.3 10e3/uL    Absolute Monocytes 0.8 0.0 - 1.3 10e3/uL    Absolute Eosinophils 0.1 0.0 - 0.7 10e3/uL    Absolute Basophils 0.0 0.0 - 0.2 10e3/uL    Absolute Immature Granulocytes 0.0 <=0.4 10e3/uL    Absolute NRBCs 0.0 10e3/uL       Medications   potassium chloride 10 mEq in 100 mL sterile water infusion (has no administration in time range)   ipratropium - albuterol 0.5 mg/2.5 mg/3 mL (DUONEB) neb solution 3 mL (3 mLs Nebulization $Given 4/18/24 9626)   acetaminophen (TYLENOL) tablet 1,000 mg (1,000 mg Oral Not Given 4/18/24 7674)       Assessments & Plan (with Medical Decision Making) records reviewed including past medical history medications and allergies.  I saw patient on 3/13/2024 in the ED and for abdominal pain and this visit was reviewed.  Chest pain ED visit at [facility] [place] was reviewed on 2/15/2024.  EKG was obtained and revealed a normal sinus rhythm with T wave inversion anteriorly and nonspecific ST-T wave abnormalities which is unchanged from previous.  Patient was given another DuoNeb labs were obtained.  I independently reviewed and interpreted labs.  CBC was unremarkable.  Comprehensive metabolic panel significant for potassium low at 2.6.  Patient was given a dose of IV potassium and also given oral potassium.  UA was ordered as patient stated she had some urinary symptoms.  Troponin was less  than 6.  proBNP is less than 36.  Venous blood gas with a pH of 7.34 CO2 52 O2 41 bicarb 28 FiO2 29%.  Oxyhemoglobin 71 O2 sat venous was elevated compared at 76.7.  D-dimer had been added on and came back at 0.28.  A chest x-ray was ordered.  Patient complained of pain and was given Solu-Medrol and Dilaudid she continued wheezing and was given a second DuoNeb.  A chest x-ray was obtained I I independently reviewed and interpreted this there were possible emphysematous lungs with no infiltrate pneumothorax or other abnormality noted.  The patient's low potassium and her decreased saturations on room air I feel patient needs to be admitted for observation and cares.  Case was discussed with telehospitalist Dr. Torrez and she is in agree with admitting the patient for further evaluation and care.     I have reviewed the nursing notes.    I have reviewed the findings, diagnosis, plan and need for follow up with the patient.           New Prescriptions    No medications on file       Final diagnoses:   SOB (shortness of breath)   Wheezing   Hypoxia   Hypokalemia       4/18/2024   [facility] EMERGENCY DEPT       Dami Crarington MD  04/20/24 8112

## 2024-04-19 NOTE — PROGRESS NOTES
Pt was uncooperative with neb. Refused to keep neb in her mouth, would press the mouthpiece to her lips and take short shallow breaths. I instructed her on how to take deep breaths and she would refuse, I reminded her several times on how to use/take the neb. Breath sounds clear with faint coarseness that clears with cough. RT will continue to follow.

## 2024-04-19 NOTE — PROGRESS NOTES
Skin affirmation note    Admitting nurse completed full skin assessment, Reynold score and Reynold interventions. This writer agrees with the initial skin assessment findings.

## 2024-04-19 NOTE — PLAN OF CARE
End Of Shift Note    Situation: 47 yo female here for SOB and chronic pain    Plan: Steroids for SOB w/ Exp wheeze, non pharm pain interventions    Subjective/Objective:    Neuro: AO x 4, chronic pain in torso, irritable, refusing ordered pain meds. Asking for narcotics.     Cardiac: VSS    Resp: Faint wheezes, complaints of SOB, NC 1 L    GI/: UA sent, chronic nausea, poor appetite    MSK: Bed alarm on, SBA    Skin: intact    LDAs: PIV x 1    K and Mag replaced.

## 2024-04-19 NOTE — PROGRESS NOTES
CLINICAL NUTRITION SERVICES - ASSESSMENT NOTE     Nutrition Prescription    RECOMMENDATIONS FOR MDs/PROVIDERS TO ORDER:  None at this time    Malnutrition Status:    Moderate malnutrition in the context of acute on chronic illness    Recommendations already ordered by Registered Dietitian (RD):  Easy to chew diet modification  Please send chocolate sidney gomez with meals TID    Future/Additional Recommendations:  Monitor patient weight, intakes, meds/labs and GI/BM  Monitor patient tolerance to supplement     REASON FOR ASSESSMENT  Meli Rodriguez is a/an 48 year old female assessed by the dietitian for Admission Nutrition Risk Screen for positive    Patient scored for 14-23 lb weight loss and decreased appetite.    NUTRITION HISTORY  Patient is a 48 year old female who presents with acute hypoxic respiratory failure, hypokalemia, toabcco abuse, bipolar disorder.    Patient reports a decreased appetite over the last few months. Patient noted that a typical weight used to be around 165 lbs around 1 month ago and a current weight is currently around 125 lbs-questionable accuracy of patient report. Patient noted chronic diarrhea, nausea and vomiting. Patient also endorsed poor dentition but no swallowing difficulties. RD went over texture modification and patient was agreeable. RD discussed nutrition recommendation for management of GI disturbances. RD also discussed the importance of adequate calorie and protein for the preservation of LBM. Patient noted that she has not tried supplements in the past but was agreeable to chocolate flavors.    CURRENT NUTRITION ORDERS  Diet: Orders Placed This Encounter      Regular Diet Adult      Intake/Tolerance: No recorded meal intakes in patient chart.    LABS  Labs reviewed  Urea nitrogen decreased-4.6 mg/dL  Blood glucose elevated-158 mg/dL    MEDICATIONS  Medications reviewed  Scheduled: Dilaudid, Magesium sulfate, Klor-con  Continuous: Sodium chloride infusion  PRN: None  "pertinent    ANTHROPOMETRICS  Height: 157.5 cm (5' 2\")  Most Recent Weight: 57.2 kg (126 lb 1.7 oz)    IBW: 50 kg  BMI: Normal BMI  Weight History:   Wt Readings from Last 15 Encounters:   04/19/24 57.2 kg (126 lb 1.7 oz)   03/13/24 56.7 kg (125 lb)   03/03/24 59 kg (130 lb)   12/25/23 56.7 kg (125 lb)   11/07/23 56.7 kg (125 lb)   10/13/23 57.6 kg (127 lb)   10/12/23 57.6 kg (127 lb)   10/10/23 56.7 kg (125 lb)   09/12/23 61.8 kg (136 lb 3.9 oz)   08/15/23 57.6 kg (127 lb)   08/06/23 57.6 kg (127 lb)   08/02/23 56.7 kg (125 lb)   07/10/23 62 kg (136 lb 11 oz)   06/30/23 61.1 kg (134 lb 9.6 oz)   06/23/23 58.5 kg (129 lb)   Patient weight loss does not meet criteria for significance.    Dosing Weight: 57.2 kg-actual BW used.    ASSESSED NUTRITION NEEDS  Estimated Energy Needs: 1,430-1,716 kcals/day (25 - 30 kcals/kg)  Justification: Maintenance  Estimated Protein Needs: 68-86 grams protein/day (1.2 - 1.5 grams of pro/kg)  Justification: Increased needs  Estimated Fluid Needs: 1,430-1,716 mL/day (1 mL/kcal)   Justification: Per provider pending fluid status    PHYSICAL FINDINGS  See malnutrition section below.  Skin noted to be WDL   Poor dentition    MALNUTRITION  % Intake: </=75% for >/= 1 month (severe)  % Weight Loss: Weight loss does not meet criteria  Subcutaneous Fat Loss: None observed  Muscle Loss: Upper leg (quadricep, hamstring):  mild  Fluid Accumulation/Edema: None noted  Malnutrition Diagnosis: Moderate malnutrition in the context of acute on chronic illness    NUTRITION DIAGNOSIS  Malnutrition related to poor appetite as evidenced by mild muscle loss, decrease intakes of </=75% for >/= 1 month, and progressive weight loss.     INTERVENTIONS  Implementation  Nutrition education for nutrition relationship to health/disease and Nutrition education for recommended modifications   Collaboration with other providers-IDT rounds  Medical food supplement therapy-KF TID  Modify composition of meals/snacks-Easy " to chew     Goals  Patient to consume % of nutritionally adequate meal trays TID, or the equivalent with supplements/snacks.     Monitoring/Evaluation  Progress toward goals will be monitored and evaluated per protocol.  Araceli Odell RDN, LD  Clinical Dietitian  Office: [phone]  Weekend pager: [phone]

## 2024-04-19 NOTE — ED TRIAGE NOTES
"Pt arrives via EMS with concerns for SOB x 3 months but has been getting worse over the past few weeks. Pt reports chest pain with breathing and \"it comes and goes as it pleases\". EMS gave 4 mg Zofran for nausea. EMS reports pts SpO2 drops into the high 80's at rest, SpO2 would improve with movement. DuoNeb administered.        "

## 2024-04-19 NOTE — PLAN OF CARE
[facility] DISCHARGE NOTE    Patient discharged to home at 11:40 AM via ambulation. Accompanied by significant other and staff. Discharge instructions reviewed with patient, opportunity offered to ask questions. Prescriptions - None ordered for discharge. All belongings sent with patient.    Jl Lopez RN

## 2024-04-19 NOTE — ED NOTES
Respiratory in room administering DuoNeb. Pts SpO2 noted to be 85% during treatment. Upon assessment pt noted to be hypoventilating, pt encouraged to take deep breaths.

## 2024-04-19 NOTE — H&P
[facility]    History and Physical - Hospitalist Service       Date of Admission:  4/18/2024    Assessment & Plan    Acute hypoxic respiratory failure  Asthma exacerbation  -now on room air  -continue Duobebs, prednisone  -D-dimer wnl; CXR clear    Hypokalemia  Hypomagnesemia  -replacement protocols ordered  -due to chronic GI losses and poor intake    Tobacco abuse  -has cut way back; declines nicotine replacement    Bipolar disorder  GERD  IBS           Diet:  regular  DVT Prophylaxis: scds  Patel Catheter: Not present  Lines: None     Code Status: Full Code      Clinically Significant Risk Factors Present on Admission     # Hypokalemia: Lowest K = 2.6 mmol/L in last 2 days, will replace as needed   # Hypocalcemia: Lowest Ca = 8 mg/dL in last 2 days, will monitor and replace as appropriate   # Hypomagnesemia: Lowest Mg = 1.3 mg/dL in last 2 days, will replace as needed                       Disposition Plan      Expected Discharge Date: 04/21/2024                The patient's care was discussed with the Patient.        MECHE SCHREIBER MD  [facility]  Securely message with the Vocera Web Console (learn more here)  Text page via [facility] Paging/Directory      Visit/Communication Style   Virtual (Video) communication was used to evaluate Meli.  Meli consented to the use of video communication: yes  Video START time: , 4/19/2024  Video STOP time: , 4/19/2024   Patient's location: [facility]   Provider's location during the visit: remote [facility] site        ______________________________________________________________________    Chief Complaint     History of Present Illness   48yoF with IBS, MDD, GERD, polysubstance abuse, and bipolar disorder presented to the ED with SOB, cough and hypoxia.  She has a 3-month history of chest discomfort but this  became more severe in the last few days.     She denies fever, chills.   Her cough is sometimes productive of white sputum.  She is a smoker but has cut back recently since she has not been feeling well.  She also has poor po intake due to her chronic abdominal pain.    Her chart does reference a prior diagnosis of asthma.    Review of Systems    General: negative for fever, chills, sweats, weakness  Eyes: negative for blurred vision, loss of vision  Ear Nose and Throat: negative for pharyngitis, speech or swallowing difficulties  Respiratory:  negative for sputum production, wheezing, RUTH, pleuritic pain, sob or cough  Cardiology:  negative for chest pain, palpitations, orthopnea, PND, edema, syncope   Gastrointestinal: negative for abdominal pain, nausea, vomiting, diarrhea, constipation, hematemesis, melena or hematochezia  Genitourinary: negative for frequency, urgency, dysuria, hematuria   Neurological: negative for focal weakness, paresthesia    Past Medical History    I have reviewed this patient's medical history and updated it with pertinent information if needed.   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     Migraine     Migraine     Polysubstance abuse (H) 12/4/2012    PTSD (post-traumatic stress disorder)     PTSD (post-traumatic stress disorder)     Thyroid disease     Ulcer     Urinary retention        Past  Surgical History   I have reviewed this patient's surgical history and updated it with pertinent information if needed.  Past Surgical History:   Procedure Laterality Date    APPENDECTOMY      APPENDECTOMY      CHOLECYSTECTOMY  01/01/2005    CHOLECYSTECTOMY      HYSTERECTOMY      with ophorectomy bilateral    HYSTERECTOMY TOTAL ABDOMINAL, BILATERAL SALPINGO-OOPHORECTOMY, COMBINED  01/01/2001    bleeding ovarian cysts    HYSTERECTOMY, PAP NO LONGER INDICATED      HYSTERECTOMY, PAP NO LONGER INDICATED      TONSILLECTOMY, ADENOIDECTOMY, MYRINGOTOMY, INSERT TUBE BILATERAL, COMBINED      TOTAL VAGINAL HYSTERECTOMY      URETHRA SURGERY      temporary stent    URETHRA SURGERY         Social History   I have reviewed this patient's social history and updated it with pertinent information if needed.  Social History     Tobacco Use    Smoking status: Every Day     Current packs/day: 0.50     Average packs/day: 0.5 packs/day for 20.0 years (10.0 ttl pk-yrs)     Types: Cigarettes    Smokeless tobacco: Never   Substance Use Topics    Alcohol use: No    Drug use: No     Comment: Denies, past marijuana       Family History   I have reviewed this patient's family history and updated it with pertinent information if needed.  Family History   Problem Relation Age of Onset    Unknown/Adopted Father     Chronic Obstructive Pulmonary Disease Mother     Aneurysm Maternal Grandmother     Cerebrovascular Disease Maternal Grandfather     Breast Cancer No family hx of     Cancer No family hx of     Diabetes No family hx of        Prior to Admission Medications   Prior to Admission Medications   Prescriptions Last Dose Informant Patient Reported? Taking?   ondansetron (ZOFRAN ODT) 4 MG ODT tab   No No   Sig: Take 1 tablet (4 mg) by mouth every 8 hours as needed for nausea   ondansetron (ZOFRAN ODT) 4 MG ODT tab   No No   Sig: Take 1 tablet (4 mg) by mouth every 8 hours as needed for nausea      Facility-Administered Medications: None      Allergies   Allergies   Allergen Reactions    Butalbital-Aspirin-Caffeine      Increases headaches    Demerol Itching    Droperidol Other (See Comments)     twitching      Hydroxyzine Unknown     Refuses IM numerous times stating that it does not work    Ketorolac Tromethamine Itching    Methocarbamol Hives    Metoclopramide Other (See Comments)     Cause acute dystonic reaction; Tolerated 3/3/24    Nicotine      Intolerant to    Nsaids Other (See Comments)     Gastric ulcer    Pramipexole      Ill feel    Rizatriptan      Ill feel    Ropinirole      Increases RLS    Trazodone Other (See Comments)     Out of body sense     Bad dreams    Triptans Itching     vomits    Adhesive Tape Rash    Compazine [Prochlorperazine] Rash     Arm red and rash    Diphenhydramine Anxiety     Jittery and jumpy       Physical Exam   Vital Signs: Temp: 97.9  F (36.6  C) Temp src: Oral BP: 123/81 Pulse: 65   Resp: 12 SpO2: 100 % O2 Device: None (Room air) Oxygen Delivery: 2 LPM  Weight: 126 lbs 1.65 oz    Gen:  Well-developed, well-nourished, in no acute distress, lying semi-supine in hospital stretcher  HEENT:  Anicteric sclera, PER, hearing intact to voice  Resp:  No accessory muscle use, breath sounds clear; no wheezes no rales no rhonchi  Card:  No murmur, normal S1, S2   Abd:  Soft per RN exam, no TTP, non-distended, normoactive bowel sounds are present  Musc:  Normal strength and movement of the major muscle groups without obvious deformity  Psych:  Good insight, oriented to person, place and time, not anxious, not agitated    Data     Recent Labs   Lab 04/18/24  2323   WBC 9.5   HGB 14.2   MCV 92         POTASSIUM 2.6*   CHLORIDE 103   CO2 25   BUN 4.4*   CR 0.60   ANIONGAP 10   SG 8.0*   *   ALBUMIN 3.7   PROTTOTAL 6.0*   BILITOTAL 0.8   ALKPHOS 68   ALT 7   AST 15         Recent Results (from the past 24 hour(s))   Chest XR,  PA & LAT    Narrative    EXAM: XR CHEST 2 VIEWS  LOCATION: [facility]  DATE: 4/19/2024    INDICATION: Shortness of breath  COMPARISON: None.      Impression    IMPRESSION: No focal airspace opacities, pleural effusion or pneumothorax. Heart size and pulmonary vascularity are normal.

## 2024-04-19 NOTE — DISCHARGE SUMMARY
[facility]  Hospitalist Discharge Summary      Date of Admission:  4/18/2024  Date of Discharge:  4/19/2024 11:45 AM  Discharging Provider: Chaitanya Ceja MD  Discharge Service: Hospitalist Service    Discharge Diagnoses   Principal Problem:    Possible Asthma vs COPD exacerbation  Active Problems:    Wheezing    Hypokalemia    SOB (shortness of breath)    Acute respiratory failure with hypoxia     Chronic abdominal pain         Clinically Significant Risk Factors     # Moderate Malnutrition: based on nutrition assessment      Follow-ups Needed After Discharge   Follow-up Appointments     Follow-up and recommended labs and tests       Follow up with primary care provider, Physician No Ref-Primary, within 7   days for hospital follow- up.  The following labs/tests are recommended:   Pulmonary Function Test .            Unresulted Labs Ordered in the Past 30 Days of this Admission       Date and Time Order Name Status Description    4/19/2024  8:05 AM Urine Culture In process         These results will be followed up by PCP    Discharge Disposition   Discharged to home  Condition at discharge: Good    Hospital Course   Patient is a 58 year old female with history of chronic abdominal pain, multiple psychiatric diagnoses, polysubstance abuse, history of drug seeking behavior, tobacco use and multiple ED visits. She has been labeled as asthma in the past though she says she has COPD and apparently has 'alpha-1' diagnosed with a home test kit    She presented to the ED with SOB, cough and hypoxia as well as 3-month history of chest discomfort. In ED troponin, CXR, D-dimer, BNP, CBC were normal. Her EKG had mild non-specific EKG changes. She was given solumedrol, nebulizer and was admitted    Since admission patient has not required O2, but nurses note she will be comfortable on entering room, but then becomes agitated, asking for pain medications for her chest pain. She will seem to hold her  "breath until her sats dip, but sats remain in high 90s when no one is in room.     Today she seems to have no difficulty breathing. She become angry and verbally abusive, searing, when I ask her about her current symptoms. \"I've already answered these questions a 100 times\" . She denies any urinary symptoms. She does not give a description of her chest pain. She asks for pain medications and becomes angry and upset when I say  we usually do not treat COPD or Asthma with narcotic pain medications. She asks 'why did they give me some downstairs\". She starts swearing and says if we are not going to do anything she may as well leave. I told her I felt she was stable for discharge today and we agreed she would go home.         Consultations This Hospital Stay   None    Code Status   Prior    Time Spent on this Encounter   I, Chaitanya Ceja MD, personally saw the patient today and spent greater than 60 minutes discharging this patient.       Chaitanya Ceja MD  [facility]  [address] [facility] [address]  Phone: [phone]  Fax: [phone]  ______________________________________________________________________    Physical Exam   Vital Signs: Temp: 97.8  F (36.6  C) Temp src: Oral BP: 106/74 Pulse: 68   Resp: 14 SpO2: 95 % O2 Device: None (Room air) Oxygen Delivery: 1 LPM  Weight: 126 lbs 1.65 oz  Constitutional: awake, alert, alternately teary, upset, angry argumentative   LUNGS: no shortness of breath , cough, increased work of breathing, speaking in full paragraphs       Primary Care Physician   Physician No Ref-Primary    Discharge Orders      Reason for your hospital stay    shortness of breath and wheezing due to COPD or asthma exacerbation     Follow-up and recommended labs and tests     Follow up with primary care provider, Physician No Ref-Primary, within 7 days for hospital follow- up.  The following labs/tests are recommended: Pulmonary Function Test .     Activity    " Your activity upon discharge: activity as tolerated     Diet    Snacks/Supplements Adult: Francisca Stockton Standard Oral Supplement; With Meals      Combination Diet Regular Diet; Easy to Chew (level 7); Thin Liquids (level 0)       Significant Results and Procedures     CMP  Recent Labs   Lab 04/19/24  1039 04/19/24  0519 04/18/24  2323   NA  --  138 138   POTASSIUM  --  3.8 2.6*   CHLORIDE  --  102 103   CO2  --  24 25   ANIONGAP  --  12 10   GLC  --  158* 136*   BUN  --  4.6* 4.4*   CR  --  0.60 0.60   GFRESTIMATED  --  >90 >90   SG  --  8.5* 8.0*   MAG 2.3  --  1.3*   PROTTOTAL  --   --  6.0*   ALBUMIN  --   --  3.7   BILITOTAL  --   --  0.8   ALKPHOS  --   --  68   AST  --   --  15   ALT  --   --  7     CBC  Recent Labs   Lab 04/18/24  2323   WBC 9.5   RBC 4.47   HGB 14.2   HCT 41.2   MCV 92   MCH 31.8   MCHC 34.5   RDW 12.7        Venous Blood Gas  Recent Labs   Lab 04/19/24  0054   PHV 7.34   PCO2V 52*   PO2V 41   HCO3V 28   STEPHANIE 1.6   O2PER 29      Results for orders placed or performed during the hospital encounter of 04/18/24   Chest XR,  PA & LAT    Narrative    EXAM: XR CHEST 2 VIEWS  LOCATION: [facility]  DATE: 4/19/2024    INDICATION: Shortness of breath  COMPARISON: None.      Impression    IMPRESSION: No focal airspace opacities, pleural effusion or pneumothorax. Heart size and pulmonary vascularity are normal.        Discharge Medications   Discharge Medication List as of 4/19/2024 11:59 AM        START taking these medications    Details   albuterol (PROAIR HFA/PROVENTIL HFA/VENTOLIN HFA) 108 (90 Base) MCG/ACT inhaler Inhale 2 puffs into the lungs every 6 hours as needed for shortness of breath, wheezing or cough, Disp-18 g, R-1, E-PrescribePharmacy may dispense brand covered by insurance (Proair, or proventil or ventolin or generic albuterol inhaler)      predniSONE (DELTASONE) 10 MG tablet 4 tabs daily for 2 days, then 3 tabs daily for 2 days, then 2 tabs daily for 2  days, then 1 tab daily for 2 days, then stop, Disp-20 tablet, R-0, E-Prescribe           CONTINUE these medications which have NOT CHANGED    Details   ondansetron (ZOFRAN ODT) 4 MG ODT tab Take 1 tablet (4 mg) by mouth every 8 hours as needed for nausea, Disp-10 tablet, R-0, E-Prescribe           Allergies   Allergies   Allergen Reactions    Butalbital-Aspirin-Caffeine      Increases headaches    Demerol Itching    Droperidol Other (See Comments)     twitching      Hydroxyzine Unknown     Refuses IM numerous times stating that it does not work    Ketorolac Tromethamine Itching    Methocarbamol Hives    Metoclopramide Other (See Comments)     Cause acute dystonic reaction; Tolerated 3/3/24    Nicotine      Intolerant to    Nsaids Other (See Comments)     Gastric ulcer    Pramipexole      Ill feel    Rizatriptan      Ill feel    Ropinirole      Increases RLS    Trazodone Other (See Comments)     Out of body sense     Bad dreams    Triptans Itching     vomits    Adhesive Tape Rash    Compazine [Prochlorperazine] Rash     Arm red and rash    Diphenhydramine Anxiety     Jittery and jumpy

## 2024-04-20 LAB — BACTERIA UR CULT: NORMAL

## 2024-04-30 ENCOUNTER — HOSPITAL ENCOUNTER (EMERGENCY)
Facility: CLINIC | Age: 49
Discharge: HOME | End: 2024-04-30
Attending: NURSE PRACTITIONER | Admitting: NURSE PRACTITIONER

## 2024-04-30 ENCOUNTER — APPOINTMENT (OUTPATIENT)
Dept: GENERAL RADIOLOGY | Facility: CLINIC | Age: 49
End: 2024-04-30
Attending: NURSE PRACTITIONER

## 2024-04-30 VITALS
OXYGEN SATURATION: 98 % | TEMPERATURE: 97 F | SYSTOLIC BLOOD PRESSURE: 123 MMHG | RESPIRATION RATE: 16 BRPM | HEART RATE: 97 BPM | DIASTOLIC BLOOD PRESSURE: 91 MMHG

## 2024-04-30 DIAGNOSIS — S40.012A CONTUSION OF LEFT SHOULDER, INITIAL ENCOUNTER: ICD-10-CM

## 2024-04-30 PROCEDURE — 73030 X-RAY EXAM OF SHOULDER: CPT | Mod: LT

## 2024-04-30 PROCEDURE — G0463 HOSPITAL OUTPT CLINIC VISIT: HCPCS | Performed by: NURSE PRACTITIONER

## 2024-04-30 PROCEDURE — 99213 OFFICE O/P EST LOW 20 MIN: CPT | Performed by: NURSE PRACTITIONER

## 2024-04-30 RX ORDER — LIDOCAINE 50 MG/G
1 PATCH TOPICAL EVERY 24 HOURS
Qty: 3 PATCH | Refills: 0 | Status: SHIPPED | OUTPATIENT
Start: 2024-04-30 | End: 2024-04-30 | Stop reason: SINTOL

## 2024-04-30 ASSESSMENT — ACTIVITIES OF DAILY LIVING (ADL): ADLS_ACUITY_SCORE: 38
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
## 2024-05-01 NOTE — DISCHARGE INSTRUCTIONS
No fractures were identified, no bony abnormalities identified on x-rays.  Recommend icing, staying mobile to prevent stiffness.  Tylenol recommended for pain.  Follow-up if symptoms or not improving despite recommended treatment plan.

## 2024-05-31 ENCOUNTER — HOSPITAL ENCOUNTER (EMERGENCY)
Facility: CLINIC | Age: 49
Discharge: HOME | End: 2024-05-31
Attending: EMERGENCY MEDICINE | Admitting: EMERGENCY MEDICINE

## 2024-05-31 ENCOUNTER — APPOINTMENT (OUTPATIENT)
Dept: GENERAL RADIOLOGY | Facility: CLINIC | Age: 49
End: 2024-05-31
Attending: EMERGENCY MEDICINE

## 2024-05-31 VITALS
BODY MASS INDEX: 22.08 KG/M2 | SYSTOLIC BLOOD PRESSURE: 109 MMHG | DIASTOLIC BLOOD PRESSURE: 81 MMHG | WEIGHT: 120 LBS | RESPIRATION RATE: 18 BRPM | OXYGEN SATURATION: 97 % | TEMPERATURE: 98.7 F | HEART RATE: 82 BPM | HEIGHT: 62 IN

## 2024-05-31 DIAGNOSIS — M79.622 PAIN OF LEFT UPPER ARM: ICD-10-CM

## 2024-05-31 LAB
ANION GAP SERPL CALCULATED.3IONS-SCNC: 14 MMOL/L (ref 7–15)
BASOPHILS # BLD AUTO: 0 10E3/UL (ref 0–0.2)
BASOPHILS NFR BLD AUTO: 0 %
BUN SERPL-MCNC: 5.4 MG/DL (ref 6–20)
CALCIUM SERPL-MCNC: 9.6 MG/DL (ref 8.6–10)
CHLORIDE SERPL-SCNC: 104 MMOL/L (ref 98–107)
CK SERPL-CCNC: 98 U/L (ref 26–192)
CREAT SERPL-MCNC: 0.6 MG/DL (ref 0.51–0.95)
DEPRECATED HCO3 PLAS-SCNC: 21 MMOL/L (ref 22–29)
EGFRCR SERPLBLD CKD-EPI 2021: >90 ML/MIN/1.73M2
EOSINOPHIL # BLD AUTO: 0.1 10E3/UL (ref 0–0.7)
EOSINOPHIL NFR BLD AUTO: 1 %
ERYTHROCYTE [DISTWIDTH] IN BLOOD BY AUTOMATED COUNT: 13.3 % (ref 10–15)
GLUCOSE SERPL-MCNC: 92 MG/DL (ref 70–99)
HCT VFR BLD AUTO: 41.5 % (ref 35–47)
HGB BLD-MCNC: 14.5 G/DL (ref 11.7–15.7)
IMM GRANULOCYTES # BLD: 0 10E3/UL
IMM GRANULOCYTES NFR BLD: 0 %
LYMPHOCYTES # BLD AUTO: 3.6 10E3/UL (ref 0.8–5.3)
LYMPHOCYTES NFR BLD AUTO: 35 %
MAGNESIUM SERPL-MCNC: 1.7 MG/DL (ref 1.7–2.3)
MCH RBC QN AUTO: 31.5 PG (ref 26.5–33)
MCHC RBC AUTO-ENTMCNC: 34.9 G/DL (ref 31.5–36.5)
MCV RBC AUTO: 90 FL (ref 78–100)
MONOCYTES # BLD AUTO: 1 10E3/UL (ref 0–1.3)
MONOCYTES NFR BLD AUTO: 10 %
NEUTROPHILS # BLD AUTO: 5.5 10E3/UL (ref 1.6–8.3)
NEUTROPHILS NFR BLD AUTO: 53 %
NRBC # BLD AUTO: 0 10E3/UL
NRBC BLD AUTO-RTO: 0 /100
PLATELET # BLD AUTO: 205 10E3/UL (ref 150–450)
POTASSIUM SERPL-SCNC: 3.8 MMOL/L (ref 3.4–5.3)
RBC # BLD AUTO: 4.6 10E6/UL (ref 3.8–5.2)
SODIUM SERPL-SCNC: 139 MMOL/L (ref 135–145)
TROPONIN T SERPL HS-MCNC: <6 NG/L
WBC # BLD AUTO: 10.2 10E3/UL (ref 4–11)

## 2024-05-31 PROCEDURE — 250N000011 HC RX IP 250 OP 636: Performed by: EMERGENCY MEDICINE

## 2024-05-31 PROCEDURE — 71046 X-RAY EXAM CHEST 2 VIEWS: CPT

## 2024-05-31 PROCEDURE — 93005 ELECTROCARDIOGRAM TRACING: CPT | Performed by: EMERGENCY MEDICINE

## 2024-05-31 PROCEDURE — 93010 ELECTROCARDIOGRAM REPORT: CPT | Performed by: EMERGENCY MEDICINE

## 2024-05-31 PROCEDURE — 36415 COLL VENOUS BLD VENIPUNCTURE: CPT | Performed by: EMERGENCY MEDICINE

## 2024-05-31 PROCEDURE — 96374 THER/PROPH/DIAG INJ IV PUSH: CPT | Performed by: EMERGENCY MEDICINE

## 2024-05-31 PROCEDURE — 73060 X-RAY EXAM OF HUMERUS: CPT | Mod: LT

## 2024-05-31 PROCEDURE — 96375 TX/PRO/DX INJ NEW DRUG ADDON: CPT | Performed by: EMERGENCY MEDICINE

## 2024-05-31 PROCEDURE — 85004 AUTOMATED DIFF WBC COUNT: CPT | Performed by: EMERGENCY MEDICINE

## 2024-05-31 PROCEDURE — 83735 ASSAY OF MAGNESIUM: CPT | Performed by: EMERGENCY MEDICINE

## 2024-05-31 PROCEDURE — 82550 ASSAY OF CK (CPK): CPT | Performed by: EMERGENCY MEDICINE

## 2024-05-31 PROCEDURE — 84484 ASSAY OF TROPONIN QUANT: CPT | Performed by: EMERGENCY MEDICINE

## 2024-05-31 PROCEDURE — 99285 EMERGENCY DEPT VISIT HI MDM: CPT | Mod: 25 | Performed by: EMERGENCY MEDICINE

## 2024-05-31 PROCEDURE — 99284 EMERGENCY DEPT VISIT MOD MDM: CPT | Performed by: EMERGENCY MEDICINE

## 2024-05-31 PROCEDURE — 80048 BASIC METABOLIC PNL TOTAL CA: CPT | Performed by: EMERGENCY MEDICINE

## 2024-05-31 RX ORDER — ONDANSETRON 2 MG/ML
4 INJECTION INTRAMUSCULAR; INTRAVENOUS ONCE
Status: COMPLETED | OUTPATIENT
Start: 2024-05-31 | End: 2024-05-31

## 2024-05-31 RX ORDER — DEXAMETHASONE SODIUM PHOSPHATE 10 MG/ML
10 INJECTION, SOLUTION INTRAMUSCULAR; INTRAVENOUS ONCE
Status: DISCONTINUED | OUTPATIENT
Start: 2024-05-31 | End: 2024-05-31

## 2024-05-31 RX ORDER — ACETAMINOPHEN 500 MG
500 TABLET ORAL ONCE
Status: DISCONTINUED | OUTPATIENT
Start: 2024-05-31 | End: 2024-05-31

## 2024-05-31 RX ORDER — MORPHINE SULFATE 4 MG/ML
4 INJECTION, SOLUTION INTRAMUSCULAR; INTRAVENOUS ONCE
Status: COMPLETED | OUTPATIENT
Start: 2024-05-31 | End: 2024-05-31

## 2024-05-31 RX ADMIN — MORPHINE SULFATE 4 MG: 4 INJECTION, SOLUTION INTRAMUSCULAR; INTRAVENOUS at 15:57

## 2024-05-31 RX ADMIN — ONDANSETRON 4 MG: 2 INJECTION INTRAMUSCULAR; INTRAVENOUS at 15:56

## 2024-05-31 ASSESSMENT — ACTIVITIES OF DAILY LIVING (ADL)
ADLS_ACUITY_SCORE: 38
ADLS_ACUITY_SCORE: 38
ADLS_ACUITY_SCORE: 36
ADLS_ACUITY_SCORE: 38

## 2024-05-31 ASSESSMENT — COLUMBIA-SUICIDE SEVERITY RATING SCALE - C-SSRS
2. HAVE YOU ACTUALLY HAD ANY THOUGHTS OF KILLING YOURSELF IN THE PAST MONTH?: NO
1. IN THE PAST MONTH, HAVE YOU WISHED YOU WERE DEAD OR WISHED YOU COULD GO TO SLEEP AND NOT WAKE UP?: NO
6. HAVE YOU EVER DONE ANYTHING, STARTED TO DO ANYTHING, OR PREPARED TO DO ANYTHING TO END YOUR LIFE?: NO
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
## 2024-05-31 NOTE — ED TRIAGE NOTES
Left arm pain x 2 months, increase pain x 3 days and unable to sleep     Triage Assessment (Adult)       Row Name 05/31/24 1440          Triage Assessment    Airway WDL WDL        Respiratory WDL    Respiratory WDL WDL

## 2024-05-31 NOTE — DISCHARGE INSTRUCTIONS
I'm not sure what is causing this pain, but your X-rays and labs and ECG are reassuring.     I did put in a referral for primary care.     I hope you feel better soon.    A heating pad may help. Alternately, if you do not have a heating pad, you can put dry uncooked rice in a sock and heat it in the microwave for a minute, and then apply it to your arm.

## 2024-06-09 ENCOUNTER — HOSPITAL ENCOUNTER (EMERGENCY)
Facility: CLINIC | Age: 49
Discharge: HOME | End: 2024-06-09
Attending: FAMILY MEDICINE | Admitting: FAMILY MEDICINE

## 2024-06-09 VITALS
BODY MASS INDEX: 22.08 KG/M2 | TEMPERATURE: 98.5 F | DIASTOLIC BLOOD PRESSURE: 86 MMHG | HEART RATE: 94 BPM | HEIGHT: 62 IN | OXYGEN SATURATION: 98 % | RESPIRATION RATE: 20 BRPM | WEIGHT: 120 LBS | SYSTOLIC BLOOD PRESSURE: 134 MMHG

## 2024-06-09 DIAGNOSIS — F41.9 ANXIETY: ICD-10-CM

## 2024-06-09 PROCEDURE — 99283 EMERGENCY DEPT VISIT LOW MDM: CPT | Performed by: FAMILY MEDICINE

## 2024-06-09 PROCEDURE — 99282 EMERGENCY DEPT VISIT SF MDM: CPT | Performed by: FAMILY MEDICINE

## 2024-06-09 NOTE — ED TRIAGE NOTES
Pt reports anxiety and pain     Writer and provider spoke with pt about treatment and diagnostic options in ED vs. UC. Pt agreed to be evaluated in the ED.

## 2024-06-09 NOTE — DISCHARGE INSTRUCTIONS
RETURN TO THE EMERGENCY ROOM FOR THE FOLLOWING:    Suicidal or homicidal ideation requiring hospitalization, or at anytime for any concern.    FOLLOW UP:    With your mental health provider when you have the ability to do so.    TREATMENT RECOMMENDATIONS:    There have been no new medications provided and there are no prescription medication changes recommended.     NURSE ADVICE LINE:  [phone] or [phone]

## 2024-06-09 NOTE — ED NOTES
Pt presents requesting anxiety medication. States she has been suffering from severe agoraphobia and has not been able to leave the house in one week. Pt holding comfort stuffed animal and rocking back and forth while speaking. Here with SO. [facility] to [facility] and get coverage to see therapist. Significant other has insurance through work but is too expensive to add pt onto insurance. According to pt she is unable to get coverage through [employer] because he makes too much money. Pts goal is to have less ER visits and get the anxiety under control. Seeking to get appointments through [facility] in the near future for further management. Needs medication for now until she can get an appointment.
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
## 2024-06-09 NOTE — ED NOTES
"/86   Pulse 94   Temp 98.5  F (36.9  C) (Tympanic)   Resp 20   Ht 1.575 m (5' 2\")   Wt 54.4 kg (120 lb)   SpO2 98%   BMI 21.95 kg/m      Meli Rodriguez is a 48-year-old female presenting with complaints of anxiety.  Reports that she has chronic pain and that the chronic pain is giving her anxiety, she is asking for a medication to help with her anxiety.  Given patient's history, I recommended he/she be evaluated in the emergency department.  We discussed that he/she will likely require further testing and/or treatment beyond the capabilities of the current urgent care setting including labs and potential imaging.  Patient expresses understanding of this and agreement with the plan.         Katie Glaser PA-C  06/09/24 1155    "

## 2024-06-09 NOTE — ED TRIAGE NOTES
Pt states has hx anxiety, pt states used to take lorazepam, has not had for 3 years. Pt states anxiety has gotten worse the past week.      Triage Assessment (Adult)       Row Name 06/09/24 1121          Triage Assessment    Airway WDL WDL        Respiratory WDL    Respiratory WDL WDL        Skin Circulation/Temperature WDL    Skin Circulation/Temperature WDL WDL        Cardiac WDL    Cardiac WDL WDL        Peripheral/Neurovascular WDL    Peripheral Neurovascular WDL WDL        Cognitive/Neuro/Behavioral WDL    Cognitive/Neuro/Behavioral WDL WDL

## 2024-06-12 ENCOUNTER — HOSPITAL ENCOUNTER (EMERGENCY)
Facility: CLINIC | Age: 49
Discharge: HOME | End: 2024-06-12
Attending: FAMILY MEDICINE | Admitting: FAMILY MEDICINE

## 2024-06-12 VITALS
SYSTOLIC BLOOD PRESSURE: 122 MMHG | DIASTOLIC BLOOD PRESSURE: 87 MMHG | HEART RATE: 83 BPM | RESPIRATION RATE: 16 BRPM | OXYGEN SATURATION: 95 % | WEIGHT: 120 LBS | HEIGHT: 62 IN | TEMPERATURE: 97.8 F | BODY MASS INDEX: 22.08 KG/M2

## 2024-06-12 DIAGNOSIS — R11.2 NAUSEA AND VOMITING, UNSPECIFIED VOMITING TYPE: ICD-10-CM

## 2024-06-12 LAB
ALBUMIN SERPL BCG-MCNC: 4.5 G/DL (ref 3.5–5.2)
ALP SERPL-CCNC: 75 U/L (ref 40–150)
ALT SERPL W P-5'-P-CCNC: 7 U/L (ref 0–50)
ANION GAP SERPL CALCULATED.3IONS-SCNC: 16 MMOL/L (ref 7–15)
AST SERPL W P-5'-P-CCNC: 14 U/L (ref 0–45)
BASOPHILS # BLD AUTO: 0 10E3/UL (ref 0–0.2)
BASOPHILS NFR BLD AUTO: 0 %
BILIRUB SERPL-MCNC: 0.7 MG/DL
BUN SERPL-MCNC: 9.6 MG/DL (ref 6–20)
CALCIUM SERPL-MCNC: 9.3 MG/DL (ref 8.6–10)
CHLORIDE SERPL-SCNC: 101 MMOL/L (ref 98–107)
CREAT SERPL-MCNC: 0.52 MG/DL (ref 0.51–0.95)
DEPRECATED HCO3 PLAS-SCNC: 21 MMOL/L (ref 22–29)
EGFRCR SERPLBLD CKD-EPI 2021: >90 ML/MIN/1.73M2
EOSINOPHIL # BLD AUTO: 0 10E3/UL (ref 0–0.7)
EOSINOPHIL NFR BLD AUTO: 0 %
ERYTHROCYTE [DISTWIDTH] IN BLOOD BY AUTOMATED COUNT: 13.4 % (ref 10–15)
GLUCOSE SERPL-MCNC: 126 MG/DL (ref 70–99)
HCT VFR BLD AUTO: 39.8 % (ref 35–47)
HGB BLD-MCNC: 13.9 G/DL (ref 11.7–15.7)
IMM GRANULOCYTES # BLD: 0 10E3/UL
IMM GRANULOCYTES NFR BLD: 0 %
LACTATE SERPL-SCNC: 1.7 MMOL/L (ref 0.7–2)
LIPASE SERPL-CCNC: 13 U/L (ref 13–60)
LYMPHOCYTES # BLD AUTO: 2 10E3/UL (ref 0.8–5.3)
LYMPHOCYTES NFR BLD AUTO: 17 %
MCH RBC QN AUTO: 31.5 PG (ref 26.5–33)
MCHC RBC AUTO-ENTMCNC: 34.9 G/DL (ref 31.5–36.5)
MCV RBC AUTO: 90 FL (ref 78–100)
MONOCYTES # BLD AUTO: 0.7 10E3/UL (ref 0–1.3)
MONOCYTES NFR BLD AUTO: 6 %
NEUTROPHILS # BLD AUTO: 8.8 10E3/UL (ref 1.6–8.3)
NEUTROPHILS NFR BLD AUTO: 76 %
NRBC # BLD AUTO: 0 10E3/UL
NRBC BLD AUTO-RTO: 0 /100
PLATELET # BLD AUTO: 222 10E3/UL (ref 150–450)
POTASSIUM SERPL-SCNC: 3.5 MMOL/L (ref 3.4–5.3)
PROT SERPL-MCNC: 6.8 G/DL (ref 6.4–8.3)
RBC # BLD AUTO: 4.41 10E6/UL (ref 3.8–5.2)
SODIUM SERPL-SCNC: 138 MMOL/L (ref 135–145)
TROPONIN T SERPL HS-MCNC: <6 NG/L
WBC # BLD AUTO: 11.5 10E3/UL (ref 4–11)

## 2024-06-12 PROCEDURE — 85025 COMPLETE CBC W/AUTO DIFF WBC: CPT | Performed by: FAMILY MEDICINE

## 2024-06-12 PROCEDURE — 84484 ASSAY OF TROPONIN QUANT: CPT | Performed by: FAMILY MEDICINE

## 2024-06-12 PROCEDURE — 83690 ASSAY OF LIPASE: CPT | Performed by: FAMILY MEDICINE

## 2024-06-12 PROCEDURE — 258N000003 HC RX IP 258 OP 636: Mod: JZ | Performed by: FAMILY MEDICINE

## 2024-06-12 PROCEDURE — 99284 EMERGENCY DEPT VISIT MOD MDM: CPT | Mod: 25 | Performed by: FAMILY MEDICINE

## 2024-06-12 PROCEDURE — 99284 EMERGENCY DEPT VISIT MOD MDM: CPT | Performed by: FAMILY MEDICINE

## 2024-06-12 PROCEDURE — 96376 TX/PRO/DX INJ SAME DRUG ADON: CPT | Performed by: FAMILY MEDICINE

## 2024-06-12 PROCEDURE — 80053 COMPREHEN METABOLIC PANEL: CPT | Performed by: FAMILY MEDICINE

## 2024-06-12 PROCEDURE — 96361 HYDRATE IV INFUSION ADD-ON: CPT | Performed by: FAMILY MEDICINE

## 2024-06-12 PROCEDURE — 36415 COLL VENOUS BLD VENIPUNCTURE: CPT | Performed by: FAMILY MEDICINE

## 2024-06-12 PROCEDURE — 250N000011 HC RX IP 250 OP 636: Mod: JZ | Performed by: FAMILY MEDICINE

## 2024-06-12 PROCEDURE — 96374 THER/PROPH/DIAG INJ IV PUSH: CPT | Performed by: FAMILY MEDICINE

## 2024-06-12 PROCEDURE — 83605 ASSAY OF LACTIC ACID: CPT | Performed by: FAMILY MEDICINE

## 2024-06-12 RX ORDER — ONDANSETRON 4 MG/1
4 TABLET, ORALLY DISINTEGRATING ORAL EVERY 8 HOURS PRN
Qty: 10 TABLET | Refills: 0 | Status: SHIPPED | OUTPATIENT
Start: 2024-06-12 | End: 2024-06-15

## 2024-06-12 RX ORDER — ONDANSETRON 2 MG/ML
4 INJECTION INTRAMUSCULAR; INTRAVENOUS ONCE
Status: COMPLETED | OUTPATIENT
Start: 2024-06-12 | End: 2024-06-12

## 2024-06-12 RX ORDER — OLANZAPINE 10 MG/1
5 INJECTION, POWDER, LYOPHILIZED, FOR SOLUTION INTRAMUSCULAR ONCE
Status: COMPLETED | OUTPATIENT
Start: 2024-06-12 | End: 2024-06-12

## 2024-06-12 RX ADMIN — SODIUM CHLORIDE 1000 ML: 9 INJECTION, SOLUTION INTRAVENOUS at 09:58

## 2024-06-12 RX ADMIN — ONDANSETRON 4 MG: 2 INJECTION INTRAMUSCULAR; INTRAVENOUS at 12:08

## 2024-06-12 RX ADMIN — ONDANSETRON 4 MG: 2 INJECTION INTRAMUSCULAR; INTRAVENOUS at 09:58

## 2024-06-12 ASSESSMENT — ACTIVITIES OF DAILY LIVING (ADL)
ADLS_ACUITY_SCORE: 38

## 2024-06-12 NOTE — ED NOTES
"Pt just put on her call light and states \" I'm ready to go home.  I don't need any more medication.\"   MD aware.  "
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
## 2024-06-12 NOTE — ED TRIAGE NOTES
Pt presents with SO from home with vomiting and diarrhea since last night. Nobody else in the home is sick. Pain to abdomen only with wretching.      Triage Assessment (Adult)       Row Name 06/12/24 0927          Triage Assessment    Airway WDL WDL        Respiratory WDL    Respiratory WDL WDL        Skin Circulation/Temperature WDL    Skin Circulation/Temperature WDL WDL        Cardiac WDL    Cardiac WDL WDL        Peripheral/Neurovascular WDL    Peripheral Neurovascular WDL WDL        Cognitive/Neuro/Behavioral WDL    Cognitive/Neuro/Behavioral WDL X;mood/behavior     Mood/Behavior restless

## 2024-06-12 NOTE — DISCHARGE INSTRUCTIONS
Zofran as needed for nausea/vomiting.  Alma diet and clear fluids through the rest of today and tomorrow until you are clearly feeling much better.  Return to the emergency department if worse or changes.

## 2024-07-02 ENCOUNTER — HOSPITAL ENCOUNTER (EMERGENCY)
Facility: CLINIC | Age: 49
Discharge: LEFT BEFORE DISCHARGE | End: 2024-07-02
Attending: EMERGENCY MEDICINE | Admitting: EMERGENCY MEDICINE

## 2024-07-02 VITALS
DIASTOLIC BLOOD PRESSURE: 75 MMHG | SYSTOLIC BLOOD PRESSURE: 104 MMHG | TEMPERATURE: 98.2 F | HEART RATE: 94 BPM | WEIGHT: 120 LBS | RESPIRATION RATE: 18 BRPM | OXYGEN SATURATION: 96 % | BODY MASS INDEX: 21.26 KG/M2 | HEIGHT: 63 IN

## 2024-07-02 PROCEDURE — 99281 EMR DPT VST MAYX REQ PHY/QHP: CPT | Performed by: EMERGENCY MEDICINE

## 2024-07-02 ASSESSMENT — ACTIVITIES OF DAILY LIVING (ADL)
ADLS_ACUITY_SCORE: 36
ADLS_ACUITY_SCORE: 36

## 2024-07-02 NOTE — ED TRIAGE NOTES
Low back pain with odorous urination.  Pt denies difficulty or burning during urination.  Pt taking ibuprofen and tylenol with no relief     Triage Assessment (Adult)       Row Name 07/02/24 0805          Triage Assessment    Airway WDL WDL        Respiratory WDL    Respiratory WDL WDL

## 2024-07-28 ENCOUNTER — APPOINTMENT (OUTPATIENT)
Dept: CT IMAGING | Facility: CLINIC | Age: 49
End: 2024-07-28
Attending: FAMILY MEDICINE

## 2024-07-28 ENCOUNTER — HOSPITAL ENCOUNTER (EMERGENCY)
Facility: CLINIC | Age: 49
Discharge: HOME | End: 2024-07-28
Attending: FAMILY MEDICINE | Admitting: FAMILY MEDICINE

## 2024-07-28 VITALS
HEART RATE: 54 BPM | BODY MASS INDEX: 21.26 KG/M2 | OXYGEN SATURATION: 95 % | DIASTOLIC BLOOD PRESSURE: 62 MMHG | WEIGHT: 120 LBS | RESPIRATION RATE: 18 BRPM | SYSTOLIC BLOOD PRESSURE: 95 MMHG | TEMPERATURE: 98.1 F

## 2024-07-28 DIAGNOSIS — M54.50 LEFT-SIDED LOW BACK PAIN WITHOUT SCIATICA, UNSPECIFIED CHRONICITY: ICD-10-CM

## 2024-07-28 DIAGNOSIS — R10.9 LEFT FLANK PAIN: ICD-10-CM

## 2024-07-28 LAB
ALBUMIN SERPL BCG-MCNC: 4.1 G/DL (ref 3.5–5.2)
ALBUMIN UR-MCNC: NEGATIVE MG/DL
ALP SERPL-CCNC: 64 U/L (ref 40–150)
ALT SERPL W P-5'-P-CCNC: 8 U/L (ref 0–50)
ANION GAP SERPL CALCULATED.3IONS-SCNC: 11 MMOL/L (ref 7–15)
APPEARANCE UR: CLEAR
AST SERPL W P-5'-P-CCNC: 16 U/L (ref 0–45)
BASOPHILS # BLD AUTO: 0 10E3/UL (ref 0–0.2)
BASOPHILS NFR BLD AUTO: 0 %
BILIRUB DIRECT SERPL-MCNC: <0.2 MG/DL (ref 0–0.3)
BILIRUB SERPL-MCNC: 0.7 MG/DL
BILIRUB UR QL STRIP: NEGATIVE
BUN SERPL-MCNC: 7.3 MG/DL (ref 6–20)
CALCIUM SERPL-MCNC: 8.8 MG/DL (ref 8.8–10.4)
CHLORIDE SERPL-SCNC: 105 MMOL/L (ref 98–107)
COLOR UR AUTO: YELLOW
CREAT SERPL-MCNC: 0.67 MG/DL (ref 0.51–0.95)
EGFRCR SERPLBLD CKD-EPI 2021: >90 ML/MIN/1.73M2
EOSINOPHIL # BLD AUTO: 0.1 10E3/UL (ref 0–0.7)
EOSINOPHIL NFR BLD AUTO: 1 %
ERYTHROCYTE [DISTWIDTH] IN BLOOD BY AUTOMATED COUNT: 13.7 % (ref 10–15)
GLUCOSE SERPL-MCNC: 96 MG/DL (ref 70–99)
GLUCOSE UR STRIP-MCNC: NEGATIVE MG/DL
HCO3 SERPL-SCNC: 22 MMOL/L (ref 22–29)
HCT VFR BLD AUTO: 39.7 % (ref 35–47)
HGB BLD-MCNC: 13.9 G/DL (ref 11.7–15.7)
HGB UR QL STRIP: NEGATIVE
IMM GRANULOCYTES # BLD: 0 10E3/UL
IMM GRANULOCYTES NFR BLD: 0 %
KETONES UR STRIP-MCNC: NEGATIVE MG/DL
LEUKOCYTE ESTERASE UR QL STRIP: NEGATIVE
LIPASE SERPL-CCNC: 13 U/L (ref 13–60)
LYMPHOCYTES # BLD AUTO: 2.6 10E3/UL (ref 0.8–5.3)
LYMPHOCYTES NFR BLD AUTO: 32 %
MCH RBC QN AUTO: 32.3 PG (ref 26.5–33)
MCHC RBC AUTO-ENTMCNC: 35 G/DL (ref 31.5–36.5)
MCV RBC AUTO: 92 FL (ref 78–100)
MONOCYTES # BLD AUTO: 0.8 10E3/UL (ref 0–1.3)
MONOCYTES NFR BLD AUTO: 10 %
MUCOUS THREADS #/AREA URNS LPF: PRESENT /LPF
NEUTROPHILS # BLD AUTO: 4.7 10E3/UL (ref 1.6–8.3)
NEUTROPHILS NFR BLD AUTO: 57 %
NITRATE UR QL: NEGATIVE
NRBC # BLD AUTO: 0 10E3/UL
NRBC BLD AUTO-RTO: 0 /100
PH UR STRIP: 6 [PH] (ref 5–7)
PLATELET # BLD AUTO: 199 10E3/UL (ref 150–450)
POTASSIUM SERPL-SCNC: 4 MMOL/L (ref 3.4–5.3)
PROT SERPL-MCNC: 6.4 G/DL (ref 6.4–8.3)
RBC # BLD AUTO: 4.31 10E6/UL (ref 3.8–5.2)
RBC URINE: 1 /HPF
SODIUM SERPL-SCNC: 138 MMOL/L (ref 135–145)
SP GR UR STRIP: 1.01 (ref 1–1.03)
SQUAMOUS EPITHELIAL: 1 /HPF
UROBILINOGEN UR STRIP-MCNC: 2 MG/DL
WBC # BLD AUTO: 8.3 10E3/UL (ref 4–11)
WBC URINE: 3 /HPF

## 2024-07-28 PROCEDURE — 96375 TX/PRO/DX INJ NEW DRUG ADDON: CPT

## 2024-07-28 PROCEDURE — 99285 EMERGENCY DEPT VISIT HI MDM: CPT | Performed by: FAMILY MEDICINE

## 2024-07-28 PROCEDURE — 250N000013 HC RX MED GY IP 250 OP 250 PS 637: Performed by: FAMILY MEDICINE

## 2024-07-28 PROCEDURE — 258N000003 HC RX IP 258 OP 636: Performed by: FAMILY MEDICINE

## 2024-07-28 PROCEDURE — 250N000011 HC RX IP 250 OP 636: Performed by: FAMILY MEDICINE

## 2024-07-28 PROCEDURE — 80048 BASIC METABOLIC PNL TOTAL CA: CPT | Performed by: FAMILY MEDICINE

## 2024-07-28 PROCEDURE — 96361 HYDRATE IV INFUSION ADD-ON: CPT

## 2024-07-28 PROCEDURE — 85025 COMPLETE CBC W/AUTO DIFF WBC: CPT | Performed by: FAMILY MEDICINE

## 2024-07-28 PROCEDURE — 36415 COLL VENOUS BLD VENIPUNCTURE: CPT | Performed by: FAMILY MEDICINE

## 2024-07-28 PROCEDURE — 82248 BILIRUBIN DIRECT: CPT | Performed by: FAMILY MEDICINE

## 2024-07-28 PROCEDURE — 80053 COMPREHEN METABOLIC PANEL: CPT | Performed by: FAMILY MEDICINE

## 2024-07-28 PROCEDURE — 96374 THER/PROPH/DIAG INJ IV PUSH: CPT | Mod: 59

## 2024-07-28 PROCEDURE — 83690 ASSAY OF LIPASE: CPT | Performed by: FAMILY MEDICINE

## 2024-07-28 PROCEDURE — 74177 CT ABD & PELVIS W/CONTRAST: CPT

## 2024-07-28 PROCEDURE — 99285 EMERGENCY DEPT VISIT HI MDM: CPT | Mod: 25

## 2024-07-28 PROCEDURE — 250N000009 HC RX 250: Performed by: FAMILY MEDICINE

## 2024-07-28 PROCEDURE — 81001 URINALYSIS AUTO W/SCOPE: CPT | Performed by: FAMILY MEDICINE

## 2024-07-28 RX ORDER — OLANZAPINE 10 MG/1
5 INJECTION, POWDER, LYOPHILIZED, FOR SOLUTION INTRAMUSCULAR ONCE
Status: COMPLETED | OUTPATIENT
Start: 2024-07-28 | End: 2024-07-28

## 2024-07-28 RX ORDER — HYDROMORPHONE HYDROCHLORIDE 1 MG/ML
0.5 INJECTION, SOLUTION INTRAMUSCULAR; INTRAVENOUS; SUBCUTANEOUS ONCE
Status: COMPLETED | OUTPATIENT
Start: 2024-07-28 | End: 2024-07-28

## 2024-07-28 RX ORDER — IOPAMIDOL 755 MG/ML
58 INJECTION, SOLUTION INTRAVASCULAR ONCE
Status: COMPLETED | OUTPATIENT
Start: 2024-07-28 | End: 2024-07-28

## 2024-07-28 RX ORDER — ACETAMINOPHEN 325 MG/1
975 TABLET ORAL ONCE
Status: COMPLETED | OUTPATIENT
Start: 2024-07-28 | End: 2024-07-28

## 2024-07-28 RX ORDER — OXYCODONE HYDROCHLORIDE 5 MG/1
10 TABLET ORAL ONCE
Status: COMPLETED | OUTPATIENT
Start: 2024-07-28 | End: 2024-07-28

## 2024-07-28 RX ORDER — SODIUM CHLORIDE 9 MG/ML
1000 INJECTION, SOLUTION INTRAVENOUS CONTINUOUS
Status: DISCONTINUED | OUTPATIENT
Start: 2024-07-28 | End: 2024-07-28 | Stop reason: HOSPADM

## 2024-07-28 RX ADMIN — SODIUM CHLORIDE 1000 ML: 9 INJECTION, SOLUTION INTRAVENOUS at 15:06

## 2024-07-28 RX ADMIN — ACETAMINOPHEN 975 MG: 325 TABLET, FILM COATED ORAL at 15:10

## 2024-07-28 RX ADMIN — OLANZAPINE 5 MG: 10 INJECTION, POWDER, FOR SOLUTION INTRAMUSCULAR at 15:06

## 2024-07-28 RX ADMIN — OXYCODONE HYDROCHLORIDE 10 MG: 5 TABLET ORAL at 15:10

## 2024-07-28 RX ADMIN — HYDROMORPHONE HYDROCHLORIDE 0.5 MG: 1 INJECTION, SOLUTION INTRAMUSCULAR; INTRAVENOUS; SUBCUTANEOUS at 15:06

## 2024-07-28 RX ADMIN — SODIUM CHLORIDE 55 ML: 9 INJECTION, SOLUTION INTRAVENOUS at 16:17

## 2024-07-28 RX ADMIN — IOPAMIDOL 58 ML: 755 INJECTION, SOLUTION INTRAVENOUS at 16:17

## 2024-07-28 ASSESSMENT — ACTIVITIES OF DAILY LIVING (ADL)
ADLS_ACUITY_SCORE: 38

## 2024-07-28 ASSESSMENT — ENCOUNTER SYMPTOMS
SINUS PRESSURE: 0
ABDOMINAL PAIN: 1
PALPITATIONS: 0
HEADACHES: 0
FEVER: 0
VOMITING: 0
BLOOD IN STOOL: 0
SHORTNESS OF BREATH: 0
DYSURIA: 0
SORE THROAT: 0
FREQUENCY: 0
COUGH: 0
CONSTIPATION: 0
DIARRHEA: 0
DIAPHORESIS: 0
NAUSEA: 1
CHILLS: 0
WHEEZING: 0
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
## 2024-07-28 NOTE — ED TRIAGE NOTES
Onset of left flank pain today radiating into groin, states worse than past kidney stones, C/O urinary symptoms recently, given 450 NS IV, fentanyl 125 mg and zofran 4 mg by EMS with some relief     Triage Assessment (Adult)       Row Name 07/28/24 [id number]          Triage Assessment    Airway WDL WDL        Respiratory WDL    Respiratory WDL WDL        Peripheral/Neurovascular WDL    Peripheral Neurovascular WDL WDL        Cognitive/Neuro/Behavioral WDL    Cognitive/Neuro/Behavioral WDL WDL

## 2024-07-28 NOTE — DISCHARGE INSTRUCTIONS
ICD-10-CM    1. Left-sided low back pain without sciatica, unspecified chronicity  M54.50 Primary Care Referral    mainbtain back range of motion. follow-up pcp      2. Left flank pain  R10.9 Primary Care Referral     no serious findings. no stone - no kidney infection

## 2024-08-31 ENCOUNTER — HOSPITAL ENCOUNTER (EMERGENCY)
Facility: CLINIC | Age: 49
Discharge: LEFT BEFORE DISCHARGE | End: 2024-08-31
Attending: EMERGENCY MEDICINE | Admitting: EMERGENCY MEDICINE

## 2024-08-31 VITALS
TEMPERATURE: 98.3 F | BODY MASS INDEX: 21.53 KG/M2 | DIASTOLIC BLOOD PRESSURE: 81 MMHG | RESPIRATION RATE: 18 BRPM | WEIGHT: 117 LBS | OXYGEN SATURATION: 99 % | SYSTOLIC BLOOD PRESSURE: 116 MMHG | HEIGHT: 62 IN | HEART RATE: 87 BPM

## 2024-08-31 DIAGNOSIS — R31.9 HEMATURIA, UNSPECIFIED TYPE: ICD-10-CM

## 2024-08-31 LAB
ALBUMIN UR-MCNC: 30 MG/DL
APPEARANCE UR: ABNORMAL
BILIRUB UR QL STRIP: NEGATIVE
COLOR UR AUTO: YELLOW
GLUCOSE UR STRIP-MCNC: NEGATIVE MG/DL
HGB UR QL STRIP: ABNORMAL
KETONES UR STRIP-MCNC: NEGATIVE MG/DL
LEUKOCYTE ESTERASE UR QL STRIP: NEGATIVE
MUCOUS THREADS #/AREA URNS LPF: PRESENT /LPF
NITRATE UR QL: NEGATIVE
PH UR STRIP: 6 [PH] (ref 5–7)
RBC URINE: >182 /HPF
SP GR UR STRIP: 1.02 (ref 1–1.03)
SQUAMOUS EPITHELIAL: 1 /HPF
UROBILINOGEN UR STRIP-MCNC: NORMAL MG/DL
WBC URINE: 5 /HPF

## 2024-08-31 PROCEDURE — 99283 EMERGENCY DEPT VISIT LOW MDM: CPT | Performed by: EMERGENCY MEDICINE

## 2024-08-31 PROCEDURE — 99284 EMERGENCY DEPT VISIT MOD MDM: CPT | Performed by: EMERGENCY MEDICINE

## 2024-08-31 PROCEDURE — 250N000013 HC RX MED GY IP 250 OP 250 PS 637: Performed by: EMERGENCY MEDICINE

## 2024-08-31 PROCEDURE — 81001 URINALYSIS AUTO W/SCOPE: CPT | Performed by: EMERGENCY MEDICINE

## 2024-08-31 RX ORDER — OXYCODONE AND ACETAMINOPHEN 5; 325 MG/1; MG/1
1 TABLET ORAL ONCE
Status: COMPLETED | OUTPATIENT
Start: 2024-08-31 | End: 2024-08-31

## 2024-08-31 RX ADMIN — OXYCODONE HYDROCHLORIDE AND ACETAMINOPHEN 1 TABLET: 5; 325 TABLET ORAL at 12:06

## 2024-08-31 ASSESSMENT — ACTIVITIES OF DAILY LIVING (ADL)
ADLS_ACUITY_SCORE: 38
ADLS_ACUITY_SCORE: 38
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
## 2024-08-31 NOTE — ED NOTES
Pt states she is going to leave and follow up with PCP, declined speaking with MD and declines signing AMA paperwork

## 2024-09-25 ENCOUNTER — APPOINTMENT (OUTPATIENT)
Dept: GENERAL RADIOLOGY | Facility: CLINIC | Age: 49
End: 2024-09-25
Attending: FAMILY MEDICINE

## 2024-09-25 ENCOUNTER — HOSPITAL ENCOUNTER (EMERGENCY)
Facility: CLINIC | Age: 49
Discharge: HOME | End: 2024-09-25
Attending: FAMILY MEDICINE | Admitting: FAMILY MEDICINE

## 2024-09-25 VITALS
TEMPERATURE: 97.7 F | HEIGHT: 62 IN | SYSTOLIC BLOOD PRESSURE: 121 MMHG | BODY MASS INDEX: 21.16 KG/M2 | RESPIRATION RATE: 16 BRPM | OXYGEN SATURATION: 97 % | DIASTOLIC BLOOD PRESSURE: 90 MMHG | HEART RATE: 71 BPM | WEIGHT: 115 LBS

## 2024-09-25 DIAGNOSIS — R05.9: ICD-10-CM

## 2024-09-25 DIAGNOSIS — S22.39XA: ICD-10-CM

## 2024-09-25 PROCEDURE — 99283 EMERGENCY DEPT VISIT LOW MDM: CPT | Performed by: FAMILY MEDICINE

## 2024-09-25 PROCEDURE — 99283 EMERGENCY DEPT VISIT LOW MDM: CPT | Mod: 25 | Performed by: FAMILY MEDICINE

## 2024-09-25 PROCEDURE — 71046 X-RAY EXAM CHEST 2 VIEWS: CPT

## 2024-09-25 RX ORDER — OXYCODONE HYDROCHLORIDE 5 MG/1
5 TABLET ORAL EVERY 6 HOURS PRN
Qty: 10 TABLET | Refills: 0 | Status: SHIPPED | OUTPATIENT
Start: 2024-09-25

## 2024-09-25 ASSESSMENT — ACTIVITIES OF DAILY LIVING (ADL): ADLS_ACUITY_SCORE: 38
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
## 2024-09-25 NOTE — ED TRIAGE NOTES
Pt reports coughing hard last night when smoking and now pt has right rib pain, SOB and bruising on her right upper back. Pt is breathing shallow and holding her breath.      Triage Assessment (Adult)       Row Name 09/25/24 1110          Triage Assessment    Airway WDL WDL        Respiratory WDL    Respiratory WDL X;rhythm/pattern     Rhythm/Pattern, Respiratory shallow;shortness of breath        Cardiac WDL    Cardiac WDL WDL        Peripheral/Neurovascular WDL    Peripheral Neurovascular WDL WDL        Cognitive/Neuro/Behavioral WDL    Cognitive/Neuro/Behavioral WDL WDL

## 2024-09-25 NOTE — DISCHARGE INSTRUCTIONS
ICD-10-CM    1. Fracture of rib due to cough  S22.39XA Primary Care Referral    R05.9     use topical lidocaine gel.  tylenol 650 mg every 6 hours.  take the ibuprofen 650 mg every 6 hours. maintain back range of motion. take oxycodone for breakthrough pain - this is only for pain interfering with sleep.  i recommend only brief use of this. use all other means instead use incentive spirometer.

## 2024-10-09 NOTE — ED TRIAGE NOTES
Pt here for dysuria, hx of bladder problems     Triage Assessment (Adult)       Row Name 08/31/24 1123          Triage Assessment    Airway WDL WDL        Respiratory WDL    Respiratory WDL WDL        Skin Circulation/Temperature WDL    Skin Circulation/Temperature WDL WDL        Cardiac WDL    Cardiac WDL WDL        Peripheral/Neurovascular WDL    Peripheral Neurovascular WDL WDL        Cognitive/Neuro/Behavioral WDL    Cognitive/Neuro/Behavioral WDL WDL                      Yolanda Guillen is to return to the clinic to see Paulette Waite MD in  3 months for a 20 min OFV for follow-up     Referral:  None    Labs to be done day of next visit:  None     Labs to be done within 5 days prior to next visit:  CBC with diff  CMP  Chromogranin level  none      Imaging to be done within a week prior to next visit:  PET scan -- Dotatate PET Scan     Labs to be done now:  None     Imaging to be done now:  None    Misc Orders:  None    Diagnosis    1. Neuroendocrine tumor (CMD)        Paulette Waite MD

## 2024-11-18 ENCOUNTER — APPOINTMENT (OUTPATIENT)
Dept: CT IMAGING | Facility: CLINIC | Age: 49
End: 2024-11-18
Attending: FAMILY MEDICINE

## 2024-11-18 ENCOUNTER — HOSPITAL ENCOUNTER (EMERGENCY)
Facility: CLINIC | Age: 49
Discharge: HOME | End: 2024-11-19
Attending: FAMILY MEDICINE | Admitting: FAMILY MEDICINE

## 2024-11-18 DIAGNOSIS — R07.89 RIGHT-SIDED CHEST WALL PAIN: ICD-10-CM

## 2024-11-18 DIAGNOSIS — R91.8 PULMONARY NODULES: ICD-10-CM

## 2024-11-18 LAB
BASOPHILS # BLD AUTO: 0 10E3/UL (ref 0–0.2)
BASOPHILS NFR BLD AUTO: 0 %
EOSINOPHIL # BLD AUTO: 0.1 10E3/UL (ref 0–0.7)
EOSINOPHIL NFR BLD AUTO: 1 %
ERYTHROCYTE [DISTWIDTH] IN BLOOD BY AUTOMATED COUNT: 13.7 % (ref 10–15)
HCT VFR BLD AUTO: 37.8 % (ref 35–47)
HGB BLD-MCNC: 13.3 G/DL (ref 11.7–15.7)
HOLD SPECIMEN: NORMAL
HOLD SPECIMEN: NORMAL
IMM GRANULOCYTES # BLD: 0 10E3/UL
IMM GRANULOCYTES NFR BLD: 0 %
LYMPHOCYTES # BLD AUTO: 3.4 10E3/UL (ref 0.8–5.3)
LYMPHOCYTES NFR BLD AUTO: 31 %
MCH RBC QN AUTO: 32.4 PG (ref 26.5–33)
MCHC RBC AUTO-ENTMCNC: 35.2 G/DL (ref 31.5–36.5)
MCV RBC AUTO: 92 FL (ref 78–100)
MONOCYTES # BLD AUTO: 1.2 10E3/UL (ref 0–1.3)
MONOCYTES NFR BLD AUTO: 11 %
NEUTROPHILS # BLD AUTO: 6.1 10E3/UL (ref 1.6–8.3)
NEUTROPHILS NFR BLD AUTO: 57 %
NRBC # BLD AUTO: 0 10E3/UL
NRBC BLD AUTO-RTO: 0 /100
PLATELET # BLD AUTO: 170 10E3/UL (ref 150–450)
RBC # BLD AUTO: 4.11 10E6/UL (ref 3.8–5.2)
WBC # BLD AUTO: 10.7 10E3/UL (ref 4–11)

## 2024-11-18 PROCEDURE — 71250 CT THORAX DX C-: CPT

## 2024-11-18 PROCEDURE — 84450 TRANSFERASE (AST) (SGOT): CPT | Performed by: FAMILY MEDICINE

## 2024-11-18 PROCEDURE — 85025 COMPLETE CBC W/AUTO DIFF WBC: CPT | Performed by: FAMILY MEDICINE

## 2024-11-18 PROCEDURE — 250N000013 HC RX MED GY IP 250 OP 250 PS 637: Performed by: FAMILY MEDICINE

## 2024-11-18 PROCEDURE — 84155 ASSAY OF PROTEIN SERUM: CPT | Performed by: FAMILY MEDICINE

## 2024-11-18 PROCEDURE — 36415 COLL VENOUS BLD VENIPUNCTURE: CPT | Performed by: FAMILY MEDICINE

## 2024-11-18 PROCEDURE — 99284 EMERGENCY DEPT VISIT MOD MDM: CPT | Performed by: FAMILY MEDICINE

## 2024-11-18 PROCEDURE — 99284 EMERGENCY DEPT VISIT MOD MDM: CPT | Mod: 25 | Performed by: FAMILY MEDICINE

## 2024-11-18 RX ORDER — OXYCODONE HYDROCHLORIDE 5 MG/1
10 TABLET ORAL ONCE
Status: COMPLETED | OUTPATIENT
Start: 2024-11-18 | End: 2024-11-18

## 2024-11-18 RX ADMIN — OXYCODONE 10 MG: 5 TABLET ORAL at 23:35

## 2024-11-18 ASSESSMENT — ACTIVITIES OF DAILY LIVING (ADL): ADLS_ACUITY_SCORE: 0

## 2024-11-19 VITALS
HEART RATE: 70 BPM | DIASTOLIC BLOOD PRESSURE: 75 MMHG | SYSTOLIC BLOOD PRESSURE: 103 MMHG | BODY MASS INDEX: 21.03 KG/M2 | WEIGHT: 115 LBS | RESPIRATION RATE: 17 BRPM | TEMPERATURE: 98.6 F | OXYGEN SATURATION: 96 %

## 2024-11-19 LAB
ALBUMIN SERPL BCG-MCNC: 4.1 G/DL (ref 3.5–5.2)
ALP SERPL-CCNC: 66 U/L (ref 40–150)
ALT SERPL W P-5'-P-CCNC: 7 U/L (ref 0–50)
ANION GAP SERPL CALCULATED.3IONS-SCNC: 9 MMOL/L (ref 7–15)
AST SERPL W P-5'-P-CCNC: 16 U/L (ref 0–45)
BILIRUB SERPL-MCNC: 0.5 MG/DL
BUN SERPL-MCNC: 18.3 MG/DL (ref 6–20)
CALCIUM SERPL-MCNC: 8.8 MG/DL (ref 8.8–10.4)
CHLORIDE SERPL-SCNC: 102 MMOL/L (ref 98–107)
CREAT SERPL-MCNC: 0.75 MG/DL (ref 0.51–0.95)
EGFRCR SERPLBLD CKD-EPI 2021: >90 ML/MIN/1.73M2
GLUCOSE SERPL-MCNC: 105 MG/DL (ref 70–99)
HCO3 SERPL-SCNC: 25 MMOL/L (ref 22–29)
POTASSIUM SERPL-SCNC: 3.9 MMOL/L (ref 3.4–5.3)
PROT SERPL-MCNC: 6 G/DL (ref 6.4–8.3)
SODIUM SERPL-SCNC: 136 MMOL/L (ref 135–145)

## 2024-11-19 NOTE — DISCHARGE INSTRUCTIONS
RETURN TO THE EMERGENCY ROOM FOR THE FOLLOWING:    Fever greater than 101, new difficulty with breathing, or at anytime for any concern.    FOLLOW UP:    With your primary clinic for further discussion, as needed.  Repeat CT imaging in 1 year to clarify for changes to the pulmonary nodules.    TREATMENT RECOMMENDATIONS:    There have been no new medications provided and there are no prescription medication changes recommended.     NURSE ADVICE LINE:  [phone] or [phone]
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
## 2024-11-19 NOTE — ED TRIAGE NOTES
Arrives with acute on chronic right chest wall pain that seems to radiate across entire chest & abdomen, has been occurring for past year, laying flat or position changes makes it worse, advil has helped some in the past.  States she has had issues with insurance & has not been able to f/up with specialty MDs to further investigate. Denies significant heart hx, does endorse chronic pain.     Triage Assessment (Adult)       Row Name 11/18/24 5481          Triage Assessment    Airway WDL WDL        Respiratory WDL    Respiratory WDL WDL        Skin Circulation/Temperature WDL    Skin Circulation/Temperature WDL WDL        Cardiac WDL    Cardiac WDL X     Cardiac Rhythm NSR        Peripheral/Neurovascular WDL    Peripheral Neurovascular WDL WDL        Cognitive/Neuro/Behavioral WDL    Cognitive/Neuro/Behavioral WDL WDL                      [FreeTextEntry1] : William is 79 years old with a history of hypertension  and mild hyperlipidemia.  He is otherwise healthy and has not had any significant hospitalizations.  He is a non-smoker.  There is no significant family history of coronary artery disease\par \par To review, he presented to his internist Dr. Ricky Kwok with severe left arm pain and new EKG changes in the lateral leads.  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  He denies chest pain\par \par He lost his wife over 2 years ago which is created great sadness for him depression and anxiety.\par \par He was admitted to [facility] with a diagnosis of unstable angina.  ECG showed some nonspecific T wave changes in the lateral leads enzymes were normal\par \par He underwent cardiac catheterization approximately November 6, 2020.  LV function was normal.  He had severe disease of the circumflex and right coronary artery and the LAD had luminal disease.  He underwent successful stenting of the circumflex and right coronary with drug-eluting stents.  He was discharged the following day.  He did have chest pressure during the inflations but no significant left arm pain\par \par Since discharge he has felt well without chest pain chest pressure left arm pain or shortness of breath\par \par He continues to feel well without chest pain chest pressure shortness of breath.  He seems to be somewhat confused about his medications on last visit his blood pressure was over 150\par \par He denies chest pain chest pressure shortness of breath.  He denies dizziness or syncope\par \par He is tolerating his present regimen of medications\par \par He continues to feel well without chest pain chest pressure back pain or shortness of breath.  He is much less depressed though complains of sleeping a good deal during the day though does not bother him.\par \par He does have some unsteadiness on his feet particularly going up stairs often has to lean against the wall to steady himself.  He denies palpitations chest pain or significant shortness of breath.  He remains on stable medication\par \par EKG today on May 6 third 2022 normal sinus rhythm minor nonspecific ST-T wave changes unchanged from prior EKG\par \par He does not exercise that much but what ever activity he does he has no cardiac symptoms\par He continues to do well but is now in need of dental work.  He is still on Plavix\par \par O last visit he denied shortness of breath chest pain chest pressure palpitations.  He does feel loss of balance and is certain dizziness.  On last visit his blood pressure was on the low side.  He is not clear exactly what he is taking\par \par He came today because yesterday he developed pain across his chest which was worse when he took a breath. There was no associated nausea vomiting diaphoresis and the symptoms are different than what he had when he had two stents placed.\par \par EKG today is unchanged with normal sinus rhythm first-degree AV block nonspecific ST-T wave changes

## 2024-11-28 ENCOUNTER — APPOINTMENT (OUTPATIENT)
Dept: CT IMAGING | Facility: CLINIC | Age: 49
End: 2024-11-28
Attending: EMERGENCY MEDICINE

## 2024-11-28 ENCOUNTER — HOSPITAL ENCOUNTER (EMERGENCY)
Facility: CLINIC | Age: 49
Discharge: HOME | End: 2024-11-28
Attending: EMERGENCY MEDICINE

## 2024-11-28 VITALS
HEIGHT: 62 IN | TEMPERATURE: 97.5 F | OXYGEN SATURATION: 97 % | SYSTOLIC BLOOD PRESSURE: 128 MMHG | HEART RATE: 94 BPM | RESPIRATION RATE: 24 BRPM | BODY MASS INDEX: 21.16 KG/M2 | DIASTOLIC BLOOD PRESSURE: 89 MMHG | WEIGHT: 115 LBS

## 2024-11-28 DIAGNOSIS — R10.12 ABDOMINAL PAIN, LEFT UPPER QUADRANT: ICD-10-CM

## 2024-11-28 DIAGNOSIS — N30.00 ACUTE CYSTITIS WITHOUT HEMATURIA: ICD-10-CM

## 2024-11-28 LAB
ALBUMIN SERPL BCG-MCNC: 4.3 G/DL (ref 3.5–5.2)
ALBUMIN UR-MCNC: NEGATIVE MG/DL
ALP SERPL-CCNC: 73 U/L (ref 40–150)
ALT SERPL W P-5'-P-CCNC: 11 U/L (ref 0–50)
ANION GAP SERPL CALCULATED.3IONS-SCNC: 13 MMOL/L (ref 7–15)
APPEARANCE UR: ABNORMAL
AST SERPL W P-5'-P-CCNC: 17 U/L (ref 0–45)
BASOPHILS # BLD AUTO: 0 10E3/UL (ref 0–0.2)
BASOPHILS NFR BLD AUTO: 0 %
BILIRUB SERPL-MCNC: 0.7 MG/DL
BILIRUB UR QL STRIP: NEGATIVE
BUN SERPL-MCNC: 11.4 MG/DL (ref 6–20)
CALCIUM SERPL-MCNC: 9.2 MG/DL (ref 8.8–10.4)
CHLORIDE SERPL-SCNC: 101 MMOL/L (ref 98–107)
COLOR UR AUTO: YELLOW
CREAT SERPL-MCNC: 0.82 MG/DL (ref 0.51–0.95)
EGFRCR SERPLBLD CKD-EPI 2021: 88 ML/MIN/1.73M2
EOSINOPHIL # BLD AUTO: 0.2 10E3/UL (ref 0–0.7)
EOSINOPHIL NFR BLD AUTO: 1 %
ERYTHROCYTE [DISTWIDTH] IN BLOOD BY AUTOMATED COUNT: 13.6 % (ref 10–15)
GLUCOSE SERPL-MCNC: 117 MG/DL (ref 70–99)
GLUCOSE UR STRIP-MCNC: NEGATIVE MG/DL
HCO3 SERPL-SCNC: 25 MMOL/L (ref 22–29)
HCT VFR BLD AUTO: 39.5 % (ref 35–47)
HGB BLD-MCNC: 13.5 G/DL (ref 11.7–15.7)
HGB UR QL STRIP: NEGATIVE
IMM GRANULOCYTES # BLD: 0 10E3/UL
IMM GRANULOCYTES NFR BLD: 0 %
KETONES UR STRIP-MCNC: NEGATIVE MG/DL
LEUKOCYTE ESTERASE UR QL STRIP: ABNORMAL
LIPASE SERPL-CCNC: 29 U/L (ref 13–60)
LYMPHOCYTES # BLD AUTO: 3.5 10E3/UL (ref 0.8–5.3)
LYMPHOCYTES NFR BLD AUTO: 31 %
MCH RBC QN AUTO: 31.5 PG (ref 26.5–33)
MCHC RBC AUTO-ENTMCNC: 34.2 G/DL (ref 31.5–36.5)
MCV RBC AUTO: 92 FL (ref 78–100)
MONOCYTES # BLD AUTO: 0.9 10E3/UL (ref 0–1.3)
MONOCYTES NFR BLD AUTO: 8 %
MUCOUS THREADS #/AREA URNS LPF: PRESENT /LPF
NEUTROPHILS # BLD AUTO: 6.6 10E3/UL (ref 1.6–8.3)
NEUTROPHILS NFR BLD AUTO: 59 %
NITRATE UR QL: NEGATIVE
NRBC # BLD AUTO: 0 10E3/UL
NRBC BLD AUTO-RTO: 0 /100
PH UR STRIP: 5 [PH] (ref 5–7)
PLATELET # BLD AUTO: 220 10E3/UL (ref 150–450)
POTASSIUM SERPL-SCNC: 3.9 MMOL/L (ref 3.4–5.3)
PROT SERPL-MCNC: 6.5 G/DL (ref 6.4–8.3)
RBC # BLD AUTO: 4.28 10E6/UL (ref 3.8–5.2)
RBC URINE: 11 /HPF
SODIUM SERPL-SCNC: 139 MMOL/L (ref 135–145)
SP GR UR STRIP: 1.02 (ref 1–1.03)
SQUAMOUS EPITHELIAL: 2 /HPF
UROBILINOGEN UR STRIP-MCNC: NORMAL MG/DL
WBC # BLD AUTO: 11.2 10E3/UL (ref 4–11)
WBC URINE: 26 /HPF

## 2024-11-28 PROCEDURE — 99285 EMERGENCY DEPT VISIT HI MDM: CPT | Mod: 25

## 2024-11-28 PROCEDURE — 96374 THER/PROPH/DIAG INJ IV PUSH: CPT | Mod: 59

## 2024-11-28 PROCEDURE — 250N000009 HC RX 250: Performed by: EMERGENCY MEDICINE

## 2024-11-28 PROCEDURE — 83690 ASSAY OF LIPASE: CPT | Performed by: EMERGENCY MEDICINE

## 2024-11-28 PROCEDURE — 80053 COMPREHEN METABOLIC PANEL: CPT | Performed by: EMERGENCY MEDICINE

## 2024-11-28 PROCEDURE — 250N000011 HC RX IP 250 OP 636: Performed by: EMERGENCY MEDICINE

## 2024-11-28 PROCEDURE — 36415 COLL VENOUS BLD VENIPUNCTURE: CPT | Performed by: EMERGENCY MEDICINE

## 2024-11-28 PROCEDURE — 81001 URINALYSIS AUTO W/SCOPE: CPT | Performed by: EMERGENCY MEDICINE

## 2024-11-28 PROCEDURE — 99285 EMERGENCY DEPT VISIT HI MDM: CPT | Performed by: EMERGENCY MEDICINE

## 2024-11-28 PROCEDURE — 96376 TX/PRO/DX INJ SAME DRUG ADON: CPT

## 2024-11-28 PROCEDURE — 87086 URINE CULTURE/COLONY COUNT: CPT | Performed by: EMERGENCY MEDICINE

## 2024-11-28 PROCEDURE — 96375 TX/PRO/DX INJ NEW DRUG ADDON: CPT

## 2024-11-28 PROCEDURE — 85025 COMPLETE CBC W/AUTO DIFF WBC: CPT | Performed by: EMERGENCY MEDICINE

## 2024-11-28 PROCEDURE — 74177 CT ABD & PELVIS W/CONTRAST: CPT

## 2024-11-28 RX ORDER — CEPHALEXIN 500 MG/1
500 CAPSULE ORAL 4 TIMES DAILY
Qty: 40 CAPSULE | Refills: 0 | Status: SHIPPED | OUTPATIENT
Start: 2024-11-28 | End: 2024-12-08

## 2024-11-28 RX ORDER — IOPAMIDOL 755 MG/ML
56 INJECTION, SOLUTION INTRAVASCULAR ONCE
Status: COMPLETED | OUTPATIENT
Start: 2024-11-28 | End: 2024-11-28

## 2024-11-28 RX ORDER — ONDANSETRON 2 MG/ML
4 INJECTION INTRAMUSCULAR; INTRAVENOUS ONCE
Status: COMPLETED | OUTPATIENT
Start: 2024-11-28 | End: 2024-11-28

## 2024-11-28 RX ORDER — ONDANSETRON 4 MG/1
4 TABLET, ORALLY DISINTEGRATING ORAL EVERY 8 HOURS PRN
Qty: 10 TABLET | Refills: 0 | Status: SHIPPED | OUTPATIENT
Start: 2024-11-28

## 2024-11-28 RX ORDER — HYDROMORPHONE HYDROCHLORIDE 1 MG/ML
0.5 INJECTION, SOLUTION INTRAMUSCULAR; INTRAVENOUS; SUBCUTANEOUS
Status: COMPLETED | OUTPATIENT
Start: 2024-11-28 | End: 2024-11-28

## 2024-11-28 RX ORDER — OXYCODONE HYDROCHLORIDE 5 MG/1
5 TABLET ORAL EVERY 6 HOURS PRN
Qty: 6 TABLET | Refills: 0 | Status: SHIPPED | OUTPATIENT
Start: 2024-11-28

## 2024-11-28 RX ADMIN — HYDROMORPHONE HYDROCHLORIDE 0.5 MG: 1 INJECTION, SOLUTION INTRAMUSCULAR; INTRAVENOUS; SUBCUTANEOUS at 16:44

## 2024-11-28 RX ADMIN — ONDANSETRON 4 MG: 2 INJECTION, SOLUTION INTRAMUSCULAR; INTRAVENOUS at 14:55

## 2024-11-28 RX ADMIN — SODIUM CHLORIDE 54 ML: 9 INJECTION, SOLUTION INTRAVENOUS at 16:17

## 2024-11-28 RX ADMIN — HYDROMORPHONE HYDROCHLORIDE 0.5 MG: 1 INJECTION, SOLUTION INTRAMUSCULAR; INTRAVENOUS; SUBCUTANEOUS at 15:28

## 2024-11-28 RX ADMIN — IOPAMIDOL 56 ML: 755 INJECTION, SOLUTION INTRAVENOUS at 16:17

## 2024-11-28 RX ADMIN — HYDROMORPHONE HYDROCHLORIDE 0.5 MG: 1 INJECTION, SOLUTION INTRAMUSCULAR; INTRAVENOUS; SUBCUTANEOUS at 14:56

## 2024-11-28 ASSESSMENT — ACTIVITIES OF DAILY LIVING (ADL)
ADLS_ACUITY_SCORE: 51
ADLS_ACUITY_SCORE: 51
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
## 2024-11-28 NOTE — DISCHARGE INSTRUCTIONS
Use ondansetron as needed for nausea.  Take the antibiotics as prescribed.  Return to the emergency department for fevers, repeated vomiting, worsening symptoms, or other concerns.  Otherwise follow-up in clinic for recheck.    Use ibuprofen and acetaminophen for your symptoms.  Use oxycodone as needed for pain that is not controlled by the prior two medications.    Oxycodone is an addictive opioid medication, please only take it when the pain is more than can be controlled by acetaminophen or ibuprofen alone. It will also make you lightheaded, nauseated, and constipated.  Do not drive, operate heavy machinery, or take care of young children while taking this medication. Do not mix it with other medications or drugs that will make you sleepy, such as alcohol.     Repeated studies have shown that the longer you use opioid pain medications, the longer it is until you return to normal function. It is our recommendation that you taper off opioids as quickly as possible with the goal of returning to normal function or near normal function. Long term use of opioids quickly results in growing tolerance to the medication (less of the benefits) and increased dependence (more of the bad side effects).     Pain is very difficult to treat and we can very rarely take away pain completely. If you are having difficulty with pain over several weeks after an injury, you may need to start different medications and therapies (such as physical therapy, graded exercise, massage, and acupuncture). Please talk about this with your regular doctor.     If you are interested in seeking free, confidential treatment referral and information service for individuals and families facing mental health and/or substance use disorders please call [phone])

## 2024-11-28 NOTE — ED TRIAGE NOTES
Pt reports left upper quadrant pain that started after having some ham dinner, pt reports started vomiting and has been going on for about 2 hours now. Pt reports pain 9/10. Pt denies diarrhea or fevers at this time

## 2024-11-28 NOTE — ED NOTES
Bed: ED23  Expected date: 11/28/24  Expected time: 2:34 PM  Means of arrival: Ambulance  Comments:  48 y/o Fe, Rt upper quad abd pain and vomiting.

## 2024-11-29 LAB — BACTERIA UR CULT: NORMAL

## 2024-12-22 ENCOUNTER — HEALTH MAINTENANCE LETTER (OUTPATIENT)
Age: 49
End: 2024-12-22

## 2024-12-24 ENCOUNTER — HOSPITAL ENCOUNTER (EMERGENCY)
Facility: CLINIC | Age: 49
Discharge: HOME | End: 2024-12-24
Attending: EMERGENCY MEDICINE | Admitting: EMERGENCY MEDICINE

## 2024-12-24 ENCOUNTER — APPOINTMENT (OUTPATIENT)
Dept: GENERAL RADIOLOGY | Facility: CLINIC | Age: 49
End: 2024-12-24
Attending: EMERGENCY MEDICINE

## 2024-12-24 VITALS
BODY MASS INDEX: 20.38 KG/M2 | WEIGHT: 115 LBS | HEART RATE: 71 BPM | TEMPERATURE: 98.7 F | HEIGHT: 63 IN | RESPIRATION RATE: 16 BRPM | OXYGEN SATURATION: 99 % | DIASTOLIC BLOOD PRESSURE: 80 MMHG | SYSTOLIC BLOOD PRESSURE: 124 MMHG

## 2024-12-24 DIAGNOSIS — R07.9 ACUTE CHEST PAIN: ICD-10-CM

## 2024-12-24 LAB
ATRIAL RATE - MUSE: 59 BPM
DIASTOLIC BLOOD PRESSURE - MUSE: NORMAL MMHG
INTERPRETATION ECG - MUSE: NORMAL
P AXIS - MUSE: 73 DEGREES
PR INTERVAL - MUSE: 218 MS
QRS DURATION - MUSE: 76 MS
QT - MUSE: 410 MS
QTC - MUSE: 405 MS
R AXIS - MUSE: 96 DEGREES
SYSTOLIC BLOOD PRESSURE - MUSE: NORMAL MMHG
T AXIS - MUSE: 74 DEGREES
VENTRICULAR RATE- MUSE: 59 BPM

## 2024-12-24 PROCEDURE — 71046 X-RAY EXAM CHEST 2 VIEWS: CPT

## 2024-12-24 PROCEDURE — 93005 ELECTROCARDIOGRAM TRACING: CPT | Performed by: EMERGENCY MEDICINE

## 2024-12-24 PROCEDURE — 99284 EMERGENCY DEPT VISIT MOD MDM: CPT | Mod: 25 | Performed by: EMERGENCY MEDICINE

## 2024-12-24 PROCEDURE — 93010 ELECTROCARDIOGRAM REPORT: CPT | Performed by: EMERGENCY MEDICINE

## 2024-12-24 PROCEDURE — 250N000013 HC RX MED GY IP 250 OP 250 PS 637: Performed by: EMERGENCY MEDICINE

## 2024-12-24 PROCEDURE — 99284 EMERGENCY DEPT VISIT MOD MDM: CPT | Performed by: EMERGENCY MEDICINE

## 2024-12-24 RX ORDER — OXYCODONE HYDROCHLORIDE 5 MG/1
10 TABLET ORAL ONCE
Status: COMPLETED | OUTPATIENT
Start: 2024-12-24 | End: 2024-12-24

## 2024-12-24 RX ADMIN — OXYCODONE 10 MG: 5 TABLET ORAL at 09:49

## 2024-12-24 ASSESSMENT — ACTIVITIES OF DAILY LIVING (ADL): ADLS_ACUITY_SCORE: 51
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
## 2024-12-24 NOTE — ED TRIAGE NOTES
Pt arrives with c/o sharp pins and needles feelings in her chest all over when she breaths. Reports it is better when sitting up and is tender to the touch.

## 2024-12-24 NOTE — DISCHARGE INSTRUCTIONS
Follow-up in clinic for routine cares.    Return to be seen if new or severe worsening of symptoms develop.

## 2025-01-09 ENCOUNTER — HOSPITAL ENCOUNTER (EMERGENCY)
Facility: CLINIC | Age: 50
Discharge: LEFT BEFORE DISCHARGE | End: 2025-01-09
Attending: FAMILY MEDICINE | Admitting: FAMILY MEDICINE

## 2025-01-09 VITALS
HEART RATE: 80 BPM | HEIGHT: 62 IN | SYSTOLIC BLOOD PRESSURE: 133 MMHG | RESPIRATION RATE: 18 BRPM | OXYGEN SATURATION: 96 % | WEIGHT: 112 LBS | TEMPERATURE: 98.3 F | DIASTOLIC BLOOD PRESSURE: 83 MMHG | BODY MASS INDEX: 20.61 KG/M2

## 2025-01-09 PROCEDURE — 99281 EMR DPT VST MAYX REQ PHY/QHP: CPT | Performed by: FAMILY MEDICINE

## 2025-01-09 NOTE — ED TRIAGE NOTES
Pt states she was coughing and felt a popping sensation in L rib, states she is now having pain. States she is a smoker with a chronic cough, Pt also reports nodules in lungs.     Triage Assessment (Adult)       Row Name 01/09/25 1323          Triage Assessment    Airway WDL WDL        Respiratory WDL    Respiratory WDL WDL        Skin Circulation/Temperature WDL    Skin Circulation/Temperature WDL WDL        Cardiac WDL    Cardiac WDL WDL        Peripheral/Neurovascular WDL    Peripheral Neurovascular WDL WDL        Cognitive/Neuro/Behavioral WDL    Cognitive/Neuro/Behavioral WDL WDL

## 2025-01-18 ENCOUNTER — APPOINTMENT (OUTPATIENT)
Dept: CT IMAGING | Facility: CLINIC | Age: 50
End: 2025-01-18
Attending: EMERGENCY MEDICINE
Payer: MEDICAID

## 2025-01-18 ENCOUNTER — HOSPITAL ENCOUNTER (EMERGENCY)
Facility: CLINIC | Age: 50
Discharge: HOME | End: 2025-01-18
Attending: EMERGENCY MEDICINE | Admitting: EMERGENCY MEDICINE
Payer: MEDICAID

## 2025-01-18 VITALS
TEMPERATURE: 98 F | RESPIRATION RATE: 26 BRPM | WEIGHT: 119 LBS | HEIGHT: 63 IN | DIASTOLIC BLOOD PRESSURE: 80 MMHG | OXYGEN SATURATION: 95 % | HEART RATE: 62 BPM | SYSTOLIC BLOOD PRESSURE: 118 MMHG | BODY MASS INDEX: 21.09 KG/M2

## 2025-01-18 DIAGNOSIS — R07.9 CHEST PAIN, UNSPECIFIED TYPE: ICD-10-CM

## 2025-01-18 LAB
ANION GAP SERPL CALCULATED.3IONS-SCNC: 11 MMOL/L (ref 7–15)
BASOPHILS # BLD AUTO: 0 10E3/UL (ref 0–0.2)
BASOPHILS NFR BLD AUTO: 0 %
BUN SERPL-MCNC: 8 MG/DL (ref 6–20)
CALCIUM SERPL-MCNC: 9 MG/DL (ref 8.8–10.4)
CHLORIDE SERPL-SCNC: 102 MMOL/L (ref 98–107)
CREAT SERPL-MCNC: 0.86 MG/DL (ref 0.51–0.95)
D DIMER PPP FEU-MCNC: <0.27 UG/ML FEU (ref 0–0.5)
EGFRCR SERPLBLD CKD-EPI 2021: 83 ML/MIN/1.73M2
EOSINOPHIL # BLD AUTO: 0.3 10E3/UL (ref 0–0.7)
EOSINOPHIL NFR BLD AUTO: 2 %
ERYTHROCYTE [DISTWIDTH] IN BLOOD BY AUTOMATED COUNT: 13.1 % (ref 10–15)
FLUAV RNA SPEC QL NAA+PROBE: NEGATIVE
FLUBV RNA RESP QL NAA+PROBE: NEGATIVE
GLUCOSE SERPL-MCNC: 107 MG/DL (ref 70–99)
HCO3 SERPL-SCNC: 25 MMOL/L (ref 22–29)
HCT VFR BLD AUTO: 39.3 % (ref 35–47)
HGB BLD-MCNC: 13.6 G/DL (ref 11.7–15.7)
HOLD SPECIMEN: NORMAL
IMM GRANULOCYTES # BLD: 0 10E3/UL
IMM GRANULOCYTES NFR BLD: 0 %
LYMPHOCYTES # BLD AUTO: 4.4 10E3/UL (ref 0.8–5.3)
LYMPHOCYTES NFR BLD AUTO: 38 %
MCH RBC QN AUTO: 31.8 PG (ref 26.5–33)
MCHC RBC AUTO-ENTMCNC: 34.6 G/DL (ref 31.5–36.5)
MCV RBC AUTO: 92 FL (ref 78–100)
MONOCYTES # BLD AUTO: 1.1 10E3/UL (ref 0–1.3)
MONOCYTES NFR BLD AUTO: 9 %
NEUTROPHILS # BLD AUTO: 5.8 10E3/UL (ref 1.6–8.3)
NEUTROPHILS NFR BLD AUTO: 50 %
NRBC # BLD AUTO: 0 10E3/UL
NRBC BLD AUTO-RTO: 0 /100
PLATELET # BLD AUTO: 198 10E3/UL (ref 150–450)
POTASSIUM SERPL-SCNC: 3.9 MMOL/L (ref 3.4–5.3)
RBC # BLD AUTO: 4.28 10E6/UL (ref 3.8–5.2)
RSV RNA SPEC NAA+PROBE: NEGATIVE
SARS-COV-2 RNA RESP QL NAA+PROBE: NEGATIVE
SODIUM SERPL-SCNC: 138 MMOL/L (ref 135–145)
TROPONIN T SERPL HS-MCNC: <6 NG/L
WBC # BLD AUTO: 11.6 10E3/UL (ref 4–11)

## 2025-01-18 PROCEDURE — 80048 BASIC METABOLIC PNL TOTAL CA: CPT | Performed by: EMERGENCY MEDICINE

## 2025-01-18 PROCEDURE — 250N000009 HC RX 250: Performed by: EMERGENCY MEDICINE

## 2025-01-18 PROCEDURE — 250N000011 HC RX IP 250 OP 636: Performed by: EMERGENCY MEDICINE

## 2025-01-18 PROCEDURE — 93005 ELECTROCARDIOGRAM TRACING: CPT

## 2025-01-18 PROCEDURE — 36415 COLL VENOUS BLD VENIPUNCTURE: CPT | Performed by: EMERGENCY MEDICINE

## 2025-01-18 PROCEDURE — 87637 SARSCOV2&INF A&B&RSV AMP PRB: CPT | Performed by: EMERGENCY MEDICINE

## 2025-01-18 PROCEDURE — 85025 COMPLETE CBC W/AUTO DIFF WBC: CPT | Performed by: EMERGENCY MEDICINE

## 2025-01-18 PROCEDURE — 99284 EMERGENCY DEPT VISIT MOD MDM: CPT | Performed by: EMERGENCY MEDICINE

## 2025-01-18 PROCEDURE — 93010 ELECTROCARDIOGRAM REPORT: CPT | Performed by: EMERGENCY MEDICINE

## 2025-01-18 PROCEDURE — 99285 EMERGENCY DEPT VISIT HI MDM: CPT | Mod: 25

## 2025-01-18 PROCEDURE — 84484 ASSAY OF TROPONIN QUANT: CPT | Performed by: EMERGENCY MEDICINE

## 2025-01-18 PROCEDURE — 71275 CT ANGIOGRAPHY CHEST: CPT

## 2025-01-18 PROCEDURE — 85379 FIBRIN DEGRADATION QUANT: CPT | Performed by: EMERGENCY MEDICINE

## 2025-01-18 PROCEDURE — 250N000013 HC RX MED GY IP 250 OP 250 PS 637: Performed by: EMERGENCY MEDICINE

## 2025-01-18 RX ORDER — OXYCODONE HYDROCHLORIDE 5 MG/1
5 TABLET ORAL EVERY 4 HOURS PRN
Status: COMPLETED | OUTPATIENT
Start: 2025-01-18 | End: 2025-01-18

## 2025-01-18 RX ORDER — FLUTICASONE PROPIONATE AND SALMETEROL 100; 50 UG/1; UG/1
1 POWDER RESPIRATORY (INHALATION) EVERY 12 HOURS
Qty: 1 EACH | Refills: 0 | Status: SHIPPED | OUTPATIENT
Start: 2025-01-18

## 2025-01-18 RX ORDER — IOPAMIDOL 755 MG/ML
64 INJECTION, SOLUTION INTRAVASCULAR ONCE
Status: COMPLETED | OUTPATIENT
Start: 2025-01-18 | End: 2025-01-18

## 2025-01-18 RX ORDER — OLANZAPINE 5 MG/1
5 TABLET, ORALLY DISINTEGRATING ORAL AT BEDTIME
Status: COMPLETED | OUTPATIENT
Start: 2025-01-18 | End: 2025-01-18

## 2025-01-18 RX ADMIN — SODIUM CHLORIDE 93 ML: 9 INJECTION, SOLUTION INTRAVENOUS at 19:52

## 2025-01-18 RX ADMIN — OXYCODONE 5 MG: 5 TABLET ORAL at 19:14

## 2025-01-18 RX ADMIN — IOPAMIDOL 64 ML: 755 INJECTION, SOLUTION INTRAVENOUS at 19:52

## 2025-01-18 ASSESSMENT — ACTIVITIES OF DAILY LIVING (ADL)
ADLS_ACUITY_SCORE: 51

## 2025-01-19 LAB
ATRIAL RATE - MUSE: 84 BPM
DIASTOLIC BLOOD PRESSURE - MUSE: NORMAL MMHG
INTERPRETATION ECG - MUSE: NORMAL
P AXIS - MUSE: 0 DEGREES
PR INTERVAL - MUSE: 208 MS
QRS DURATION - MUSE: 68 MS
QT - MUSE: 362 MS
QTC - MUSE: 427 MS
R AXIS - MUSE: 95 DEGREES
SYSTOLIC BLOOD PRESSURE - MUSE: NORMAL MMHG
T AXIS - MUSE: 61 DEGREES
VENTRICULAR RATE- MUSE: 84 BPM

## 2025-01-19 NOTE — ED NOTES
"Patient asking for pain medications, patient also highly anxious. Initially MD ordered Olanzapine. Upon RN entering room with medication.  Patient raised voiced started yelling \" I am not gonna take that, it is an antipsychotic and I am not taking it.\"  This RN attempted to explain that the medication has several uses.  Patient would not allow RN to speak and continued to raise voice.  RN then asked patient what (she) would like for pain. Patient replied \" I don't know, oxy, dilaudid, vicodin, any of those would work.  This RN relayed message to Dr Root.  MD ordered oxycodone (5mg).  Patient given medication.  1 hour and 5 minutes later, patient calling out for more pain medication.  Upon RN entering room, patient laying back in bed, looking at and watching phone.  This RN attempted to give patient another dose of oxycodone as initially requested.  Upon pulling up the computer and starting to open medication. Patient cut RN off verbally and started raising voice and stating \"I don't want that, why would I want that, It did not help the first time, why would I want it again?\" Patient made no mention that medication did not work.  Patient raising voice and very aggressive with this RN.  This RN took medication and left room while patient yelling.  MD made aware that patient no longer will take or wants this medication.  "

## 2025-01-19 NOTE — ED PROVIDER NOTES
"[facility]  Emergency Department Visit Note    PATIENT:  Meli Rodriguez     49 year old     female      [id number]    Chief complaint:  Chief Complaint   Patient presents with    Chest Pain     Pain/pressure/pins and needles/burning sensation located in left side of chest, rib, left arm, back. Started \"about 2 hours ago\". Severe pain. Nausea started \"about 20 minutes after the pain started\".         History of present illness:  Patient is a 49 year old female with a complex medical history presenting for evaluation of pain pressure and a pins and needle sensation over the left side of her chest, sometimes radiating into her back, going to her left arm that started about 2 hours ago.  Patient reports that he was sitting playing games on her phone when she noticed sudden onset of this pain in her chest.  She notes it is on the left side of her chest but it is not her heart.  She notes that she started to feel nauseous but 20 minutes after the pain started however she has a history of nausea and abdominal issues.  She reports she has not been to the doctor in a couple of years because she has not had medical insurance.  She does not take any medications on a regular basis.  She does use Tylenol ibuprofen as needed for pain.  Has recently lost a lot of weight because of her stomach issues and decreased oral intake.  She has an appointment scheduled with her primary care doctor next week because she finally has medical insurance.  This chest pain was very concerning to her which is why she decided to come to the emergency department for evaluation.  She denies any recent trauma.  It is not worse with any exertion.  Does seem to be worse with different positions.  She states that she does not have COVID and influenza because she has had these before and she does not have any of the symptoms.  She did not receive the COVID or influenza vaccine.  She reports that she think she has a history of COPD " "because she smokes and she is always coughing has not seen a doctor for this though.  She has not taken anything for her pain.    Medical history includes a history of bipolar 2, posttraumatic stress disorder, anxiety, wheezing, GERD, and a history of drug-seeking behavior.    Review of Systems:  As in HPI above    BP (!) 114/102   Pulse 90   Temp 98  F (36.7  C) (Oral)   Ht 1.588 m (5' 2.5\")   Wt 54 kg (119 lb)   SpO2 97%   BMI 21.42 kg/m      Physical Exam  Constitutional: laying in hospital bed, alert, oriented, in no apparent distress, appears uncomfortable, conversant, and answering questions appropriately  HEENT: normocephalic, atraumatic, pupils 3mm, equal, round, and reactive to light, sclerae anicteric, extraocular motions intact, moist mucous membranes, and poor dentition  Neck: able to fully range, no midline tenderness, and no stridor  Cardiovascular: regular rate and rhythm  Pulmonary: breathing comfortably on room air and lungs clear to auscultation bilaterally  Abdominal: soft, non-tender, non-distended  Genitourinary: deferred  Extremities/MSK: no peripheral edema, no cyanosis , and no calf tenderness to palpation  Skin: warm, dry and non-diaphoretic  Neurologic: moves all four extremities spontaneously and GCS 15  Psychiatric: calm, appropriate      MDM:  Patient is a 49 year old female with above history presenting for evaluation of chest pain.    Vitals reassuring and within normal limits. Exam reassuring and within normal limits.    Differential diagnosis includes but is not limited to ACS, MI, PE, pneumothorax, pericardial tamponade, aortic dissection, esophageal rupture, heart failure, COPD exacerbation, pneumonia, GERD, anxiety, costochondritis, etc.      Plan for EKG, symptom management, labs and monitoring.      Remainder of ED course below.    ED COURSE:  ED Course as of 01/18/25 2059   Sat Jan 18, 2025 2057 Laboratory workup reveals a mildly elevated white count of 11.6.  Within " normal limits when compared with patient's past values.  D-dimer is normal, BMP is normal, troponin is normal.   2057 CT Chest Pulmonary Embolism w Contrast  IMPRESSION:  1.  Negative for acute pulmonary embolism.     2.  No consolidative airspace disease.     3.  Chronic lung changes, including emphysema, probable air trapping, and possible respiratory bronchiolitis, hypersensitivity pneumonitis, or similar process.     4.  There are a couple of small pulmonary nodules, less than 4 mm in size. Please see follow-up guidelines below.     2057 Overall workup is reassuring   2058 I suspect that the patient is experiencing pain related to ireactive airway disease.    2058 ECG:  - Ventricular rate 84 bpm, regular  - UT, QRS, QT intervals normal  - Axis right  - no ST segment  changes concerning for acute ischemia  - Comparison to prior ECGs: no changes  - My independent interpretation: t wave biphasic in inferior leads, concerning for right heart hypertrophy which would make sense in the setting of patients lung disease      2059 Patient is feeling comfortable at this time.  Wishes to go home with outpatient follow-up.  Ordered a PFT test as well as a budesonide inhaler for her to get started on her workup for COPD.  Patient is in agreement with the plan to follow-up with PFT testing and follow-up with her primary care provider.  The appointment is already scheduled.   2059 Return precautions discussed, all questions answered and discharged in stable condition.       Encounter Diagnoses:  Final diagnoses:   Chest pain, unspecified type       Final disposition: discharge    DO WINTER Mason Physician  [facility] Lexie Garrido DO  01/18/25 2059

## 2025-01-19 NOTE — DISCHARGE INSTRUCTIONS
You were seen in the ER today with chest pain.  Your workup was reassuring however you were found to have some pulmonary nodules on your chest.  I provided you with a referral to the pulmonary nodule clinic to follow-up about these.  I am also concerned that you have COPD or other reactive airway disease related to your smoking.  I provided you with an order for a steroid inhaler.  Start taking this twice a day once every 12 hours for your symptoms.  Make sure to rinse your mouth out with water after use it.  In addition I provided you with an order for pulmonary function test.  Please get these done before you establish care with your primary care provider next week.  Come back with changing or worsening symptoms that you find concerning.

## 2025-01-19 NOTE — ED TRIAGE NOTES
"Patient presents to the ED with c/o \"pain/pressure/pins and needles feeling/burning sensation\" that is located on \"left side of chest, rib, left arm and back\". Pt reports this started \"about 2 hours ago\". Pt curled up on bed reporting 8/10 pain. Nausea started \"about 20 minutes after the pain started\".      Triage Assessment (Adult)       Row Name 01/18/25 1838          Triage Assessment    Airway WDL WDL        Respiratory WDL    Respiratory WDL WDL        Skin Circulation/Temperature WDL    Skin Circulation/Temperature WDL WDL        Cardiac WDL    Cardiac WDL X;chest pain        Chest Pain Assessment    Chest Pain Radiation arm;back     Character burning;pressure;sharp;tingling     Duration 2 hours ago        Peripheral/Neurovascular WDL    Peripheral Neurovascular WDL WDL        Cognitive/Neuro/Behavioral WDL    Cognitive/Neuro/Behavioral WDL WDL                     "

## 2025-01-20 ENCOUNTER — PRE VISIT (OUTPATIENT)
Dept: ONCOLOGY | Facility: CLINIC | Age: 50
End: 2025-01-20
Payer: MEDICAID

## 2025-01-20 ENCOUNTER — PATIENT OUTREACH (OUTPATIENT)
Dept: ONCOLOGY | Facility: CLINIC | Age: 50
End: 2025-01-20
Payer: MEDICAID

## 2025-01-20 NOTE — TELEPHONE ENCOUNTER
RECORDS STATUS - ALL OTHER DIAGNOSIS      RECORDS RECEIVED FROM:    NOTES STATUS DETAILS   OFFICE NOTE from referring provider Epic 01/18/25: Dr. Lexie Root   OFFICE NOTE from medical oncologist     OFFICE NOTE from other specialist     DISCHARGE SUMMARY from hospital [facility] 04/18/24: [facility] [place]   DISCHARGE REPORT from the ER [facility]/-NM 01/18/25, 12/24/24, 11/18/24, 09/25/24: [facility] [place] [facility]    05/12/24: [facility]    02/15/24, 02/14/24: [facility]   OPERATIVE REPORT     MEDICATION LIST [facility]    LABS     PATHOLOGY REPORTS     ANYTHING RELATED TO DIAGNOSIS     PATHOLOGY [employer] TRACKING   Tracking #:   GENONOMIC TESTING     TYPE:     IMAGING (NEED IMAGES & REPORT)     CT SCANS     MRI     XRAYS Req 01/20 [facility]:  05/12/24, 04/17/18: XR Chest    [facility]:  02/14/24-01/06/18: XR Chest   ULTRASOUND     PET     IMAGE DISC FED[employer] TRACKING   Tracking #:

## 2025-01-20 NOTE — PROGRESS NOTES
New IP (Interventional Pulmonology) referral rec'd.  Chart reviewed.       New Patient: Interventional Pulmonary (Lung nodule) Nurse Navigator Note    [facility]    Referred to (specialty): Interventional Pulmonary (Lung nodule)    Requested provider (if applicable): n/a    Date Referral Received: 1/20/2025    Evaluation for :  Lung nodule    Clinical History (per Nurse review of records provided):    **BOOK MARKED**    EXAM: CT CHEST PULMONARY EMBOLISM W CONTRAST  LOCATION: [facility]  DATE: 1/18/2025     INDICATION: chest pain  COMPARISON: None.  TECHNIQUE: CT chest pulmonary angiogram during pulmonary arterial phase injection of IV contrast. Multiplanar reformats and MIP reconstructions were performed. Dose reduction techniques were used.   CONTRAST: 64 mL of Isovue-370     FINDINGS:  PULMONARY ARTERY CT ANGIOGRAM: No filling defects.     LUNGS AND PLEURA: Scattered centrilobular and paraseptal emphysema. There is mosaic groundglass attenuation. There is a 3 mm left upper lobe pulmonary nodule series 7, image 103. There is a 2 mm pulmonary nodule in the left upper lobe image 114. There is   a 1 to 2 mm nodule in the right middle lobe series 7, image 145. No consolidation. No pleural effusions or pneumothorax.     MEDIASTINUM/AXILLAE: Visualized thyroid gland is unremarkable. No thoracic adenopathy. No cardiomegaly. No pericardial effusion. Normal caliber thoracic aorta. No acute abnormality. Normal variant retroesophageal aberrant right subclavian artery.   Diameter at the artery origin is 1.3 cm, compared to a distal caliber of 1.0 cm.     CORONARY ARTERY CALCIFICATION: Mild.     UPPER ABDOMEN: Cholecystectomy. Early contrast excretion in the upper tracts.     MUSCULOSKELETAL: Normal.                                                                      IMPRESSION:  1.  Negative for acute pulmonary  embolism.     2.  No consolidative airspace disease.     3.  Chronic lung changes, including emphysema, probable air trapping, and possible respiratory bronchiolitis, hypersensitivity pneumonitis, or similar process.     4.  There are a couple of small pulmonary nodules, less than 4 mm in size. Please see follow-up guidelines below.     Records Location: [facility] &  ([facility])    RECORDS NEEDED:  Last FIVE years CHEST imaging pushed to PACS from [facility]--thank you!!    Additional testing needed prior to consult: PFT's ordered by referring

## 2025-01-24 PROBLEM — R10.13 ABDOMINAL PAIN, EPIGASTRIC: Status: ACTIVE | Noted: 2025-01-24

## 2025-01-24 PROBLEM — Z78.9 MEDICALLY COMPLEX PATIENT: Status: ACTIVE | Noted: 2025-01-24

## 2025-01-24 PROBLEM — G89.4 CHRONIC PAIN SYNDROME: Status: ACTIVE | Noted: 2025-01-24

## 2025-01-24 PROBLEM — R11.0 NAUSEA: Status: ACTIVE | Noted: 2025-01-24

## 2025-02-03 ENCOUNTER — TELEPHONE (OUTPATIENT)
Dept: SCHEDULING | Facility: CLINIC | Age: 50
End: 2025-02-03

## 2025-02-03 NOTE — TELEPHONE ENCOUNTER
Reason for Call:  Appointment Request    Patient requesting this type of appt: Chronic Diease Management/Medication/Follow-Up    Requested provider:  Manuel Multani    Reason patient unable to be scheduled: Not with their preferred provider    When does patient want to be seen/preferred time:  Asap, after 1pm    Comments: Pt has a referral from Sharmaine Christensen to meet with internal medicine provider. States that Dr. Multani was recommended by provider, unable to schedule on call.    Could we send this information to you in [facility] or would you prefer to receive a phone call?:   Patient would prefer a phone call   Okay to leave a detailed message?: Yes at Cell number on file:    Telephone Information:   Mobile [phone]       Call taken on 2/3/2025 at 9:27 AM by Graciela Quigley

## 2025-02-04 ENCOUNTER — HOSPITAL ENCOUNTER (EMERGENCY)
Facility: CLINIC | Age: 50
Discharge: HOME | End: 2025-02-04
Attending: EMERGENCY MEDICINE | Admitting: EMERGENCY MEDICINE
Payer: MEDICAID

## 2025-02-04 ENCOUNTER — APPOINTMENT (OUTPATIENT)
Dept: GENERAL RADIOLOGY | Facility: CLINIC | Age: 50
End: 2025-02-04
Attending: EMERGENCY MEDICINE
Payer: MEDICAID

## 2025-02-04 VITALS
SYSTOLIC BLOOD PRESSURE: 97 MMHG | RESPIRATION RATE: 19 BRPM | BODY MASS INDEX: 20.32 KG/M2 | OXYGEN SATURATION: 94 % | DIASTOLIC BLOOD PRESSURE: 60 MMHG | TEMPERATURE: 97.7 F | WEIGHT: 119 LBS | HEIGHT: 64 IN | HEART RATE: 58 BPM

## 2025-02-04 DIAGNOSIS — R07.9 CHEST PAIN, UNSPECIFIED TYPE: ICD-10-CM

## 2025-02-04 LAB
ANION GAP SERPL CALCULATED.3IONS-SCNC: 11 MMOL/L (ref 7–15)
ATRIAL RATE - MUSE: 63 BPM
BUN SERPL-MCNC: 12.4 MG/DL (ref 6–20)
CALCIUM SERPL-MCNC: 9.1 MG/DL (ref 8.8–10.4)
CHLORIDE SERPL-SCNC: 101 MMOL/L (ref 98–107)
CREAT SERPL-MCNC: 0.63 MG/DL (ref 0.51–0.95)
CRP SERPL-MCNC: <3 MG/L
DIASTOLIC BLOOD PRESSURE - MUSE: NORMAL MMHG
EGFRCR SERPLBLD CKD-EPI 2021: >90 ML/MIN/1.73M2
GLUCOSE SERPL-MCNC: 70 MG/DL (ref 70–99)
HCO3 SERPL-SCNC: 26 MMOL/L (ref 22–29)
INTERPRETATION ECG - MUSE: NORMAL
P AXIS - MUSE: 77 DEGREES
POTASSIUM SERPL-SCNC: 4.1 MMOL/L (ref 3.4–5.3)
PR INTERVAL - MUSE: 212 MS
QRS DURATION - MUSE: 74 MS
QT - MUSE: 396 MS
QTC - MUSE: 405 MS
R AXIS - MUSE: 95 DEGREES
SODIUM SERPL-SCNC: 138 MMOL/L (ref 135–145)
SYSTOLIC BLOOD PRESSURE - MUSE: NORMAL MMHG
T AXIS - MUSE: 75 DEGREES
TROPONIN T SERPL HS-MCNC: <6 NG/L
VENTRICULAR RATE- MUSE: 63 BPM

## 2025-02-04 PROCEDURE — 86140 C-REACTIVE PROTEIN: CPT | Performed by: EMERGENCY MEDICINE

## 2025-02-04 PROCEDURE — 93010 ELECTROCARDIOGRAM REPORT: CPT | Performed by: EMERGENCY MEDICINE

## 2025-02-04 PROCEDURE — 250N000011 HC RX IP 250 OP 636: Performed by: EMERGENCY MEDICINE

## 2025-02-04 PROCEDURE — 93005 ELECTROCARDIOGRAM TRACING: CPT

## 2025-02-04 PROCEDURE — 99285 EMERGENCY DEPT VISIT HI MDM: CPT | Mod: 25

## 2025-02-04 PROCEDURE — 84520 ASSAY OF UREA NITROGEN: CPT | Performed by: EMERGENCY MEDICINE

## 2025-02-04 PROCEDURE — 84484 ASSAY OF TROPONIN QUANT: CPT | Performed by: EMERGENCY MEDICINE

## 2025-02-04 PROCEDURE — 96376 TX/PRO/DX INJ SAME DRUG ADON: CPT

## 2025-02-04 PROCEDURE — 36415 COLL VENOUS BLD VENIPUNCTURE: CPT | Performed by: EMERGENCY MEDICINE

## 2025-02-04 PROCEDURE — 96374 THER/PROPH/DIAG INJ IV PUSH: CPT

## 2025-02-04 PROCEDURE — 99284 EMERGENCY DEPT VISIT MOD MDM: CPT | Performed by: EMERGENCY MEDICINE

## 2025-02-04 PROCEDURE — 96375 TX/PRO/DX INJ NEW DRUG ADDON: CPT

## 2025-02-04 PROCEDURE — 80048 BASIC METABOLIC PNL TOTAL CA: CPT | Performed by: EMERGENCY MEDICINE

## 2025-02-04 PROCEDURE — 82310 ASSAY OF CALCIUM: CPT | Performed by: EMERGENCY MEDICINE

## 2025-02-04 PROCEDURE — 71046 X-RAY EXAM CHEST 2 VIEWS: CPT

## 2025-02-04 RX ORDER — HYDROMORPHONE HYDROCHLORIDE 1 MG/ML
0.5 INJECTION, SOLUTION INTRAMUSCULAR; INTRAVENOUS; SUBCUTANEOUS EVERY 30 MIN PRN
Status: DISCONTINUED | OUTPATIENT
Start: 2025-02-04 | End: 2025-02-04 | Stop reason: HOSPADM

## 2025-02-04 RX ORDER — ONDANSETRON 2 MG/ML
4 INJECTION INTRAMUSCULAR; INTRAVENOUS
Status: COMPLETED | OUTPATIENT
Start: 2025-02-04 | End: 2025-02-04

## 2025-02-04 RX ORDER — HYDROMORPHONE HYDROCHLORIDE 1 MG/ML
0.5 INJECTION, SOLUTION INTRAMUSCULAR; INTRAVENOUS; SUBCUTANEOUS
Status: COMPLETED | OUTPATIENT
Start: 2025-02-04 | End: 2025-02-04

## 2025-02-04 RX ADMIN — ONDANSETRON 4 MG: 2 INJECTION INTRAMUSCULAR; INTRAVENOUS at 15:23

## 2025-02-04 RX ADMIN — HYDROMORPHONE HYDROCHLORIDE 0.5 MG: 1 INJECTION, SOLUTION INTRAMUSCULAR; INTRAVENOUS; SUBCUTANEOUS at 16:24

## 2025-02-04 RX ADMIN — HYDROMORPHONE HYDROCHLORIDE 0.5 MG: 1 INJECTION, SOLUTION INTRAMUSCULAR; INTRAVENOUS; SUBCUTANEOUS at 15:23

## 2025-02-04 ASSESSMENT — ENCOUNTER SYMPTOMS
PSYCHIATRIC NEGATIVE: 1
ALLERGIC/IMMUNOLOGIC NEGATIVE: 1
VOMITING: 1
NEUROLOGICAL NEGATIVE: 1
ENDOCRINE NEGATIVE: 1
RESPIRATORY NEGATIVE: 1
CONSTITUTIONAL NEGATIVE: 1
HEMATOLOGIC/LYMPHATIC NEGATIVE: 1
MUSCULOSKELETAL NEGATIVE: 1
EYES NEGATIVE: 1

## 2025-02-04 ASSESSMENT — ACTIVITIES OF DAILY LIVING (ADL)
ADLS_ACUITY_SCORE: 51
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
## 2025-02-04 NOTE — TELEPHONE ENCOUNTER
Called patient to schedule. She only wants to see an internist and she wants them to have a MD. Patient will think about what she wants to do and will call back     Nereida-

## 2025-02-04 NOTE — PROGRESS NOTES
ASSESSMENT & PLAN    Meli was seen today for pain.    Diagnoses and all orders for this visit:    Left upper arm pain  -     MR Shoulder Left w/o Contrast; Future  -     MRI Cervical spine w/o contrast; Future      This issue is chronic and Worsening.    # Left upper arm pain: Meli Rodriguez  was seen today for left upper arm pain. Symptoms had been going on for 10 months. On examination there are positive findings of minimal reproduction of pain with rotator cuff and c-spine testing. C-spine xrays at [facility] called to get pushed through. Shoulder/ humerus xrays unremarkable. Patient's condition unclear on exam, but my suspicion would be rotator cuff tendinopathy vs cervical radiculopathy. Counseled patient on nature of condition and treatment options.    - Workup: MRI c-spine and L shoulder. Offered one time benzo for MRI, but patient declined this.  - Activity: activity as tolerated  - Ice, heat, massage as needed   - Medications:      - Hx gastric ulcer, so avoiding NSAIDs. Reports does not tolerate steroids well, so not interested in prednisone. Gabapentin helps some, but reportedly wears off unless paired with norco or other opioid. Offered higher dose of gabapentin, but patient declined this. Patient asked for something else for pain, but I would not recommend anything beyond these medications.     - Okay to take tylenol 1000mg three times daily as needed in addition to these.    Follow-up: After MRIs, sooner if worsening    Nimesh Burks MD  [facility]    -----  Chief Complaint   Patient presents with    Left Shoulder - Pain       SUBJECTIVE  Meli Rodriguez is a/an 49 year old female who is seen as a self referral for evaluation of left shoulder.     The patient is seen by themselves.  The patient is Right handed    Onset: ~10 month(s) ago. Reports insidious onset without acute precipitating event. She reports a pulling sensation along the left upper arm. She reports a  constant pain that varies in severity. She also reports intermittent left sided neck pain and pain into the left upper traps.  Location of Pain: left lateral upper arm  Worsened by: neck flexion, internal rotation  Better with: oxycodone, Gabapentin  Treatments tried: ice, heat, Tylenol, ibuprofen, IcyHot, oxycodone, Gabapentin  Associated symptoms: grinding in left shoulder    Orthopedic/Surgical history: YES - left shoulder injury 4/30/24, seen in ED and x-rays performed  Social History/Occupation: disabled      REVIEW OF SYSTEMS:  Review of Systems    OBJECTIVE:  There were no vitals taken for this visit.   General: healthy, alert and in no distress  Skin: no suspicious lesions or rash.  CV: distal perfusion intact   Resp: normal respiratory effort without conversational dyspnea   Psych: normal mood and affect  Gait: NORMAL  Neuro: Normal light sensory exam of left upper extremity    LEFT SHOULDER  Inspection:    no swelling, bruising, discoloration, or obvious deformity or asymmetry  Palpation:    Tender about the infraspinatus insertion. Remainder of bony and tendinous landmarks are nontender.  Active Range of Motion:     Abduction 1800, FF 1800, , IR L4.    Strength:    Scapular plane abduction 5/5,  ER 5/5, IR 5/5, biceps 5/5,  Special Tests:    Negative: Neer's, Virgen', supraspinatus (empty can), Winkler's, and Speed's    CERVICAL SPINE  Inspection:    normal cervical lordosis present, rounded shoulders, forward head posture  Palpation:    Nontender.  Range of Motion:     Flexion full    Extension full    Right side bend full    Left side bend full    Right rotation full    Left rotation full  Strength:    Full strength throughout all neck muscles  Special Tests:    Positive: None    Negative: Spurling's (bilateral)      RADIOLOGY:  Final results and radiologist's interpretation, available in the [facility] health record.  Images were reviewed with the patient in the office today.  My personal  interpretation of the performed imaging:   - C[facility]spine xrays at [facility] called to get pushed through. Shoulder/ humerus xrays unremarkable    EXAM: XR SHOULDER LEFT 2 VIEWS  LOCATION: [facility]  DATE: 4/30/2024     INDICATION: Left shoulder injury today. Decreased ROM.  COMPARISON: None.                                                                      IMPRESSION: Anatomic alignment left shoulder. No acute displaced left shoulder fracture. Mild acromioclavicular joint osteoarthritis. No significant glenohumeral joint space narrowing. Small subacromial spur.  ================================  EXAM: XR HUMERUS LEFT G/E 2 VIEWS  LOCATION: [facility]  DATE: 5/31/2024     INDICATION: upper arm pain  COMPARISON: None.                                                                      IMPRESSION: Intact humerus without evidence of an acute displaced fracture. Bones are demineralized.

## 2025-02-04 NOTE — DISCHARGE INSTRUCTIONS
1) Your evaluation today did not reveal an emergency condition or diagnosis.  We have discussed your reassuring workup with plan to continue medication management needs and care coordination with your care team.    2) keep your upcoming follow-up visit with pulmonary medicine on 3/25/25. Although you appear stable for discharge to home if there are new concerns you should have the child to care team or return to be reexamined

## 2025-02-04 NOTE — ED TRIAGE NOTES
Pt presents with left sided chest pain and burning urination. States the urine itself is hot, not a burning from a UTI. Reports pulmonary nodules and chest pain for days. Taking Vicodin and gabapentin without relief.      Triage Assessment (Adult)       Row Name 02/04/25 1258          Triage Assessment    Airway WDL WDL        Respiratory WDL    Respiratory WDL WDL        Skin Circulation/Temperature WDL    Skin Circulation/Temperature WDL WDL        Cardiac WDL    Cardiac WDL X;chest pain        Chest Pain Assessment    Chest Pain Location anterior chest, left     Chest Pain Intervention 12-lead ECG obtained        Peripheral/Neurovascular WDL    Peripheral Neurovascular WDL WDL        Cognitive/Neuro/Behavioral WDL    Cognitive/Neuro/Behavioral WDL WDL

## 2025-02-05 ENCOUNTER — OFFICE VISIT (OUTPATIENT)
Dept: ORTHOPEDICS | Facility: CLINIC | Age: 50
End: 2025-02-05
Payer: MEDICAID

## 2025-02-05 DIAGNOSIS — M79.622 LEFT UPPER ARM PAIN: Primary | ICD-10-CM

## 2025-02-05 PROCEDURE — 99204 OFFICE O/P NEW MOD 45 MIN: CPT | Performed by: STUDENT IN AN ORGANIZED HEALTH CARE EDUCATION/TRAINING PROGRAM

## 2025-02-05 NOTE — LETTER
2/5/2025      Meli Rodriguez  [address]  [facility] [address]      Dear Colleague,    Thank you for referring your patient, Meli Rodriguez, to the [facility]. Please see a copy of my visit note below.    ASSESSMENT & PLAN    Meli was seen today for pain.    Diagnoses and all orders for this visit:    Left upper arm pain  -     MR Shoulder Left w/o Contrast; Future  -     MRI Cervical spine w/o contrast; Future      This issue is chronic and Worsening.    # Left upper arm pain: Meli Rodriguez  was seen today for left upper arm pain. Symptoms had been going on for 10 months. On examination there are positive findings of minimal reproduction of pain with rotator cuff and c-spine testing. C-spine xrays at [facility] called to get pushed through. Shoulder/ humerus xrays unremarkable. Patient's condition unclear on exam, but my suspicion would be rotator cuff tendinopathy vs cervical radiculopathy. Counseled patient on nature of condition and treatment options.    - Workup: MRI c-spine and L shoulder. Offered one time benzo for MRI, but patient declined this.  - Activity: activity as tolerated  - Ice, heat, massage as needed   - Medications:      - Hx gastric ulcer, so avoiding NSAIDs. Reports does not tolerate steroids well, so not interested in prednisone. Gabapentin helps some, but reportedly wears off unless paired with norco or other opioid. Offered higher dose of gabapentin, but patient declined this. Patient asked for something else for pain, but I would not recommend anything beyond these medications.     - Okay to take tylenol 1000mg three times daily as needed in addition to these.    Follow-up: After MRIs, sooner if worsening    Nimesh Burks MD  [facility]    -----  Chief Complaint   Patient presents with     Left Shoulder - Pain       SUBJECTIVE  Meli Rodriguez is a/an 49 year old female who is seen as a self referral for  evaluation of left shoulder.     The patient is seen by themselves.  The patient is Right handed    Onset: ~10 month(s) ago. Reports insidious onset without acute precipitating event. She reports a pulling sensation along the left upper arm. She reports a constant pain that varies in severity. She also reports intermittent left sided neck pain and pain into the left upper traps.  Location of Pain: left lateral upper arm  Worsened by: neck flexion, internal rotation  Better with: oxycodone, Gabapentin  Treatments tried: ice, heat, Tylenol, ibuprofen, IcyHot, oxycodone, Gabapentin  Associated symptoms: grinding in left shoulder    Orthopedic/Surgical history: YES - left shoulder injury 4/30/24, seen in ED and x-rays performed  Social History/Occupation: disabled      REVIEW OF SYSTEMS:  Review of Systems    OBJECTIVE:  There were no vitals taken for this visit.   General: healthy, alert and in no distress  Skin: no suspicious lesions or rash.  CV: distal perfusion intact   Resp: normal respiratory effort without conversational dyspnea   Psych: normal mood and affect  Gait: NORMAL  Neuro: Normal light sensory exam of left upper extremity    LEFT SHOULDER  Inspection:    no swelling, bruising, discoloration, or obvious deformity or asymmetry  Palpation:    Tender about the infraspinatus insertion. Remainder of bony and tendinous landmarks are nontender.  Active Range of Motion:     Abduction 1800, FF 1800, , IR L4.    Strength:    Scapular plane abduction 5/5,  ER 5/5, IR 5/5, biceps 5/5,  Special Tests:    Negative: Neer's, Virgen', supraspinatus (empty can), Fannin's, and Speed's    CERVICAL SPINE  Inspection:    normal cervical lordosis present, rounded shoulders, forward head posture  Palpation:    Nontender.  Range of Motion:     Flexion full    Extension full    Right side bend full    Left side bend full    Right rotation full    Left rotation full  Strength:    Full strength throughout all neck  muscles  Special Tests:    Positive: None    Negative: Spurling's (bilateral)      RADIOLOGY:  Final results and radiologist's interpretation, available in the [facility] health record.  Images were reviewed with the patient in the office today.  My personal interpretation of the performed imaging:   - C-spine xrays at [facility] called to get pushed through. Shoulder/ humerus xrays unremarkable    EXAM: XR SHOULDER LEFT 2 VIEWS  LOCATION: [facility]  DATE: 4/30/2024     INDICATION: Left shoulder injury today. Decreased ROM.  COMPARISON: None.                                                                      IMPRESSION: Anatomic alignment left shoulder. No acute displaced left shoulder fracture. Mild acromioclavicular joint osteoarthritis. No significant glenohumeral joint space narrowing. Small subacromial spur.  ================================  EXAM: XR HUMERUS LEFT G/E 2 VIEWS  LOCATION: [facility]  DATE: 5/31/2024     INDICATION: upper arm pain  COMPARISON: None.                                                                      IMPRESSION: Intact humerus without evidence of an acute displaced fracture. Bones are demineralized.           Again, thank you for allowing me to participate in the care of your patient.        Sincerely,        Nimesh Burks MD    Electronically signed

## 2025-02-05 NOTE — PATIENT INSTRUCTIONS
Advanced imaging is done by appointment. Please call [facility] ([facility]) [phone]  to schedule your C-spine MRI and L shoulder MRI.     Some insurance companies may require a prior authorization to be completed which can delay the time until you are able to schedule your appointment.       If you are active on [email or web address], you may have access to your test results before your provider is able to review the study and advise on next steps.      The clinic will contact you with results either via phone or DocSperahart. If you have not heard from the clinic within 2-3 days following your MRIs, please contact us at [phone] or via [email or web address].

## 2025-02-06 ENCOUNTER — MYC REFILL (OUTPATIENT)
Dept: FAMILY MEDICINE | Facility: CLINIC | Age: 50
End: 2025-02-06
Payer: COMMERCIAL

## 2025-02-06 DIAGNOSIS — G89.4 CHRONIC PAIN SYNDROME: ICD-10-CM

## 2025-02-07 RX ORDER — GABAPENTIN 100 MG/1
100 CAPSULE ORAL 3 TIMES DAILY
Qty: 90 CAPSULE | Refills: 0 | OUTPATIENT
Start: 2025-02-07

## 2025-02-07 NOTE — TELEPHONE ENCOUNTER
Patient was to schedule with pain management. I am not comfortable taking over her long-term pain control as we discussed at the visit. I do not see a future appointment with pain clinic, did she schedule outside? If she has not scheduled this needs to be done stat.  I filled gabapentin two weeks ago so she shouldnt' need a refill at this time. Sharmaine Christensen PA-C

## 2025-02-10 NOTE — TELEPHONE ENCOUNTER
Patient states she doesn't need a refill of this, she was just asking for increase dose as she states you guys discussed this.     She states she has not scheduled with pain management yet and is waiting until she knows she 100% needs to go to pain management. RN sees patient is also seeing sports med for pain. RN encouraged patient to call and schedule with pain management, but she declined and did not want # from RN. She states she already has all the information.       She states she has an appointment with you 2/24/25 and will further discuss this at that time. RN again encouraged in the meantime to schedule pain management appointment, patient again declined.       Jennifer Bosch RN on 2/10/2025 at 3:28 PM

## 2025-02-11 ENCOUNTER — TELEPHONE (OUTPATIENT)
Dept: FAMILY MEDICINE | Facility: CLINIC | Age: 50
End: 2025-02-11
Payer: COMMERCIAL

## 2025-02-11 RX ORDER — GABAPENTIN 300 MG/1
300 CAPSULE ORAL 3 TIMES DAILY
Qty: 90 CAPSULE | Refills: 0 | Status: SHIPPED | OUTPATIENT
Start: 2025-02-11

## 2025-02-11 NOTE — TELEPHONE ENCOUNTER
Called [phone] (home)    Did they answer the phone: No, left a message on voicemail to return call to the [facility] at [phone], and to ask for any available triage nurse.  (RN did not leave specific details on voicemail for confidential reasons)    Shannan BSN   Triage Nurse RN  [facility]

## 2025-02-11 NOTE — TELEPHONE ENCOUNTER
We received new prescriptions for Meli.  However, per [place] she is locked into [facility] pharmacy [id number] (it does not give me a phone number on [place] webiste, but according to google: Visit your [facility] at [address] in [place], [place]. Phone is [phone].     We are not able to fill prescriptions for Meli.  She will have to work out with the Department of Human Services and / or [place] Medicaid to get that restriction changed to our pharmacy in order for us to be able to fill for her.    Please reach out to [facility] with Questions.    Thank You,  Sarah Castro, PharmD

## 2025-02-11 NOTE — TELEPHONE ENCOUNTER
Hello, gabapentin 300 tid sent over.   PLeasE let patient know I will not prescribe  any more opiates for patient. She has chronic pain and this needs to be evaluated and treated by pain specialist. I do not manage chronic pain or chronic narcotics as I do not feel comfortable with this and am not trained in pain medicine. Just want her to be aware of this going forward if she chooses to not see pain specialist.     Sharmaine Christensen PA-C

## 2025-02-12 NOTE — TELEPHONE ENCOUNTER
Patient stated she already knows this information. She has filled out paperwork to change her pharmacy to [facility].     I tried to call [facility] but they were closed.       Destini Bronson RN on 2/12/2025 at 5:07 PM

## 2025-02-13 NOTE — TELEPHONE ENCOUNTER
I called and spoke to patient, she is still waiting for [employer] to receive her paperwork regarding changing her pharmacy to [facility].    She will plan to fill the 300 mg gabapentin after that has happened.    I advised her of Sharmaine Christensen's note, patient says she is aware of this, she wants to wait to follow up with GI before starting anything with pain clinic (in hopes GI can solve the issue first).    Patient verbalized understanding of and agreement with plan.    Paula NANCE RN  [facility] Triage

## 2025-02-13 NOTE — TELEPHONE ENCOUNTER
"I called [facility] pharmacy.  They tried to \"run\" the gabapentin and it still locks them out, says she is restricted to [facility] still.   Pharmacist does not know how long it takes for this to change, but until insurance updates and changes it, they cannot fill the gabapentin.    I called and spoke to patient, she says she just mailed in the paperwork on Monday and is waiting to hear back from insurance.   Once she knows it has been changed, she will call the [facility] pharmacy to fill her gabapentin.    No further action needed from RN team.    Paula NANCE RN  [facility] Triage            "

## 2025-02-13 NOTE — TELEPHONE ENCOUNTER
Please call patient again with info below. If she does not answer can we put it in a mychart message?  She needs to see a pain clinic I will not be providing ongoing pain management as stated below.     Sharmaine Christensen PA-C

## 2025-02-20 ENCOUNTER — APPOINTMENT (OUTPATIENT)
Dept: CT IMAGING | Facility: CLINIC | Age: 50
End: 2025-02-20
Attending: EMERGENCY MEDICINE
Payer: COMMERCIAL

## 2025-02-20 ENCOUNTER — HOSPITAL ENCOUNTER (EMERGENCY)
Facility: CLINIC | Age: 50
Discharge: HOME | End: 2025-02-20
Attending: EMERGENCY MEDICINE | Admitting: EMERGENCY MEDICINE
Payer: COMMERCIAL

## 2025-02-20 VITALS
BODY MASS INDEX: 21.08 KG/M2 | HEIGHT: 63 IN | OXYGEN SATURATION: 97 % | SYSTOLIC BLOOD PRESSURE: 114 MMHG | HEART RATE: 81 BPM | RESPIRATION RATE: 16 BRPM | TEMPERATURE: 99.6 F | DIASTOLIC BLOOD PRESSURE: 85 MMHG

## 2025-02-20 DIAGNOSIS — R10.12 ABDOMINAL PAIN, LEFT UPPER QUADRANT: ICD-10-CM

## 2025-02-20 LAB
ALBUMIN SERPL BCG-MCNC: 4.1 G/DL (ref 3.5–5.2)
ALP SERPL-CCNC: 65 U/L (ref 40–150)
ALT SERPL W P-5'-P-CCNC: 10 U/L (ref 0–50)
ANION GAP SERPL CALCULATED.3IONS-SCNC: 9 MMOL/L (ref 7–15)
AST SERPL W P-5'-P-CCNC: 21 U/L (ref 0–45)
BASOPHILS # BLD AUTO: 0.1 10E3/UL (ref 0–0.2)
BASOPHILS NFR BLD AUTO: 1 %
BILIRUB DIRECT SERPL-MCNC: 0.21 MG/DL (ref 0–0.3)
BILIRUB SERPL-MCNC: 0.9 MG/DL
BUN SERPL-MCNC: 8.2 MG/DL (ref 6–20)
CALCIUM SERPL-MCNC: 8.9 MG/DL (ref 8.8–10.4)
CHLORIDE SERPL-SCNC: 103 MMOL/L (ref 98–107)
CREAT SERPL-MCNC: 0.64 MG/DL (ref 0.51–0.95)
EGFRCR SERPLBLD CKD-EPI 2021: >90 ML/MIN/1.73M2
EOSINOPHIL # BLD AUTO: 0.2 10E3/UL (ref 0–0.7)
EOSINOPHIL NFR BLD AUTO: 2 %
ERYTHROCYTE [DISTWIDTH] IN BLOOD BY AUTOMATED COUNT: 13.2 % (ref 10–15)
GLUCOSE SERPL-MCNC: 86 MG/DL (ref 70–99)
HCO3 SERPL-SCNC: 25 MMOL/L (ref 22–29)
HCT VFR BLD AUTO: 39.8 % (ref 35–47)
HGB BLD-MCNC: 13.4 G/DL (ref 11.7–15.7)
HOLD SPECIMEN: NORMAL
HOLD SPECIMEN: NORMAL
IMM GRANULOCYTES # BLD: 0.1 10E3/UL
IMM GRANULOCYTES NFR BLD: 0 %
LIPASE SERPL-CCNC: 17 U/L (ref 13–60)
LYMPHOCYTES # BLD AUTO: 3 10E3/UL (ref 0.8–5.3)
LYMPHOCYTES NFR BLD AUTO: 24 %
MCH RBC QN AUTO: 31.7 PG (ref 26.5–33)
MCHC RBC AUTO-ENTMCNC: 33.7 G/DL (ref 31.5–36.5)
MCV RBC AUTO: 94 FL (ref 78–100)
MONOCYTES # BLD AUTO: 1.3 10E3/UL (ref 0–1.3)
MONOCYTES NFR BLD AUTO: 11 %
NEUTROPHILS # BLD AUTO: 7.8 10E3/UL (ref 1.6–8.3)
NEUTROPHILS NFR BLD AUTO: 63 %
NRBC # BLD AUTO: 0 10E3/UL
NRBC BLD AUTO-RTO: 0 /100
PLATELET # BLD AUTO: 181 10E3/UL (ref 150–450)
POTASSIUM SERPL-SCNC: 4.1 MMOL/L (ref 3.4–5.3)
PROT SERPL-MCNC: 6.4 G/DL (ref 6.4–8.3)
RBC # BLD AUTO: 4.23 10E6/UL (ref 3.8–5.2)
SODIUM SERPL-SCNC: 137 MMOL/L (ref 135–145)
WBC # BLD AUTO: 12.5 10E3/UL (ref 4–11)

## 2025-02-20 PROCEDURE — 71260 CT THORAX DX C+: CPT

## 2025-02-20 PROCEDURE — 258N000003 HC RX IP 258 OP 636: Performed by: EMERGENCY MEDICINE

## 2025-02-20 PROCEDURE — 96361 HYDRATE IV INFUSION ADD-ON: CPT | Performed by: EMERGENCY MEDICINE

## 2025-02-20 PROCEDURE — 84075 ASSAY ALKALINE PHOSPHATASE: CPT | Performed by: EMERGENCY MEDICINE

## 2025-02-20 PROCEDURE — 250N000009 HC RX 250: Performed by: EMERGENCY MEDICINE

## 2025-02-20 PROCEDURE — 99285 EMERGENCY DEPT VISIT HI MDM: CPT | Mod: 25 | Performed by: EMERGENCY MEDICINE

## 2025-02-20 PROCEDURE — 250N000011 HC RX IP 250 OP 636: Mod: JZ | Performed by: EMERGENCY MEDICINE

## 2025-02-20 PROCEDURE — 74177 CT ABD & PELVIS W/CONTRAST: CPT

## 2025-02-20 PROCEDURE — 84155 ASSAY OF PROTEIN SERUM: CPT | Performed by: EMERGENCY MEDICINE

## 2025-02-20 PROCEDURE — 85014 HEMATOCRIT: CPT | Performed by: EMERGENCY MEDICINE

## 2025-02-20 PROCEDURE — 250N000011 HC RX IP 250 OP 636: Performed by: EMERGENCY MEDICINE

## 2025-02-20 PROCEDURE — 80053 COMPREHEN METABOLIC PANEL: CPT | Performed by: EMERGENCY MEDICINE

## 2025-02-20 PROCEDURE — 83690 ASSAY OF LIPASE: CPT | Performed by: EMERGENCY MEDICINE

## 2025-02-20 PROCEDURE — 96374 THER/PROPH/DIAG INJ IV PUSH: CPT | Performed by: EMERGENCY MEDICINE

## 2025-02-20 PROCEDURE — 99285 EMERGENCY DEPT VISIT HI MDM: CPT | Performed by: EMERGENCY MEDICINE

## 2025-02-20 PROCEDURE — 96376 TX/PRO/DX INJ SAME DRUG ADON: CPT | Performed by: EMERGENCY MEDICINE

## 2025-02-20 PROCEDURE — 85025 COMPLETE CBC W/AUTO DIFF WBC: CPT | Performed by: EMERGENCY MEDICINE

## 2025-02-20 PROCEDURE — 36415 COLL VENOUS BLD VENIPUNCTURE: CPT | Performed by: EMERGENCY MEDICINE

## 2025-02-20 PROCEDURE — 80048 BASIC METABOLIC PNL TOTAL CA: CPT | Performed by: EMERGENCY MEDICINE

## 2025-02-20 RX ORDER — IOPAMIDOL 755 MG/ML
58 INJECTION, SOLUTION INTRAVASCULAR ONCE
Status: COMPLETED | OUTPATIENT
Start: 2025-02-20 | End: 2025-02-20

## 2025-02-20 RX ORDER — HYDROMORPHONE HYDROCHLORIDE 1 MG/ML
0.5 INJECTION, SOLUTION INTRAMUSCULAR; INTRAVENOUS; SUBCUTANEOUS
Status: COMPLETED | OUTPATIENT
Start: 2025-02-20 | End: 2025-02-20

## 2025-02-20 RX ADMIN — HYDROMORPHONE HYDROCHLORIDE 0.5 MG: 1 INJECTION, SOLUTION INTRAMUSCULAR; INTRAVENOUS; SUBCUTANEOUS at 14:48

## 2025-02-20 RX ADMIN — SODIUM CHLORIDE 500 ML: 9 INJECTION, SOLUTION INTRAVENOUS at 14:50

## 2025-02-20 RX ADMIN — SODIUM CHLORIDE 55 ML: 9 INJECTION, SOLUTION INTRAVENOUS at 14:59

## 2025-02-20 RX ADMIN — HYDROMORPHONE HYDROCHLORIDE 0.5 MG: 1 INJECTION, SOLUTION INTRAMUSCULAR; INTRAVENOUS; SUBCUTANEOUS at 15:53

## 2025-02-20 RX ADMIN — IOPAMIDOL 58 ML: 755 INJECTION, SOLUTION INTRAVENOUS at 14:59

## 2025-02-20 ASSESSMENT — ENCOUNTER SYMPTOMS
EYES NEGATIVE: 1
PSYCHIATRIC NEGATIVE: 1
ALLERGIC/IMMUNOLOGIC NEGATIVE: 1
NEUROLOGICAL NEGATIVE: 1
COUGH: 1
ENDOCRINE NEGATIVE: 1
HEMATOLOGIC/LYMPHATIC NEGATIVE: 1
CONSTITUTIONAL NEGATIVE: 1
MUSCULOSKELETAL NEGATIVE: 1
ABDOMINAL PAIN: 1

## 2025-02-20 ASSESSMENT — ACTIVITIES OF DAILY LIVING (ADL)
ADLS_ACUITY_SCORE: 51
ADLS_ACUITY_SCORE: 51
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
## 2025-02-20 NOTE — TELEPHONE ENCOUNTER
RECORDS STATUS - ALL OTHER DIAGNOSIS             RECORDS RECEIVED FROM:    NOTES STATUS DETAILS   OFFICE NOTE from referring provider Epic 01/18/25: Dr. Lexie Root   OFFICE NOTE from medical oncologist       OFFICE NOTE from other specialist       DISCHARGE SUMMARY from hospital [facility] 04/18/24: [facility]   DISCHARGE REPORT from the ER [facility]/-NM 01/18/25, 12/24/24, 11/18/24, 09/25/24: [facility] [place] [facility]     05/12/24: [facility]     02/15/24, 02/14/24: [facility]   OPERATIVE REPORT       MEDICATION LIST [facility]     LABS       PATHOLOGY REPORTS       ANYTHING RELATED TO DIAGNOSIS       PATHOLOGY [employer] TRACKING   Tracking #:   GENONOMIC TESTING       TYPE:       IMAGING (NEED IMAGES & REPORT)       CT SCANS       MRI       XRAYS PACS [facility]:  05/12/24, 04/17/18: XR Chest     [facility]:  02/14/24-01/06/18: XR Chest

## 2025-02-20 NOTE — ED TRIAGE NOTES
Pt states that she has been coughing up blood for the last 2 days, left sided abdominal pain     Triage Assessment (Adult)       Row Name 02/20/25 1213          Triage Assessment    Airway WDL WDL        Respiratory WDL    Respiratory WDL WDL        Peripheral/Neurovascular WDL    Peripheral Neurovascular WDL WDL

## 2025-02-24 ENCOUNTER — HOSPITAL ENCOUNTER (EMERGENCY)
Facility: HOSPITAL | Age: 50
Discharge: HOME | End: 2025-02-24
Attending: EMERGENCY MEDICINE | Admitting: EMERGENCY MEDICINE
Payer: COMMERCIAL

## 2025-02-24 ENCOUNTER — OFFICE VISIT (OUTPATIENT)
Dept: FAMILY MEDICINE | Facility: CLINIC | Age: 50
End: 2025-02-24
Payer: COMMERCIAL

## 2025-02-24 VITALS
DIASTOLIC BLOOD PRESSURE: 70 MMHG | BODY MASS INDEX: 20.93 KG/M2 | RESPIRATION RATE: 18 BRPM | TEMPERATURE: 98.1 F | OXYGEN SATURATION: 99 % | WEIGHT: 125.6 LBS | HEART RATE: 76 BPM | HEIGHT: 65 IN | SYSTOLIC BLOOD PRESSURE: 108 MMHG

## 2025-02-24 VITALS
RESPIRATION RATE: 16 BRPM | OXYGEN SATURATION: 92 % | TEMPERATURE: 99.1 F | DIASTOLIC BLOOD PRESSURE: 72 MMHG | SYSTOLIC BLOOD PRESSURE: 113 MMHG | HEART RATE: 66 BPM

## 2025-02-24 DIAGNOSIS — R79.89 LOW T4: ICD-10-CM

## 2025-02-24 DIAGNOSIS — H91.93 CHANGE IN HEARING, BILATERAL: ICD-10-CM

## 2025-02-24 DIAGNOSIS — E83.42 HYPOMAGNESEMIA: ICD-10-CM

## 2025-02-24 DIAGNOSIS — I47.10 SVT (SUPRAVENTRICULAR TACHYCARDIA): ICD-10-CM

## 2025-02-24 DIAGNOSIS — I21.3 ST ELEVATION MI (STEMI) (H): ICD-10-CM

## 2025-02-24 DIAGNOSIS — F41.0 PANIC ATTACK: ICD-10-CM

## 2025-02-24 DIAGNOSIS — F31.81 BIPOLAR 2 DISORDER (H): Primary | ICD-10-CM

## 2025-02-24 DIAGNOSIS — J43.9 PULMONARY EMPHYSEMA, UNSPECIFIED EMPHYSEMA TYPE (H): ICD-10-CM

## 2025-02-24 DIAGNOSIS — Z12.31 VISIT FOR SCREENING MAMMOGRAM: ICD-10-CM

## 2025-02-24 DIAGNOSIS — R06.02 SOB (SHORTNESS OF BREATH): ICD-10-CM

## 2025-02-24 DIAGNOSIS — R07.89 CHEST WALL PAIN: ICD-10-CM

## 2025-02-24 DIAGNOSIS — G89.4 CHRONIC PAIN SYNDROME: ICD-10-CM

## 2025-02-24 LAB
ALBUMIN SERPL BCG-MCNC: 4.2 G/DL (ref 3.5–5.2)
ALP SERPL-CCNC: 69 U/L (ref 40–150)
ALT SERPL W P-5'-P-CCNC: 8 U/L (ref 0–50)
ANION GAP SERPL CALCULATED.3IONS-SCNC: 13 MMOL/L (ref 7–15)
AST SERPL W P-5'-P-CCNC: 20 U/L (ref 0–45)
BASOPHILS # BLD AUTO: 0 10E3/UL (ref 0–0.2)
BASOPHILS NFR BLD AUTO: 0 %
BILIRUB DIRECT SERPL-MCNC: 0.18 MG/DL (ref 0–0.3)
BILIRUB SERPL-MCNC: 0.7 MG/DL
BUN SERPL-MCNC: 9.8 MG/DL (ref 6–20)
CALCIUM SERPL-MCNC: 9.5 MG/DL (ref 8.8–10.4)
CHLORIDE SERPL-SCNC: 103 MMOL/L (ref 98–107)
CREAT SERPL-MCNC: 0.8 MG/DL (ref 0.51–0.95)
EGFRCR SERPLBLD CKD-EPI 2021: 90 ML/MIN/1.73M2
EOSINOPHIL # BLD AUTO: 0.2 10E3/UL (ref 0–0.7)
EOSINOPHIL NFR BLD AUTO: 2 %
ERYTHROCYTE [DISTWIDTH] IN BLOOD BY AUTOMATED COUNT: 13.2 % (ref 10–15)
GLUCOSE SERPL-MCNC: 127 MG/DL (ref 70–99)
HCO3 SERPL-SCNC: 25 MMOL/L (ref 22–29)
HCT VFR BLD AUTO: 40.3 % (ref 35–47)
HGB BLD-MCNC: 13.5 G/DL (ref 11.7–15.7)
HOLD SPECIMEN: NORMAL
IMM GRANULOCYTES # BLD: 0 10E3/UL
IMM GRANULOCYTES NFR BLD: 0 %
LYMPHOCYTES # BLD AUTO: 3.3 10E3/UL (ref 0.8–5.3)
LYMPHOCYTES NFR BLD AUTO: 38 %
MAGNESIUM SERPL-MCNC: 1.5 MG/DL (ref 1.7–2.3)
MCH RBC QN AUTO: 30.6 PG (ref 26.5–33)
MCHC RBC AUTO-ENTMCNC: 33.5 G/DL (ref 31.5–36.5)
MCV RBC AUTO: 91 FL (ref 78–100)
MONOCYTES # BLD AUTO: 0.9 10E3/UL (ref 0–1.3)
MONOCYTES NFR BLD AUTO: 10 %
NEUTROPHILS # BLD AUTO: 4.4 10E3/UL (ref 1.6–8.3)
NEUTROPHILS NFR BLD AUTO: 50 %
NRBC # BLD AUTO: 0 10E3/UL
NRBC BLD AUTO-RTO: 0 /100
PLATELET # BLD AUTO: 210 10E3/UL (ref 150–450)
POTASSIUM SERPL-SCNC: 3.5 MMOL/L (ref 3.4–5.3)
PROT SERPL-MCNC: 6.4 G/DL (ref 6.4–8.3)
RBC # BLD AUTO: 4.41 10E6/UL (ref 3.8–5.2)
SODIUM SERPL-SCNC: 141 MMOL/L (ref 135–145)
T4 FREE SERPL-MCNC: 0.89 NG/DL (ref 0.9–1.7)
TSH SERPL DL<=0.005 MIU/L-ACNC: 6.68 UIU/ML (ref 0.3–4.2)
WBC # BLD AUTO: 8.8 10E3/UL (ref 4–11)

## 2025-02-24 PROCEDURE — 99284 EMERGENCY DEPT VISIT MOD MDM: CPT | Mod: 25

## 2025-02-24 PROCEDURE — 82310 ASSAY OF CALCIUM: CPT | Performed by: EMERGENCY MEDICINE

## 2025-02-24 PROCEDURE — 96365 THER/PROPH/DIAG IV INF INIT: CPT

## 2025-02-24 PROCEDURE — 84295 ASSAY OF SERUM SODIUM: CPT | Performed by: EMERGENCY MEDICINE

## 2025-02-24 PROCEDURE — 250N000011 HC RX IP 250 OP 636: Performed by: EMERGENCY MEDICINE

## 2025-02-24 PROCEDURE — 999N000157 HC STATISTIC RCP TIME EA 10 MIN

## 2025-02-24 PROCEDURE — 36415 COLL VENOUS BLD VENIPUNCTURE: CPT | Performed by: EMERGENCY MEDICINE

## 2025-02-24 PROCEDURE — G2211 COMPLEX E/M VISIT ADD ON: HCPCS | Performed by: PHYSICIAN ASSISTANT

## 2025-02-24 PROCEDURE — 250N000011 HC RX IP 250 OP 636

## 2025-02-24 PROCEDURE — 85004 AUTOMATED DIFF WBC COUNT: CPT | Performed by: EMERGENCY MEDICINE

## 2025-02-24 PROCEDURE — 84439 ASSAY OF FREE THYROXINE: CPT | Performed by: EMERGENCY MEDICINE

## 2025-02-24 PROCEDURE — 84075 ASSAY ALKALINE PHOSPHATASE: CPT | Performed by: EMERGENCY MEDICINE

## 2025-02-24 PROCEDURE — 93005 ELECTROCARDIOGRAM TRACING: CPT | Performed by: EMERGENCY MEDICINE

## 2025-02-24 PROCEDURE — 84443 ASSAY THYROID STIM HORMONE: CPT | Performed by: EMERGENCY MEDICINE

## 2025-02-24 PROCEDURE — 96376 TX/PRO/DX INJ SAME DRUG ADON: CPT

## 2025-02-24 PROCEDURE — 82248 BILIRUBIN DIRECT: CPT | Performed by: EMERGENCY MEDICINE

## 2025-02-24 PROCEDURE — 83735 ASSAY OF MAGNESIUM: CPT | Performed by: EMERGENCY MEDICINE

## 2025-02-24 PROCEDURE — 99215 OFFICE O/P EST HI 40 MIN: CPT | Performed by: PHYSICIAN ASSISTANT

## 2025-02-24 PROCEDURE — 85014 HEMATOCRIT: CPT | Performed by: EMERGENCY MEDICINE

## 2025-02-24 PROCEDURE — 96375 TX/PRO/DX INJ NEW DRUG ADDON: CPT

## 2025-02-24 RX ORDER — MAGNESIUM SULFATE HEPTAHYDRATE 40 MG/ML
2 INJECTION, SOLUTION INTRAVENOUS ONCE
Status: COMPLETED | OUTPATIENT
Start: 2025-02-24 | End: 2025-02-24

## 2025-02-24 RX ORDER — ADENOSINE 3 MG/ML
12 INJECTION, SOLUTION INTRAVENOUS ONCE
Status: COMPLETED | OUTPATIENT
Start: 2025-02-24 | End: 2025-02-24

## 2025-02-24 RX ORDER — MORPHINE SULFATE 4 MG/ML
4 INJECTION, SOLUTION INTRAMUSCULAR; INTRAVENOUS ONCE
Status: COMPLETED | OUTPATIENT
Start: 2025-02-24 | End: 2025-02-24

## 2025-02-24 RX ORDER — ONDANSETRON 2 MG/ML
4 INJECTION INTRAMUSCULAR; INTRAVENOUS ONCE
Status: COMPLETED | OUTPATIENT
Start: 2025-02-24 | End: 2025-02-24

## 2025-02-24 RX ORDER — ADENOSINE 3 MG/ML
INJECTION, SOLUTION INTRAVENOUS
Status: COMPLETED
Start: 2025-02-24 | End: 2025-02-24

## 2025-02-24 RX ADMIN — ADENOSINE 12 MG: 3 INJECTION INTRAVENOUS at 12:15

## 2025-02-24 RX ADMIN — ADENOSINE 12 MG: 3 INJECTION, SOLUTION INTRAVENOUS at 12:15

## 2025-02-24 RX ADMIN — ONDANSETRON 4 MG: 2 INJECTION, SOLUTION INTRAMUSCULAR; INTRAVENOUS at 12:33

## 2025-02-24 RX ADMIN — MAGNESIUM SULFATE HEPTAHYDRATE 2 G: 40 INJECTION, SOLUTION INTRAVENOUS at 13:37

## 2025-02-24 RX ADMIN — MORPHINE SULFATE 4 MG: 4 INJECTION, SOLUTION INTRAMUSCULAR; INTRAVENOUS at 12:35

## 2025-02-24 RX ADMIN — MORPHINE SULFATE 4 MG: 4 INJECTION, SOLUTION INTRAMUSCULAR; INTRAVENOUS at 13:37

## 2025-02-24 ASSESSMENT — ACTIVITIES OF DAILY LIVING (ADL)
ADLS_ACUITY_SCORE: 51
ADLS_ACUITY_SCORE: 51

## 2025-02-24 NOTE — ED TRIAGE NOTES
Triage Assessment (Adult)       Row Name 02/24/25 1211          Triage Assessment    Airway WDL WDL        Skin Circulation/Temperature WDL    Skin Circulation/Temperature WDL WDL        Cardiac WDL    Cardiac WDL chest pain        Chest Pain Assessment    Chest Pain Location midsternal     Chest Pain Radiation back     Character crushing;stabbing;tightness        Peripheral/Neurovascular WDL    Peripheral Neurovascular WDL WDL        Cognitive/Neuro/Behavioral WDL    Cognitive/Neuro/Behavioral WDL WDL

## 2025-02-24 NOTE — PATIENT INSTRUCTIONS
If you have MyChart access here are some tips for appropriate use of [employer]:      If you are having a medical emergency, call 911.       MyChart messages are intended for quick communication and do not replace the need for visits that allow for two-way conversations. Visits are used for clarification, documentation, review, medical decision making, education and answering questions resulting in the best and safest care for you.    A new concern not brought up at a recent visit will require a separate in person or virtual visit and will not be addressed through a MyChart message.    Acceptable uses of [employer] messages would include simple questions or clarification questions about your last visit or After Visit Summary (AVS).       If you need me to complete any forms or letters, adjust, modify or prescribe new prescriptions, assess and respond to new, worsening, or unimproved symptoms, or need a follow up action not discussed in your After Visit Summary (AVS), this will require you to schedule a visit with a provider (in person, video, E-visit, urgent care, emergency room) to receive the best and safe care.    If you are unsure of which type of visit is appropriate for your particular concern, this would be appropriate to send as a [employer] question.       Medical messages you send become part of your official medical record and should contain only medically related content.       Lab results and X-Ray results will appear in your results as soon as they are available on your MyChart-- even before I see them. Myself (or a covering provider if I am out of office), will review and provide specific comments for your results on your MyChart within 3-7 business days.  It is not necessary to separately message a provider before this time. We will try and contact you by phone for more urgent results.    -If you do not have an [employer] account, test/radiology results will arrive by mail within 1-2 weeks.       E-visits or  messages are not reviewed over the weekend.      If you have a billing concern or questions, please call billing at [phone].

## 2025-02-24 NOTE — ED NOTES
Pt was given 12mg adenosine at 12:15. Initial rhythm was SVT per Dr. Louis. At 12:16 patient converted to NSR and another EKG was obtained confirming

## 2025-02-24 NOTE — DISCHARGE INSTRUCTIONS
Follow up with cardiology clinic - they will call you for close follow up. Call your primary clinic to discuss your slightly low thyroid levels.  Return for recurrent or worsening symptoms.

## 2025-02-24 NOTE — PROGRESS NOTES
Assessment & Plan     Bipolar 2 disorder (H)  Referred to our psychiatry as [facility] doesn't take her insurance  - Adult Mental Health  Referral; Future    Pulmonary emphysema, unspecified emphysema type (H)  Keep pulm appt    SOB (shortness of breath)  Same as above  - Primary Care - Care Coordination Referral; Future  Pca question to be addressed by CC.     Panic attack  Same as above  - Adult Mental Health  Referral; Future    Change in hearing, bilateral  Referred for testing  - Adult Audiology  Referral; Future    Chronic pain syndrome  Has pain clinic referral she will schedule    Visit for screening mammogram    - MA Screening Bilateral w/ Mark; Future      The longitudinal plan of care for the diagnosis(es)/condition(s) as documented were addressed during this visit. Due to the added complexity in care, I will continue to support Meli in the subsequent management and with ongoing continuity of care.  .43 min spent on patient today including chart review, history, filling out paperwork together,  exam, and explaining treatment plan and follow-up.           Subjective   Meli is a 49 year old, presenting for the following health issues:  Forms        2/24/2025     9:05 AM   Additional Questions   Roomed by Kelsie KATHLEEN MA   Accompanied by n/a         2/24/2025     9:05 AM   Patient Reported Additional Medications   Patient reports taking the following new medications No Medications Missing     History of Present Illness       Reason for visit:  Multiple question   She is taking medications regularly.    Patient is alone today at visit.     Referred to internal medicine did she schedule this? They are not taking new patients per patient she did try to schedule.     Scheduled with pain clinic? Not yet per patient is going to do this now. She had wanted to see gi first but gi won't take new patients. She has h/o chronic pain and narcotic/benzo abuse therefore pain clinic absolutely  "necessary for pain management and she is aware that might not be narcotics.     Needs forms filled out.   - Medical opinion form (adult). This is for social security and food stamps. Patient quit smoking 2 weeks ago. Encouraged this. Ct diagnosed her with emphemsa but she has pulm appt for actual formal diagnosis and PFTS.   H/o cannabis hyperemesis syndrome in 2023 as well seen in er. Restricted to [facility].   - For medical disability handicapped placard ? Did dmv handicap form for copd today cant walk more than 200 feet without stopping due to sob.     She would like to be referred for a hearing specialist. [place] [facility] can't [place] due to owed bills and our gi is not taking new patients.     She would also like to get a powered wheelchair, and would like to discuss where to get this. Has apt with pulmonary soon. She will discuss with them also it is due to her breathing.     She would like to discuss how to get a PCA. Referred to care coordination for this. Regarding the chair I do not know if she meets criteria for needing this. Would like pulm's input on that after they get to know patient.       Copied From my last note:  \"Chronic pain syndrome  Very complex history  Discussed she will need pain management to manage longterm and opiates long term are not necessarily going to be the plan. Nor do they give them on the first visit. She states understanding.   See patient instructions below for more plan.  Re-start gabapentin  - Pain Management  Referral; Future  - gabapentin (NEURONTIN) 100 MG capsule; Take 1 capsule (100 mg) by mouth 3 times daily.  - HYDROcodone-acetaminophen (NORCO) 5-325 MG tablet; Take 1 tablet by mouth every 6 hours as needed for severe pain.     Abdominal pain, epigastric  For years medina shave h/o ibs and gerd didnt respond to meds     - Adult GI  Referral - Consult Only; Future     Medically complex patient     - Internal Medicine Referral; Future     Nausea  Restart zofran " "helped in past  - ondansetron (ZOFRAN ODT) 4 MG ODT tab; Take 1 tablet (4 mg) by mouth every 8 hours as needed for nausea.     Trying to quit smoking cigarettes.     Follow up with me only until you establish with an MD and specialists.      See patient instructions below for more plan.     Mental health-keep nystrum appointment.         Patient Instructions   -Narcotic medications can be addicting and therefore are used for short term pain control only.  They are not intended for long-term use.    -Take them only for severe pain as needed.    -Never mix with alcohol.    -Do not drive after taking this medication.    -No refills without an office visit. No replacements will be given.    -Keep these medications kept in a safe location.  You are responsible for them.  -Do not give them to anyone else.  They are prescribed to you only.         I do not treat chronic pain with narcotics, see pain specialist for chronic pain assessment which may or may not include narcotics.      Start gabapentin, titrate up as needed message me in a month if needed more. If needing more narcotics this would require a visit.           Objective    /70   Pulse 76   Temp 98.1  F (36.7  C) (Temporal)   Resp 18   Ht 1.64 m (5' 4.57\")   Wt 57 kg (125 lb 9.6 oz)   SpO2 99%   BMI 21.18 kg/m    Body mass index is 21.18 kg/m .  Physical Exam   GENERAL: alert, no distress, and appears older than stated age  RESP: lungs clear to auscultation - no rales, rhonchi or wheezes  CV: regular rate and rhythm, normal S1 S2, no S3 or S4, no murmur, click or rub, no peripheral edema  MS: no gross musculoskeletal defects noted, no edema  PSYCH: mentation appears normal and anxious            Signed Electronically by: Sharmaine Christensen PA-C    "

## 2025-02-24 NOTE — ED NOTES
Bed: JNED-01  Expected date: 2/24/25  Expected time: 11:48 AM  Means of arrival:   Comments:  48F STEMI, 's

## 2025-02-24 NOTE — ED PROVIDER NOTES
Emergency Department Encounter     Evaluation Date & Time:   2/24/2025 12:02 PM    CHIEF COMPLAINT:  Chest Pain and Shortness of Breath      Triage Note:       ED COURSE & MEDICAL DECISION MAKING:     Pt here by EMS for STEMI alert, but arrives in SVT with rates 160s-210s.  Pt symptomatic with rate related ischemia on EKG.  Symptoms started abruptly with palpitations within past hour, then chest tightness.  No symptoms prior to this.  Will cardiovert with adenosine, contact cardiology to discuss and stop cath lab activation.  Checking labs, reassess after cardioversion.      ED Course as of 02/24/25 1457   Mon Feb 24, 2025   1208 I met with the patient, obtained history, performed an initial exam, and discussed options and plan for diagnostics and treatment here in the ED.    1216 Adenosine administered, she is back in normal rhythm   1217 I spoke with Dr. Bravo from cardiology. Agrees with calling off cath lab.  Would not routinely check trop either given this is not consistent with occlusive disease.   1217 Pt converted with adenosine to NSR, now feeling much improved, HR 80s.   1301 TSH elevated, Mag 1.5. Awaiting T4, will replace mag.   1353 T4 a little low at 0.89, consistent with hypothyroid. Will have pt get close follow up on this.   1408 Pt re-examined, updated. Chest pain is reproducible with palpation and movement, focally to left ant chest wall. Pt allergic to nsaids. Will plan for discharge, outpatient primary and rapid access cardiology follow up.  Pt agreeable, understands return precautions.         Medical Decision Making    History:  Supplemental history from: EMS  External Record(s) reviewed: Documented in chart    Work Up:  Chart documentation includes differential considered and any EKGs or imaging independently interpreted by provider, where specified.  In additional to work up documented, I considered the following work up: Documented in chart, if applicable.    External  "consultation:  Discussion of management with another provider: Documented in chart, if applicable    Complicating factors:  Care impacted by chronic illness: Chronic Lung Disease, Mental Health, and Smoking / Nicotine Use  Care affected by social determinants of health: N/A    Disposition considerations: Discharge. No recommendations on prescription strength medication(s). I considered admission, but ultimately discharged patient after reassuring workup, outpatient plan.    Not Applicable     At the conclusion of the encounter I discussed the results of all the tests and the disposition. The questions were answered. The patient or family acknowledged understanding and was agreeable with the care plan.      MEDICATIONS GIVEN IN THE EMERGENCY DEPARTMENT:  Medications   adenosine (ADENOCARD) injection 12 mg (12 mg Intravenous $Given 2/24/25 1215)   ondansetron (ZOFRAN) injection 4 mg (4 mg Intravenous $Given 2/24/25 1233)   morphine (PF) injection 4 mg (4 mg Intravenous $Given 2/24/25 1235)   magnesium sulfate 2 g in 50 mL sterile water intermittent infusion (0 g Intravenous Stopped 2/24/25 1437)   morphine (PF) injection 4 mg (4 mg Intravenous $Given 2/24/25 1337)       NEW PRESCRIPTIONS STARTED AT TODAY'S ED VISIT:  Discharge Medication List as of 2/24/2025  2:32 PM          HPI   HPI     Meli Rodriguez is a 49 year old female with a pertinent history of chronic pain, COPD, emphysema, tobacco use disorder, and mental health diagnoses who presents to this ED via EMS for evaluation of chest pain.    Per EMS,  STEMI called in the field. About 1 hour prior to arrival (11:15am) the patient developed sudden increase in heart rate and \"crushing, severe\" chest pain. Prior to EMS arrival she took Aspirin 325 mg. Vagal maneuvers did not improve her symptoms. She had a systolic blood pressure in the mid 80s and heart rate in the 180-190s. No history of cardiac problems but she does have COPD, emphysema, and history of tobacco " use. Of note, she was in the hospital 4 days ago with hematemesis.     Per patient,   She was sitting at her desk when she suddenly developed chest pain and shortness of breath. She has felt her heart rate increase like this in the past. About 6 years ago she had similar symptoms with a heart rate in the 200s and received adenosine (and emphasizes she did not like it). Right now her feet feel tingly. She also explains that she was anxious when she was in the ambulance coming here with lights and sirens and hearing them talk about her heart attack.     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    Bipolar 2 disorder (H)     Blood in feces 08/13/2012    Calculi, ureter 05/22/2012    Cannabinoid hyperemesis syndrome 09/12/2023    Colitis     Contusion of duodenum 06/26/2012    De Quervain's disease (tenosynovitis) 12/01/2021    Depressive disorder     Drug-seeking behavior     Ectopic pregnancy, tubal 03/28/2009    Gastroenteritis 09/12/2023    GERD (gastroesophageal reflux disease)     Head injury     Hematemesis with nausea 07/10/2023    Hematochezia 06/26/2012    Irritable bowel syndrome (IBS)     Lower abdominal pain 07/10/2023    Lower urinary tract infection 02/18/2011    Migraine     Neuralgia neuritis, sciatic nerve 02/18/2012    PTSD (post-traumatic stress disorder)     Retention of urine 03/28/2009       Past Surgical History:   Procedure Laterality Date    APPENDECTOMY  08/16/2007    CARPAL TUNNEL RELEASE RT/LT Left 2009    CARPAL TUNNEL RELEASE RT/LT Right 2003    CHOLECYSTECTOMY  2004    CYSTOSCOPY  2012    ENT SURGERY  1981    Multiple PE tubes from 1977 to 1981.    GYN SURGERY   1999    Left salpingostomy in 1999. Right salpingectomy in 12/98.    HYSTERECTOMY, PAP NO LONGER INDICATED  2001    with ophorectomy bilateral.    ORTHOPEDIC SURGERY  2002    Trigger finger release in 2002.    SUPRAPUBIC CATHETER INSERTION  2006    TONSILLECTOMY, ADENOIDECTOMY, MYRINGOTOMY, INSERT TUBE BILATERAL, COMBINED      TUBAL LIGATION  2000    URETHRA SURGERY      temporary stent       Family History   Problem Relation Age of Onset    Unknown/Adopted Father     Chronic Obstructive Pulmonary Disease Mother     Aneurysm Maternal Grandmother     Cerebrovascular Disease Maternal Grandfather     Breast Cancer No family hx of     Cancer No family hx of     Diabetes No family hx of        Social History     Tobacco Use    Smoking status: Former     Types: Cigarettes     Passive exposure: Past    Smokeless tobacco: Never    Tobacco comments:     Less than a pack a day.   Vaping Use    Vaping status: Never Used   Substance Use Topics    Alcohol use: No    Drug use: No     Comment: Denies, past marijuana       albuterol (PROAIR HFA/PROVENTIL HFA/VENTOLIN HFA) 108 (90 Base) MCG/ACT inhaler  fluticasone-salmeterol (ADVAIR) 100-50 MCG/ACT inhaler  gabapentin (NEURONTIN) 300 MG capsule  ondansetron (ZOFRAN ODT) 4 MG ODT tab        Physical Exam     Vitals:  /72   Pulse 66   Temp 99.1  F (37.3  C)   Resp 16   SpO2 92%     PHYSICAL EXAM:   Physical Exam  Vitals and nursing note reviewed.   Constitutional:       General: She is not in acute distress.     Appearance: Normal appearance.   HENT:      Head: Normocephalic and atraumatic.      Nose: Nose normal.      Mouth/Throat:      Mouth: Mucous membranes are moist.   Eyes:      Pupils: Pupils are equal, round, and reactive to light.   Cardiovascular:      Rate and Rhythm: Regular rhythm. Tachycardia present.      Pulses: Normal pulses.           Radial pulses are 2+ on the right side and 2+ on the left side.        Dorsalis pedis pulses are 2+ on the right side and  2+ on the left side.   Pulmonary:      Effort: Pulmonary effort is normal. No respiratory distress.      Breath sounds: Normal breath sounds.   Abdominal:      Palpations: Abdomen is soft.      Tenderness: There is no abdominal tenderness.   Musculoskeletal:      Cervical back: Full passive range of motion without pain and neck supple.      Comments: No calf tenderness or swelling b/l   Skin:     General: Skin is warm.      Findings: No rash.   Neurological:      General: No focal deficit present.      Mental Status: She is alert. Mental status is at baseline.      Comments: Fluent speech, no acute lateralizing deficits   Psychiatric:         Mood and Affect: Mood is anxious.         Behavior: Behavior normal.           Results     LAB:  All pertinent labs reviewed and interpreted  Labs Ordered and Resulted from Time of ED Arrival to Time of ED Departure   BASIC METABOLIC PANEL - Abnormal       Result Value    Sodium 141      Potassium 3.5      Chloride 103      Carbon Dioxide (CO2) 25      Anion Gap 13      Urea Nitrogen 9.8      Creatinine 0.80      GFR Estimate 90      Calcium 9.5      Glucose 127 (*)    MAGNESIUM - Abnormal    Magnesium 1.5 (*)    TSH WITH FREE T4 REFLEX - Abnormal    TSH 6.68 (*)    T4 FREE - Abnormal    Free T4 0.89 (*)    HEPATIC FUNCTION PANEL - Normal    Protein Total 6.4      Albumin 4.2      Bilirubin Total 0.7      Alkaline Phosphatase 69      AST 20      ALT 8      Bilirubin Direct 0.18     CBC WITH PLATELETS AND DIFFERENTIAL    WBC Count 8.8      RBC Count 4.41      Hemoglobin 13.5      Hematocrit 40.3      MCV 91      MCH 30.6      MCHC 33.5      RDW 13.2      Platelet Count 210      % Neutrophils 50      % Lymphocytes 38      % Monocytes 10      % Eosinophils 2      % Basophils 0      % Immature Granulocytes 0      NRBCs per 100 WBC 0      Absolute Neutrophils 4.4      Absolute Lymphocytes 3.3      Absolute Monocytes 0.9      Absolute Eosinophils 0.2      Absolute Basophils 0.0       Absolute Immature Granulocytes 0.0      Absolute NRBCs 0.0         RADIOLOGY:  No orders to display                ECG:  EKG 1: SVT, rate 170 or so, rate related ischemia  EKG 2: NSR, rate 82, 1st degree AV block, no acute ischemia, similar to 2/4/25 EKG  EKG 3: NSR, rate 70, 1st degree AV block, no acute ischemia    I have independently reviewed and interpreted the EKG(s) documented above     PROCEDURES:  Procedures:  PROCEDURE: Chemical Cardioversion    INDICATIONS: SVT   CARDIOVERSION TYPE: Chemical, Adenosine   CARDIOVERSION PROVIDER: Dr Vishnu Louis   SEDATION: None   CONSENT: Risks, benefits and alternatives were discussed with and Verbal consent was obtained from Patient.   PROCEDURE SPECIFIC CHECKLIST COMPLETED: Yes   TIME OUT: Universal protocol was followed. TIME OUT conducted just prior to starting procedure confirmed patient identity, site/side, procedure, patient position, and availability of correct equipment. Yes.   MEDICATIONS GIVEN: 12 mg Adenosine, IV push, followed by rapid flush     MONITORING: Heart rate, cardiac monitor, continuous pulse oximeter, frequent blood pressure checks, level of consciousness checks, IV access, constant attendance by RN until patient is recovered and aconstant attendance by MD until patient is stable   RESPONSE: Vital signs stable, airway patent and O2 saturations remained >92%     Brief pause, then sinus rhythm   COMPLICATIONS: Patient tolerated procedure well, without complication           FINAL IMPRESSION:    ICD-10-CM    1. ST elevation MI (STEMI) (H)  I21.3 CANCELED: Cardiac Catheterization     CANCELED: Cardiac Catheterization      2. SVT (supraventricular tachycardia)  I47.10 Rapid [facility]  Referral      3. Chest wall pain  R07.89       4. Hypomagnesemia  E83.42       5. Low T4  R79.89           CRITICAL CARE  40 minutes of critical care time spent managing SVT requiring adenosine. Time spent interpreting labs, speaking with pt, consultants, as  well as documenting.  Independent of any procedures performed.        I, Sylvester Campo, am serving as a scribe to document services personally performed by Dr. Leonel Louis, based on my observations and the provider's statements to me. I, Leonel Louis, DO attest that Sylvester Campo is acting in a scribe capacity, has observed my performance of the services and has documented them in accordance with my direction.      Leonel Louis DO  Emergency Medicine  [facility] EMERGENCY DEPARTMENT  2/24/2025  12:08 PM          Leonel Louis MD  02/24/25 [id number]

## 2025-02-25 ENCOUNTER — OFFICE VISIT (OUTPATIENT)
Dept: CARDIOLOGY | Facility: CLINIC | Age: 50
End: 2025-02-25
Attending: EMERGENCY MEDICINE
Payer: COMMERCIAL

## 2025-02-25 ENCOUNTER — PATIENT OUTREACH (OUTPATIENT)
Dept: CARE COORDINATION | Facility: CLINIC | Age: 50
End: 2025-02-25

## 2025-02-25 VITALS
OXYGEN SATURATION: 98 % | DIASTOLIC BLOOD PRESSURE: 64 MMHG | BODY MASS INDEX: 21.55 KG/M2 | HEART RATE: 59 BPM | RESPIRATION RATE: 16 BRPM | SYSTOLIC BLOOD PRESSURE: 92 MMHG | WEIGHT: 127.8 LBS

## 2025-02-25 DIAGNOSIS — I25.10 CORONARY ARTERY DISEASE INVOLVING NATIVE CORONARY ARTERY OF NATIVE HEART WITHOUT ANGINA PECTORIS: ICD-10-CM

## 2025-02-25 DIAGNOSIS — I47.10 SVT (SUPRAVENTRICULAR TACHYCARDIA): ICD-10-CM

## 2025-02-25 DIAGNOSIS — R06.09 DOE (DYSPNEA ON EXERTION): ICD-10-CM

## 2025-02-25 DIAGNOSIS — R07.2 PRECORDIAL PAIN: Primary | ICD-10-CM

## 2025-02-25 DIAGNOSIS — E78.5 DYSLIPIDEMIA: ICD-10-CM

## 2025-02-25 PROCEDURE — 3074F SYST BP LT 130 MM HG: CPT | Performed by: INTERNAL MEDICINE

## 2025-02-25 PROCEDURE — 3078F DIAST BP <80 MM HG: CPT | Performed by: INTERNAL MEDICINE

## 2025-02-25 PROCEDURE — 99204 OFFICE O/P NEW MOD 45 MIN: CPT | Performed by: INTERNAL MEDICINE

## 2025-02-25 NOTE — PROGRESS NOTES
[facility] HEART CARE   [address], [place] [address]   [email or web address]   OFFICE: [phone]        Thank you Dr. Louis for asking the [facility] Heart Care team to participate in the care of your patient, Meli Rodriguez.     Impression and Plan     1.  SVT.  As noted below, Krystyna was seen in the Emergency Department by Dr. Leonel Louis 24 February 2025.  She had reported sensation of chest discomfort and was ultimately found to have evidence of SVT with heart rate in the 180-190s.  She was administered adenosine with termination of the tachycardia.  She had a similar episode approximately 6 years prior that resolved with adenosine administration.  She is states that her heart rate is commonly on the lower side and she has a tendency toward symptomatic hypotension.  Precluding beta-blocker therapy at this time.  She has only had 2 more pronounced episodes over the last 6 years and for now we will defer referral to EP for consideration of ablation and the like (this is in accordance with patient wishes as well).  Will arrange for 1 week Zio patch monitor to further clarify her shorter subjective episodes of rapid heart rate.    2.  Coronary artery disease.  Meli has coronary artery disease by virtue of CT scan of chest 20 February 2025 revealing moderate coronary calcification.  She did have some ST segment changes at the time of her SVT though with prompt resolution post adenosine termination of SVT.  She does report some chest discomfort symptoms though certain features are a bit atypical.  Do feel ischemic workup is reasonable and prudent.  She would be unable to exercise on treadmill due to leg, knee, and back discomfort and therefore none treadmill perfusion imaging modality will be required.  Normally would advocate initiation of aspirin though 5 days prior she had been evaluated in the Emergency Department with coffee-ground emesis.  She has a  history of gastric ulcers.  Recommend follow-up with gastroenterology as per problem #5.  Will arrange for cardiac stress MRI.  Meli states she has had MRIs in the past and is able to tolerate them with some mild sedation.  Will provide some Valium for the test.    3.  Dyslipidemia.  She has some initial reticence in starting statin therapy and will await stress imaging as per problem #2.  Discussed the importance of smoking cessation with Meli.    4.  Tobacco abuse.    5.  Hematemesis.  As noted below, Meli was recently seen in the Emergency Department with coffee-ground emesis.  She has a history of gastric ulcers.  Recommend follow-up with gastroenterology.    Further recommendations and follow-up pending test results.    35 minutes spent reviewing prior records (including documentation, laboratory studies, cardiac testing/imaging), interview with patient along with physical exam, planning, and subsequent documentation/crafting of note).           History of Present Illness    Once again I would like to thank you again for asking me to participate in the care of your patient, Meli Rodriguez.  As you know, but to reiterate for my own records, Meli Rodriguez is a 49 year old female with seen in the emergency department by Dr. Leonel Louis 24 February 2025.  She had reported sensation of chest discomfort and was ultimately found to have evidence of SVT with heart rate in the 180-190s.  She had a similar episode approximately 6 years prior that resolved with adenosine administration.    In addition to the aforementioned, she reports that she has nearly daily episodes of subjective rapid heart rate though these are quite fleeting only lasting less than a minute.  She has had some occasional chest discomfort though describes more of a sharp stabbing type pain that is transient.    Further review of systems is otherwise negative/noncontributory (medical record and 13 point review of systems reviewed as well  and pertinent positives noted).         Cardiac Diagnostics/Imaging      Twelve-lead ECG (personally reviewed) 24 February 2025 at 1216: Sinus rhythm with borderline first-degree AV block.  Nonspecific T wave changes.    Twelve-lead ECG (personally reviewed) 24 February 2025 at 1203: SVT with heart rate of 163 bpm.  Subtle ST elevation in V1 and V2 with ST depression in the inferior and anterolateral leads.    CT scan of chest 20 February 2025:  No acute abnormality in the chest, abdomen, or pelvis. No cause for left chest and upper abdominal pain is identified.  Moderate amount of stool throughout the colon.  A 0.3 cm left upper lobe pulmonary nodule is unchanged.  Moderate coronary calcification.    CT scan of chest/abdomen/pelvis 21 February 2019:  No evidence for aortic dissection.  No evidence for pulmonary emboli.  No evidence for acute pulmonary disease.           Physical Examination       BP 92/64 (BP Location: Left arm, Patient Position: Sitting, Cuff Size: Adult Regular)   Pulse 59   Resp 16   Wt 58 kg (127 lb 12.8 oz)   SpO2 98%   BMI 21.55 kg/m          Wt Readings from Last 3 Encounters:   02/25/25 58 kg (127 lb 12.8 oz)   02/24/25 57 kg (125 lb 9.6 oz)   02/04/25 54 kg (119 lb)       The patient is alert and oriented times three. Sclerae are anicteric. Mucosal membranes are moist. Jugular venous pressure is normal. No significant adenopathy/thyromegally appreciated. Lungs are diminished, but clear.  On cardiovascular exam, the patient has a regular S1 and S2. Abdomen is soft and non-tender. Extremities reveal no clubbing, cyanosis, or edema.         Family History/Social History/Risk Factors   Patient does smoke.  Family history reviewed, and family history includes Aneurysm in her maternal grandmother; Cerebrovascular Disease in her maternal grandfather; Chronic Obstructive Pulmonary Disease in her mother; Unknown/Adopted in her father.          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    Bipolar 2 disorder (H)     Blood in feces 08/13/2012    Calculi, ureter 05/22/2012    Cannabinoid hyperemesis syndrome 09/12/2023    possible    Colitis     Contusion of duodenum 06/26/2012    De Quervain's disease (tenosynovitis) 12/01/2021    Depressive disorder     Drug-seeking behavior     Ectopic pregnancy, tubal 03/28/2009    Gastroenteritis 09/12/2023    GERD (gastroesophageal reflux disease)     Head injury     Hematemesis with nausea 07/10/2023    Hematochezia 06/26/2012    Irritable bowel syndrome (IBS)     Lower abdominal pain 07/10/2023    Lower urinary tract infection 02/18/2011    Migraine     Neuralgia neuritis, sciatic nerve 02/18/2012    PTSD (post-traumatic stress disorder)     Retention of urine 03/28/2009     Past Surgical History:   Procedure Laterality Date    APPENDECTOMY  08/16/2007    CARPAL TUNNEL RELEASE RT/LT Left 2009    CARPAL TUNNEL RELEASE RT/LT Right 2003    CHOLECYSTECTOMY  2004    CYSTOSCOPY  2012    ENT SURGERY  1981    Multiple PE tubes from 1977 to 1981.    GYN SURGERY  1999    Left salpingostomy in 1999. Right salpingectomy in 12/98.    HYSTERECTOMY, PAP NO LONGER INDICATED  2001    with ophorectomy bilateral.    ORTHOPEDIC SURGERY  2002    Trigger finger release in 2002.    SUPRAPUBIC CATHETER INSERTION  2006    TONSILLECTOMY, ADENOIDECTOMY, MYRINGOTOMY, INSERT TUBE BILATERAL, COMBINED      TUBAL LIGATION  2000    URETHRA SURGERY      temporary stent     Family History   Problem Relation Age of Onset    Unknown/Adopted Father     Chronic Obstructive Pulmonary Disease Mother     Aneurysm Maternal Grandmother     Cerebrovascular Disease Maternal  Grandfather     Breast Cancer No family hx of     Cancer No family hx of     Diabetes No family hx of         Social History     Socioeconomic History    Marital status:      Spouse name: Not on file    Number of children: Not on file    Years of education: Not on file    Highest education level: Not on file   Occupational History    Not on file   Tobacco Use    Smoking status: Former     Types: Cigarettes     Passive exposure: Past    Smokeless tobacco: Never    Tobacco comments:     Less than a pack a day.   Vaping Use    Vaping status: Never Used   Substance and Sexual Activity    Alcohol use: No    Drug use: No     Comment: Denies, past marijuana    Sexual activity: Yes     Partners: Male   Other Topics Concern    Parent/sibling w/ CABG, MI or angioplasty before 65F 55M? Not Asked   Social History Narrative    Not on file     Social Drivers of Health     Financial Resource Strain: High Risk (1/1/2022)    [facility] [employer]    Financial Resource Strain     Difficulty of Paying Living Expenses: Not on file     Difficulty of Paying Living Expenses: Not on file   Food Insecurity: Not on file   Transportation Needs: Not on file   Physical Activity: Not on file   Stress: Not on file   Social Connections: Unknown (1/1/2022)    [facility] from [employer] [employer]    Social Connections     Frequency of Communication with Friends and Family: Not on file   Interpersonal Safety: Unknown (2/24/2025)    Interpersonal Safety     Do you feel physically and emotionally safe where you currently live?: Patient unable to answer     Within the past 12 months, have you been hit, slapped, kicked or otherwise physically hurt by someone?: Patient unable to answer     Within the past 12 months, have you been humiliated or emotionally abused in other ways by your partner or  "ex-partner?: Patient unable to answer   Housing Stability: Not on file           Medications  Allergies   Current Outpatient Medications   Medication Sig Dispense Refill    albuterol (PROAIR HFA/PROVENTIL HFA/VENTOLIN HFA) 108 (90 Base) MCG/ACT inhaler Inhale 2 puffs into the lungs every 6 hours as needed for shortness of breath, wheezing or cough 18 g 1    gabapentin (NEURONTIN) 300 MG capsule Take 1 capsule (300 mg) by mouth 3 times daily. 90 capsule 0    ondansetron (ZOFRAN ODT) 4 MG ODT tab Take 1 tablet (4 mg) by mouth every 8 hours as needed for nausea. 30 tablet 2    fluticasone-salmeterol (ADVAIR) 100-50 MCG/ACT inhaler Inhale 1 puff into the lungs every 12 hours. (Patient not taking: Reported on 1/24/2025) 1 each 0       Allergies   Allergen Reactions    Butalbital-Aspirin-Caffeine      Increases headaches    Demerol Itching    Droperidol Other (See Comments)     twitching      Hydroxyzine Unknown     Refuses IM numerous times stating that it does not work    Ketorolac Tromethamine Itching    Methocarbamol Hives    Metoclopramide Other (See Comments)     Cause acute dystonic reaction; Tolerated 3/3/24    Nicotine      Intolerant to    Nsaids Other (See Comments)     Gastric ulcer    Pramipexole      Ill feel    Rizatriptan      Ill feel    Ropinirole      Increases RLS    Trazodone Other (See Comments)     Out of body sense     Bad dreams    Triptans Itching     vomits    Adhesive Tape Rash    Compazine [Prochlorperazine] Rash     Arm red and rash    Diphenhydramine Anxiety     Jittery and jumpy          Lab Results    Chemistry/lipid CBC Cardiac Enzymes/BNP/TSH/INR   No results for input(s): \"CHOL\", \"HDL\", \"LDL\", \"TRIG\", \"CHOLHDLRATIO\" in the last 95675 hours.  No results for input(s): \"LDL\" in the last 18684 hours.  Recent Labs   Lab Test 02/24/25  1205      POTASSIUM 3.5   CHLORIDE 103   CO2 25   *   BUN 9.8   CR 0.80   GFRESTIMATED 90   SG 9.5     Recent Labs   Lab Test 02/24/25  1205 " "02/20/25  1427 02/04/25  1527   CR 0.80 0.64 0.63     No results for input(s): \"A1C\" in the last 56840 hours.       Recent Labs   Lab Test 02/24/25  1205   WBC 8.8   HGB 13.5   HCT 40.3   MCV 91        Recent Labs   Lab Test 02/24/25  1205 02/20/25  1427 01/18/25  1850   HGB 13.5 13.4 13.6    Recent Labs   Lab Test 08/12/20  1908 02/21/19  1845   TROPONINI <0.01 <0.01     Recent Labs   Lab Test 04/18/24  2323   NTBNPI <36     Recent Labs   Lab Test 02/24/25  1205   TSH 6.68*     Recent Labs   Lab Test 02/23/21  1936 08/12/17  2041   INR 1.08 0.9          Medications  Allergies   Current Outpatient Medications   Medication Sig Dispense Refill    albuterol (PROAIR HFA/PROVENTIL HFA/VENTOLIN HFA) 108 (90 Base) MCG/ACT inhaler Inhale 2 puffs into the lungs every 6 hours as needed for shortness of breath, wheezing or cough 18 g 1    gabapentin (NEURONTIN) 300 MG capsule Take 1 capsule (300 mg) by mouth 3 times daily. 90 capsule 0    ondansetron (ZOFRAN ODT) 4 MG ODT tab Take 1 tablet (4 mg) by mouth every 8 hours as needed for nausea. 30 tablet 2    fluticasone-salmeterol (ADVAIR) 100-50 MCG/ACT inhaler Inhale 1 puff into the lungs every 12 hours. (Patient not taking: Reported on 1/24/2025) 1 each 0      Allergies   Allergen Reactions    Butalbital-Aspirin-Caffeine      Increases headaches    Demerol Itching    Droperidol Other (See Comments)     twitching      Hydroxyzine Unknown     Refuses IM numerous times stating that it does not work    Ketorolac Tromethamine Itching    Methocarbamol Hives    Metoclopramide Other (See Comments)     Cause acute dystonic reaction; Tolerated 3/3/24    Nicotine      Intolerant to    Nsaids Other (See Comments)     Gastric ulcer    Pramipexole      Ill feel    Rizatriptan      Ill feel    Ropinirole      Increases RLS    Trazodone Other (See Comments)     Out of body sense     Bad dreams    Triptans Itching     vomits    Adhesive Tape Rash    Compazine [Prochlorperazine] Rash     " "Arm red and rash    Diphenhydramine Anxiety     Jittery and jumpy          Lab Results   Lab Results   Component Value Date     02/24/2025     02/23/2021    CO2 25 02/24/2025    CO2 25 02/23/2021    BUN 9.8 02/24/2025    BUN 7 02/23/2021     Lab Results   Component Value Date    WBC 8.8 02/24/2025    WBC 9.6 02/23/2021    HGB 13.5 02/24/2025    HGB 13.4 02/23/2021    HCT 40.3 02/24/2025    HCT 39.4 02/23/2021    MCV 91 02/24/2025    MCV 91 02/23/2021     02/24/2025     02/23/2021     No results found for: \"CHOL\", \"TRIG\", \"HDL\", \"LDLDIRECT\"  Lab Results   Component Value Date    INR 1.08 02/23/2021     No results found for: \"BNP\"  Lab Results   Component Value Date    TROPONINI <0.01 08/12/2020    TROPONINI <0.01 02/21/2019     Lab Results   Component Value Date    TSH 6.68 02/24/2025    TSH 12.30 09/24/2013                  "

## 2025-02-25 NOTE — PROGRESS NOTES
Clinic Care Coordination Contact  Community Health Worker Initial Outreach    CHW Initial Information Gathering:  Referral Source: PCP  Current living arrangement:: I live in a private home with family  Type of residence:: Private home - stairs  Community Resources: Other (see comment) (SNAP)  Informal Support system:: Family, Children, Other (EX )  No PCP office visit in Past Year: No  Transportation means:: Regular car, Family  CHW Additional Questions  If ED/Hospital discharge, follow-up appointment scheduled as recommended?: N/A  Medication changes made following ED/Hospital discharge?: N/A  MyChart active?: Yes  Patient sent Social Drivers of Health questionnaire?: Yes    Patient accepts CC: Yes. Patient scheduled for assessment with MARILEE HERNANDEZ on 03/05/2025 at 9:45AM. Patient noted desire to discuss PCA (Sister In Law), Power Wheelchair, Social Security and Transportation Resources (Medical Appointments) .     CORRINE Washington  Clinic Care Coordination   [facility]   Phone: [phone]  [email or web address]

## 2025-02-25 NOTE — LETTER
2/25/2025    Sharmaine Christensen PA-C  [address] [address]  [place] [address]    RE: Meli Rodriguez       Dear Colleague,     I had the pleasure of seeing Meli Rodriguez in the [facility].         [facility]   [address], [place] [address]   [email or web address]   OFFICE: [phone]        Thank you Dr. Louis for asking the [facility] team to participate in the care of your patient, Meli Rodriguez.     Impression and Plan     1.  SVT.  As noted below, Krystyna was seen in the Emergency Department by Dr. Leonel Louis 24 February 2025.  She had reported sensation of chest discomfort and was ultimately found to have evidence of SVT with heart rate in the 180-190s.  She was administered adenosine with termination of the tachycardia.  She had a similar episode approximately 6 years prior that resolved with adenosine administration.  She is states that her heart rate is commonly on the lower side and she has a tendency toward symptomatic hypotension.  Precluding beta-blocker therapy at this time.  She has only had 2 more pronounced episodes over the last 6 years and for now we will defer referral to EP for consideration of ablation and the like (this is in accordance with patient wishes as well).  Will arrange for 1 week Zio patch monitor to further clarify her shorter subjective episodes of rapid heart rate.    2.  Coronary artery disease.  Meli has coronary artery disease by virtue of CT scan of chest 20 February 2025 revealing moderate coronary calcification.  She did have some ST segment changes at the time of her SVT though with prompt resolution post adenosine termination of SVT.  She does report some chest discomfort symptoms though certain features are a bit atypical.  Do feel ischemic workup is reasonable and prudent.  She would be unable to exercise on treadmill due to leg, knee, and back discomfort and therefore none  treadmill perfusion imaging modality will be required.  Normally would advocate initiation of aspirin though 5 days prior she had been evaluated in the Emergency Department with coffee-ground emesis.  She has a history of gastric ulcers.  Recommend follow-up with gastroenterology as per problem #5.  Will arrange for cardiac stress MRI.  Meli states she has had MRIs in the past and is able to tolerate them with some mild sedation.  Will provide some Valium for the test.    3.  Dyslipidemia.  She has some initial reticence in starting statin therapy and will await stress imaging as per problem #2.  Discussed the importance of smoking cessation with Meli.    4.  Tobacco abuse.    5.  Hematemesis.  As noted below, Meli was recently seen in the Emergency Department with coffee-ground emesis.  She has a history of gastric ulcers.  Recommend follow-up with gastroenterology.    Further recommendations and follow-up pending test results.    35 minutes spent reviewing prior records (including documentation, laboratory studies, cardiac testing/imaging), interview with patient along with physical exam, planning, and subsequent documentation/crafting of note).           History of Present Illness    Once again I would like to thank you again for asking me to participate in the care of your patient, Meli Rodriguez.  As you know, but to reiterate for my own records, Meli Rodriguez is a 49 year old female with seen in the emergency department by Dr. Leonel Louis 24 February 2025.  She had reported sensation of chest discomfort and was ultimately found to have evidence of SVT with heart rate in the 180-190s.  She had a similar episode approximately 6 years prior that resolved with adenosine administration.    In addition to the aforementioned, she reports that she has nearly daily episodes of subjective rapid heart rate though these are quite fleeting only lasting less than a minute.  She has had some occasional chest  discomfort though describes more of a sharp stabbing type pain that is transient.    Further review of systems is otherwise negative/noncontributory (medical record and 13 point review of systems reviewed as well and pertinent positives noted).         Cardiac Diagnostics/Imaging      Twelve-lead ECG (personally reviewed) 24 February 2025 at 1216: Sinus rhythm with borderline first-degree AV block.  Nonspecific T wave changes.    Twelve-lead ECG (personally reviewed) 24 February 2025 at 1203: SVT with heart rate of 163 bpm.  Subtle ST elevation in V1 and V2 with ST depression in the inferior and anterolateral leads.    CT scan of chest 20 February 2025:  No acute abnormality in the chest, abdomen, or pelvis. No cause for left chest and upper abdominal pain is identified.  Moderate amount of stool throughout the colon.  A 0.3 cm left upper lobe pulmonary nodule is unchanged.  Moderate coronary calcification.    CT scan of chest/abdomen/pelvis 21 February 2019:  No evidence for aortic dissection.  No evidence for pulmonary emboli.  No evidence for acute pulmonary disease.           Physical Examination       BP 92/64 (BP Location: Left arm, Patient Position: Sitting, Cuff Size: Adult Regular)   Pulse 59   Resp 16   Wt 58 kg (127 lb 12.8 oz)   SpO2 98%   BMI 21.55 kg/m          Wt Readings from Last 3 Encounters:   02/25/25 58 kg (127 lb 12.8 oz)   02/24/25 57 kg (125 lb 9.6 oz)   02/04/25 54 kg (119 lb)       The patient is alert and oriented times three. Sclerae are anicteric. Mucosal membranes are moist. Jugular venous pressure is normal. No significant adenopathy/thyromegally appreciated. Lungs are diminished, but clear.  On cardiovascular exam, the patient has a regular S1 and S2. Abdomen is soft and non-tender. Extremities reveal no clubbing, cyanosis, or edema.         Family History/Social History/Risk Factors   Patient does not smoke.  Family history reviewed, and family history includes Aneurysm in her  maternal grandmother; Cerebrovascular Disease in her maternal grandfather; Chronic Obstructive Pulmonary Disease in her mother; Unknown/Adopted in her father.          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     Bipolar 2 disorder (H)      Blood in feces 08/13/2012     Calculi, ureter 05/22/2012     Cannabinoid hyperemesis syndrome 09/12/2023    possible     Colitis      Contusion of duodenum 06/26/2012     De Quervain's disease (tenosynovitis) 12/01/2021     Depressive disorder      Drug-seeking behavior      Ectopic pregnancy, tubal 03/28/2009     Gastroenteritis 09/12/2023     GERD (gastroesophageal reflux disease)      Head injury      Hematemesis with nausea 07/10/2023     Hematochezia 06/26/2012     Irritable bowel syndrome (IBS)      Lower abdominal pain 07/10/2023     Lower urinary tract infection 02/18/2011     Migraine      Neuralgia neuritis, sciatic nerve 02/18/2012     PTSD (post-traumatic stress disorder)      Retention of urine 03/28/2009     Past Surgical History:   Procedure Laterality Date     APPENDECTOMY  08/16/2007     CARPAL TUNNEL RELEASE RT/LT Left 2009     CARPAL TUNNEL RELEASE RT/LT Right 2003     CHOLECYSTECTOMY  2004     CYSTOSCOPY  2012     ENT SURGERY  1981    Multiple PE tubes from 1977 to 1981.     GYN SURGERY  1999    Left salpingostomy in 1999. Right salpingectomy in 12/98.     HYSTERECTOMY, PAP NO LONGER INDICATED  2001    with ophorectomy bilateral.     ORTHOPEDIC SURGERY  2002    Trigger finger release in 2002.     SUPRAPUBIC CATHETER INSERTION  2006     TONSILLECTOMY, ADENOIDECTOMY, MYRINGOTOMY, INSERT TUBE BILATERAL, COMBINED        TUBAL LIGATION  2000     URETHRA SURGERY      temporary stent     Family History   Problem Relation Age of Onset     Unknown/Adopted Father      Chronic Obstructive Pulmonary Disease Mother      Aneurysm Maternal Grandmother      Cerebrovascular Disease Maternal Grandfather      Breast Cancer No family hx of      Cancer No family hx of      Diabetes No family hx of         Social History     Socioeconomic History     Marital status:      Spouse name: Not on file     Number of children: Not on file     Years of education: Not on file     Highest education level: Not on file   Occupational History     Not on file   Tobacco Use     Smoking status: Former     Types: Cigarettes     Passive exposure: Past     Smokeless tobacco: Never     Tobacco comments:     Less than a pack a day.   Vaping Use     Vaping status: Never Used   Substance and Sexual Activity     Alcohol use: No     Drug use: No     Comment: Denies, past marijuana     Sexual activity: Yes     Partners: Male   Other Topics Concern     Parent/sibling w/ CABG, MI or angioplasty before 65F 55M? Not Asked   Social History Narrative     Not on file     Social Drivers of Health     Financial Resource Strain: High Risk (1/1/2022)    [facility]    Financial Resource Strain      Difficulty of Paying Living Expenses: Not on file      Difficulty of Paying Living Expenses: Not on file   Food Insecurity: Not on file   Transportation Needs: Not on file   Physical Activity: Not on file   Stress: Not on file   Social Connections: Unknown (1/1/2022)    Received from [facility], [facility]    Social Connections      Frequency of Communication with Friends and Family: Not on file   Interpersonal Safety: Unknown (2/24/2025)    Interpersonal Safety      Do you feel physically and emotionally safe where you  "currently live?: Patient unable to answer      Within the past 12 months, have you been hit, slapped, kicked or otherwise physically hurt by someone?: Patient unable to answer      Within the past 12 months, have you been humiliated or emotionally abused in other ways by your partner or ex-partner?: Patient unable to answer   Housing Stability: Not on file           Medications  Allergies   Current Outpatient Medications   Medication Sig Dispense Refill     albuterol (PROAIR HFA/PROVENTIL HFA/VENTOLIN HFA) 108 (90 Base) MCG/ACT inhaler Inhale 2 puffs into the lungs every 6 hours as needed for shortness of breath, wheezing or cough 18 g 1     gabapentin (NEURONTIN) 300 MG capsule Take 1 capsule (300 mg) by mouth 3 times daily. 90 capsule 0     ondansetron (ZOFRAN ODT) 4 MG ODT tab Take 1 tablet (4 mg) by mouth every 8 hours as needed for nausea. 30 tablet 2     fluticasone-salmeterol (ADVAIR) 100-50 MCG/ACT inhaler Inhale 1 puff into the lungs every 12 hours. (Patient not taking: Reported on 1/24/2025) 1 each 0       Allergies   Allergen Reactions     Butalbital-Aspirin-Caffeine      Increases headaches     Demerol Itching     Droperidol Other (See Comments)     twitching       Hydroxyzine Unknown     Refuses IM numerous times stating that it does not work     Ketorolac Tromethamine Itching     Methocarbamol Hives     Metoclopramide Other (See Comments)     Cause acute dystonic reaction; Tolerated 3/3/24     Nicotine      Intolerant to     Nsaids Other (See Comments)     Gastric ulcer     Pramipexole      Ill feel     Rizatriptan      Ill feel     Ropinirole      Increases RLS     Trazodone Other (See Comments)     Out of body sense     Bad dreams     Triptans Itching     vomits     Adhesive Tape Rash     Compazine [Prochlorperazine] Rash     Arm red and rash     Diphenhydramine Anxiety     Jittery and jumpy          Lab Results    Chemistry/lipid CBC Cardiac Enzymes/BNP/TSH/INR   No results for input(s): \"CHOL\", " "\"HDL\", \"LDL\", \"TRIG\", \"CHOLHDLRATIO\" in the last 20529 hours.  No results for input(s): \"LDL\" in the last 28198 hours.  Recent Labs   Lab Test 02/24/25  1205      POTASSIUM 3.5   CHLORIDE 103   CO2 25   *   BUN 9.8   CR 0.80   GFRESTIMATED 90   SG 9.5     Recent Labs   Lab Test 02/24/25  1205 02/20/25  1427 02/04/25  1527   CR 0.80 0.64 0.63     No results for input(s): \"A1C\" in the last 13888 hours.       Recent Labs   Lab Test 02/24/25  1205   WBC 8.8   HGB 13.5   HCT 40.3   MCV 91        Recent Labs   Lab Test 02/24/25  1205 02/20/25  1427 01/18/25  1850   HGB 13.5 13.4 13.6    Recent Labs   Lab Test 08/12/20  1908 02/21/19  1845   TROPONINI <0.01 <0.01     Recent Labs   Lab Test 04/18/24  2323   NTBNPI <36     Recent Labs   Lab Test 02/24/25  1205   TSH 6.68*     Recent Labs   Lab Test 02/23/21  1936 08/12/17  2041   INR 1.08 0.9          Medications  Allergies   Current Outpatient Medications   Medication Sig Dispense Refill     albuterol (PROAIR HFA/PROVENTIL HFA/VENTOLIN HFA) 108 (90 Base) MCG/ACT inhaler Inhale 2 puffs into the lungs every 6 hours as needed for shortness of breath, wheezing or cough 18 g 1     gabapentin (NEURONTIN) 300 MG capsule Take 1 capsule (300 mg) by mouth 3 times daily. 90 capsule 0     ondansetron (ZOFRAN ODT) 4 MG ODT tab Take 1 tablet (4 mg) by mouth every 8 hours as needed for nausea. 30 tablet 2     fluticasone-salmeterol (ADVAIR) 100-50 MCG/ACT inhaler Inhale 1 puff into the lungs every 12 hours. (Patient not taking: Reported on 1/24/2025) 1 each 0      Allergies   Allergen Reactions     Butalbital-Aspirin-Caffeine      Increases headaches     Demerol Itching     Droperidol Other (See Comments)     twitching       Hydroxyzine Unknown     Refuses IM numerous times stating that it does not work     Ketorolac Tromethamine Itching     Methocarbamol Hives     Metoclopramide Other (See Comments)     Cause acute dystonic reaction; Tolerated 3/3/24     Nicotine     " " Intolerant to     Nsaids Other (See Comments)     Gastric ulcer     Pramipexole      Ill feel     Rizatriptan      Ill feel     Ropinirole      Increases RLS     Trazodone Other (See Comments)     Out of body sense     Bad dreams     Triptans Itching     vomits     Adhesive Tape Rash     Compazine [Prochlorperazine] Rash     Arm red and rash     Diphenhydramine Anxiety     Jittery and jumpy          Lab Results   Lab Results   Component Value Date     02/24/2025     02/23/2021    CO2 25 02/24/2025    CO2 25 02/23/2021    BUN 9.8 02/24/2025    BUN 7 02/23/2021     Lab Results   Component Value Date    WBC 8.8 02/24/2025    WBC 9.6 02/23/2021    HGB 13.5 02/24/2025    HGB 13.4 02/23/2021    HCT 40.3 02/24/2025    HCT 39.4 02/23/2021    MCV 91 02/24/2025    MCV 91 02/23/2021     02/24/2025     02/23/2021     No results found for: \"CHOL\", \"TRIG\", \"HDL\", \"LDLDIRECT\"  Lab Results   Component Value Date    INR 1.08 02/23/2021     No results found for: \"BNP\"  Lab Results   Component Value Date    TROPONINI <0.01 08/12/2020    TROPONINI <0.01 02/21/2019     Lab Results   Component Value Date    TSH 6.68 02/24/2025    TSH 12.30 09/24/2013                      Thank you for allowing me to participate in the care of your patient.      Sincerely,     Danii Poe MD     [facility]  cc:   Leonel Louis MD  [address] [place] [phone]      "

## 2025-02-26 ENCOUNTER — ORDERS ONLY (AUTO-RELEASED) (OUTPATIENT)
Dept: CARDIOLOGY | Facility: CLINIC | Age: 50
End: 2025-02-26
Payer: COMMERCIAL

## 2025-02-26 DIAGNOSIS — I47.10 SVT (SUPRAVENTRICULAR TACHYCARDIA): ICD-10-CM

## 2025-02-27 ENCOUNTER — APPOINTMENT (OUTPATIENT)
Dept: GENERAL RADIOLOGY | Facility: CLINIC | Age: 50
End: 2025-02-27
Attending: FAMILY MEDICINE
Payer: COMMERCIAL

## 2025-02-27 ENCOUNTER — HOSPITAL ENCOUNTER (EMERGENCY)
Facility: CLINIC | Age: 50
Discharge: HOME | End: 2025-02-27
Attending: FAMILY MEDICINE
Payer: COMMERCIAL

## 2025-02-27 VITALS
SYSTOLIC BLOOD PRESSURE: 98 MMHG | HEIGHT: 63 IN | RESPIRATION RATE: 14 BRPM | TEMPERATURE: 97.6 F | OXYGEN SATURATION: 92 % | WEIGHT: 125 LBS | HEART RATE: 53 BPM | BODY MASS INDEX: 22.15 KG/M2 | DIASTOLIC BLOOD PRESSURE: 66 MMHG

## 2025-02-27 DIAGNOSIS — R07.89 ATYPICAL CHEST PAIN: ICD-10-CM

## 2025-02-27 LAB
ALBUMIN SERPL BCG-MCNC: 3.9 G/DL (ref 3.5–5.2)
ALP SERPL-CCNC: 61 U/L (ref 40–150)
ALT SERPL W P-5'-P-CCNC: 8 U/L (ref 0–50)
ANION GAP SERPL CALCULATED.3IONS-SCNC: 12 MMOL/L (ref 7–15)
AST SERPL W P-5'-P-CCNC: 23 U/L (ref 0–45)
ATRIAL RATE - MUSE: 60 BPM
BASOPHILS # BLD AUTO: 0.1 10E3/UL (ref 0–0.2)
BASOPHILS NFR BLD AUTO: 1 %
BILIRUB SERPL-MCNC: 0.5 MG/DL
BUN SERPL-MCNC: 7.8 MG/DL (ref 6–20)
CALCIUM SERPL-MCNC: 8.7 MG/DL (ref 8.8–10.4)
CHLORIDE SERPL-SCNC: 105 MMOL/L (ref 98–107)
CREAT SERPL-MCNC: 0.62 MG/DL (ref 0.51–0.95)
D DIMER PPP FEU-MCNC: <0.27 UG/ML FEU (ref 0–0.5)
DIASTOLIC BLOOD PRESSURE - MUSE: NORMAL MMHG
EGFRCR SERPLBLD CKD-EPI 2021: >90 ML/MIN/1.73M2
EOSINOPHIL # BLD AUTO: 0.3 10E3/UL (ref 0–0.7)
EOSINOPHIL NFR BLD AUTO: 3 %
ERYTHROCYTE [DISTWIDTH] IN BLOOD BY AUTOMATED COUNT: 13.3 % (ref 10–15)
GLUCOSE SERPL-MCNC: 89 MG/DL (ref 70–99)
HCO3 SERPL-SCNC: 23 MMOL/L (ref 22–29)
HCT VFR BLD AUTO: 37.8 % (ref 35–47)
HGB BLD-MCNC: 12.8 G/DL (ref 11.7–15.7)
HOLD SPECIMEN: NORMAL
IMM GRANULOCYTES # BLD: 0 10E3/UL
IMM GRANULOCYTES NFR BLD: 0 %
INTERPRETATION ECG - MUSE: NORMAL
LIPASE SERPL-CCNC: 17 U/L (ref 13–60)
LYMPHOCYTES # BLD AUTO: 3.1 10E3/UL (ref 0.8–5.3)
LYMPHOCYTES NFR BLD AUTO: 32 %
MCH RBC QN AUTO: 31.8 PG (ref 26.5–33)
MCHC RBC AUTO-ENTMCNC: 33.9 G/DL (ref 31.5–36.5)
MCV RBC AUTO: 94 FL (ref 78–100)
MONOCYTES # BLD AUTO: 1 10E3/UL (ref 0–1.3)
MONOCYTES NFR BLD AUTO: 10 %
NEUTROPHILS # BLD AUTO: 5.3 10E3/UL (ref 1.6–8.3)
NEUTROPHILS NFR BLD AUTO: 54 %
NRBC # BLD AUTO: 0 10E3/UL
NRBC BLD AUTO-RTO: 0 /100
P AXIS - MUSE: 38 DEGREES
PLATELET # BLD AUTO: 197 10E3/UL (ref 150–450)
POTASSIUM SERPL-SCNC: 4 MMOL/L (ref 3.4–5.3)
PR INTERVAL - MUSE: 222 MS
PROT SERPL-MCNC: 6.4 G/DL (ref 6.4–8.3)
QRS DURATION - MUSE: 74 MS
QT - MUSE: 420 MS
QTC - MUSE: 420 MS
R AXIS - MUSE: 93 DEGREES
RBC # BLD AUTO: 4.03 10E6/UL (ref 3.8–5.2)
SODIUM SERPL-SCNC: 140 MMOL/L (ref 135–145)
SYSTOLIC BLOOD PRESSURE - MUSE: NORMAL MMHG
T AXIS - MUSE: 67 DEGREES
TROPONIN T SERPL HS-MCNC: 7 NG/L
VENTRICULAR RATE- MUSE: 60 BPM
WBC # BLD AUTO: 9.7 10E3/UL (ref 4–11)

## 2025-02-27 PROCEDURE — 93010 ELECTROCARDIOGRAM REPORT: CPT | Performed by: FAMILY MEDICINE

## 2025-02-27 PROCEDURE — 82374 ASSAY BLOOD CARBON DIOXIDE: CPT | Performed by: FAMILY MEDICINE

## 2025-02-27 PROCEDURE — 85004 AUTOMATED DIFF WBC COUNT: CPT | Performed by: FAMILY MEDICINE

## 2025-02-27 PROCEDURE — 99284 EMERGENCY DEPT VISIT MOD MDM: CPT | Performed by: FAMILY MEDICINE

## 2025-02-27 PROCEDURE — 36415 COLL VENOUS BLD VENIPUNCTURE: CPT | Performed by: FAMILY MEDICINE

## 2025-02-27 PROCEDURE — 82310 ASSAY OF CALCIUM: CPT | Performed by: FAMILY MEDICINE

## 2025-02-27 PROCEDURE — 93005 ELECTROCARDIOGRAM TRACING: CPT | Performed by: FAMILY MEDICINE

## 2025-02-27 PROCEDURE — 84484 ASSAY OF TROPONIN QUANT: CPT | Performed by: FAMILY MEDICINE

## 2025-02-27 PROCEDURE — 85379 FIBRIN DEGRADATION QUANT: CPT | Performed by: FAMILY MEDICINE

## 2025-02-27 PROCEDURE — 99285 EMERGENCY DEPT VISIT HI MDM: CPT | Mod: 25 | Performed by: FAMILY MEDICINE

## 2025-02-27 PROCEDURE — 83690 ASSAY OF LIPASE: CPT | Performed by: FAMILY MEDICINE

## 2025-02-27 PROCEDURE — 71046 X-RAY EXAM CHEST 2 VIEWS: CPT

## 2025-02-27 RX ORDER — OLANZAPINE 10 MG/1
5 INJECTION, POWDER, LYOPHILIZED, FOR SOLUTION INTRAMUSCULAR ONCE
Status: COMPLETED | OUTPATIENT
Start: 2025-02-27 | End: 2025-02-27

## 2025-02-27 ASSESSMENT — ACTIVITIES OF DAILY LIVING (ADL)
ADLS_ACUITY_SCORE: 51

## 2025-02-27 NOTE — ED NOTES
Patient requesting pain medications and Zyprex was offered and declined. Repeatedly asking for pain medications and then left due to us not treating her pain.

## 2025-02-27 NOTE — ED NOTES
Bed: ED17  Expected date:   Expected time:   Means of arrival:   Comments:  EMS - Chest pain, N & V
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
## 2025-02-27 NOTE — ED TRIAGE NOTES
Arrives by EMS for left sided chest pain. No relief at home. Has known lung nodules     Triage Assessment (Adult)       Row Name 02/27/25 [id number]          Triage Assessment    Airway WDL WDL        Respiratory WDL    Respiratory WDL WDL        Skin Circulation/Temperature WDL    Skin Circulation/Temperature WDL WDL        Cardiac WDL    Cardiac WDL X;all;chest pain        Chest Pain Assessment    Chest Pain Location anterior chest, left     Character pressure     Associated Signs/Symptoms anxiety;dyspnea        Peripheral/Neurovascular WDL    Peripheral Neurovascular WDL WDL        Cognitive/Neuro/Behavioral WDL    Cognitive/Neuro/Behavioral WDL WDL

## 2025-03-01 LAB
ATRIAL RATE - MUSE: 36 BPM
ATRIAL RATE - MUSE: 70 BPM
ATRIAL RATE - MUSE: 82 BPM
DIASTOLIC BLOOD PRESSURE - MUSE: 75 MMHG
DIASTOLIC BLOOD PRESSURE - MUSE: 80 MMHG
DIASTOLIC BLOOD PRESSURE - MUSE: 83 MMHG
INTERPRETATION ECG - MUSE: NORMAL
P AXIS - MUSE: -3 DEGREES
P AXIS - MUSE: 82 DEGREES
P AXIS - MUSE: NORMAL DEGREES
PR INTERVAL - MUSE: 224 MS
PR INTERVAL - MUSE: 238 MS
PR INTERVAL - MUSE: NORMAL MS
QRS DURATION - MUSE: 160 MS
QRS DURATION - MUSE: 72 MS
QRS DURATION - MUSE: 76 MS
QT - MUSE: 290 MS
QT - MUSE: 350 MS
QT - MUSE: 386 MS
QTC - MUSE: 408 MS
QTC - MUSE: 416 MS
QTC - MUSE: 477 MS
R AXIS - MUSE: 94 DEGREES
R AXIS - MUSE: 96 DEGREES
R AXIS - MUSE: 97 DEGREES
SYSTOLIC BLOOD PRESSURE - MUSE: 107 MMHG
SYSTOLIC BLOOD PRESSURE - MUSE: 113 MMHG
SYSTOLIC BLOOD PRESSURE - MUSE: 155 MMHG
T AXIS - MUSE: 80 DEGREES
T AXIS - MUSE: 83 DEGREES
T AXIS - MUSE: 96 DEGREES
VENTRICULAR RATE- MUSE: 163 BPM
VENTRICULAR RATE- MUSE: 70 BPM
VENTRICULAR RATE- MUSE: 82 BPM

## 2025-03-03 LAB
ATRIAL RATE - MUSE: 60 BPM
DIASTOLIC BLOOD PRESSURE - MUSE: NORMAL MMHG
INTERPRETATION ECG - MUSE: NORMAL
P AXIS - MUSE: 38 DEGREES
PR INTERVAL - MUSE: 222 MS
QRS DURATION - MUSE: 74 MS
QT - MUSE: 420 MS
QTC - MUSE: 420 MS
R AXIS - MUSE: 93 DEGREES
SYSTOLIC BLOOD PRESSURE - MUSE: NORMAL MMHG
T AXIS - MUSE: 67 DEGREES
VENTRICULAR RATE- MUSE: 60 BPM

## 2025-03-05 ENCOUNTER — PATIENT OUTREACH (OUTPATIENT)
Dept: NURSING | Facility: CLINIC | Age: 50
End: 2025-03-05
Payer: COMMERCIAL

## 2025-03-05 DIAGNOSIS — Z71.9 COUNSELING, UNSPECIFIED: Primary | ICD-10-CM

## 2025-03-05 SDOH — HEALTH STABILITY: PHYSICAL HEALTH
ON AVERAGE, HOW MANY DAYS PER WEEK DO YOU ENGAGE IN MODERATE TO STRENUOUS EXERCISE (LIKE A BRISK WALK)?: PATIENT DECLINED

## 2025-03-05 SDOH — HEALTH STABILITY: PHYSICAL HEALTH: ON AVERAGE, HOW MANY MINUTES DO YOU ENGAGE IN EXERCISE AT THIS LEVEL?: PATIENT DECLINED

## 2025-03-05 ASSESSMENT — SOCIAL DETERMINANTS OF HEALTH (SDOH)
ARE YOU MARRIED, WIDOWED, DIVORCED, SEPARATED, NEVER MARRIED, OR LIVING WITH A PARTNER?: PATIENT DECLINED
DO YOU BELONG TO ANY CLUBS OR ORGANIZATIONS SUCH AS CHURCH GROUPS UNIONS, FRATERNAL OR ATHLETIC GROUPS, OR SCHOOL GROUPS?: PATIENT DECLINED
HOW OFTEN DO YOU GET TOGETHER WITH FRIENDS OR RELATIVES?: MORE THAN THREE TIMES A WEEK
IN A TYPICAL WEEK, HOW MANY TIMES DO YOU TALK ON THE PHONE WITH FAMILY, FRIENDS, OR NEIGHBORS?: MORE THAN THREE TIMES A WEEK
HOW OFTEN DO YOU ATTENT MEETINGS OF THE CLUB OR ORGANIZATION YOU BELONG TO?: PATIENT DECLINED
HOW OFTEN DO YOU ATTEND CHURCH OR RELIGIOUS SERVICES?: PATIENT DECLINED

## 2025-03-05 ASSESSMENT — ACTIVITIES OF DAILY LIVING (ADL)
DEPENDENT_IADLS:: CLEANING;COOKING;LAUNDRY;SHOPPING;MEAL PREPARATION;MEDICATION MANAGEMENT;MONEY MANAGEMENT;TRANSPORTATION

## 2025-03-05 NOTE — PROGRESS NOTES
Clinic Care Coordination Contact  Clinic Care Coordination Contact  OUTREACH    Referral Information:  initial phone assessment     Referral Source: PCP    Primary Diagnosis: Psychosocial    Chief Complaint   Patient presents with    Clinic Care Coordination - Initial             Universal Utilization: Patient has had several ED visits related to chest or abdominal pain   Clinic Utilization  Difficulty keeping appointments:: No (improved)  Compliance Concerns: Yes (forgets to take meds)  No-Show Concerns: No  No PCP office visit in Past Year: No  Utilization      No Show Count (past year)  1             ED Visits  19             Hospital Admissions  1                    Current as of: 3/5/2025 11:11 AM                Clinical Concerns: Patient has a complex medical status.  She has multiple appts and referrals:oncology appt 3/26, pulmonary appt 3/27, pain clinic, GI, IM, ARMHS discussed, [facility] online referral made; [facility] referral for [facility]; audiology.  Patient reported her priorities are pulmonary and cardiac appts/needs.  Patient has had many ED visits related to chest or abdominal pain.  She reported having a cardiology MRI 3/27 and that she is wearing a heart monitor currently.  Patient reported having a pulmonary appt and breathing test on 3/26 and 3/27/2025 for discussion of COPD, emphysema and pulmonary nodules.  Patient recently obtained MA after 3 years and has SNAP.  Patient reported PCP recently completed disability parking permit paperwork, this was submitted.  She reported her significant other will transport and is very supportive.      Patient has pain clinic referral for chronic pain syndrome.  She also  has referrals for IM and GI.  Patient reported that [facility] is currently not taking any new IM referrals.  [place] cannot return to [place] [facility] until she [place]1000 she owes.  Patient reports her current focus is for her cardiac and pulmonary needs at this time.  She will address other health  needs once these are addressed.      Patient discussed need for a PCA.  She report she cannot walk more than 200 feet or becomes very exhausted and SOB.  Patient reported she has very poor balance, her arms and legs are very weak.  She reported she does not eat much.  She reported she has a fear of drowning, she does not bathe but twice to three times per month.  This is very traumatic for her. SW discussed the process for a [place] and [place].  Will send referral form link in [employer] in hopes this will expedite the process for an assessment.      Patient reports a change in her hearing, has received audiology referral.      Patient reports her short term memory recall is very poor and she forgets to take meds, She states when younger, she fell and obtained a TBI.      Patient inquiring on a wheelchair.  SW informed that a face to face or video visit is needed a referral for a specialized w/c assessment.  Patient stated plan to follow up with PCP.      Patient reports having been denied for S.S.I. x5.  She reports plan to  apply again, I working with her therapist on paperwork needs.  She is accepting of a consult with a .      Current Medical Concerns:   Patient Active Problem List   Diagnosis    Chronic abdominal pain    Vitamin D deficiency    At risk for osteoporosis    Social phobia: with panic attacks    Moderate recurrent major depression: per psychiatry consult    Prescription Opioid and benzodiazepine abuse    Chondromalacia of patella    ADD (attention deficit disorder)    Acquired absence of both cervix and uterus    Borderline intellectual functioning    Cannabis abuse    Gastroesophageal reflux disease    Outbursts of anger    Panic attack    Other irritable bowel syndrome    Wheezing    Hypokalemia    SOB (shortness of breath)    Acute respiratory failure with hypoxia (H)    Anxiety, generalized    Posttraumatic stress disorder    Bipolar 2 disorder (H)    Nicotine  "use disorder    Dysthymic disorder    History of recurrent UTIs    Migraine headache    Restless legs syndrome    Traumatic brain injury (H)    Asthma exacerbation    Asthma    Chronic pain syndrome    Abdominal pain, epigastric    Medically complex patient    Nausea    Pulmonary emphysema, unspecified emphysema type (H)      Current Behavioral Concerns: Patient reported having \"bad\" anxiety with panic attacks and agoraphobia.  She has history of chronic pain and narcotic/benzo abuse, psychiatry recommended by PCP.  Patient has psychiatry telehealth assessment with [employer] [facility] [employer] on 3/19/2025.  Patient reported she uses marijuana daily and finds helpful for anxiety. Patient reported having weekly therapy with [facility].  Her therapist is Gena Castellano.  Patient inquired on Critical access hospital referral.  SW made online Critical access hospital referral to [facility].  Wants to get autism, ADHD, Bipolar testing done, working with therapist to coordinate assessment.      Education Provided to patient: Discussed and made online [facility] referral.  SW discussed and made referral for FRW for [facility] application for outstanding [place] balance.      Pain  Pain (GOAL):: Yes  Type: Chronic (>3mo)  Location of chronic pain:: L upper arm pain, abdominal/chest pain  Radiating: Yes (Have done colonoscopy many times and have never found anything. Worsened with eating)  Location pain radiates to: all over abdomen, often in lower back  Progression: Constant  Description of pain: Aching, Throbbing  Chronic pain severity:: 9  Limitation of routine activities due to chronic pain:: Yes  Description: Unable to perform most daily activities (chores, hobbies, social activities, driving)  Alleviating Factors: Rest, Pain Medication, Other (marijuana)  Aggravating Factors: Activity, Other (eating)  Health Maintenance Reviewed: Due/Overdue   Health Maintenance Due   Topic Date Due    SPIROMETRY  Never done    ADVANCE CARE PLANNING  Never done    COPD ACTION PLAN  Never done "    MAMMO SCREENING  Never done    YEARLY PREVENTIVE VISIT  Never done    HEPATITIS C SCREENING  Never done    HEPATITIS B IMMUNIZATION (1 of 3 - 19+ 3-dose series) Never done    PAP  Never done    Pneumococcal Vaccine: Pediatrics (0 to 5 Years) and At-Risk Patients (6 to 49 Years) (2 of 2 - PCV) 01/14/2015    LIPID  Never done    COLORECTAL CANCER SCREENING  07/10/2024    INFLUENZA VACCINE (1) 09/01/2024    COVID-19 Vaccine (1 - 2024-25 season) Never done      Mammogram discussed, she will pursue but not the current focus.  Will address at a later time as she has many other specialty appts to address after pulmonary and cardiac    Clinical Pathway: None    Medication Management:  Medication review status: Medications reviewed and no changes reported per patient.        Patient forgets to take medications, family and significant other remind/oversee      Functional Status:  Dependent ADLs:: Eating, Grooming, Wheelchair-with assist  Dependent IADLs:: Cleaning, Cooking, Laundry, Shopping, Meal Preparation, Medication Management, Money Management, Transportation  Bed or wheelchair confined:: No  Mobility Status: Dependent/Assisted by Another  Fallen 2 or more times in the past year?: (!) Yes (poor balance, weakness in arms and legs)  Any fall with injury in the past year?: No    Living Situation:  Current living arrangement:: I live in a private home with family  Type of residence:: Private home - stairs    Lifestyle & Psychosocial Needs:    Social Drivers of Health     Food Insecurity: Low Risk  (2/25/2025)    Food Insecurity     Within the past 12 months, did you worry that your food would run out before you got money to buy more?: No     Within the past 12 months, did the food you bought just not last and you didn t have money to get more?: No   Depression: At risk (1/24/2025)    PHQ-2     PHQ-2 Score: 3   Housing Stability: Low Risk  (2/25/2025)    Housing Stability     Do you have housing? : Yes     Are you worried  about losing your housing?: No   Tobacco Use: Medium Risk (3/5/2025)    Patient History     Smoking Tobacco Use: Former     Smokeless Tobacco Use: Never     Passive Exposure: Past   Financial Resource Strain: Low Risk  (2/25/2025)    Financial Resource Strain     Within the past 12 months, have you or your family members you live with been unable to get utilities (heat, electricity) when it was really needed?: No   Alcohol Use: Not At Risk (2/25/2025)    AUDIT-C     Frequency of Alcohol Consumption: Never     Average Number of Drinks: Patient does not drink     Frequency of Binge Drinking: Never   Transportation Needs: Low Risk  (2/25/2025)    Transportation Needs     Within the past 12 months, has lack of transportation kept you from medical appointments, getting your medicines, non-medical meetings or appointments, work, or from getting things that you need?: No   Physical Activity: Patient Declined (3/5/2025)    Exercise Vital Sign     Days of Exercise per Week: Patient declined     Minutes of Exercise per Session: Patient declined   Recent Concern: Physical Activity - Insufficiently Active (2/25/2025)    Exercise Vital Sign     Days of Exercise per Week: 2 days     Minutes of Exercise per Session: 10 min   Interpersonal Safety: Low Risk  (3/5/2025)    Interpersonal Safety     Do you feel physically and emotionally safe where you currently live?: Yes     Within the past 12 months, have you been hit, slapped, kicked or otherwise physically hurt by someone?: No     Within the past 12 months, have you been humiliated or emotionally abused in other ways by your partner or ex-partner?: No   Stress: Stress Concern Present (2/25/2025)    [place] [employer]ational [employer] - Occupational Stress Questionnaire     Feeling of Stress : Very much   Social Connections: Unknown (3/5/2025)    Social Connection and Isolation Panel [NHANES]     Frequency of Communication with Friends and Family: More than three times a week      Frequency of Social Gatherings with Friends and Family: More than three times a week     Attends Sikh Services: Patient declined     Active Member of Clubs or Organizations: Patient declined     Attends Club or Organization Meetings: Patient declined     Marital Status: Patient declined   Recent Concern: Social Connections - Socially Isolated (2/25/2025)    Social Connection and Isolation Panel [NHANES]     Frequency of Communication with Friends and Family: More than three times a week     Frequency of Social Gatherings with Friends and Family: Once a week     Attends Sikh Services: Never     Active Member of Clubs or Organizations: No     Attends Club or Organization Meetings: Never     Marital Status:    Health Literacy: Adequate Health Literacy (3/5/2025)     Health Literacy     Frequency of need for help with medical instructions: Rarely     Diet:: Regular (not eat much)  Inadequate nutrition (GOAL):: Yes  Tube Feeding: No  Inadequate activity/exercise (GOAL):: No  Significant changes in sleep pattern (GOAL): Yes  Transportation means:: Regular car, Family     Sikh or spiritual beliefs that impact treatment:: No  Mental health DX:: Yes  Mental health DX how managed:: Outpatient Counseling  Mental health management concern (GOAL):: Yes  Chemical Dependency Status: Current Concern (marijuana daily)  Chemical Dependency Management: Other (see comment) (see above)  Informal Support system:: Family, Children, Other (EX )      Resources and Interventions:  Current Resources:      Community Resources: Other (see comment), OP Mental Health (SNAP, oncology, cardiology; Vin therapy weekly, [facility] psychiatry appt on 3/19/2025)  Supplies Currently Used at Home: None  Equipment Currently Used at Home: walker, standard  Employment Status: unemployed       Advance Care Plan/Directive  Advanced Care Plans/Directives on file:: No  Discussed with patient/caregiver:: Declined Further  Information    Referrals Placed: Other  (oncology appt 3/26, pulmonary appt 3/27, pain clinic, GI, IM, ARMHS discussed, [facility] online referral made; FRW referral for [facility]; audiology)     Care Plan:  Care Plan: Mental Health       Problem: Mental Health Symptoms Need Improvement       Goal: Improve management of mental health symptoms and establish with mental health/psychosocial supports       Start Date: 3/5/2025 Expected End Date: 8/8/2025    This Visit's Progress: 10%    Priority: High    Note:     Barriers: anxiety, agoraphobia, complex medical status requiring multiple appts   Strengths: therapy weekly, supportive family and significant other, psychiatry appt scheduled for 3/19/2025. Recent [facility] referral   Patient expressed understanding of goal: yes  Action steps to achieve this goal:  1. I will attend all mental health appts   2. I will take medications as prescribed   3. I will reach out to others in times of increased stress                             Care Plan: Medication Regimen       Problem: Medication Adherence       Goal: Consistently take Medications as Prescribed       Start Date: 3/5/2025 Expected End Date: 8/8/2025    This Visit's Progress: 10%    Priority: High    Note:     Barriers: forget to take medications, poor short  term memory recall   Strengths: obtains reminders   Patient expressed understanding of goal: yes  Action steps to achieve this goal:  1. I will take as prescribed  2. I will request reminders as needed   3. I will create a reminder system as needed                             Care Plan: Pulmonary and cardiac appts       Problem: Need to be addressed       Goal: I will attend scheduled pulmonary and cardiac appts and follow recommendations       Start Date: 3/5/2025 Expected End Date: 8/1/2025    This Visit's Progress: 10%    Priority: Medium    Note:     Barriers: cardiac and pulmonary concerns  Strengths: has scheduled appts  Patient expressed understanding of goal:  yes  Action steps to achieve this goal:  1. I will plan to attend scheduled cardiac and pulmonary appts   2. My significant other will transport and ensure I attend   3. I will follow recommendations from these appts                              Care Plan: S.S.I.       Problem: Denied previously       Goal: I plan to appyly again for S.S.I       Start Date: 3/5/2025 Expected End Date: 9/4/2025    This Visit's Progress: 10%    Priority: Medium    Note:     Barriers: denied previously  Strengths: therapist assisting with process, recent [facility] referral   Patient expressed understanding of goal: yes  Action steps to achieve this goal:  1. I will work on necessary paperwork for application   2. I will request assistance as needed from others for application process   3. I will consider a law consult for additional support                               Patient/Caregiver understanding: Patient will attend cardiac and pulmonary appts and follow those recommendations.  Patient will take medications as prescribed.  Patient will continued process for S.S.I.  Patient will continue to attend all mental health appts    Outreach Frequency: monthly, more frequently as needed  Future Appointments                In 3 weeks MPBE PFT  1 [facility] [place], [place]    In 3 weeks Cabrera Josue MD [facility] [place], Beam    In 3 weeks PIETRO DIXON RN; ILEANA MR 2 [facility]            Plan:   1) Patient will attend cardiac and pulmonary appts and follow those recommendations  2) Patient will take medications as prescribed  3) Patient will continued process for S.S.I.  4) Patient will continue to attend all mental health appts  5) SW will send resources and care plan, introduction letter to patient   6) CHW will call patient in 1 month, Care Coordinator will review progress to goals and plan of care in 6 weeks.     SW will update PCP    Renetta Mayers, SOOW, MSW   M  [facility]  Care Coordination  [facility] [place] [facility]  [phone]  3/6/2025 7:31 AM

## 2025-03-05 NOTE — LETTER
[facility] CARE COORDINATION  [address] [place] [address]     March 6, 2025    Meli Rodriguez  [address]  [place] [address]      Dear Meli,    I am a clinic care coordinator who works with Sharmaine Christensen PA-C with the [facility]. I wanted to thank you for spending the time to talk with me.  Below is a description of clinic care coordination and how I can further assist you.       The clinic care coordination team is made up of a registered nurse, , financial resource worker and community health worker who understand the health care system. The goal of clinic care coordination is to help you manage your health and improve access to the health care system. Our team works alongside your provider to assist you in determining your health and social needs. We can help you obtain health care and community resources, providing you with necessary information and education. We can work with you through any barriers and develop a care plan that helps coordinate and strengthen the communication between you and your care team.  Our services are voluntary and are offered without charge to you personally.    Please feel free to contact me with any questions or concerns regarding care coordination and what we can offer.      We are focused on providing you with the highest-quality healthcare experience possible.    Resources discussed today:    A referral was made to out Financial Resource Worker to assist with [facility] application for your outstanding balance at [place].      An online referral was completed for [facility] for an [facility] worker for additional assistance.  I received a reply this morning thanking me for the referral.     This is the link for the online referral form for a [place] Cho[place] assessment, the first step for a PCA and evaluation of your needs.     https://[place]DocumentCenter/View/04050/EyXOLIJAC-Aixomzmkel-Rzkimrqo-Form       ALWAYS MAKE A COPY before sending anything to the [facility]         Disability :     Disability Specialists    [email or web address]-[email or web address]    Or [place] Disability in [place]     [email or web address]        Sincerely,     SAM Hu, MSW Clinic   [facility]  Care Coordination  [place] [facility]   [email or web address]  Office: [phone]  Employed by [facility]      Enclosed: I have enclosed a copy of a 24 Hour Access Plan. This has helpful phone numbers for you to call when needed. Please keep this in an easy to access place to use as needed.

## 2025-03-07 ENCOUNTER — TELEPHONE (OUTPATIENT)
Dept: FAMILY MEDICINE | Facility: CLINIC | Age: 50
End: 2025-03-07
Payer: COMMERCIAL

## 2025-03-07 NOTE — TELEPHONE ENCOUNTER
Prior Authorization Retail Medication Request    Medication/Dose: Order for ondansetron ODT ---  need prior approval for more than 9 tabs per month -- ginger would like 30 tabs for month supply - thank you   Diagnosis and ICD code (if different than what is on RX):    New/renewal/insurance change PA/secondary ins. PA:  Previously Tried and Failed:    Rationale:    Insurance   Primary:   Insurance ID:      Secondary (if applicable):  Insurance ID:      Pharmacy Information (if different than what is on RX)  Name:    Phone:    Fax:    Clinic Information  Preferred routing pool for dept communication:

## 2025-03-11 NOTE — TELEPHONE ENCOUNTER
Retail Pharmacy Prior Authorization Team   Phone: [phone]    Prior Authorization Approval    Medication: ONDANSETRON 4 MG PO TBDP  Authorization Effective Date: 2/9/2025  Authorization Expiration Date: 9/7/2025    Insurance Company: [employer] Non-Specialty PA's - Phone [phone] Fax [phone]  Which Pharmacy is filling the prescription: [facility] - [id number] [facility]  Pharmacy Notified: YES  Patient Notified: YES (called pharmacy to notify and notified patient via [employer] message)

## 2025-03-12 LAB — CV ZIO PRELIM RESULTS: NORMAL

## 2025-03-20 ENCOUNTER — TELEPHONE (OUTPATIENT)
Dept: FAMILY MEDICINE | Facility: CLINIC | Age: 50
End: 2025-03-20
Payer: COMMERCIAL

## 2025-03-20 NOTE — TELEPHONE ENCOUNTER
Called patient.  She stated that it is county paperwork, that she needs filled out, so she does not have to work.    She stated that PCP has filled out something like this out for her before, but this one is a little bit different of a form.      Shannan SANDOVALN   Triage Nurse RN  [facility]

## 2025-03-20 NOTE — TELEPHONE ENCOUNTER
RN called patient and relayed providers message. Patient verbalized understanding.       Jennifer Bosch RN on 3/20/2025 at 4:45 PM

## 2025-03-20 NOTE — TELEPHONE ENCOUNTER
"PLEASE CALL PATIENT:        Patient on schedule tomorrow for \"important paperwork\". Please clarify what paperwork this is reference to.  This will help me determine if visit is needed.     Sharmaine Christensen PA-C  "

## 2025-03-21 PROBLEM — F41.9 ANXIETY: Status: ACTIVE | Noted: 2025-03-21

## 2025-03-26 ENCOUNTER — PRE VISIT (OUTPATIENT)
Dept: PULMONOLOGY | Facility: CLINIC | Age: 50
End: 2025-03-26

## 2025-03-26 ENCOUNTER — ONCOLOGY VISIT (OUTPATIENT)
Dept: PULMONOLOGY | Facility: CLINIC | Age: 50
End: 2025-03-26
Attending: EMERGENCY MEDICINE
Payer: COMMERCIAL

## 2025-03-26 VITALS
SYSTOLIC BLOOD PRESSURE: 124 MMHG | HEART RATE: 72 BPM | WEIGHT: 127 LBS | BODY MASS INDEX: 21.16 KG/M2 | OXYGEN SATURATION: 97 % | DIASTOLIC BLOOD PRESSURE: 64 MMHG | HEIGHT: 65 IN

## 2025-03-26 DIAGNOSIS — R07.9 CHEST PAIN, UNSPECIFIED TYPE: ICD-10-CM

## 2025-03-26 DIAGNOSIS — R91.1 LUNG NODULE: Primary | ICD-10-CM

## 2025-03-26 PROCEDURE — 99204 OFFICE O/P NEW MOD 45 MIN: CPT | Performed by: INTERNAL MEDICINE

## 2025-03-26 NOTE — PROGRESS NOTES
LUNG NODULE & INTERVENTIONAL PULMONARY CLINIC  [facility]     Meli Rodriguez MRN# [id number]   Age: 49 year old YOB: 1975       Requesting Physician: Lexie Root DO  [address] [place] [address]       Assessment and Plan:    1. Established multiple pulmonary lung nodule(s). Given the characteristics on current/previous imaging and risk factors; I would classify this to be Low (<6%) risk for cancer. No further follow up necessary.    2. Chest pain. No typical pulmonary factors. She is having an SVT evaluation with cardiology. Nothing anatomic concerning on CT.  She will need to follow up with her primary and she has been referred to a chronic pain clinic.           History:     Meli Rodriguez is a 49 year old female with sig h/o for tobacco use, anxiety, chest pain, lung nodule who is here for evaluation/followup of same.    L shooting chest pain that goes from the front into the back.    Breaths in and out without a problem.    Significant pain with her cough.  Brings up quite a bit of phlegm. Sometimes it takes 2-3 minutes to bring something up.    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    Bipolar 2 disorder (H)     Blood in feces 08/13/2012    Calculi, ureter 05/22/2012    Cannabinoid hyperemesis syndrome 09/12/2023    possible    Colitis     Contusion of duodenum 06/26/2012    De Quervain's disease (tenosynovitis)  12/01/2021    Depressive disorder     Drug-seeking behavior     Ectopic pregnancy, tubal 03/28/2009    Gastroenteritis 09/12/2023    GERD (gastroesophageal reflux disease)     Head injury     Hematemesis with nausea 07/10/2023    Hematochezia 06/26/2012    Irritable bowel syndrome (IBS)     Lower abdominal pain 07/10/2023    Lower urinary tract infection 02/18/2011    Migraine     Neuralgia neuritis, sciatic nerve 02/18/2012    PTSD (post-traumatic stress disorder)     Retention of urine 03/28/2009           Past Surgical History:      Past Surgical History:   Procedure Laterality Date    APPENDECTOMY  08/16/2007    CARPAL TUNNEL RELEASE RT/LT Left 2009    CARPAL TUNNEL RELEASE RT/LT Right 2003    CHOLECYSTECTOMY  2004    CYSTOSCOPY  2012    ENT SURGERY  1981    Multiple PE tubes from 1977 to 1981.    GYN SURGERY  1999    Left salpingostomy in 1999. Right salpingectomy in 12/98.    HYSTERECTOMY, PAP NO LONGER INDICATED  2001    with ophorectomy bilateral.    ORTHOPEDIC SURGERY  2002    Trigger finger release in 2002.    SUPRAPUBIC CATHETER INSERTION  2006    TONSILLECTOMY, ADENOIDECTOMY, MYRINGOTOMY, INSERT TUBE BILATERAL, COMBINED      TUBAL LIGATION  2000    URETHRA SURGERY      temporary stent          Social History:     Social History     Tobacco Use    Smoking status: Former     Types: Cigarettes     Passive exposure: Past    Smokeless tobacco: Never    Tobacco comments:     Less than a pack a day.   Substance Use Topics    Alcohol use: No          Family History:     Family History   Problem Relation Age of Onset    Unknown/Adopted Father     Chronic Obstructive Pulmonary Disease Mother     Aneurysm Maternal Grandmother     Cerebrovascular Disease Maternal Grandfather     Breast Cancer No family hx of     Cancer No family hx of     Diabetes No family hx of            Allergies:      Allergies   Allergen Reactions    Butalbital-Aspirin-Caffeine      Increases headaches    Demerol Itching    Droperidol Other  "(See Comments)     twitching      Hydroxyzine Unknown     Refuses IM numerous times stating that it does not work    Ketorolac Tromethamine Itching    Methocarbamol Hives    Metoclopramide Other (See Comments)     Cause acute dystonic reaction; Tolerated 3/3/24    Morpholine Salicylate     Nicotine      Intolerant to    Nsaids Other (See Comments)     Gastric ulcer    Pramipexole      Ill feel    Rizatriptan      Ill feel    Ropinirole      Increases RLS    Trazodone Other (See Comments)     Out of body sense     Bad dreams    Triptans Itching     vomits    Adhesive Tape Rash    Compazine [Prochlorperazine] Rash     Arm red and rash    Diphenhydramine Anxiety     Jittery and jumpy          Medications:     Current Outpatient Medications   Medication Sig Dispense Refill    albuterol (PROAIR HFA/PROVENTIL HFA/VENTOLIN HFA) 108 (90 Base) MCG/ACT inhaler Inhale 2 puffs into the lungs every 6 hours as needed for shortness of breath, wheezing or cough 18 g 1    gabapentin (NEURONTIN) 300 MG capsule Take 1 capsule (300 mg) by mouth 3 times daily. 90 capsule 0    ondansetron (ZOFRAN ODT) 4 MG ODT tab Take 1 tablet (4 mg) by mouth every 8 hours as needed for nausea. 30 tablet 2    fluticasone-salmeterol (ADVAIR) 100-50 MCG/ACT inhaler Inhale 1 puff into the lungs every 12 hours. 1 each 0     No current facility-administered medications for this visit.          Review of Systems:     See HPI         Physical Exam:   /64 (BP Location: Left arm, Patient Position: Sitting, Cuff Size: Adult Small)   Pulse 72   Ht 1.638 m (5' 4.5\")   Wt 57.6 kg (127 lb)   SpO2 97%   BMI 21.46 kg/m      Constitutional - looks well, in no apparent distress  Eyes - no redness or discharge  Respiratory -breathing appears comfortable. No wheeze or rhonchi.   Cardiac -- Normal rate, rhythm.   Skin - No appreciable discoloration or lesions (very limited exam)  Neurological - No apparent tremors. Speech fluent and articlate  Psychiatric - no " signs of delirium or anxiety          Current Laboratory Data:   All laboratory and imaging data reviewed.    Results for orders placed or performed in visit on 03/26/25 (from the past 24 hours)   General PFT Lab (Please always keep checked)   Result Value Ref Range    FVC-Pred 3.27 L    FVC-Pre 2.60 L    FVC-%Pred-Pre 79 %    FEV1-Pre 1.53 L    FEV1-%Pred-Pre 57 %    FEV1FVC-Pred 82 %    FEV1FVC-Pre 59 %    FEFMax-Pred 6.86 L/sec    FEFMax-Pre 3.45 L/sec    FEFMax-%Pred-Pre 50 %    FEF2575-Pred 2.68 L/sec    FEF2575-Pre 0.80 L/sec    CGJ9865-%Pred-Pre 30 %    ExpTime-Pre 7.22 sec    FIFMax-Pre 3.24 L/sec    VC-Pred 3.45 L    VC-Pre 2.74 L    VC-%Pred-Pre 79 %    IC-Pred 2.39 L    IC-Pre 1.98 L    IC-%Pred-Pre 82 %    ERV-Pred 1.20 L    ERV-Pre 0.75 L    ERV-%Pred-Pre 62 %    FEV1FEV6-Pred 83 %    FEV1FEV6-Pre 60 %    DLCOunc-Pred 21.01 ml/min/mmHg    DLCOunc-Pre 17.23 ml/min/mmHg    DLCOunc-%Pred-Pre 82 %    DLCOcor-Pre 17.56 ml/min/mmHg    DLCOcor-%Pred-Pre 83 %    VA-Pre 4.30 L    VA-%Pred-Pre 87 %    FEV1SVC-Pred 77 %    FEV1SVC-Pre 56 %

## 2025-03-27 LAB
DLCOCOR-%PRED-PRE: 83 %
DLCOCOR-PRE: 17.56 ML/MIN/MMHG
DLCOUNC-%PRED-PRE: 82 %
DLCOUNC-PRE: 17.23 ML/MIN/MMHG
DLCOUNC-PRED: 21.01 ML/MIN/MMHG
ERV-%PRED-PRE: 62 %
ERV-PRE: 0.75 L
ERV-PRED: 1.2 L
EXPTIME-PRE: 7.22 SEC
FEF2575-%PRED-PRE: 30 %
FEF2575-PRE: 0.8 L/SEC
FEF2575-PRED: 2.68 L/SEC
FEFMAX-%PRED-PRE: 50 %
FEFMAX-PRE: 3.45 L/SEC
FEFMAX-PRED: 6.86 L/SEC
FEV1-%PRED-PRE: 57 %
FEV1-PRE: 1.53 L
FEV1FEV6-PRE: 60 %
FEV1FEV6-PRED: 83 %
FEV1FVC-PRE: 59 %
FEV1FVC-PRED: 82 %
FEV1SVC-PRE: 56 %
FEV1SVC-PRED: 77 %
FIFMAX-PRE: 3.24 L/SEC
FVC-%PRED-PRE: 79 %
FVC-PRE: 2.6 L
FVC-PRED: 3.27 L
IC-%PRED-PRE: 82 %
IC-PRE: 1.98 L
IC-PRED: 2.39 L
VA-%PRED-PRE: 87 %
VA-PRE: 4.3 L
VC-%PRED-PRE: 79 %
VC-PRE: 2.74 L
VC-PRED: 3.45 L

## 2025-03-31 ENCOUNTER — TELEPHONE (OUTPATIENT)
Dept: FAMILY MEDICINE | Facility: CLINIC | Age: 50
End: 2025-03-31
Payer: COMMERCIAL

## 2025-03-31 NOTE — TELEPHONE ENCOUNTER
Damien from [facility] calling to confirm provider in agreement with  Medical Opinion form completed 3/21/25 as there is two different colored inks on form. Titi requesting call back from provider or something faxed to her at fax # [phone] stating Provider completed form and is in agreement with all the answers.(Form scanned under media tab)

## 2025-04-01 NOTE — TELEPHONE ENCOUNTER
it is hard to say yes or no if I can't see what exact form she is looking at. I did a form recently for food stamps etc saying patient can't work due to her complex medical issues.     Sharmaine

## 2025-04-01 NOTE — TELEPHONE ENCOUNTER
Yes the form was completed at appointment on 3/21/25, form that Damien at [facility] received has two different colored ink on it and she states she cannot  accept it without a letter from provider stating she completed form and is in agreement with all the answers on the form. Or provider can call Damien at [phone].

## 2025-04-02 ENCOUNTER — PATIENT OUTREACH (OUTPATIENT)
Dept: CARE COORDINATION | Facility: CLINIC | Age: 50
End: 2025-04-02
Payer: COMMERCIAL

## 2025-04-02 NOTE — PROGRESS NOTES
Clinic Care Coordination Contact  Community Health Worker Follow Up    Care Gaps:     Health Maintenance Due   Topic Date Due    ADVANCE CARE PLANNING  Never done    COPD ACTION PLAN  Never done    MAMMO SCREENING  Never done    YEARLY PREVENTIVE VISIT  Never done    HEPATITIS C SCREENING  Never done    HEPATITIS B IMMUNIZATION (1 of 3 - 19+ 3-dose series) Never done    Pneumococcal Vaccine: Pediatrics (0 to 5 Years) and At-Risk Patients (6 to 49 Years) (2 of 2 - PCV) 01/14/2015    LIPID  Never done    COLORECTAL CANCER SCREENING  07/10/2024    INFLUENZA VACCINE (1) 09/01/2024    COVID-19 Vaccine (1 - 2024-25 season) Never done       Patient will schedule after specialty appointments slow down.     Care Plan:   Care Plan: Mental Health       Problem: Mental Health Symptoms Need Improvement       Goal: Improve management of mental health symptoms and establish with mental health/psychosocial supports       Start Date: 3/5/2025 Expected End Date: 8/8/2025    This Visit's Progress: 20% Recent Progress: 10%    Priority: High    Note:     Barriers: anxiety, agoraphobia, complex medical status requiring multiple appts   Strengths: therapy weekly, supportive family and significant other, psychiatry appt scheduled for 3/19/2025. Recent [facility] referral   Patient expressed understanding of goal: yes  Action steps to achieve this goal:  1. I will attend all mental health appts   2. I will continue to take medications as prescribed   3. I will reach out to others in times of increased stress   4. I will work with [employer] to get an [facility] worker.                             Care Plan: Medication Regimen       Problem: Medication Adherence       Goal: Consistently take Medications as Prescribed       Start Date: 3/5/2025 Expected End Date: 8/8/2025    This Visit's Progress: 20% Recent Progress: 10%    Priority: High    Note:     Barriers: forget to take medications, poor short  term memory recall   Strengths: obtains reminders    Patient expressed understanding of goal: yes  Action steps to achieve this goal:  1. I will take as prescribed  2. I will request reminders as needed   3. I will create a reminder system as needed                             Care Plan: Pulmonary and cardiac appts       Problem: Need to be addressed       Goal: I will attend scheduled pulmonary and cardiac appts and follow recommendations       Start Date: 3/5/2025 Expected End Date: 8/1/2025    This Visit's Progress: 20% Recent Progress: 10%    Priority: Medium    Note:     Barriers: cardiac and pulmonary concerns  Strengths: has scheduled appts  Patient expressed understanding of goal: yes  Action steps to achieve this goal:  1. I will continue to attend scheduled cardiac and pulmonary appts   2. My significant other will transport and ensure I attend   3. I will follow recommendations from these appts                              Care Plan: S.S.I.       Problem: Denied previously       Goal: I plan to appyly again for S.S.I       Start Date: 3/5/2025 Expected End Date: 9/4/2025    This Visit's Progress: 20% Recent Progress: 10%    Priority: Medium    Note:     Barriers: denied previously  Strengths: therapist assisting with process, recent [facility] referral   Patient expressed understanding of goal: yes  Action steps to achieve this goal:  1. I will work on necessary paperwork for application   2. I will request assistance as needed from others for application process   3. I will consider a law consult for additional support     Waiting on the [facility] to approve services and then will pursue applying for disability again.                                Intervention and Education during outreach:   Patient states that she has not applied for disability yet due to waiting on the [facility] to approve her for services. She will be more likely to get approved if the [facility] has already done an assessment and determined that she needs assistance.   She states that with her  TBI that it is difficult for her to manage appointments and paperwork. Her ex  has been helping her with navigating this.   She has been keeping her mental health appointments with [facility] and they have discussed having her assessed for an Count includes the [facility] worker. They will start the process at her appointment next week.   She has follow up appointments from her specialist appointments to complete. She is scheduled with the pain clinic and to have a MRI of her heart/lungs.     [facility]W Plan: [facility] will continue to support patient with goals through routine scheduled outreach.     Next outreach due:  5/7/25

## 2025-04-03 ENCOUNTER — MYC REFILL (OUTPATIENT)
Dept: FAMILY MEDICINE | Facility: CLINIC | Age: 50
End: 2025-04-03
Payer: COMMERCIAL

## 2025-04-03 DIAGNOSIS — G89.4 CHRONIC PAIN SYNDROME: ICD-10-CM

## 2025-04-03 DIAGNOSIS — R11.0 NAUSEA: ICD-10-CM

## 2025-04-03 RX ORDER — ONDANSETRON 4 MG/1
4 TABLET, ORALLY DISINTEGRATING ORAL EVERY 8 HOURS PRN
Qty: 30 TABLET | Refills: 2 | OUTPATIENT
Start: 2025-04-03

## 2025-04-03 RX ORDER — GABAPENTIN 300 MG/1
300 CAPSULE ORAL 3 TIMES DAILY
Qty: 90 CAPSULE | Refills: 0 | Status: SHIPPED | OUTPATIENT
Start: 2025-04-03

## 2025-04-03 RX ORDER — ONDANSETRON 4 MG/1
4 TABLET, ORALLY DISINTEGRATING ORAL EVERY 8 HOURS PRN
Qty: 30 TABLET | Refills: 2 | Status: SHIPPED | OUTPATIENT
Start: 2025-04-03

## 2025-04-05 ENCOUNTER — APPOINTMENT (OUTPATIENT)
Dept: CT IMAGING | Facility: CLINIC | Age: 50
End: 2025-04-05
Attending: FAMILY MEDICINE
Payer: COMMERCIAL

## 2025-04-05 ENCOUNTER — HOSPITAL ENCOUNTER (EMERGENCY)
Facility: CLINIC | Age: 50
Discharge: HOME | End: 2025-04-05
Attending: FAMILY MEDICINE | Admitting: FAMILY MEDICINE
Payer: COMMERCIAL

## 2025-04-05 VITALS
SYSTOLIC BLOOD PRESSURE: 134 MMHG | DIASTOLIC BLOOD PRESSURE: 86 MMHG | OXYGEN SATURATION: 96 % | RESPIRATION RATE: 18 BRPM | TEMPERATURE: 98.2 F | HEART RATE: 63 BPM

## 2025-04-05 DIAGNOSIS — R10.30 LOWER ABDOMINAL PAIN: ICD-10-CM

## 2025-04-05 LAB
ALBUMIN SERPL BCG-MCNC: 4.5 G/DL (ref 3.5–5.2)
ALBUMIN UR-MCNC: NEGATIVE MG/DL
ALP SERPL-CCNC: 71 U/L (ref 40–150)
ALT SERPL W P-5'-P-CCNC: 8 U/L (ref 0–50)
ANION GAP SERPL CALCULATED.3IONS-SCNC: 11 MMOL/L (ref 7–15)
APPEARANCE UR: CLEAR
AST SERPL W P-5'-P-CCNC: 18 U/L (ref 0–45)
BACTERIA #/AREA URNS HPF: ABNORMAL /HPF
BASOPHILS # BLD AUTO: 0 10E3/UL (ref 0–0.2)
BASOPHILS NFR BLD AUTO: 1 %
BILIRUB SERPL-MCNC: 0.8 MG/DL
BILIRUB UR QL STRIP: NEGATIVE
BUN SERPL-MCNC: 10.7 MG/DL (ref 6–20)
CALCIUM SERPL-MCNC: 9.1 MG/DL (ref 8.8–10.4)
CHLORIDE SERPL-SCNC: 100 MMOL/L (ref 98–107)
COLOR UR AUTO: ABNORMAL
CREAT SERPL-MCNC: 0.68 MG/DL (ref 0.51–0.95)
EGFRCR SERPLBLD CKD-EPI 2021: >90 ML/MIN/1.73M2
EOSINOPHIL # BLD AUTO: 0.2 10E3/UL (ref 0–0.7)
EOSINOPHIL NFR BLD AUTO: 3 %
ERYTHROCYTE [DISTWIDTH] IN BLOOD BY AUTOMATED COUNT: 13.7 % (ref 10–15)
GLUCOSE SERPL-MCNC: 77 MG/DL (ref 70–99)
GLUCOSE UR STRIP-MCNC: NEGATIVE MG/DL
HCO3 SERPL-SCNC: 24 MMOL/L (ref 22–29)
HCT VFR BLD AUTO: 43 % (ref 35–47)
HGB BLD-MCNC: 14.4 G/DL (ref 11.7–15.7)
HGB UR QL STRIP: NEGATIVE
HOLD SPECIMEN: NORMAL
HOLD SPECIMEN: NORMAL
HYALINE CASTS: 1 /LPF
IMM GRANULOCYTES # BLD: 0 10E3/UL
IMM GRANULOCYTES NFR BLD: 0 %
KETONES UR STRIP-MCNC: NEGATIVE MG/DL
LEUKOCYTE ESTERASE UR QL STRIP: ABNORMAL
LIPASE SERPL-CCNC: 21 U/L (ref 13–60)
LYMPHOCYTES # BLD AUTO: 2.9 10E3/UL (ref 0.8–5.3)
LYMPHOCYTES NFR BLD AUTO: 40 %
MCH RBC QN AUTO: 31.6 PG (ref 26.5–33)
MCHC RBC AUTO-ENTMCNC: 33.5 G/DL (ref 31.5–36.5)
MCV RBC AUTO: 94 FL (ref 78–100)
MONOCYTES # BLD AUTO: 0.6 10E3/UL (ref 0–1.3)
MONOCYTES NFR BLD AUTO: 9 %
MUCOUS THREADS #/AREA URNS LPF: PRESENT /LPF
NEUTROPHILS # BLD AUTO: 3.4 10E3/UL (ref 1.6–8.3)
NEUTROPHILS NFR BLD AUTO: 47 %
NITRATE UR QL: NEGATIVE
NRBC # BLD AUTO: 0 10E3/UL
NRBC BLD AUTO-RTO: 0 /100
PH UR STRIP: 6 [PH] (ref 5–7)
PLATELET # BLD AUTO: 210 10E3/UL (ref 150–450)
POTASSIUM SERPL-SCNC: 3.8 MMOL/L (ref 3.4–5.3)
PROT SERPL-MCNC: 7 G/DL (ref 6.4–8.3)
RBC # BLD AUTO: 4.56 10E6/UL (ref 3.8–5.2)
RBC URINE: 1 /HPF
SODIUM SERPL-SCNC: 135 MMOL/L (ref 135–145)
SP GR UR STRIP: 1.01 (ref 1–1.03)
SQUAMOUS EPITHELIAL: <1 /HPF
UROBILINOGEN UR STRIP-MCNC: NORMAL MG/DL
WBC # BLD AUTO: 7.3 10E3/UL (ref 4–11)
WBC URINE: 1 /HPF

## 2025-04-05 PROCEDURE — 250N000013 HC RX MED GY IP 250 OP 250 PS 637: Performed by: FAMILY MEDICINE

## 2025-04-05 PROCEDURE — 99285 EMERGENCY DEPT VISIT HI MDM: CPT | Mod: 25

## 2025-04-05 PROCEDURE — 81001 URINALYSIS AUTO W/SCOPE: CPT | Performed by: FAMILY MEDICINE

## 2025-04-05 PROCEDURE — 74177 CT ABD & PELVIS W/CONTRAST: CPT

## 2025-04-05 PROCEDURE — 80053 COMPREHEN METABOLIC PANEL: CPT | Performed by: FAMILY MEDICINE

## 2025-04-05 PROCEDURE — 36415 COLL VENOUS BLD VENIPUNCTURE: CPT | Performed by: FAMILY MEDICINE

## 2025-04-05 PROCEDURE — 99284 EMERGENCY DEPT VISIT MOD MDM: CPT | Performed by: FAMILY MEDICINE

## 2025-04-05 PROCEDURE — 250N000011 HC RX IP 250 OP 636: Performed by: FAMILY MEDICINE

## 2025-04-05 PROCEDURE — 250N000009 HC RX 250: Performed by: FAMILY MEDICINE

## 2025-04-05 PROCEDURE — 85025 COMPLETE CBC W/AUTO DIFF WBC: CPT | Performed by: FAMILY MEDICINE

## 2025-04-05 PROCEDURE — 96374 THER/PROPH/DIAG INJ IV PUSH: CPT | Mod: 59

## 2025-04-05 PROCEDURE — 83690 ASSAY OF LIPASE: CPT | Performed by: FAMILY MEDICINE

## 2025-04-05 PROCEDURE — 250N000011 HC RX IP 250 OP 636: Mod: JZ | Performed by: FAMILY MEDICINE

## 2025-04-05 RX ORDER — IOPAMIDOL 755 MG/ML
62 INJECTION, SOLUTION INTRAVASCULAR ONCE
Status: COMPLETED | OUTPATIENT
Start: 2025-04-05 | End: 2025-04-05

## 2025-04-05 RX ORDER — HYDROMORPHONE HYDROCHLORIDE 1 MG/ML
0.5 INJECTION, SOLUTION INTRAMUSCULAR; INTRAVENOUS; SUBCUTANEOUS ONCE
Status: COMPLETED | OUTPATIENT
Start: 2025-04-05 | End: 2025-04-05

## 2025-04-05 RX ORDER — HYDROMORPHONE HYDROCHLORIDE 2 MG/1
4 TABLET ORAL ONCE
Status: COMPLETED | OUTPATIENT
Start: 2025-04-05 | End: 2025-04-05

## 2025-04-05 RX ADMIN — IOPAMIDOL 62 ML: 755 INJECTION, SOLUTION INTRAVENOUS at 11:53

## 2025-04-05 RX ADMIN — HYDROMORPHONE HYDROCHLORIDE 4 MG: 2 TABLET ORAL at 13:22

## 2025-04-05 RX ADMIN — HYDROMORPHONE HYDROCHLORIDE 0.5 MG: 1 INJECTION, SOLUTION INTRAMUSCULAR; INTRAVENOUS; SUBCUTANEOUS at 11:40

## 2025-04-05 RX ADMIN — SODIUM CHLORIDE 55 ML: 9 INJECTION, SOLUTION INTRAVENOUS at 11:53

## 2025-04-05 ASSESSMENT — ENCOUNTER SYMPTOMS
DIAPHORESIS: 0
COUGH: 0
CONSTIPATION: 0
PALPITATIONS: 0
FEVER: 0
SORE THROAT: 0
HEADACHES: 0
SINUS PRESSURE: 0
VOMITING: 0
DIARRHEA: 0
SHORTNESS OF BREATH: 0
NAUSEA: 0
CHILLS: 0
ABDOMINAL PAIN: 1
DYSURIA: 0
FREQUENCY: 0
BLOOD IN STOOL: 0
WHEEZING: 0

## 2025-04-05 ASSESSMENT — COLUMBIA-SUICIDE SEVERITY RATING SCALE - C-SSRS
1. IN THE PAST MONTH, HAVE YOU WISHED YOU WERE DEAD OR WISHED YOU COULD GO TO SLEEP AND NOT WAKE UP?: NO
2. HAVE YOU ACTUALLY HAD ANY THOUGHTS OF KILLING YOURSELF IN THE PAST MONTH?: NO

## 2025-04-05 ASSESSMENT — ACTIVITIES OF DAILY LIVING (ADL)
ADLS_ACUITY_SCORE: 51
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
## 2025-04-05 NOTE — ED TRIAGE NOTES
Pt arrived via EMS for abs pain, back pain and UTI symptoms. Pt denied dysuria, hematuria, urgency.      Triage Assessment (Adult)       Row Name 04/05/25 1046          Triage Assessment    Airway WDL WDL        Respiratory WDL    Respiratory WDL WDL        Skin Circulation/Temperature WDL    Skin Circulation/Temperature WDL WDL        Cardiac WDL    Cardiac WDL WDL        Peripheral/Neurovascular WDL    Peripheral Neurovascular WDL WDL        Cognitive/Neuro/Behavioral WDL    Cognitive/Neuro/Behavioral WDL WDL

## 2025-04-07 ENCOUNTER — PATIENT OUTREACH (OUTPATIENT)
Dept: CARE COORDINATION | Facility: CLINIC | Age: 50
End: 2025-04-07
Payer: COMMERCIAL

## 2025-04-07 NOTE — PROGRESS NOTES
Clinic Care Coordination Contact    Situation: Patient chart reviewed by care coordinator.    Background: Patient is enrolled     Assessment: Patient at [facility] ED 4/5/2025 for new lower abdomen pain, back pain and a possible UTI.  No serious findings per ED note.  Patient has a history of chronic abdominal pain.  Patient has had multiple ED visits related to pain, she has appt 4/22/2025 to establish with pain clinic.  Patient received 1 IV Dilaudid in ED.      Plan/Recommendations: SW will not intervene at this time due to above     Renetta Mayers, SAM, MSW   [facility]  Care Coordination  [facility]  [phone]  4/7/2025 11:01 AM

## 2025-04-18 ASSESSMENT — ANXIETY QUESTIONNAIRES
2. NOT BEING ABLE TO STOP OR CONTROL WORRYING: SEVERAL DAYS
4. TROUBLE RELAXING: MORE THAN HALF THE DAYS
8. IF YOU CHECKED OFF ANY PROBLEMS, HOW DIFFICULT HAVE THESE MADE IT FOR YOU TO DO YOUR WORK, TAKE CARE OF THINGS AT HOME, OR GET ALONG WITH OTHER PEOPLE?: EXTREMELY DIFFICULT
1. FEELING NERVOUS, ANXIOUS, OR ON EDGE: MORE THAN HALF THE DAYS
GAD7 TOTAL SCORE: 14
GAD7 TOTAL SCORE: 14
3. WORRYING TOO MUCH ABOUT DIFFERENT THINGS: MORE THAN HALF THE DAYS
7. FEELING AFRAID AS IF SOMETHING AWFUL MIGHT HAPPEN: NEARLY EVERY DAY
GAD7 TOTAL SCORE: 14
5. BEING SO RESTLESS THAT IT IS HARD TO SIT STILL: MORE THAN HALF THE DAYS
IF YOU CHECKED OFF ANY PROBLEMS ON THIS QUESTIONNAIRE, HOW DIFFICULT HAVE THESE PROBLEMS MADE IT FOR YOU TO DO YOUR WORK, TAKE CARE OF THINGS AT HOME, OR GET ALONG WITH OTHER PEOPLE: EXTREMELY DIFFICULT
6. BECOMING EASILY ANNOYED OR IRRITABLE: MORE THAN HALF THE DAYS
7. FEELING AFRAID AS IF SOMETHING AWFUL MIGHT HAPPEN: NEARLY EVERY DAY

## 2025-04-18 ASSESSMENT — PAIN SCALES - PAIN ENJOYMENT GENERAL ACTIVITY SCALE (PEG)
INTERFERED_GENERAL_ACTIVITY: 7
PEG_TOTALSCORE: 6.33
INTERFERED_ENJOYMENT_LIFE: 7
INTERFERED_GENERAL_ACTIVITY: 7
AVG_PAIN_PASTWEEK: 5
INTERFERED_ENJOYMENT_LIFE: 7
AVG_PAIN_PASTWEEK: 5
PEG_TOTALSCORE: 6.33

## 2025-04-22 ENCOUNTER — OFFICE VISIT (OUTPATIENT)
Dept: ANESTHESIOLOGY | Facility: CLINIC | Age: 50
End: 2025-04-22
Attending: PHYSICIAN ASSISTANT
Payer: COMMERCIAL

## 2025-04-22 VITALS
RESPIRATION RATE: 16 BRPM | SYSTOLIC BLOOD PRESSURE: 99 MMHG | HEART RATE: 73 BPM | DIASTOLIC BLOOD PRESSURE: 69 MMHG | OXYGEN SATURATION: 97 %

## 2025-04-22 DIAGNOSIS — R10.9 CHRONIC ABDOMINAL PAIN: ICD-10-CM

## 2025-04-22 DIAGNOSIS — G89.29 CHRONIC ABDOMINAL PAIN: ICD-10-CM

## 2025-04-22 DIAGNOSIS — G89.4 CHRONIC PAIN SYNDROME: Primary | ICD-10-CM

## 2025-04-22 DIAGNOSIS — Z78.9 MEDICALLY COMPLEX PATIENT: ICD-10-CM

## 2025-04-22 LAB — CREAT UR-MCNC: 235 MG/DL

## 2025-04-22 PROCEDURE — 3074F SYST BP LT 130 MM HG: CPT | Performed by: STUDENT IN AN ORGANIZED HEALTH CARE EDUCATION/TRAINING PROGRAM

## 2025-04-22 PROCEDURE — 1125F AMNT PAIN NOTED PAIN PRSNT: CPT | Performed by: STUDENT IN AN ORGANIZED HEALTH CARE EDUCATION/TRAINING PROGRAM

## 2025-04-22 PROCEDURE — 80360 METHYLPHENIDATE: CPT | Performed by: STUDENT IN AN ORGANIZED HEALTH CARE EDUCATION/TRAINING PROGRAM

## 2025-04-22 PROCEDURE — 99204 OFFICE O/P NEW MOD 45 MIN: CPT | Performed by: STUDENT IN AN ORGANIZED HEALTH CARE EDUCATION/TRAINING PROGRAM

## 2025-04-22 PROCEDURE — 3078F DIAST BP <80 MM HG: CPT | Performed by: STUDENT IN AN ORGANIZED HEALTH CARE EDUCATION/TRAINING PROGRAM

## 2025-04-22 RX ORDER — HYDROCODONE BITARTRATE AND ACETAMINOPHEN 7.5; 325 MG/1; MG/1
1 TABLET ORAL EVERY 6 HOURS PRN
Qty: 10 TABLET | Refills: 0 | Status: SHIPPED | OUTPATIENT
Start: 2025-04-22

## 2025-04-22 RX ORDER — GABAPENTIN 300 MG/1
600 CAPSULE ORAL 3 TIMES DAILY
Qty: 180 CAPSULE | Refills: 0 | Status: SHIPPED | OUTPATIENT
Start: 2025-04-22 | End: 2025-05-22

## 2025-04-22 ASSESSMENT — PAIN SCALES - GENERAL: PAINLEVEL_OUTOF10: MODERATE PAIN (6)

## 2025-04-22 NOTE — NURSING NOTE
"Patient presents with:  Pain  Pain Management  Consult: \"Pain management\"      Moderate Pain (6)         What medications are you using for pain? Tylenol, aleve, IBU    Have you been seen by another pain clinic/ provider? Yes-\"in the 90's-2000's\"    Expectations: Would like to \"get somewhere\".  Pain management/relief    Ailyn Patrick, PAULA      "

## 2025-04-22 NOTE — NURSING NOTE
RN reviewed AVS with patient. Patient to contact clinic if any questions/concerns. Patient verbalized understanding.    Brooke Hilliard RNCC

## 2025-04-22 NOTE — PROGRESS NOTES
"  Assessment & Plan     Chronic abdominal pain  Medically complex patient  Chronic pain syndrome  Unclear etiology of epigastric and left thoracic pain. Has had extensive cardiac and GI workup. Discussed appropriate use of opioid medications and that long-term use for non-malignant pain is not the best option unless all other therapies have been exhausted. She has a severe needle phobia and will not agree to procedures; at this point there does not appear to be a procedure that would be beneficial.  - Pain Management  Referral  - Drug Confirmation Panel Urine with Creat  - Gabapentin (NEURONTIN) 300 MG capsule; Take 2 capsules (600 mg) by mouth 3 times daily.   - Currently 300mg TID; titrate to 300/300/600, 300/600/600, then 600mg TID every week  - HYDROcodone-acetaminophen (NORCO) 7.5-325 MG per tablet; Take 1 tablet by mouth every 6 hours as needed for severe pain. Only for emergency use to avoid needing ER visits.   - Has worked well for her in the past. Is willing to retry other medications as well. Discussed will ONLY be used for rare occasions to avoid ER visits. CSD, UDS.    Follow up: 1 month    Subjective   Meli is a 49 year old, presenting for the following health issues:  Pain, Pain Management, and Consult (\"Pain management\")    HPI Meli A [place], 49-year-old female    - Chronic pain history with complex presentation.  - Cyst on kidney and liver, nodules on both lungs, COPD, and emphysema.  - Constant chest pain for the past 1 to 1.5 years, described as stabbing, with radiation to the upper left arm and triceps.  - Daily chest pain with spikes lasting from 10 seconds to 2-3 hours.  - Pain exacerbated without specific triggers, not relieved by position changes.  - Epigastric abdominal pain for decades, with no inciting events.  - Pain starts in the left rib area, moves to the back, and sometimes felt in the shoulder blade and neck.  - History of anxiety and autism, contributing to pain " perception.  - Previous treatments include gabapentin, Tylenol, ibuprofen, and Vicodin.  - Gabapentin helps slightly with pain but causes cognitive issues.  - History of SVT episode treated with adenosine, no q episodes.  - Multiple colonoscopies and endoscopies with unremarkable findings.  - Restless legs syndrome and sporadic sleep patterns, difficulty falling and staying asleep.  - History of TBI, affecting memory and cognitive function.  - Adhesive dermatitis from patches and tapes.  - Concerns about side effects of medications, particularly those affecting mental health.            Objective    BP 99/69   Pulse 73   Resp 16   SpO2 97%   There is no height or weight on file to calculate BMI.  Physical Exam   GENERAL: alert and no distress  NECK: no adenopathy, no asymmetry, masses, or scars  RESP: lungs clear to auscultation - no rales, rhonchi or wheezes  CV: regular rate and rhythm, normal S1 S2, no S3 or S4, no murmur, click or rub, no peripheral edema  ABDOMEN: soft, nontender, no hepatosplenomegaly, no masses and bowel sounds normal  MS: no gross musculoskeletal defects noted, no edema    Results                  Signed Electronically by: Lucho Hill MD

## 2025-04-22 NOTE — LETTER
Opioid / Opioid Plus Controlled Substance Agreement    This is an agreement between you and your provider about the safe and appropriate use of controlled substance/opioids prescribed by your care team. Controlled substances are medicines that can cause physical and mental dependence (abuse).    There are strict laws about having and using these medicines. We here at [facility] are committing to working with you in your efforts to get better. To support you in this work, we ll help you schedule regular office appointments for medicine refills. If we must cancel or change your appointment for any reason, we ll make sure you have enough medicine to last until your next appointment.     As a Provider, I will:  Listen carefully to your concerns and treat you with respect.   Recommend a treatment plan that I believe is in your best interest. This plan may involve therapies other than opioid pain medication.   Talk with you often about the possible benefits, and the risk of harm of any medicine that we prescribe for you.   Provide a plan on how to taper (discontinue or go off) using this medicine if the decision is made to stop its use.    As a Patient, I understand that opioid(s):   Are a controlled substance prescribed by my care team to help me function or work and manage my condition(s).   Are strong medicines and can cause serious side effects such as:  Drowsiness, which can seriously affect my driving ability  A lower breathing rate, enough to cause death  Harm to my thinking ability   Depression   Abuse of and addiction to this medicine  Need to be taken exactly as prescribed. Combining opioids with certain medicines or chemicals (such as illegal drugs, sedatives, sleeping pills, and benzodiazepines) can be dangerous or even fatal. If I stop opioids suddenly, I may have severe withdrawal symptoms.  Do not work for all types of pain nor for all patients. If they re not helpful, I may be asked to stop  them.                      The risks, benefits and side effects of these medicine(s) were explained to me. I agree that:  I will take part in other treatments as advised by my care team. This may be psychiatry or counseling, physical therapy, behavioral therapy, group treatment or a referral to a specialist.     I will keep all my appointments. I understand that this is part of the monitoring of opioids. My care team may require an office visit for EVERY opioid/controlled substance refill. If I miss appointments or don t follow instructions, my care team may stop my medicine.    I will take my medicines as prescribed. I will not change the dose or schedule unless my care team tells me to. There will be no refills if I run out early.     I may be asked to come to the clinic and complete a urine drug test or complete a pill count at any time. If I don t give a urine sample or participate in a pill count, the care team may stop my medicine.    I will only receive prescriptions from this clinic for chronic pain. If I am treated by another provider for acute pain issues, I will tell them that I am taking opioid pain medication for chronic pain and that I have a treatment agreement with this provider. I will inform my [facility] care team within one business day if I am given a prescription for any pain medication by another healthcare provider. My [facility] care team can contact other providers and pharmacists about my use of any medicines.    It is up to me to make sure that I don t run out of my medicines on weekends or holidays. If my care team is willing to refill my opioid prescription without a visit, I must request refills only during office hours. Refills may take up to 7 business days to process. I will use one pharmacy to fill all my opioid and other controlled substance prescriptions. I will notify the clinic about any changes to my insurance or medication availability.    I am responsible for  my prescriptions. If the medicine/prescription is lost, stolen or destroyed, it will not be replaced. I also agree not to share controlled substance medicines with anyone.    I am aware I should not use any illegal or recreational drugs. I agree not to drink alcohol unless my care team says I can.       If I enroll in the [place] [facility] program, I will tell my care team prior to my next refill.     I will tell my care team right away if I become pregnant, have a new medical problem treated outside of my regular clinic, or have a change in my medications.    I understand that this medicine can affect my thinking, judgment and reaction time. Alcohol and drugs affect the brain and body, which can affect the safety of my driving. Being under the influence of alcohol or drugs can affect my decision-making, behaviors, personal safety, and the safety of others. Driving while impaired (DWI) can occur if a person is driving, operating, or in physical control of a car, motorcycle, boat, snowmobile, ATV, motorbike, off-road vehicle, or any other motor vehicle ([place] Statute 169A.20). I understand the risk if I choose to drive or operate any vehicle or machinery.    I understand that if I do not follow any of the conditions above, my prescriptions or treatment may be stopped or changed.          Opioids  What You Need to Know    What are opioids?   Opioids are pain medicines that must be prescribed by a doctor. They are also known as narcotics.     Examples are:   morphine (MS Contin, Nya)  oxycodone (Oxycontin)  oxycodone and acetaminophen (Percocet)  hydrocodone and acetaminophen (Vicodin, Norco)   fentanyl patch (Duragesic)   hydromorphone (Dilaudid)   methadone  codeine (Tylenol #3)     What do opioids do well?   Opioids are best for severe short-term pain such as after a surgery or injury. They may work well for cancer pain. They may help some people with long-lasting (chronic) pain.     What do opioids NOT do  well?   Opioids never get rid of pain entirely, and they don t work well for most patients with chronic pain. Opioids don t reduce swelling, one of the causes of pain.                                    Other ways to manage chronic pain and improve function include:     Treat the health problem that may be causing pain  Anti-inflammation medicines, which reduce swelling and tenderness, such as ibuprofen (Advil, Motrin) or naproxen (Aleve)  Acetaminophen (Tylenol)  Antidepressants and anti-seizure medicines, especially for nerve pain  Topical treatments such as patches or creams  Injections or nerve blocks  Chiropractic or osteopathic treatment  Acupuncture, massage, deep breathing, meditation, visual imagery, aromatherapy  Use heat or ice at the pain site  Physical therapy   Exercise  Stop smoking  Take part in therapy       Risks and side effects     Talk to your doctor before you start or decide to keep taking opioids. Possible side effects include:    Lowering your breathing rate enough to cause death  Overdose, including death, especially if taking higher than prescribed doses  Worse depression symptoms; less pleasure in things you usually enjoy  Feeling tired or sluggish  Slower thoughts or cloudy thinking  Being more sensitive to pain over time; pain is harder to control  Trouble sleeping or restless sleep  Changes in hormone levels (for example, less testosterone)  Changes in sex drive or ability to have sex  Constipation  Unsafe driving  Itching and sweating  Dizziness  Nausea, throwing up and dry mouth    What else should I know about opioids?    Opioids may lead to dependence, tolerance, or addiction.    Dependence means that if you stop or reduce the medicine too quickly, you will have withdrawal symptoms. These include loose poop (diarrhea), jitters, flu-like symptoms, nervousness and tremors. Dependence is not the same as addiction.                     Tolerance means needing higher doses over time to  get the same effect. This may increase the chance of serious side effects.    Addiction is when people improperly use a substance that harms their body, their mind or their relations with others. Use of opiates can cause a relapse of addiction if you have a history of drug or alcohol abuse.    People who have used opioids for a long time may have a lower quality of life, worse depression, higher levels of pain and more visits to doctors.    You can overdose on opioids. Take these steps to lower your risk of overdose:    Recognize the signs:  Signs of overdose include decrease or loss of consciousness (blackout), slowed breathing, trouble waking up and blue lips. If someone is worried about overdose, they should call 911.    Talk to your doctor about Narcan (naloxone).   If you are at risk for overdose, you may be given a prescription for Narcan. This medicine very quickly reverses the effects of opioids.   If you overdose, a friend or family member can give you Narcan while waiting for the ambulance. They need to know the signs of overdose and how to give Narcan.     Don't use alcohol or street drugs.   Taking them with opioids can cause death.    Do not take any of these medicines unless your doctor says it s OK. Taking these with opioids can cause death:  Benzodiazepines, such as lorazepam (Ativan), alprazolam (Xanax) or diazepam (Valium)  Muscle relaxers, such as cyclobenzaprine (Flexeril)  Sleeping pills like zolpidem (Ambien)   Other opioids      How to keep you and other people safe while taking opioids:    Never share your opioids with others.  Opioid medicines are regulated by the Drug Enforcement Agency (MARTY). Selling or sharing medications is a criminal act.    2. Be sure to store opioids in a secure place, locked up if possible. Young children can easily swallow them and overdose.    3. When you are traveling with your medicines, keep them in the original bottles. If you use a pill box, be sure you also  carry a copy of your medicine list from your clinic or pharmacy.    4. Safe disposal of opioids    Most pharmacies have places to get rid of medicine, called disposal kiosks. Medicine disposal options are also available in every [facility]. Search your county and  medication disposal  to find more options. You can find more details at:  [email or web address]     I agree that my provider, clinic care team, and pharmacy may work with any city, state or federal law enforcement agency that investigates the misuse, sale, or other diversion of my controlled medicine. I will allow my provider to discuss my care with, or share a copy of, this agreement with any other treating provider, pharmacy or emergency room where I receive care.    I have read this agreement and have asked questions about anything I did not understand.    _______________________________________________________  Patient Signature - Meli Rodriguez _____________________                   Date     _______________________________________________________  Provider Signature - Lucho Hill MD   _____________________                   Date     _______________________________________________________  Witness Signature (required if provider not present while patient signing)   _____________________                   Date

## 2025-04-22 NOTE — LETTER
"4/22/2025       RE: Meli Rodriguez  [address]  [place] [facility] [address]     Dear Colleague,    Thank you for referring your patient, Meli Rodriguez, to the [facility] [place] at [facility]. Please see a copy of my visit note below.      Assessment & Plan    Chronic abdominal pain  Medically complex patient  Chronic pain syndrome  Unclear etiology of epigastric and left thoracic pain. Has had extensive cardiac and GI workup. Discussed appropriate use of opioid medications and that long-term use for non-malignant pain is not the best option unless all other therapies have been exhausted. She has a severe needle phobia and will not agree to procedures; at this point there does not appear to be a procedure that would be beneficial.  - Pain Management  Referral  - Drug Confirmation Panel Urine with Creat  - Gabapentin (NEURONTIN) 300 MG capsule; Take 2 capsules (600 mg) by mouth 3 times daily.   - Currently 300mg TID; titrate to 300/300/600, 300/600/600, then 600mg TID every week  - HYDROcodone-acetaminophen (NORCO) 7.5-325 MG per tablet; Take 1 tablet by mouth every 6 hours as needed for severe pain. Only for emergency use to avoid needing ER visits.   - Has worked well for her in the past. Is willing to retry other medications as well. Discussed will ONLY be used for rare occasions to avoid ER visits. CSD, UDS.    Follow up: 1 month    Subjective  Meli is a 49 year old, presenting for the following health issues:  Pain, Pain Management, and Consult (\"Pain management\")    HPI Meli Rodriguez, 49-year-old female    - Chronic pain history with complex presentation.  - Cyst on kidney and liver, nodules on both lungs, COPD, and emphysema.  - Constant chest pain for the past 1 to 1.5 years, described as stabbing, with radiation to the upper left arm and triceps.  - Daily chest pain with spikes lasting from 10 " seconds to 2-3 hours.  - Pain exacerbated without specific triggers, not relieved by position changes.  - Epigastric abdominal pain for decades, with no inciting events.  - Pain starts in the left rib area, moves to the back, and sometimes felt in the shoulder blade and neck.  - History of anxiety and autism, contributing to pain perception.  - Previous treatments include gabapentin, Tylenol, ibuprofen, and Vicodin.  - Gabapentin helps slightly with pain but causes cognitive issues.  - History of SVT episode treated with adenosine, no q episodes.  - Multiple colonoscopies and endoscopies with unremarkable findings.  - Restless legs syndrome and sporadic sleep patterns, difficulty falling and staying asleep.  - History of TBI, affecting memory and cognitive function.  - Adhesive dermatitis from patches and tapes.  - Concerns about side effects of medications, particularly those affecting mental health.            Objective   BP 99/69   Pulse 73   Resp 16   SpO2 97%   There is no height or weight on file to calculate BMI.  Physical Exam   GENERAL: alert and no distress  NECK: no adenopathy, no asymmetry, masses, or scars  RESP: lungs clear to auscultation - no rales, rhonchi or wheezes  CV: regular rate and rhythm, normal S1 S2, no S3 or S4, no murmur, click or rub, no peripheral edema  ABDOMEN: soft, nontender, no hepatosplenomegaly, no masses and bowel sounds normal  MS: no gross musculoskeletal defects noted, no edema    Results                  Signed Electronically by: Lucho Hill MD      Again, thank you for allowing me to participate in the care of your patient.      Sincerely,    Lucho Hill MD

## 2025-04-22 NOTE — PATIENT INSTRUCTIONS
Medications:    HYDROCODONE-ACETAMINOPHEN 5-325 MG (NORCO) - Take 1 tablet every 6 hours as needed. 10 tablets to last 30 days.    Increase Gabapentin to goal dose of 600 mg three times daily using titration below:     AM   PM   Bedtime  300mg   300mg   600mg (2 tabs).  After 3-4 days, increase as tolerated to the next line  600mg (2 tabs) 300mg   600 (2 tabs).  After 3-4 days, increase as tolerated to the next line  600mg (2 tabs) 600mg (2 tabs) 600 (2 tabs).  After 3-4 days, increase as tolerated to the next line  Call with any problems or when you are at this dose. We can prescribe 600mg tabs going forwards.     Caution for sedation.   Do not drive until you know how the medication affects you.   You can go slower if you need to or increasing only one dose at a time.  Do not stop abruptly once at higher doses.  This medication must be tapered off.     For refills of opioid medications - You MUST call (Or MyChart) the clinic DIRECTLY and at least 7 days before you run out of your medication.      Treatment planning:    UDS and CSA completed in clinic.      Recommended Follow up:      Follow up in 1 month for an in person visit.    To speak with a nurse, schedule/reschedule/cancel a clinic appointment, or request a medication refill call: [phone].    You can also reach us by [employer]: [email or web address]

## 2025-04-24 LAB — GABAPENTIN UR QL CFM: PRESENT

## 2025-04-29 ENCOUNTER — TELEPHONE (OUTPATIENT)
Dept: FAMILY MEDICINE | Facility: CLINIC | Age: 50
End: 2025-04-29
Payer: COMMERCIAL

## 2025-04-29 DIAGNOSIS — G89.4 CHRONIC PAIN SYNDROME: Primary | ICD-10-CM

## 2025-04-29 RX ORDER — GABAPENTIN 600 MG/1
600 TABLET ORAL 3 TIMES DAILY
Qty: 90 TABLET | Refills: 3 | Status: SHIPPED | OUTPATIENT
Start: 2025-04-29

## 2025-04-29 NOTE — TELEPHONE ENCOUNTER
Hi,    The patient's insurance is limiting the quantity of Gabapentin to 90 capsules per fill, which is a 15 day supply based on the most recent directions (two capsules three times daily).  The patient wants to try to change from the 300mg of Gabapentin to the 600mg to get around that insurance issue. Please send in a new prescription for the Gabapentin 600mg tabs, one tab three times daily,  if appropriate.      Thank you,  Romaine Horton, Pharmacy Technician I  [facility]

## 2025-04-30 ENCOUNTER — HOSPITAL ENCOUNTER (EMERGENCY)
Facility: CLINIC | Age: 50
Discharge: HOME | End: 2025-04-30
Attending: EMERGENCY MEDICINE | Admitting: EMERGENCY MEDICINE
Payer: COMMERCIAL

## 2025-04-30 VITALS
BODY MASS INDEX: 21.68 KG/M2 | HEART RATE: 76 BPM | TEMPERATURE: 98.3 F | WEIGHT: 127 LBS | RESPIRATION RATE: 18 BRPM | SYSTOLIC BLOOD PRESSURE: 134 MMHG | HEIGHT: 64 IN | OXYGEN SATURATION: 98 % | DIASTOLIC BLOOD PRESSURE: 96 MMHG

## 2025-04-30 DIAGNOSIS — R07.9 ACUTE CHEST PAIN: ICD-10-CM

## 2025-04-30 LAB
ANION GAP SERPL CALCULATED.3IONS-SCNC: 7 MMOL/L (ref 7–15)
BASOPHILS # BLD AUTO: 0 10E3/UL (ref 0–0.2)
BASOPHILS NFR BLD AUTO: 1 %
BUN SERPL-MCNC: 11.3 MG/DL (ref 6–20)
CALCIUM SERPL-MCNC: 9.3 MG/DL (ref 8.8–10.4)
CHLORIDE SERPL-SCNC: 102 MMOL/L (ref 98–107)
CREAT SERPL-MCNC: 0.67 MG/DL (ref 0.51–0.95)
D DIMER PPP FEU-MCNC: <0.27 UG/ML FEU (ref 0–0.5)
EGFRCR SERPLBLD CKD-EPI 2021: >90 ML/MIN/1.73M2
EOSINOPHIL # BLD AUTO: 0.2 10E3/UL (ref 0–0.7)
EOSINOPHIL NFR BLD AUTO: 2 %
ERYTHROCYTE [DISTWIDTH] IN BLOOD BY AUTOMATED COUNT: 13.2 % (ref 10–15)
GLUCOSE SERPL-MCNC: 88 MG/DL (ref 70–99)
HCO3 SERPL-SCNC: 28 MMOL/L (ref 22–29)
HCT VFR BLD AUTO: 41 % (ref 35–47)
HGB BLD-MCNC: 13.8 G/DL (ref 11.7–15.7)
HOLD SPECIMEN: NORMAL
IMM GRANULOCYTES # BLD: 0 10E3/UL
IMM GRANULOCYTES NFR BLD: 0 %
LYMPHOCYTES # BLD AUTO: 3.4 10E3/UL (ref 0.8–5.3)
LYMPHOCYTES NFR BLD AUTO: 41 %
MCH RBC QN AUTO: 31.3 PG (ref 26.5–33)
MCHC RBC AUTO-ENTMCNC: 33.7 G/DL (ref 31.5–36.5)
MCV RBC AUTO: 93 FL (ref 78–100)
MONOCYTES # BLD AUTO: 0.7 10E3/UL (ref 0–1.3)
MONOCYTES NFR BLD AUTO: 8 %
NEUTROPHILS # BLD AUTO: 3.9 10E3/UL (ref 1.6–8.3)
NEUTROPHILS NFR BLD AUTO: 48 %
NRBC # BLD AUTO: 0 10E3/UL
NRBC BLD AUTO-RTO: 0 /100
PLATELET # BLD AUTO: 195 10E3/UL (ref 150–450)
POTASSIUM SERPL-SCNC: 3.7 MMOL/L (ref 3.4–5.3)
RBC # BLD AUTO: 4.41 10E6/UL (ref 3.8–5.2)
SODIUM SERPL-SCNC: 137 MMOL/L (ref 135–145)
TROPONIN T SERPL HS-MCNC: <6 NG/L
WBC # BLD AUTO: 8.2 10E3/UL (ref 4–11)

## 2025-04-30 PROCEDURE — 36415 COLL VENOUS BLD VENIPUNCTURE: CPT | Performed by: EMERGENCY MEDICINE

## 2025-04-30 PROCEDURE — 99285 EMERGENCY DEPT VISIT HI MDM: CPT | Mod: 25

## 2025-04-30 PROCEDURE — 250N000011 HC RX IP 250 OP 636: Performed by: EMERGENCY MEDICINE

## 2025-04-30 PROCEDURE — 93005 ELECTROCARDIOGRAM TRACING: CPT

## 2025-04-30 PROCEDURE — 96374 THER/PROPH/DIAG INJ IV PUSH: CPT

## 2025-04-30 PROCEDURE — 85379 FIBRIN DEGRADATION QUANT: CPT | Performed by: EMERGENCY MEDICINE

## 2025-04-30 PROCEDURE — 85004 AUTOMATED DIFF WBC COUNT: CPT | Performed by: EMERGENCY MEDICINE

## 2025-04-30 PROCEDURE — 96375 TX/PRO/DX INJ NEW DRUG ADDON: CPT

## 2025-04-30 PROCEDURE — 84484 ASSAY OF TROPONIN QUANT: CPT | Performed by: EMERGENCY MEDICINE

## 2025-04-30 PROCEDURE — 80048 BASIC METABOLIC PNL TOTAL CA: CPT | Performed by: EMERGENCY MEDICINE

## 2025-04-30 PROCEDURE — 93010 ELECTROCARDIOGRAM REPORT: CPT | Performed by: EMERGENCY MEDICINE

## 2025-04-30 PROCEDURE — 99284 EMERGENCY DEPT VISIT MOD MDM: CPT | Performed by: EMERGENCY MEDICINE

## 2025-04-30 RX ORDER — HYDROMORPHONE HYDROCHLORIDE 1 MG/ML
0.5 INJECTION, SOLUTION INTRAMUSCULAR; INTRAVENOUS; SUBCUTANEOUS
Status: DISCONTINUED | OUTPATIENT
Start: 2025-04-30 | End: 2025-04-30 | Stop reason: HOSPADM

## 2025-04-30 RX ORDER — OLANZAPINE 10 MG/1
2.5 INJECTION, POWDER, LYOPHILIZED, FOR SOLUTION INTRAMUSCULAR ONCE
Status: COMPLETED | OUTPATIENT
Start: 2025-04-30 | End: 2025-04-30

## 2025-04-30 RX ORDER — ONDANSETRON 2 MG/ML
4 INJECTION INTRAMUSCULAR; INTRAVENOUS ONCE
Status: COMPLETED | OUTPATIENT
Start: 2025-04-30 | End: 2025-04-30

## 2025-04-30 RX ADMIN — HYDROMORPHONE HYDROCHLORIDE 0.5 MG: 1 INJECTION, SOLUTION INTRAMUSCULAR; INTRAVENOUS; SUBCUTANEOUS at 13:38

## 2025-04-30 RX ADMIN — OLANZAPINE 2.5 MG: 10 INJECTION, POWDER, FOR SOLUTION INTRAMUSCULAR at 13:38

## 2025-04-30 RX ADMIN — ONDANSETRON 4 MG: 2 INJECTION, SOLUTION INTRAMUSCULAR; INTRAVENOUS at 14:56

## 2025-04-30 ASSESSMENT — ACTIVITIES OF DAILY LIVING (ADL)
ADLS_ACUITY_SCORE: 51

## 2025-04-30 NOTE — DISCHARGE INSTRUCTIONS
I am not certain what is causing your pain but so far the tests are reassuring.  Continue your home medications.  Return for worsening symptoms or other concerns.  Otherwise follow-up in clinic.

## 2025-04-30 NOTE — ED TRIAGE NOTES
Pt presents to ED from home via EMS for concerns of left sided chest pain and N/V. Took  mg at home and received 4mg IV zofran by EMS     Triage Assessment (Adult)       Row Name 04/30/25 1258          Triage Assessment    Airway WDL WDL        Respiratory WDL    Respiratory WDL WDL        Skin Circulation/Temperature WDL    Skin Circulation/Temperature WDL WDL        Cardiac WDL    Cardiac WDL X;chest pain        Chest Pain Assessment    Chest Pain Location anterior chest, left     Chest Pain Radiation abdomen     Character sharp;stabbing  heavy     Duration for a couple days     Precipitating Factors nothing     Alleviating Factors nothing     Associated Signs/Symptoms anxiety;dyspnea     Chest Pain Intervention cardiac monitor placed;12-lead ECG obtained;cardiac monitoring continued        Peripheral/Neurovascular WDL    Peripheral Neurovascular WDL WDL        Cognitive/Neuro/Behavioral WDL    Cognitive/Neuro/Behavioral WDL WDL
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
## 2025-05-01 LAB
ATRIAL RATE - MUSE: 74 BPM
DIASTOLIC BLOOD PRESSURE - MUSE: NORMAL MMHG
INTERPRETATION ECG - MUSE: NORMAL
P AXIS - MUSE: 103 DEGREES
PR INTERVAL - MUSE: 246 MS
QRS DURATION - MUSE: 66 MS
QT - MUSE: 404 MS
QTC - MUSE: 448 MS
R AXIS - MUSE: 93 DEGREES
SYSTOLIC BLOOD PRESSURE - MUSE: NORMAL MMHG
T AXIS - MUSE: 50 DEGREES
VENTRICULAR RATE- MUSE: 74 BPM

## 2025-05-07 ENCOUNTER — TELEPHONE (OUTPATIENT)
Dept: FAMILY MEDICINE | Facility: CLINIC | Age: 50
End: 2025-05-07
Payer: COMMERCIAL

## 2025-05-07 NOTE — TELEPHONE ENCOUNTER
Allison calling to request [employer] restricted referral form for patient. Patient was seen at [facility] for ECG 3/12/25 at [address] [place] [address]. Provider: Edgar Us  DX: I47.10 supraventricular tachycardia    Form placed in Sharmaine Christensen's in basket.

## 2025-05-13 ENCOUNTER — TELEPHONE (OUTPATIENT)
Dept: FAMILY MEDICINE | Facility: CLINIC | Age: 50
End: 2025-05-13
Payer: COMMERCIAL

## 2025-05-13 NOTE — TELEPHONE ENCOUNTER
Patient Quality Outreach    Patient is due for the following:   Colon Cancer Screening  Breast Cancer Screening - Mammogram  Physical Preventive Adult Physical      Topic Date Due    Hepatitis B Vaccine (1 of 3 - 19+ 3-dose series) Never done    Pneumococcal Vaccine (2 of 2 - PCV) 01/14/2015    COVID-19 Vaccine (1 - 2024-25 season) Never done       Action(s) Taken:   Schedule a Adult Preventative    Type of outreach:    Phone, spoke to patient/parent. Physical scheduled    Questions for provider review:    None         Kelsie Hale, AMADOU  Chart routed to None.

## 2025-05-14 ENCOUNTER — PATIENT OUTREACH (OUTPATIENT)
Dept: CARE COORDINATION | Facility: CLINIC | Age: 50
End: 2025-05-14
Payer: COMMERCIAL

## 2025-05-14 NOTE — PROGRESS NOTES
[facility]    Clinical Data: Care Coordinator Outreach    Outreach Documentation Number of Outreach Attempt   5/14/2025   2:13 PM 1       Unable to leave a message due to: Busy signal.      Plan:   Care Coordinator will try to reach patient again in 10 business days.    Next outreach due: 5/28/25

## 2025-05-19 NOTE — TELEPHONE ENCOUNTER
If the form was dated 3-21 then this is what we did at that appointment. Sharmaine Christensen PA-C     6

## 2025-05-20 ENCOUNTER — PATIENT OUTREACH (OUTPATIENT)
Dept: CARE COORDINATION | Facility: CLINIC | Age: 50
End: 2025-05-20
Payer: COMMERCIAL

## 2025-05-20 NOTE — PROGRESS NOTES
Clinic Care Coordination Contact    Situation: Patient chart reviewed by care coordinator.    Background: Patient is enrolled     Assessment: CHW attempting to reach patient for update     Plan/Recommendations: SW will review in 2-3 weeks     SAM Hu, MSW   [facility]  Care Coordination  [facility]  [phone]  5/20/2025 8:13 AM

## 2025-05-21 ENCOUNTER — HOSPITAL ENCOUNTER (EMERGENCY)
Facility: CLINIC | Age: 50
Discharge: HOME | End: 2025-05-21
Attending: EMERGENCY MEDICINE | Admitting: EMERGENCY MEDICINE
Payer: COMMERCIAL

## 2025-05-21 VITALS
HEART RATE: 65 BPM | RESPIRATION RATE: 12 BRPM | DIASTOLIC BLOOD PRESSURE: 59 MMHG | TEMPERATURE: 98 F | SYSTOLIC BLOOD PRESSURE: 89 MMHG | OXYGEN SATURATION: 95 %

## 2025-05-21 DIAGNOSIS — J18.9 PNEUMONIA DUE TO INFECTIOUS ORGANISM, UNSPECIFIED LATERALITY, UNSPECIFIED PART OF LUNG: ICD-10-CM

## 2025-05-21 LAB
ANION GAP SERPL CALCULATED.3IONS-SCNC: 10 MMOL/L (ref 7–15)
ATRIAL RATE - MUSE: 68 BPM
BASE EXCESS BLDV CALC-SCNC: 3 MMOL/L (ref -3–3)
BASOPHILS # BLD AUTO: 0.1 10E3/UL (ref 0–0.2)
BASOPHILS NFR BLD AUTO: 1 %
BUN SERPL-MCNC: 13.8 MG/DL (ref 6–20)
CALCIUM SERPL-MCNC: 9.6 MG/DL (ref 8.8–10.4)
CHLORIDE SERPL-SCNC: 100 MMOL/L (ref 98–107)
CREAT SERPL-MCNC: 0.72 MG/DL (ref 0.51–0.95)
DIASTOLIC BLOOD PRESSURE - MUSE: NORMAL MMHG
EGFRCR SERPLBLD CKD-EPI 2021: >90 ML/MIN/1.73M2
EOSINOPHIL # BLD AUTO: 0.3 10E3/UL (ref 0–0.7)
EOSINOPHIL NFR BLD AUTO: 3 %
ERYTHROCYTE [DISTWIDTH] IN BLOOD BY AUTOMATED COUNT: 12.9 % (ref 10–15)
GLUCOSE SERPL-MCNC: 75 MG/DL (ref 70–99)
HCO3 BLDV-SCNC: 30 MMOL/L (ref 21–28)
HCO3 SERPL-SCNC: 27 MMOL/L (ref 22–29)
HCT VFR BLD AUTO: 42.1 % (ref 35–47)
HGB BLD-MCNC: 14.1 G/DL (ref 11.7–15.7)
HOLD SPECIMEN: NORMAL
IMM GRANULOCYTES # BLD: 0 10E3/UL
IMM GRANULOCYTES NFR BLD: 0 %
INTERPRETATION ECG - MUSE: NORMAL
LYMPHOCYTES # BLD AUTO: 3.3 10E3/UL (ref 0.8–5.3)
LYMPHOCYTES NFR BLD AUTO: 34 %
MCH RBC QN AUTO: 31.1 PG (ref 26.5–33)
MCHC RBC AUTO-ENTMCNC: 33.5 G/DL (ref 31.5–36.5)
MCV RBC AUTO: 93 FL (ref 78–100)
MONOCYTES # BLD AUTO: 1.1 10E3/UL (ref 0–1.3)
MONOCYTES NFR BLD AUTO: 11 %
NEUTROPHILS # BLD AUTO: 5.1 10E3/UL (ref 1.6–8.3)
NEUTROPHILS NFR BLD AUTO: 52 %
NRBC # BLD AUTO: 0 10E3/UL
NRBC BLD AUTO-RTO: 0 /100
NT-PROBNP SERPL-MCNC: <36 PG/ML (ref 0–192)
O2/TOTAL GAS SETTING VFR VENT: 0 %
OXYHGB MFR BLDV: 60 % (ref 70–75)
P AXIS - MUSE: -2 DEGREES
PCO2 BLDV: 51 MM HG (ref 40–50)
PH BLDV: 7.37 [PH] (ref 7.32–7.43)
PLATELET # BLD AUTO: 189 10E3/UL (ref 150–450)
PO2 BLDV: 32 MM HG (ref 25–47)
POTASSIUM SERPL-SCNC: 4.1 MMOL/L (ref 3.4–5.3)
PR INTERVAL - MUSE: 236 MS
QRS DURATION - MUSE: 68 MS
QT - MUSE: 394 MS
QTC - MUSE: 418 MS
R AXIS - MUSE: 94 DEGREES
RBC # BLD AUTO: 4.54 10E6/UL (ref 3.8–5.2)
SAO2 % BLDV: 64.2 % (ref 70–75)
SODIUM SERPL-SCNC: 137 MMOL/L (ref 135–145)
SYSTOLIC BLOOD PRESSURE - MUSE: NORMAL MMHG
T AXIS - MUSE: 53 DEGREES
TROPONIN T SERPL HS-MCNC: <6 NG/L
VENTRICULAR RATE- MUSE: 68 BPM
WBC # BLD AUTO: 9.8 10E3/UL (ref 4–11)

## 2025-05-21 PROCEDURE — 93010 ELECTROCARDIOGRAM REPORT: CPT | Performed by: EMERGENCY MEDICINE

## 2025-05-21 PROCEDURE — 85004 AUTOMATED DIFF WBC COUNT: CPT | Performed by: EMERGENCY MEDICINE

## 2025-05-21 PROCEDURE — 82805 BLOOD GASES W/O2 SATURATION: CPT | Performed by: EMERGENCY MEDICINE

## 2025-05-21 PROCEDURE — 250N000011 HC RX IP 250 OP 636: Performed by: EMERGENCY MEDICINE

## 2025-05-21 PROCEDURE — 36415 COLL VENOUS BLD VENIPUNCTURE: CPT | Performed by: EMERGENCY MEDICINE

## 2025-05-21 PROCEDURE — 99284 EMERGENCY DEPT VISIT MOD MDM: CPT | Performed by: EMERGENCY MEDICINE

## 2025-05-21 PROCEDURE — 93005 ELECTROCARDIOGRAM TRACING: CPT | Performed by: EMERGENCY MEDICINE

## 2025-05-21 PROCEDURE — 250N000013 HC RX MED GY IP 250 OP 250 PS 637: Performed by: EMERGENCY MEDICINE

## 2025-05-21 PROCEDURE — 83880 ASSAY OF NATRIURETIC PEPTIDE: CPT | Performed by: EMERGENCY MEDICINE

## 2025-05-21 PROCEDURE — 84484 ASSAY OF TROPONIN QUANT: CPT | Performed by: EMERGENCY MEDICINE

## 2025-05-21 PROCEDURE — 99285 EMERGENCY DEPT VISIT HI MDM: CPT | Mod: 25 | Performed by: EMERGENCY MEDICINE

## 2025-05-21 PROCEDURE — 80048 BASIC METABOLIC PNL TOTAL CA: CPT | Performed by: EMERGENCY MEDICINE

## 2025-05-21 PROCEDURE — 250N000009 HC RX 250: Performed by: EMERGENCY MEDICINE

## 2025-05-21 RX ORDER — LEVOFLOXACIN 750 MG/1
750 TABLET, FILM COATED ORAL DAILY
Qty: 7 TABLET | Refills: 0 | Status: SHIPPED | OUTPATIENT
Start: 2025-05-21 | End: 2025-05-22

## 2025-05-21 RX ORDER — IOPAMIDOL 755 MG/ML
61 INJECTION, SOLUTION INTRAVASCULAR ONCE
Status: COMPLETED | OUTPATIENT
Start: 2025-05-21 | End: 2025-05-21

## 2025-05-21 RX ORDER — OXYCODONE HYDROCHLORIDE 5 MG/1
5 TABLET ORAL EVERY 4 HOURS PRN
Refills: 0 | Status: COMPLETED | OUTPATIENT
Start: 2025-05-21 | End: 2025-05-21

## 2025-05-21 RX ORDER — NITROGLYCERIN 0.4 MG/1
0.4 TABLET SUBLINGUAL EVERY 5 MIN PRN
Status: DISCONTINUED | OUTPATIENT
Start: 2025-05-21 | End: 2025-05-21 | Stop reason: HOSPADM

## 2025-05-21 RX ORDER — OXYCODONE HYDROCHLORIDE 5 MG/1
5 TABLET ORAL EVERY 4 HOURS PRN
Refills: 0 | Status: DISCONTINUED | OUTPATIENT
Start: 2025-05-21 | End: 2025-05-21

## 2025-05-21 RX ADMIN — OXYCODONE 5 MG: 5 TABLET ORAL at 15:21

## 2025-05-21 RX ADMIN — IOPAMIDOL 61 ML: 755 INJECTION, SOLUTION INTRAVENOUS at 16:10

## 2025-05-21 RX ADMIN — NITROGLYCERIN 0.4 MG: 0.4 TABLET SUBLINGUAL at 15:01

## 2025-05-21 RX ADMIN — SODIUM CHLORIDE 93 ML: 9 INJECTION, SOLUTION INTRAVENOUS at 16:10

## 2025-05-21 RX ADMIN — OXYCODONE 5 MG: 5 TABLET ORAL at 17:35

## 2025-05-21 RX ADMIN — LEVOFLOXACIN 750 MG: 500 TABLET, FILM COATED ORAL at 17:35

## 2025-05-21 ASSESSMENT — ACTIVITIES OF DAILY LIVING (ADL)
ADLS_ACUITY_SCORE: 51

## 2025-05-21 NOTE — ED TRIAGE NOTES
BIBA from home with CP and expiratory wheezing, received duoneb en route.   Hx: COPD. VSS en route.

## 2025-05-21 NOTE — ED PROVIDER NOTES
[facility]  Emergency Department Visit Note    PATIENT:  Meli Rodriguez     49 year old     female      [id number]    Chief complaint:  Chief Complaint   Patient presents with    Chest Pain        History of present illness:  Patient is a 49 year old female with a medical history significant for irritable bowel syndrome, shortness of breath, COPD, tobacco use, history of coronary artery disease frequent UTIs, bipolar 2, borderline intellectual functioning following a TBI presenting for evaluation of breath and chest pain.  Patient reports that she has had the symptoms now for a very long time, probably months.  She states that her chest pain is quite debilitating.  She also states that she has chronic abdominal pain.  Is scheduled to follow-up with the pain clinic about her chronic pain symptoms in a couple of weeks and states that she is out of opioids until then.  She also notes that she has had a little bit of a cough.  Has had chest pain like this before.  She states it does not get any better or worse with any activities.  No recent vomiting or diarrhea.  No recent fevers or chills.  She does always feel very cold however this is at its baseline.  She is otherwise taking all of her medications as prescribed.  She states that her chronic abdominal pain is debilitating for her.  She has had this worked up with multiple different providers.  She localizes it over the left side of her chest and notes it is sharp and stabbing in nature.  It hurts to cough specifically.  If she is not coughing it does not hurt too bad.  No blood in her cough.    Review of Systems:  As in HPI above    BP 89/59   Pulse 65   Temp 98  F (36.7  C)   Resp 12   SpO2 95%     Physical Exam  Constitutional: laying in hospital bed, alert, oriented, in no apparent distress, and answering questions appropriately  HEENT: normocephalic, atraumatic, pupils 3mm, equal, round, and reactive to light, sclerae anicteric,  extraocular motions intact, moist mucous membranes, and poor dentition  Neck: able to fully range and no midline tenderness  Cardiovascular: regular rate and rhythm  Pulmonary: breathing comfortably on room air and diffuse wheezing throughout lung fields, mostly on expiration but a little bit with inspiration  Abdominal: soft, non-tender, non-distended  Genitourinary: deferred  Extremities/MSK: no peripheral edema and no cyanosis   Skin: warm, dry and non-diaphoretic  Neurologic: moves all four extremities spontaneously and GCS 15  Psychiatric: calm, appropriate      MDM:  Patient is a 49 year old female with above history presenting for evaluation of chest pain with breathing and shortness of breath.    Vitals reassuring and within normal limits.  Patient does have a lower blood pressure however this is normal for her.  Did confirm this with chart review.. Exam notable for expiratory wheezing, unable to reproduce chest pain with palpation, no unilateral leg swelling.    Differential diagnosis includes but is not limited to ACS, MI, PE, pneumothorax, pericardial tamponade, aortic dissection, esophageal rupture, heart failure, COPD exacerbation, pneumonia, GERD, anxiety, costochondritis, etc.     Patient is higher risk for ACS considering risk factors, will evaluate with labs, chest x-ray, EKG and cardiac monitoring.     Plan for PE study to further risk characterize in the setting of her chest pain.  Also planning for a nitroglycerin tablet to determine whether or not this helps her chest pain and if further evaluation of whether or not it is ACS.    Patient is not interested in any steroid therapy she states that she has side effects to this.    Remainder of ED course below.    ED COURSE:  ED Course as of 05/21/25 1734   Wed May 21, 2025   1731 N-Terminal Pro Bnp: <36   1731 Blood gas venous(!)  Reassuring against hypercapnic respiratory failure   1732 Troponin T, High Sensitivity: <6  Reassuring against ACS   1732  Basic metabolic panel  All labs are reassuring and within normal limits   1732 CBC with platelets differential  Reassuring and within normal limits   1732 CT Chest Pulmonary Embolism w Contrast  IMPRESSION:  1.  No acute pulmonary embolism.     2.  Scattered tree-in-bud nodularity and associated groundglass opacity bilaterally, suspicious for multifocal atypical infection.     1732 EKG 12 lead  ECG:  - Ventricular rate 68 bpm, regular  - MA, QRS, QT intervals notable for prolonged MA otherwise reassuring and within normal limits  - Axis rightward  - no ST segment or T wave changes concerning for acute ischemia  - Comparison to prior ECGs: from 4/30 no change  - My independent interpretation: reassuring against ACS in this context     1733 Currently patient's workup is revealing for the possibility of chest pain related to pneumonia.  Discussed this with the patient.  Will give her a dose of Levaquin here to treat pneumonia and reassure her with all other lab values.  Discussed reassuring BNP, troponin and reassuring EKG.   1734 Plan is discharge patient with stable oxygenation for outpatient antibiotics.  Were will refer her back to her primary care provider considering she has restricted.  Unable to obtain UA at this time considering patient was diagnosed with pneumonia, very low utility.  Will delete this order.   1734 No improvement in chest pain with nitroglycerin        Encounter Diagnoses:  Final diagnoses:   Pneumonia due to infectious organism, unspecified laterality, unspecified part of lung       Final disposition: discharge    Lexie Root DO  EM Physician  [facility], [place]       Lexie Root DO  05/21/25 1735

## 2025-05-21 NOTE — ED NOTES
Bed: ED09  Expected date: 5/21/25  Expected time: 12:53 PM  Means of arrival: Ambulance  Comments:  EMS - 50yo Fe, chest pain

## 2025-05-21 NOTE — DISCHARGE INSTRUCTIONS
You were seen in the ER today with a chief complaint of chest pain.  You were diagnosed with pneumonia.    Please take the prescribed antibiotics once daily for the next week.  I gave you your first dose here tonight.  You will be due for your next dose tomorrow night by 6 PM.  Please come back with any difficulty breathing otherwise follow-up with your primary care team.    Please read the attached information about how to care for self only have pneumonia.  Please come back with any changing or worsening symptoms that you find concerning.

## 2025-05-22 ENCOUNTER — MYC MEDICAL ADVICE (OUTPATIENT)
Dept: FAMILY MEDICINE | Facility: CLINIC | Age: 50
End: 2025-05-22
Payer: COMMERCIAL

## 2025-05-22 ENCOUNTER — PATIENT OUTREACH (OUTPATIENT)
Dept: CARE COORDINATION | Facility: CLINIC | Age: 50
End: 2025-05-22
Payer: COMMERCIAL

## 2025-05-22 DIAGNOSIS — R10.9 CHRONIC ABDOMINAL PAIN: ICD-10-CM

## 2025-05-22 DIAGNOSIS — G89.4 CHRONIC PAIN SYNDROME: ICD-10-CM

## 2025-05-22 DIAGNOSIS — G89.29 CHRONIC ABDOMINAL PAIN: ICD-10-CM

## 2025-05-22 DIAGNOSIS — J18.9 PNEUMONIA DUE TO INFECTIOUS ORGANISM, UNSPECIFIED LATERALITY, UNSPECIFIED PART OF LUNG: Primary | ICD-10-CM

## 2025-05-22 RX ORDER — LEVOFLOXACIN 750 MG/1
750 TABLET, FILM COATED ORAL DAILY
Qty: 7 TABLET | Refills: 0 | Status: SHIPPED | OUTPATIENT
Start: 2025-05-22

## 2025-05-22 RX ORDER — HYDROCODONE BITARTRATE AND ACETAMINOPHEN 7.5; 325 MG/1; MG/1
1 TABLET ORAL EVERY 6 HOURS PRN
Qty: 10 TABLET | Refills: 0 | Status: SHIPPED | OUTPATIENT
Start: 2025-05-23

## 2025-05-22 RX ORDER — LEVOFLOXACIN 750 MG/1
750 TABLET, FILM COATED ORAL DAILY
Qty: 7 TABLET | Refills: 0 | Status: SHIPPED | OUTPATIENT
Start: 2025-05-22 | End: 2025-05-22

## 2025-05-22 NOTE — TELEPHONE ENCOUNTER
No note routed to patient yet by PCP, I called and spoke to her; she says she is restricted to the  [facility] so needs the antibiotic re-sent there and she has to get her pain med refilled there.  She wonders if there's any way Sharmaine can send the Rx for  tomorrow (5/23) but she won't start it until it is due.   Says she has done that before.   Otherwise, she won't be able to pick it up until Tuesday when the clinic pharmacy is open after the holiday.    Regarding the pain clinic, she has not found one, she is actually working with her  to see if they can change her insurance.    She is scheduled to see Sharmaine Christensen on 6/6 to discuss this issue as well.    Routed to PCP to address:  re-send antibiotic to [facility] pharmacy and possibly send pain med to clinic pharmacy for  tomorrow.    Paula NANCE RN  [facility] Triage

## 2025-05-22 NOTE — TELEPHONE ENCOUNTER
Antibiotics sent. Next pain fill would not be until 5-25 for next 10/month max recommended by last pain clinic. Did patient find out what pain clinic she can go to that is covered by insurance? I never saw a message back regarding this. I can future the pain med for sunday since this is a holiday weekend, our pharmacy is no open on weekends clarify what pharmacy please for sunday.     Sharmaine Christensen PA-C

## 2025-05-22 NOTE — PROGRESS NOTES
Clinic Care Coordination Contact  [facility]    Patient is enrolled, she was at [facility] ED on 5/21/2025 for pneumonia.  Per chart notes, patient has PCP office visit scheduled for 6/6/2025.  She does not have a post hospital/ED visit scheduled.  ABX prescribed by PCP as she is only one able to do so per insurance.  Please see MyChart message.  Patient also has chronic pain.  Patient has not yet established with a pain clinic, she is in process with  to possibly change insurance.     Clinical Data: Care Coordinator Outreach    Outreach Documentation Number of Outreach Attempt   5/14/2025   2:13 PM 1   5/22/2025  12:21 PM 1       Unable to leave a message due to: [facility] is full.      Plan: Care Coordinator will try to reach patient again in 1-2 business days.    Renetta Mayers, SOOW, MSW   [facility]  Care Coordination  [facility] [place] and [facility]  [phone]  5/22/2025 12:21 PM

## 2025-05-24 LAB
ATRIAL RATE - MUSE: 68 BPM
DIASTOLIC BLOOD PRESSURE - MUSE: NORMAL MMHG
INTERPRETATION ECG - MUSE: NORMAL
P AXIS - MUSE: -2 DEGREES
PR INTERVAL - MUSE: 236 MS
QRS DURATION - MUSE: 68 MS
QT - MUSE: 394 MS
QTC - MUSE: 418 MS
R AXIS - MUSE: 94 DEGREES
SYSTOLIC BLOOD PRESSURE - MUSE: NORMAL MMHG
T AXIS - MUSE: 53 DEGREES
VENTRICULAR RATE- MUSE: 68 BPM

## 2025-05-27 ENCOUNTER — APPOINTMENT (OUTPATIENT)
Dept: GENERAL RADIOLOGY | Facility: CLINIC | Age: 50
End: 2025-05-27
Payer: COMMERCIAL

## 2025-05-27 ENCOUNTER — HOSPITAL ENCOUNTER (EMERGENCY)
Facility: CLINIC | Age: 50
Discharge: HOME | End: 2025-05-27
Payer: COMMERCIAL

## 2025-05-27 VITALS
HEART RATE: 71 BPM | WEIGHT: 125 LBS | BODY MASS INDEX: 22.15 KG/M2 | OXYGEN SATURATION: 95 % | TEMPERATURE: 98.7 F | DIASTOLIC BLOOD PRESSURE: 77 MMHG | HEIGHT: 63 IN | SYSTOLIC BLOOD PRESSURE: 93 MMHG

## 2025-05-27 DIAGNOSIS — J40 BRONCHITIS: Primary | ICD-10-CM

## 2025-05-27 PROCEDURE — 99283 EMERGENCY DEPT VISIT LOW MDM: CPT

## 2025-05-27 PROCEDURE — 71046 X-RAY EXAM CHEST 2 VIEWS: CPT

## 2025-05-27 PROCEDURE — 99283 EMERGENCY DEPT VISIT LOW MDM: CPT | Mod: 25

## 2025-05-27 PROCEDURE — 250N000013 HC RX MED GY IP 250 OP 250 PS 637

## 2025-05-27 RX ORDER — OXYCODONE HYDROCHLORIDE 5 MG/1
5 TABLET ORAL ONCE
Refills: 0 | Status: COMPLETED | OUTPATIENT
Start: 2025-05-27 | End: 2025-05-27

## 2025-05-27 RX ADMIN — OXYCODONE 5 MG: 5 TABLET ORAL at 15:29

## 2025-05-27 ASSESSMENT — ACTIVITIES OF DAILY LIVING (ADL)
ADLS_ACUITY_SCORE: 51
ADLS_ACUITY_SCORE: 51

## 2025-05-27 NOTE — DISCHARGE INSTRUCTIONS
You were seen in the emergency department today for persistent cough. We did tests including x-ray that showed no evidence of pneumonia.  This seems to have been treated well with the antibiotics.  Be sure to take your final dose as scheduled tomorrow.    Your cough is likely due to postinfectious bronchitis, which is inflammation of the lungs after an infection.  Sometimes this can take 2 to 4 weeks to get better, but will get better on its own.    Please follow up with your primary doctor in the next 1 to 2 weeks for ongoing evaluation and management of your symptoms.    Return to the emergency department with new or worsening symptoms that you find concerning.

## 2025-05-27 NOTE — ED PROVIDER NOTES
"[facility]  Emergency Department Visit Note    PATIENT:  Meli Rodriguez     49 year old     female      [id number]    Chief complaint:  Chief Complaint   Patient presents with    Cough     Pt stated she recently got dx x1 week ago with pneumonia, on abx, last day tomorrow and doesn't feel like sx are getting better. CP with coughing, cramping bottom of feet as well as numb/tingly. Pt reporting \"coughing up blood x2 days\".           History of present illness:  Patient is a 49 year old female with chronic abdominal pain, depression, bipolar 2, PTSD, TBI, emphysema presenting for evaluation of cough.    Reviewed emergency department visit 5/21/2025.  Seen at that time for chest pain and cough.  CT PE study negative for PE but demonstrated likely multifocal atypical pneumonia.  Sent with levofloxacin.    She reports she does not feel like the symptoms are getting better.  Still having pleuritic chest pain that is constant and throughout the day.  No aggravating or alleviating factors.  Having some streaky hemoptysis.  Mild shortness of breath.  No fevers.  No vomiting.  Reports she has 1 more day of the levofloxacin.      Review of Systems:  As in HPI above    BP 93/77   Pulse 71   Temp 98.7  F (37.1  C) (Oral)   Ht 1.6 m (5' 3\")   Wt 56.7 kg (125 lb)   SpO2 94%   BMI 22.14 kg/m        Physical Exam:  Constitutional: laying in hospital bed, alert, oriented, and in no apparent distress  HEENT: normocephalic, atraumatic and sclerae anicteric  Neck: no stridor  Cardiovascular: regular rate and rhythm and no murmurs, rubs, or extra heart sounds  Pulmonary: breathing comfortably on room air and lungs clear to auscultation bilaterally  Abdominal: soft, non-tender, non-distended  Extremities/MSK: no peripheral edema  Skin: warm, dry  Neurologic: moves all four extremities spontaneously  Psychiatric: calm, appropriate      MDM:  Patient is a 49 year old female with above history presenting for " "evaluation of persistent cough.    Vitals at her baseline from a blood pressure standpoint, otherwise normal. Exam reassuring, appears acutely well, clear lungs.    Probably experiencing postinfectious bronchitis with bronchial irritation/inflammation related to recent infection.  Levofloxacin should have treated an atypical pneumonia well.  No hypoxia here.  Will evaluate chest x-ray to see if there is any worsening.  Had PE study and broad cardiac workup recently that was negative, no need to repeat today.      Patient requested something for the pain.  Stated she could not take NSAIDs due to prior what sounds like upper GI ulcer (\"hole in my stomach\"), states has been taking acetaminophen without relief.  Does have what looks like complex outpatient opioid pain management.  Giving single dose of oxycodone here but not filling.    Disposition pending above workup. Remainder of ED course below.    ED COURSE:  ED Course as of 05/27/25 1602   Tue May 27, 2025   1600 Chest XR,  PA & LAT  Preliminary image reviewed as well as radiologist report reviewed.  No focal consolidation or infiltrate concerning for pneumonia.  No pneumothorax.   1600 Patient stable for outpatient follow-up.  Likely experiencing postinfectious bronchitis.  Did offer steroids, she states it makes her shaky and she did not want to take these.  Recommended over-the-counter analgesics, monitoring of symptoms, primary follow-up.  ED return in the event of new or worsening symptoms.       Encounter Diagnoses:  Final diagnoses:   Bronchitis       Final disposition: discharge    Cabrera Mckinney MD  5/27/2025  3:11 PM   Emergency Medicine  [facility]      Cabrera Mckinney MD  05/27/25 1602    "

## 2025-05-27 NOTE — ED TRIAGE NOTES
"Pt stated she recently got dx x1 week ago with pneumonia, on abx, last day tomorrow and doesn't feel like sx are getting better. CP with coughing, cramping bottom of feet as well as numb/tingly. Pt reporting \"coughing up blood x2 days\".      Triage Assessment (Adult)       Row Name 05/27/25 1443          Triage Assessment    Airway WDL WDL        Respiratory WDL    Respiratory WDL WDL        Skin Circulation/Temperature WDL    Skin Circulation/Temperature WDL WDL        Cardiac WDL    Cardiac WDL WDL        Peripheral/Neurovascular WDL    Peripheral Neurovascular WDL WDL        Cognitive/Neuro/Behavioral WDL    Cognitive/Neuro/Behavioral WDL WDL                     "
not examined

## 2025-05-28 ENCOUNTER — PATIENT OUTREACH (OUTPATIENT)
Dept: CARE COORDINATION | Facility: CLINIC | Age: 50
End: 2025-05-28
Payer: COMMERCIAL

## 2025-05-28 NOTE — PROGRESS NOTES
Clinic Care Coordination Contact    Situation: Patient chart reviewed by care coordinator.    Background: Patient is enrolled    Assessment: Patient ay [place] ED 5/27/2025 for a cough.  Per ED report, patient on ABX for pneumonia, has 1 day of ABX remaining but does not feel as though she is getting better.  This is believed to be irritation related to post bronchial pneumonia.  Patient also asked for something for pain.  Patient was given 1 dose Oxycodone as she is managed by pain management.  Patient is scheduled to see PCP 6/6/2025 and on 6/10/2025.      Plan/Recommendations: SW will not intervene further regarding ED visit     Renetta Mayers, SOOW, MSW   [facility]  Care Coordination  [facility]  [phone]  5/28/2025 12:58 PM

## 2025-06-04 ENCOUNTER — PATIENT OUTREACH (OUTPATIENT)
Dept: CARE COORDINATION | Facility: CLINIC | Age: 50
End: 2025-06-04
Payer: COMMERCIAL

## 2025-06-04 NOTE — PROGRESS NOTES
Clinic Care Coordination Contact  Community Health Worker Follow Up    Care Gaps:     Health Maintenance Due   Topic Date Due    ADVANCE CARE PLANNING  Never done    COPD ACTION PLAN  Never done    MAMMO SCREENING  Never done    YEARLY PREVENTIVE VISIT  Never done    HEPATITIS C SCREENING  Never done    HEPATITIS B VACCINE (1 of 3 - 19+ 3-dose series) Never done    PNEUMOCOCCAL VACCINE: PEDIATRICS (0 to 5 YEARS) AND AT-RISK PATIENTS (6 to 49 YEARS) (2 of 2 - PCV) 01/14/2015    LIPID  Never done    COLORECTAL CANCER SCREENING  07/10/2024    COVID-19 VACCINE (1 - 2024-25 season) Never done    DEPRESSION 6 MO INDEX REPEAT PHQ-9  05/25/2025       Patient is working with her county worker to get her insurance changed and reassigned to [facility].     Care Plan:   Care Plan: Mental Health       Problem: Mental Health Symptoms Need Improvement       Goal: Improve management of mental health symptoms and establish with mental health/psychosocial supports       Start Date: 3/5/2025 Expected End Date: 8/8/2025    This Visit's Progress: 30% Recent Progress: 20%    Priority: High    Note:     Barriers: anxiety, agoraphobia, complex medical status requiring multiple appts   Strengths: therapy weekly, supportive family and significant other, psychiatry appt scheduled for 3/19/2025. Recent [facility] referral   Patient expressed understanding of goal: yes  Action steps to achieve this goal:  1. I will continue to attend all mental health appts   2. I will continue to take medications as prescribed   3. I will reach out to others in times of increased stress   4. I will work with [employer] to get an [facility] worker.  Waiting on insurance to change. 6/4/2025                              Care Plan: Medication Regimen       Problem: Medication Adherence       Goal: Consistently take Medications as Prescribed       Start Date: 3/5/2025 Expected End Date: 8/8/2025    This Visit's Progress: 30% Recent Progress: 20%    Priority: High    Note:      Barriers: forget to take medications, poor short  term memory recall   Strengths: obtains reminders   Patient expressed understanding of goal: yes  Action steps to achieve this goal:  1. I will take as prescribed  2. I will request reminders as needed   3. I will create a reminder system as needed                             Care Plan: Pulmonary and cardiac appts       Problem: Need to be addressed       Goal: I will attend scheduled pulmonary and cardiac appts and follow recommendations       Start Date: 3/5/2025 Expected End Date: 8/1/2025    This Visit's Progress: 30% Recent Progress: 20%    Priority: Medium    Note:     Barriers: cardiac and pulmonary concerns  Strengths: has scheduled appts  Patient expressed understanding of goal: yes  Action steps to achieve this goal:  1. I will continue to attend scheduled cardiac and pulmonary appts   2. My significant other will transport and ensure I attend   3. I will follow recommendations from these appts                              Care Plan: S.S.I.       Problem: Denied previously       Goal: I plan to appyly again for S.S.I       Start Date: 3/5/2025 Expected End Date: 9/4/2025    This Visit's Progress: 30% Recent Progress: 20%    Priority: Medium    Note:     Barriers: denied previously  Strengths: therapist assisting with process, recent [facility] referral   Patient expressed understanding of goal: yes  Action steps to achieve this goal:  1. I will work on necessary paperwork for application   2. I will request assistance as needed from others for application process   3. I will consider a law consult for additional support     Waiting on the county to approve services and then will pursue applying for disability again.                                Intervention and Education during outreach:   Patient states that she is waiting for her county worker to get her insurance switched to [employer] so that she can schedule with [employer] to get assessed for an ARM  worker.   She also has restricted access and is working to get her clinic changed to [facility]. She needs something closer to her home due to transportation.     CHW Plan: CHW will continue to support patient with goals through routine scheduled outreach.     Next outreach due: 7/7/25

## 2025-06-05 ENCOUNTER — PATIENT OUTREACH (OUTPATIENT)
Dept: CARE COORDINATION | Facility: CLINIC | Age: 50
End: 2025-06-05
Payer: COMMERCIAL

## 2025-06-05 NOTE — PROGRESS NOTES
Clinic Care Coordination Contact  Care Coordination Clinician Chart Review    Situation: Patient chart reviewed by Care Coordinator.       Background: Care Coordination Program started: 2/24/2025. Initial assessment completed and patient-centered care plan(s) were developed with participation from patient. Lead CC handed patient off to CHW for continued outreaches.       Assessment: Per chart review, patient outreach completed by CC CHW on 6/4/2025.  Per CHW note, Patient is working with [facility] worker for insurance insurance change to be and reassigned to [facility]. Patient stated plan to transfer to [facility] to allow her to be assessed for [facility] worker through [employer].  Patient reported she needs a clinic closer to her home due to transportation.     Patient is actively working to accomplish goal(s). Patient's goal(s) appropriate and relevant at this time. Patient is not due for updated Plan of Care.  Assessments will be completed annually or as needed/with change of patient status.      Care Plan: Mental Health       Problem: Mental Health Symptoms Need Improvement       Goal: Improve management of mental health symptoms and establish with mental health/psychosocial supports       Start Date: 3/5/2025 Expected End Date: 8/8/2025    This Visit's Progress: 30% Recent Progress: 20%    Priority: High    Note:     Barriers: anxiety, agoraphobia, complex medical status requiring multiple appts   Strengths: therapy weekly, supportive family and significant other, psychiatry appt scheduled for 3/19/2025. Recent [facility] referral   Patient expressed understanding of goal: yes  Action steps to achieve this goal:  1. I will continue to attend all mental health appts   2. I will continue to take medications as prescribed   3. I will reach out to others in times of increased stress   4. I will work with [employer] to get an ARM worker.  Waiting on insurance to change. 6/4/2025                              Care Plan: Medication  Regimen       Problem: Medication Adherence       Goal: Consistently take Medications as Prescribed       Start Date: 3/5/2025 Expected End Date: 8/8/2025    This Visit's Progress: 30% Recent Progress: 20%    Priority: High    Note:     Barriers: forget to take medications, poor short  term memory recall   Strengths: obtains reminders   Patient expressed understanding of goal: yes  Action steps to achieve this goal:  1. I will take as prescribed  2. I will request reminders as needed   3. I will create a reminder system as needed                             Care Plan: Pulmonary and cardiac appts       Problem: Need to be addressed       Goal: I will attend scheduled pulmonary and cardiac appts and follow recommendations       Start Date: 3/5/2025 Expected End Date: 8/1/2025    This Visit's Progress: 30% Recent Progress: 20%    Priority: Medium    Note:     Barriers: cardiac and pulmonary concerns  Strengths: has scheduled appts  Patient expressed understanding of goal: yes  Action steps to achieve this goal:  1. I will continue to attend scheduled cardiac and pulmonary appts   2. My significant other will transport and ensure I attend   3. I will follow recommendations from these appts                              Care Plan: S.S.I.       Problem: Denied previously       Goal: I plan to appyly again for S.S.I       Start Date: 3/5/2025 Expected End Date: 9/4/2025    This Visit's Progress: 30% Recent Progress: 20%    Priority: Medium    Note:     Barriers: denied previously  Strengths: therapist assisting with process, recent [facility] referral   Patient expressed understanding of goal: yes  Action steps to achieve this goal:  1. I will work on necessary paperwork for application   2. I will request assistance as needed from others for application process   3. I will consider a law consult for additional support     Waiting on the county to approve services and then will pursue applying for disability again.                                    Plan/Recommendations: The patient will continue working with Care Coordination to achieve goal(s) as above. CHW will continue outreaches at minimum every 30 days and will involve Lead CC as needed or if patient is ready to move to Maintenance. Lead CC will continue to monitor CHW outreaches and patient's progress to goal(s) every 6 weeks.     Plan of Care updated and sent to patient: No    SMA Hu, MSW   [facility]  Care Coordination  [facility] and [facility]  [phone]  6/5/2025 6:54 AM

## 2025-06-12 NOTE — TELEPHONE ENCOUNTER
Patient Quality Outreach    Patient is due for the following:   Colon Cancer Screening  Breast Cancer Screening - Mammogram  Depression  -  PHQ-9 needed  Physical Preventive Adult Physical      Topic Date Due    Hepatitis B Vaccine (1 of 3 - 19+ 3-dose series) Never done    Pneumococcal Vaccine (2 of 2 - PCV) 01/14/2015    COVID-19 Vaccine (1 - 2024-25 season) Never done       Action(s) Taken:   Patient requests NO further contact/outreach    Type of outreach:    Patient requests NO further contact/outreach. Patient states she will be switching care to [facility] and would not like to be contacted from [facility].     Questions for provider review:    None         Kelsie Hale, AMADOU  Chart routed to None.

## 2025-06-13 ENCOUNTER — HOSPITAL ENCOUNTER (EMERGENCY)
Facility: CLINIC | Age: 50
Discharge: HOME | End: 2025-06-13
Attending: FAMILY MEDICINE | Admitting: FAMILY MEDICINE
Payer: COMMERCIAL

## 2025-06-13 VITALS
TEMPERATURE: 98.1 F | DIASTOLIC BLOOD PRESSURE: 89 MMHG | HEART RATE: 74 BPM | RESPIRATION RATE: 18 BRPM | OXYGEN SATURATION: 93 % | HEIGHT: 63 IN | BODY MASS INDEX: 19.84 KG/M2 | SYSTOLIC BLOOD PRESSURE: 128 MMHG | WEIGHT: 112 LBS

## 2025-06-13 DIAGNOSIS — R19.7 NAUSEA VOMITING AND DIARRHEA: ICD-10-CM

## 2025-06-13 DIAGNOSIS — R11.2 NAUSEA VOMITING AND DIARRHEA: ICD-10-CM

## 2025-06-13 DIAGNOSIS — R10.84 GENERALIZED ABDOMINAL PAIN: ICD-10-CM

## 2025-06-13 LAB
ANION GAP SERPL CALCULATED.3IONS-SCNC: 4 MMOL/L (ref 7–15)
BASOPHILS # BLD AUTO: 0 10E3/UL (ref 0–0.2)
BASOPHILS NFR BLD AUTO: 0 %
BUN SERPL-MCNC: 6.7 MG/DL (ref 6–20)
CALCIUM SERPL-MCNC: 9.5 MG/DL (ref 8.8–10.4)
CHLORIDE SERPL-SCNC: 105 MMOL/L (ref 98–107)
CREAT SERPL-MCNC: 0.66 MG/DL (ref 0.51–0.95)
CRP SERPL-MCNC: <3 MG/L
EGFRCR SERPLBLD CKD-EPI 2021: >90 ML/MIN/1.73M2
EOSINOPHIL # BLD AUTO: 0.2 10E3/UL (ref 0–0.7)
EOSINOPHIL NFR BLD AUTO: 2 %
ERYTHROCYTE [DISTWIDTH] IN BLOOD BY AUTOMATED COUNT: 12.9 % (ref 10–15)
GLUCOSE SERPL-MCNC: 115 MG/DL (ref 70–99)
HCO3 SERPL-SCNC: 31 MMOL/L (ref 22–29)
HCT VFR BLD AUTO: 41.7 % (ref 35–47)
HGB BLD-MCNC: 14.3 G/DL (ref 11.7–15.7)
IMM GRANULOCYTES # BLD: 0 10E3/UL
IMM GRANULOCYTES NFR BLD: 0 %
LYMPHOCYTES # BLD AUTO: 3.7 10E3/UL (ref 0.8–5.3)
LYMPHOCYTES NFR BLD AUTO: 34 %
MCH RBC QN AUTO: 31 PG (ref 26.5–33)
MCHC RBC AUTO-ENTMCNC: 34.3 G/DL (ref 31.5–36.5)
MCV RBC AUTO: 91 FL (ref 78–100)
MONOCYTES # BLD AUTO: 1 10E3/UL (ref 0–1.3)
MONOCYTES NFR BLD AUTO: 9 %
NEUTROPHILS # BLD AUTO: 5.9 10E3/UL (ref 1.6–8.3)
NEUTROPHILS NFR BLD AUTO: 54 %
NRBC # BLD AUTO: 0 10E3/UL
NRBC BLD AUTO-RTO: 0 /100
PLATELET # BLD AUTO: 183 10E3/UL (ref 150–450)
POTASSIUM SERPL-SCNC: 3.7 MMOL/L (ref 3.4–5.3)
RBC # BLD AUTO: 4.61 10E6/UL (ref 3.8–5.2)
SODIUM SERPL-SCNC: 140 MMOL/L (ref 135–145)
WBC # BLD AUTO: 10.8 10E3/UL (ref 4–11)

## 2025-06-13 PROCEDURE — 85004 AUTOMATED DIFF WBC COUNT: CPT | Performed by: FAMILY MEDICINE

## 2025-06-13 PROCEDURE — 258N000003 HC RX IP 258 OP 636: Performed by: FAMILY MEDICINE

## 2025-06-13 PROCEDURE — 99284 EMERGENCY DEPT VISIT MOD MDM: CPT | Performed by: FAMILY MEDICINE

## 2025-06-13 PROCEDURE — 80048 BASIC METABOLIC PNL TOTAL CA: CPT | Performed by: FAMILY MEDICINE

## 2025-06-13 PROCEDURE — 86140 C-REACTIVE PROTEIN: CPT | Performed by: FAMILY MEDICINE

## 2025-06-13 PROCEDURE — 99284 EMERGENCY DEPT VISIT MOD MDM: CPT | Mod: 25

## 2025-06-13 PROCEDURE — 96374 THER/PROPH/DIAG INJ IV PUSH: CPT

## 2025-06-13 PROCEDURE — 250N000011 HC RX IP 250 OP 636: Mod: JZ | Performed by: FAMILY MEDICINE

## 2025-06-13 PROCEDURE — 96361 HYDRATE IV INFUSION ADD-ON: CPT

## 2025-06-13 PROCEDURE — 36415 COLL VENOUS BLD VENIPUNCTURE: CPT | Performed by: FAMILY MEDICINE

## 2025-06-13 RX ORDER — HYDROMORPHONE HYDROCHLORIDE 1 MG/ML
0.5 INJECTION, SOLUTION INTRAMUSCULAR; INTRAVENOUS; SUBCUTANEOUS
Status: DISCONTINUED | OUTPATIENT
Start: 2025-06-13 | End: 2025-06-13 | Stop reason: HOSPADM

## 2025-06-13 RX ADMIN — HYDROMORPHONE HYDROCHLORIDE 0.5 MG: 1 INJECTION, SOLUTION INTRAMUSCULAR; INTRAVENOUS; SUBCUTANEOUS at 17:27

## 2025-06-13 RX ADMIN — SODIUM CHLORIDE 1000 ML: 0.9 INJECTION, SOLUTION INTRAVENOUS at 17:28

## 2025-06-13 ASSESSMENT — ACTIVITIES OF DAILY LIVING (ADL)
ADLS_ACUITY_SCORE: 51
ADLS_ACUITY_SCORE: 51
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
## 2025-06-13 NOTE — ED TRIAGE NOTES
Pt here with vomiting that started today. Tried her zofran without results. Stomach pain and diarrhea have been present for weeks. Had a virtual visit this AM to switch from vicodin to percocet 1 per day without any relief. Her MD was going to give her CT referral, but they would not be able to get her in for weeks. They recommended she come in. Was given 4 mg of zofran by EMS.      Triage Assessment (Adult)       Row Name 06/13/25 7537          Triage Assessment    Airway WDL WDL        Respiratory WDL    Respiratory WDL WDL        Skin Circulation/Temperature WDL    Skin Circulation/Temperature WDL WDL        Cardiac WDL    Cardiac WDL WDL        Peripheral/Neurovascular WDL    Peripheral Neurovascular WDL WDL        Cognitive/Neuro/Behavioral WDL    Cognitive/Neuro/Behavioral WDL WDL

## 2025-06-13 NOTE — DISCHARGE INSTRUCTIONS
RETURN TO THE EMERGENCY ROOM FOR THE FOLLOWING:    Severely worsened pain, concerns for dehydration, or at anytime for any concern.    Stool culture and C. difficile testing ordered at the time of discharge, kit provided.    FOLLOW UP:    Follow-up with your primary clinic and discuss symptoms further.  Consider outpatient colonoscopy through general surgery.    TREATMENT RECOMMENDATIONS:    There have been no new medications provided and there are no prescription medication changes recommended.     NURSE ADVICE LINE:  [phone] or [phone]

## 2025-06-16 ENCOUNTER — MYC MEDICAL ADVICE (OUTPATIENT)
Dept: FAMILY MEDICINE | Facility: CLINIC | Age: 50
End: 2025-06-16
Payer: COMMERCIAL

## 2025-06-16 DIAGNOSIS — G89.4 CHRONIC PAIN SYNDROME: ICD-10-CM

## 2025-06-16 RX ORDER — OXYCODONE AND ACETAMINOPHEN 5; 325 MG/1; MG/1
1 TABLET ORAL DAILY
Qty: 7 TABLET | Refills: 0 | Status: SHIPPED | OUTPATIENT
Start: 2025-06-20

## 2025-06-16 RX ORDER — OXYCODONE AND ACETAMINOPHEN 5; 325 MG/1; MG/1
1 TABLET ORAL DAILY
Qty: 7 TABLET | Refills: 0 | Status: SHIPPED | OUTPATIENT
Start: 2025-07-03 | End: 2025-07-10

## 2025-06-16 RX ORDER — OXYCODONE AND ACETAMINOPHEN 5; 325 MG/1; MG/1
1 TABLET ORAL DAILY
Qty: 7 TABLET | Refills: 0 | Status: SHIPPED | OUTPATIENT
Start: 2025-06-27 | End: 2025-07-04

## 2025-06-16 NOTE — TELEPHONE ENCOUNTER
See patient's mychart message; I called and spoke to Sarah in the pharmacy who verifies patient's insurance only allows a one week supply to be dispensed for the first 4 fills.   She is unsure if they are counting the norco or not.   Either way, this is either her 3rd fill of either norco or percocet, or the first fill of percocet.      Meli picked up 7 percocet on 6/13, so will be out on 6/20/25 (will take the last dose 6/19).     Routed to PCP, patient will need new Rx for oxycodone around 6/20/25 as only one week was allowed.    Paula NANCE RN  [facility] Triage

## 2025-06-16 NOTE — TELEPHONE ENCOUNTER
I sent over the next 3 weeks worth 7 days at a time.   She can pick it up a day early july 3 as we are closed july 4th.     Sharmaine Christensen PA-C

## 2025-06-17 ENCOUNTER — PATIENT OUTREACH (OUTPATIENT)
Dept: CARE COORDINATION | Facility: CLINIC | Age: 50
End: 2025-06-17
Payer: COMMERCIAL

## 2025-06-17 NOTE — PROGRESS NOTES
Clinic Care Coordination Contact  [facility]/Voicemail    SW unable to call yesterday due to having Epic issues most of the day     Patient is enrolled     Clinical Data: Care Coordinator Outreach    Outreach Documentation Number of Outreach Attempt   5/14/2025   2:13 PM 1   5/22/2025  12:21 PM 1   5/23/2025  10:15 AM 2   6/17/2025   1:56 PM 1       Left message on patient's voicemail with call back information and requested return call.      Plan: Care Coordinator will try to reach patient again in 1-2 business days.    SAM Hu, MSW   [facility]  Care Coordination  [facility]  [phone]  6/17/2025 1:56 PM

## 2025-06-17 NOTE — TELEPHONE ENCOUNTER
Explained to patient that she can  her subsequent prescriptions on 6/20/25, 6/27/25 and 7/3/25. Patient states she understood and agreeable to this.     Kelsie Hale, CMA

## 2025-06-23 ENCOUNTER — VIRTUAL VISIT (OUTPATIENT)
Dept: FAMILY MEDICINE | Facility: CLINIC | Age: 50
End: 2025-06-23
Payer: COMMERCIAL

## 2025-06-23 DIAGNOSIS — R10.13 ABDOMINAL PAIN, EPIGASTRIC: Primary | ICD-10-CM

## 2025-06-23 DIAGNOSIS — S06.9XAS TRAUMATIC BRAIN INJURY, WITH UNKNOWN LOSS OF CONSCIOUSNESS STATUS, SEQUELA: ICD-10-CM

## 2025-06-23 DIAGNOSIS — Z86.0100 HISTORY OF COLONIC POLYPS: ICD-10-CM

## 2025-06-23 DIAGNOSIS — Z78.9 MEDICALLY COMPLEX PATIENT: ICD-10-CM

## 2025-06-23 DIAGNOSIS — R19.7 DIARRHEA, UNSPECIFIED TYPE: ICD-10-CM

## 2025-06-23 DIAGNOSIS — R19.4 ENCOUNTER FOR DIAGNOSTIC COLONOSCOPY DUE TO CHANGE IN BOWEL HABITS: ICD-10-CM

## 2025-06-23 DIAGNOSIS — G89.4 CHRONIC PAIN SYNDROME: ICD-10-CM

## 2025-06-23 PROBLEM — J96.01 ACUTE RESPIRATORY FAILURE WITH HYPOXIA (H): Status: RESOLVED | Noted: 2024-04-19 | Resolved: 2025-06-23

## 2025-06-23 PROBLEM — J45.909 ASTHMA: Status: RESOLVED | Noted: 2024-04-19 | Resolved: 2025-06-23

## 2025-06-23 PROBLEM — J45.901 ASTHMA EXACERBATION: Status: RESOLVED | Noted: 2024-04-19 | Resolved: 2025-06-23

## 2025-06-23 PROCEDURE — 98006 SYNCH AUDIO-VIDEO EST MOD 30: CPT | Performed by: PHYSICIAN ASSISTANT

## 2025-06-23 NOTE — PROGRESS NOTES
Meli is a 49 year old who is being evaluated via a billable video visit.    How would you like to obtain your AVS? [employer]  If the video visit is dropped, the invitation should be resent by: Text to cell phone: [phone]  Will anyone else be joining your video visit? No      Assessment & Plan     Abdominal pain, epigastric    - Adult GI  Referral - Procedure Only; Future  - Adult Gen Surg  Referral; Future    Diarrhea, unspecified type    - Adult GI  Referral - Procedure Only; Future  - Adult Gen Surg  Referral; Future    Encounter for diagnostic colonoscopy due to change in bowel habits    - Adult Gen Surg  Referral; Future    History of colonic polyps      Chronic pain syndrome  Ok for 30 5 mg percocets a month max. Insurance is having her fill weekly for now.   Side effects of medication and expected course of condition discussed.    See hpi    Medically complex patient      Traumatic brain injury, with unknown loss of consciousness status, sequela            The longitudinal plan of care for the diagnosis(es)/condition(s) as documented were addressed during this visit. Due to the added complexity in care, I will continue to support Meli in the subsequent management and with ongoing continuity of care.      Subjective   Meli is a 49 year old, presenting for the following health issues:  Colonoscopy (Referral)        6/23/2025     6:57 AM   Additional Questions   Roomed by Pt Completed E-check in via [employer]     Video Start Time: 9:04 AM    History of Present Illness       Reason for visit:  Referral for general surgery to get Colonoscopy    She eats 0-1 servings of fruits and vegetables daily.She consumes 11 or more sweetened beverage(s) daily.She exercises with enough effort to increase her heart rate 10 to 19 minutes per day.  She exercises with enough effort to increase her heart rate 7 days per week.   She is taking medications regularly.          Chronic  conditions:  H/o tbi.     Patient with chronic abdominal pain, diarrhea, frequently visits er, ct negative, strong fh of colon cancer. Weight loss. She is concerned about cx. H/o polyps. Needs colonoscopy but has been difficult to schedule wtih her restricted insurance per provider. Was told perhaps gen surg was covered.   Has had problems with the prep in the past with taking the prep. Has needed a tube in nose to do it that way. Unsure how we will do this. I placed referral for both gi consult and diagnostic scope. Has had diarrhea.   Follow up if symptoms worsen or change. To ER with severe worsening symptoms.       Chronic pain update-for now I am managing as she saw our pain specialist but then I got a letter stating they aren't covered by her insurance and I had to Rx. Doing 30 tabs percocet a month only. No more and she is aware of this.   Is trying to get into pain clinic but there is a waitlist for ones that are covered by about  months.   Has medica  and not all mds are covered even if the clinic is. Right now getting 7 tabs/week through me because they wont fill 30 tabs daily at this point. Cheng need regular urine drug screens. H/o drug abuse and cannabis abuse and bipolar 2. No jennifer currently. Started back on abilify and lexapro which previous helped. On gabapentin.     Pulm emphysema-former smoker. Albuterol only for now. Previously referred to pulm. H/o bronchitis and pneumonia 2025 treated with Levaquin. From last onc visit for lung nodules:   Assessment and Plan:     1. Established multiple pulmonary lung nodule(s). Given the characteristics on current/previous imaging and risk factors; I would classify this to be Low (<6%) risk for cancer. No further follow up necessary.     2. Chest pain. No typical pulmonary factors. She is having an SVT evaluation with cardiology. Nothing anatomic concerning on CT.  She will need to follow up with her primary and she has been referred to a chronic pain clinic.            Objective           Vitals:  No vitals were obtained today due to virtual visit.    Physical Exam             Video-Visit Details    Type of service:  Video Visit   Video End Time:9:22 AM  Originating Location (pt. Location): Home    Distant Location (provider location):  On-site  Platform used for Video Visit: Leonora  Signed Electronically by: Sharmaine Christensen PA-C

## 2025-06-23 NOTE — PROGRESS NOTES
Clinic Care Coordination Contact    SW did not make second call to patient after ED visit.  Patient had virtual visit with PCP today.  Adult GI and general surgery consults placed by PCP.      Plan:   1) CHW will call as scheduled for update, Care Coordinator will review progress to goals and plan of care in 6 weeks.     SAM Hu, MSW   [facility]  Care Coordination  [facility]  [phone]  6/23/2025 3:20 PM

## 2025-06-25 ENCOUNTER — OFFICE VISIT (OUTPATIENT)
Dept: SURGERY | Facility: CLINIC | Age: 50
End: 2025-06-25
Attending: PHYSICIAN ASSISTANT
Payer: COMMERCIAL

## 2025-06-25 VITALS
WEIGHT: 119.4 LBS | HEIGHT: 63 IN | BODY MASS INDEX: 21.16 KG/M2 | DIASTOLIC BLOOD PRESSURE: 75 MMHG | SYSTOLIC BLOOD PRESSURE: 124 MMHG | TEMPERATURE: 97.4 F | HEART RATE: 67 BPM

## 2025-06-25 DIAGNOSIS — R10.13 ABDOMINAL PAIN, EPIGASTRIC: ICD-10-CM

## 2025-06-25 DIAGNOSIS — R11.2 NAUSEA AND VOMITING, UNSPECIFIED VOMITING TYPE: Primary | ICD-10-CM

## 2025-06-25 DIAGNOSIS — R19.4 ENCOUNTER FOR DIAGNOSTIC COLONOSCOPY DUE TO CHANGE IN BOWEL HABITS: ICD-10-CM

## 2025-06-25 DIAGNOSIS — R45.4 OUTBURSTS OF ANGER: ICD-10-CM

## 2025-06-25 DIAGNOSIS — F12.10 CANNABIS ABUSE: ICD-10-CM

## 2025-06-25 DIAGNOSIS — R19.7 DIARRHEA, UNSPECIFIED TYPE: ICD-10-CM

## 2025-06-25 DIAGNOSIS — F17.200 NICOTINE USE DISORDER: ICD-10-CM

## 2025-06-25 PROCEDURE — 99203 OFFICE O/P NEW LOW 30 MIN: CPT | Performed by: SURGERY

## 2025-06-25 PROCEDURE — 3078F DIAST BP <80 MM HG: CPT | Performed by: SURGERY

## 2025-06-25 PROCEDURE — 3074F SYST BP LT 130 MM HG: CPT | Performed by: SURGERY

## 2025-06-25 NOTE — NURSING NOTE
"Initial /75 (BP Location: Right arm, Patient Position: Sitting, Cuff Size: Adult Regular)   Pulse 67   Temp 97.4  F (36.3  C) (Tympanic)   Ht 1.6 m (5' 3\")   Wt 54.2 kg (119 lb 6.4 oz)   BMI 21.15 kg/m   Estimated body mass index is 21.15 kg/m  as calculated from the following:    Height as of this encounter: 1.6 m (5' 3\").    Weight as of this encounter: 54.2 kg (119 lb 6.4 oz). .  Sharmaine Sawyer MA    "

## 2025-06-25 NOTE — PROGRESS NOTES
"  Assessment & Plan   Problem List Items Addressed This Visit          Behavioral Health    Cannabis abuse    Outbursts of anger    Nicotine use disorder       FEN/Gastrointestinal    Abdominal pain, epigastric     Other Visit Diagnoses         Nausea and vomiting, unspecified vomiting type    -  Primary      Diarrhea, unspecified type          Encounter for diagnostic colonoscopy due to change in bowel habits               48 yo F with chronic abdominal pain, nausea, and vomiting.   -I offered EGD and colonoscopy for diarrhea and epigastric pain.   -previous endoscopies as stated below.   -Pt got angry and walked out of the visit and stated that I was gaslighting her again.  Pt also stated she'll just be going to the ED to get her colon prep done.   -this happened after I told her there is no medical indications for me to directly admit her to the hospital with an NGT just so she can do her colonoscopy prep.    -I never had a chance to discuss further work-up and possible causes of her pain and nausea.     Face to Face/patient Contact total time: 15 minutes  Pre Charting time: 10 minutes; Post charting time, communication and other activities: 10 minutes;   Total time:  35 minutes      Faviola Garrison is a 49 year old, presenting for the following health issues:  No chief complaint on file.    Epigastric pain for 10+ years  Worsening the past few months.   Chronic diarrhea - mucous, cannot eat as she just vomits; \"lives on zofran.\"   Multiple CTs and ED work-up; all WNL  Hx of lap marcos; lap appy; MAKENZIE/BSO   Hx of suprapubic cather  Still smoke -1/2ppd;cannabis use.   [facility] in 2017 [facility] - LA A; otherwise normal  Colonoscopy at [facility] 8+ yrs ago (5/2017) - precancerous polyp? But no final pathology.  Reported that she was admitted prior to the colonoscopy for the prep and she cannot tolerates it.    Hx of chronic pain - nopain contract.   GI consults in the past - stated they only tells her it is IBS.  "                 Review of Systems  Constitutional, neuro, ENT, endocrine, pulmonary, cardiac, gastrointestinal, genitourinary, musculoskeletal, integument and psychiatric systems are negative, except as otherwise noted.      Objective    There were no vitals taken for this visit.  There is no height or weight on file to calculate BMI.  Physical Exam   Patient walked up before an exam can be done.           Signed Electronically by: Neal Raymundo MD

## 2025-06-25 NOTE — LETTER
"6/25/2025      Meli Rodriguez  [address]  [facility] [address]      Dear Colleague,    Thank you for referring your patient, Meil Rodriguez, to the [facility]. Please see a copy of my visit note below.      Assessment & Plan  Problem List Items Addressed This Visit          Behavioral Health    Cannabis abuse    Outbursts of anger    Nicotine use disorder       FEN/Gastrointestinal    Abdominal pain, epigastric     Other Visit Diagnoses         Nausea and vomiting, unspecified vomiting type    -  Primary      Diarrhea, unspecified type          Encounter for diagnostic colonoscopy due to change in bowel habits               50 yo F with chronic abdominal pain, nausea, and vomiting.   -I offered EGD and colonoscopy for diarrhea and epigastric pain.   -previous endoscopies as stated below.   -Pt got angry and walked out of the visit and stated that I was gaslighting her again.  Pt also stated she'll just be going to the ED to get her colon prep done.   -this happened after I told her there is no medical indications for me to directly admit her to the hospital with an NGT just so she can do her colonoscopy prep.    -I never had a chance to discuss further work-up and possible causes of her pain and nausea.     Face to Face/patient Contact total time: 15 minutes  Pre Charting time: 10 minutes; Post charting time, communication and other activities: 10 minutes;   Total time:  35 minutes      Subjective  Meli is a 49 year old, presenting for the following health issues:  No chief complaint on file.    Epigastric pain for 10+ years  Worsening the past few months.   Chronic diarrhea - mucous, cannot eat as she just vomits; \"lives on zofran.\"   Multiple CTs and ED work-up; all WNL  Hx of lap marcos; lap appy; MAKENZIE/BSO   Hx of suprapubic cather  Still smoke -1/2ppd;cannabis use.   EGD in 2017 at OSH ([facility]) - LA A; otherwise normal  Colonoscopy at [place] 8+ yrs ago (5/2017) - precancerous polyp? " But no final pathology.  Reported that she was admitted prior to the colonoscopy for the prep and she cannot tolerates it.    Hx of chronic pain - nopain contract.   GI consults in the past - stated they only tells her it is IBS.                  Review of Systems  Constitutional, neuro, ENT, endocrine, pulmonary, cardiac, gastrointestinal, genitourinary, musculoskeletal, integument and psychiatric systems are negative, except as otherwise noted.      Objective   There were no vitals taken for this visit.  There is no height or weight on file to calculate BMI.  Physical Exam   Patient walked up before an exam can be done.           Signed Electronically by: Neal Raymundo MD      Again, thank you for allowing me to participate in the care of your patient.        Sincerely,        Neal Raymundo MD    Electronically signed

## 2025-07-01 ENCOUNTER — OFFICE VISIT (OUTPATIENT)
Dept: FAMILY MEDICINE | Facility: CLINIC | Age: 50
End: 2025-07-01
Payer: COMMERCIAL

## 2025-07-01 VITALS
SYSTOLIC BLOOD PRESSURE: 100 MMHG | TEMPERATURE: 97.1 F | HEART RATE: 82 BPM | RESPIRATION RATE: 18 BRPM | BODY MASS INDEX: 19.79 KG/M2 | OXYGEN SATURATION: 97 % | WEIGHT: 118.8 LBS | DIASTOLIC BLOOD PRESSURE: 72 MMHG | HEIGHT: 65 IN

## 2025-07-01 DIAGNOSIS — R05.3 CHRONIC COUGH: ICD-10-CM

## 2025-07-01 DIAGNOSIS — Z13.1 SCREENING FOR DIABETES MELLITUS: ICD-10-CM

## 2025-07-01 DIAGNOSIS — E55.9 VITAMIN D DEFICIENCY: ICD-10-CM

## 2025-07-01 DIAGNOSIS — G89.4 CHRONIC PAIN SYNDROME: ICD-10-CM

## 2025-07-01 DIAGNOSIS — Z79.899 CONTROLLED SUBSTANCE AGREEMENT SIGNED: ICD-10-CM

## 2025-07-01 DIAGNOSIS — Z13.6 SCREENING FOR CARDIOVASCULAR CONDITION: ICD-10-CM

## 2025-07-01 DIAGNOSIS — F11.20 CONTINUOUS OPIOID DEPENDENCE (H): ICD-10-CM

## 2025-07-01 DIAGNOSIS — R10.9 CHRONIC ABDOMINAL PAIN: Primary | ICD-10-CM

## 2025-07-01 DIAGNOSIS — Z13.220 SCREENING, LIPID: ICD-10-CM

## 2025-07-01 DIAGNOSIS — G89.29 CHRONIC ABDOMINAL PAIN: Primary | ICD-10-CM

## 2025-07-01 DIAGNOSIS — R63.4 WEIGHT LOSS: ICD-10-CM

## 2025-07-01 LAB
ALBUMIN SERPL BCG-MCNC: 4.5 G/DL (ref 3.5–5.2)
ALP SERPL-CCNC: 74 U/L (ref 40–150)
ALT SERPL W P-5'-P-CCNC: 11 U/L (ref 0–50)
AMPHETAMINES UR QL SCN: ABNORMAL
ANION GAP SERPL CALCULATED.3IONS-SCNC: 14 MMOL/L (ref 7–15)
AST SERPL W P-5'-P-CCNC: 25 U/L (ref 0–45)
BARBITURATES UR QL SCN: ABNORMAL
BASOPHILS # BLD AUTO: 0 10E3/UL (ref 0–0.2)
BASOPHILS NFR BLD AUTO: 0 %
BENZODIAZ UR QL SCN: ABNORMAL
BILIRUB SERPL-MCNC: 0.8 MG/DL
BUN SERPL-MCNC: 10.2 MG/DL (ref 6–20)
BZE UR QL SCN: ABNORMAL
CALCIUM SERPL-MCNC: 9.6 MG/DL (ref 8.8–10.4)
CANNABINOIDS UR QL SCN: ABNORMAL
CHLORIDE SERPL-SCNC: 103 MMOL/L (ref 98–107)
CHOLEST SERPL-MCNC: 201 MG/DL
CREAT SERPL-MCNC: 0.65 MG/DL (ref 0.51–0.95)
EGFRCR SERPLBLD CKD-EPI 2021: >90 ML/MIN/1.73M2
EOSINOPHIL # BLD AUTO: 0.2 10E3/UL (ref 0–0.7)
EOSINOPHIL NFR BLD AUTO: 2 %
ERYTHROCYTE [DISTWIDTH] IN BLOOD BY AUTOMATED COUNT: 12.8 % (ref 10–15)
FASTING STATUS PATIENT QL REPORTED: NO
FASTING STATUS PATIENT QL REPORTED: NO
FENTANYL UR QL: ABNORMAL
FERRITIN SERPL-MCNC: 76 NG/ML (ref 6–175)
GLUCOSE SERPL-MCNC: 97 MG/DL (ref 70–99)
HCO3 SERPL-SCNC: 22 MMOL/L (ref 22–29)
HCT VFR BLD AUTO: 43.2 % (ref 35–47)
HDLC SERPL-MCNC: 57 MG/DL
HGB BLD-MCNC: 14.7 G/DL (ref 11.7–15.7)
IMM GRANULOCYTES # BLD: 0 10E3/UL
IMM GRANULOCYTES NFR BLD: 0 %
IRON BINDING CAPACITY (ROCHE): 293 UG/DL (ref 240–430)
IRON SATN MFR SERPL: 27 % (ref 15–46)
IRON SERPL-MCNC: 80 UG/DL (ref 37–145)
LDLC SERPL CALC-MCNC: 125 MG/DL
LIPASE SERPL-CCNC: 18 U/L (ref 13–60)
LYMPHOCYTES # BLD AUTO: 2.8 10E3/UL (ref 0.8–5.3)
LYMPHOCYTES NFR BLD AUTO: 28 %
MCH RBC QN AUTO: 30.9 PG (ref 26.5–33)
MCHC RBC AUTO-ENTMCNC: 34 G/DL (ref 31.5–36.5)
MCV RBC AUTO: 91 FL (ref 78–100)
MONOCYTES # BLD AUTO: 1 10E3/UL (ref 0–1.3)
MONOCYTES NFR BLD AUTO: 10 %
NEUTROPHILS # BLD AUTO: 6.1 10E3/UL (ref 1.6–8.3)
NEUTROPHILS NFR BLD AUTO: 60 %
NONHDLC SERPL-MCNC: 144 MG/DL
OPIATES UR QL SCN: ABNORMAL
PCP QUAL URINE (ROCHE): ABNORMAL
PLATELET # BLD AUTO: 217 10E3/UL (ref 150–450)
POTASSIUM SERPL-SCNC: 4 MMOL/L (ref 3.4–5.3)
PROT SERPL-MCNC: 7.2 G/DL (ref 6.4–8.3)
RBC # BLD AUTO: 4.76 10E6/UL (ref 3.8–5.2)
SODIUM SERPL-SCNC: 139 MMOL/L (ref 135–145)
T4 FREE SERPL-MCNC: 0.83 NG/DL (ref 0.9–1.7)
TRIGL SERPL-MCNC: 94 MG/DL
TSH SERPL DL<=0.005 MIU/L-ACNC: 5.63 UIU/ML (ref 0.3–4.2)
VIT D+METAB SERPL-MCNC: 15 NG/ML (ref 20–50)
WBC # BLD AUTO: 10.1 10E3/UL (ref 4–11)

## 2025-07-01 PROCEDURE — 80307 DRUG TEST PRSMV CHEM ANLYZR: CPT | Performed by: PHYSICIAN ASSISTANT

## 2025-07-01 PROCEDURE — 36415 COLL VENOUS BLD VENIPUNCTURE: CPT | Performed by: PHYSICIAN ASSISTANT

## 2025-07-01 PROCEDURE — 3074F SYST BP LT 130 MM HG: CPT | Performed by: PHYSICIAN ASSISTANT

## 2025-07-01 PROCEDURE — 1125F AMNT PAIN NOTED PAIN PRSNT: CPT | Performed by: PHYSICIAN ASSISTANT

## 2025-07-01 PROCEDURE — 83550 IRON BINDING TEST: CPT | Performed by: PHYSICIAN ASSISTANT

## 2025-07-01 PROCEDURE — 83690 ASSAY OF LIPASE: CPT | Performed by: PHYSICIAN ASSISTANT

## 2025-07-01 PROCEDURE — 83540 ASSAY OF IRON: CPT | Performed by: PHYSICIAN ASSISTANT

## 2025-07-01 PROCEDURE — 80061 LIPID PANEL: CPT | Performed by: PHYSICIAN ASSISTANT

## 2025-07-01 PROCEDURE — 82728 ASSAY OF FERRITIN: CPT | Performed by: PHYSICIAN ASSISTANT

## 2025-07-01 PROCEDURE — 84439 ASSAY OF FREE THYROXINE: CPT | Performed by: PHYSICIAN ASSISTANT

## 2025-07-01 PROCEDURE — 99215 OFFICE O/P EST HI 40 MIN: CPT | Performed by: PHYSICIAN ASSISTANT

## 2025-07-01 PROCEDURE — 84443 ASSAY THYROID STIM HORMONE: CPT | Performed by: PHYSICIAN ASSISTANT

## 2025-07-01 PROCEDURE — G2211 COMPLEX E/M VISIT ADD ON: HCPCS | Performed by: PHYSICIAN ASSISTANT

## 2025-07-01 PROCEDURE — 80053 COMPREHEN METABOLIC PANEL: CPT | Performed by: PHYSICIAN ASSISTANT

## 2025-07-01 PROCEDURE — 85025 COMPLETE CBC W/AUTO DIFF WBC: CPT | Performed by: PHYSICIAN ASSISTANT

## 2025-07-01 PROCEDURE — 82306 VITAMIN D 25 HYDROXY: CPT | Performed by: PHYSICIAN ASSISTANT

## 2025-07-01 PROCEDURE — 3078F DIAST BP <80 MM HG: CPT | Performed by: PHYSICIAN ASSISTANT

## 2025-07-01 RX ORDER — OXYCODONE AND ACETAMINOPHEN 5; 325 MG/1; MG/1
1 TABLET ORAL DAILY PRN
Qty: 30 TABLET | Refills: 0 | Status: SHIPPED | OUTPATIENT
Start: 2025-07-10 | End: 2025-07-01

## 2025-07-01 RX ORDER — OXYCODONE AND ACETAMINOPHEN 5; 325 MG/1; MG/1
1 TABLET ORAL DAILY
Qty: 7 TABLET | Refills: 0 | Status: SHIPPED | OUTPATIENT
Start: 2025-07-03 | End: 2025-07-03

## 2025-07-01 RX ORDER — OXYCODONE AND ACETAMINOPHEN 5; 325 MG/1; MG/1
1 TABLET ORAL DAILY PRN
Qty: 30 TABLET | Refills: 0 | Status: SHIPPED | OUTPATIENT
Start: 2025-08-08 | End: 2025-07-01

## 2025-07-01 RX ORDER — OXYCODONE AND ACETAMINOPHEN 5; 325 MG/1; MG/1
1 TABLET ORAL DAILY PRN
Qty: 30 TABLET | Refills: 0 | Status: SHIPPED | OUTPATIENT
Start: 2025-09-08

## 2025-07-01 RX ORDER — GABAPENTIN 600 MG/1
600 TABLET ORAL 3 TIMES DAILY
COMMUNITY
Start: 2025-07-01 | End: 2025-07-01

## 2025-07-01 ASSESSMENT — ANXIETY QUESTIONNAIRES
5. BEING SO RESTLESS THAT IT IS HARD TO SIT STILL: NEARLY EVERY DAY
3. WORRYING TOO MUCH ABOUT DIFFERENT THINGS: MORE THAN HALF THE DAYS
2. NOT BEING ABLE TO STOP OR CONTROL WORRYING: MORE THAN HALF THE DAYS
6. BECOMING EASILY ANNOYED OR IRRITABLE: NEARLY EVERY DAY
7. FEELING AFRAID AS IF SOMETHING AWFUL MIGHT HAPPEN: NEARLY EVERY DAY
GAD7 TOTAL SCORE: 19
GAD7 TOTAL SCORE: 19
IF YOU CHECKED OFF ANY PROBLEMS ON THIS QUESTIONNAIRE, HOW DIFFICULT HAVE THESE PROBLEMS MADE IT FOR YOU TO DO YOUR WORK, TAKE CARE OF THINGS AT HOME, OR GET ALONG WITH OTHER PEOPLE: EXTREMELY DIFFICULT
1. FEELING NERVOUS, ANXIOUS, OR ON EDGE: NEARLY EVERY DAY

## 2025-07-01 ASSESSMENT — PATIENT HEALTH QUESTIONNAIRE - PHQ9
SUM OF ALL RESPONSES TO PHQ QUESTIONS 1-9: 14
5. POOR APPETITE OR OVEREATING: NEARLY EVERY DAY

## 2025-07-01 ASSESSMENT — PAIN SCALES - GENERAL: PAINLEVEL_OUTOF10: SEVERE PAIN (8)

## 2025-07-01 NOTE — PROGRESS NOTES
"  Assessment & Plan     Chronic abdominal pain  See hpi    - Urine Drug Screen; Future  - Urine Drug Screen  - CBC with platelets and differential; Future  - Lipase; Future  - Ferritin; Future  - Iron and iron binding capacity; Future  - Adult GI  Referral - Consult Only; Future  - CBC with platelets and differential  - Lipase  - Ferritin  - Iron and iron binding capacity  - oxyCODONE-acetaminophen (PERCOCET) 5-325 MG tablet; Take 1 tablet by mouth daily as needed for pain.    Chronic pain syndrome  Signed and she agreed to all terms.   Future fills for next 3 months given.  Recheck 3 months sooner if needed  - oxyCODONE-acetaminophen (PERCOCET) 5-325 MG tablet; Take 1 tablet by mouth daily. Since insurance won't fill 30 days. Ok to fill a day early as holiday is friday.  - oxyCODONE-acetaminophen (PERCOCET) 5-325 MG tablet; Take 1 tablet by mouth daily as needed for pain.    Continuous opioid dependence (H)      Chronic cough  H/o copd  Didn't like ICS made her \"sick\" now just using albuterol  To pulm in future per patient has pulm md  - CT Chest w/o Contrast; Future    Weight loss    - TSH with free T4 reflex; Future  - Adult GI  Referral - Consult Only; Future  - TSH with free T4 reflex    Controlled substance agreement signed      Vitamin D deficiency    - Vitamin D Deficiency; Future  - Vitamin D Deficiency    Screening for cardiovascular condition      Screening, lipid    - Lipid panel reflex to direct LDL Fasting; Future  - Lipid panel reflex to direct LDL Fasting    Screening for diabetes mellitus    - Comprehensive metabolic panel (BMP + Alb, Alk Phos, ALT, AST, Total. Bili, TP); Future  - Comprehensive metabolic panel (BMP + Alb, Alk Phos, ALT, AST, Total. Bili, TP)                44 min spent on patient today including chart review, history, going over patient's questions,  exam, and explaining treatment plan and follow-up.     Faviola Garrison is a 49 year old, presenting for the " "following health issues:  Pain and Referral        7/1/2025     9:36 AM   Additional Questions   Roomed by Kelsie KATHLEEN CMA   Accompanied by daughterKiara         7/1/2025     9:36 AM   Patient Reported Additional Medications   Patient reports taking the following new medications No Medications Missing     Musculoskeletal Problem    History of Present Illness       Reason for visit:  Referral for general surgery to get Colonoscopy    She eats 0-1 servings of fruits and vegetables daily.She consumes 11 or more sweetened beverage(s) daily.She exercises with enough effort to increase her heart rate 10 to 19 minutes per day.  She exercises with enough effort to increase her heart rate 7 days per week.   She is taking medications regularly.      Medically complex patient. Restricted to me and the [facility] er.   Daughter is with today for visit.   For today: Would like to see if she can get a PET scan or HIDA scan. Patient had gallbladder removed and appendex removed previously.  Would like labs done to \"test for everything\". We discussed I do not order hida scan without her having a gallbladder. I also do not order PET scans without a known source/reason. Offered oncology referral she will think about.            Chronic conditions:  H/o tbi.      Bipolar-much better on lexapro and 2 mg abilify. Not able to see psych at this time per patient. Denies suicidal or homicidal thoughts.  Patient instructed to go to the emergency room or call 911 if these occur.  Denies jennifer currently.       Patient with chronic abdominal pain, diarrhea, frequently visits er, ct negative, strong fh of colon cancer. Weight loss per patient. She is concerned about cx. H/o polyps. Needs colonoscopy but has been difficult to schedule wtih her restricted insurance per provider. Was told perhaps gen surg was covered.   Has ct scheduled of abdomen. Has been normal in the past.    Has had problems with the prep in the past with taking the prep. Has " needed a tube in nose to do it that way. Unsure how we will do this. I placed referral for both gi consult and diagnostic scope. Has had diarrhea.   Follow up if symptoms worsen or change. To ER with severe worsening symptoms.   Saw gen surg not gi recently June 2025. I had referred to gi at last appt as well. H/o ibs. Bmi 20. Patient is concerned about colon cancer and wants screening but can't get scope without being admitted per patient and the gen surgeon wasn't able to do that directly so she walked out of that appointment before he could even examine her. Patient very frustrated by this experience. Does want to see GI.         Chronic pain update-for now I am managing as she saw our pain specialist but then I got a letter stating they aren't covered by her insurance and I had to Rx. Doing 30 tabs percocet a month only. No more or an increase or stronger med will be given in the future and she is aware of this and states understanding.      Is trying to get into pain clinic but there is a waitlist for ones that are covered by about  months.   Has medica  and not all mds are covered even if the clinic is. Right now getting 7 tabs/week through me because they wont fill 30 tabs daily at this point. Cheng need regular urine drug screens. H/o drug abuse and cannabis abuse and bipolar 2. No jennifer currently. Started back on abilify and lexapro which previous helped. On gabapentin.   We went over contract today and she agreed to all terms INCLUDING that I will not increase her dose, frequency, or strength of medication. She does use THC. Gave urine sample today.   [place] registry- 7 tabs filled 6-27 ---as insurance only filling week to week at this point. Oxycodone-apap 5-325 one a day.   Due for urine drug screen and controlled substance contract until she can get in with pain management that her insurance covers which has proven to be very difficult.  We will complete this today.      Pulm emphysema-former smoker. Chronic  "cough very concerned she would like ct of lungs to make sure not cancer. No longer smokes. Oxygen 97 percent. Albuterol only for now. Previously referred to pulm. H/o bronchitis and pneumonia 2025 treated with Levaquin. From last onc visit for lung nodules:   Assessment and Plan from last onc visit:     1. Established multiple pulmonary lung nodule(s). Given the characteristics on current/previous imaging and risk factors; I would classify this to be Low (<6%) risk for cancer. No further follow up necessary.     2. Chest pain. No typical pulmonary factors. She is having an SVT evaluation with cardiology. Nothing anatomic concerning on CT.  She will need to follow up with her primary and she has been referred to a chronic pain clinic.   Cards/chest pain concerns-Has cardiologist but unable to get heart testing done per patient due to them not sedating her enough per patient.     Objective    /72   Pulse 82   Temp 97.1  F (36.2  C) (Temporal)   Resp 18   Ht 1.64 m (5' 4.57\")   Wt 53.9 kg (118 lb 12.8 oz)   SpO2 97%   BMI 20.04 kg/m    Body mass index is 20.04 kg/m .  Physical Exam   GENERAL: alert and no distress  RESP: lungs clear to auscultation - no rales, rhonchi or wheezes  CV: regular rate and rhythm, normal S1 S2, no S3 or S4, no murmur, click or rub, no peripheral edema  MS: no gross musculoskeletal defects noted, no edema  NEURO: Normal strength and tone, mentation intact and speech normal  PSYCH: mentation appears normal and anxious          Signed Electronically by: Sharmaine Christensen PA-C    "

## 2025-07-01 NOTE — LETTER
Opioid / Opioid Plus Controlled Substance Agreement    This is an agreement between you and your provider about the safe and appropriate use of controlled substance/opioids prescribed by your care team. Controlled substances are medicines that can cause physical and mental dependence (abuse).    There are strict laws about having and using these medicines. We here at [facility] are committing to working with you in your efforts to get better. To support you in this work, we ll help you schedule regular office appointments for medicine refills. If we must cancel or change your appointment for any reason, we ll make sure you have enough medicine to last until your next appointment.     As a Provider, I will:  Listen carefully to your concerns and treat you with respect.   Recommend a treatment plan that I believe is in your best interest. This plan may involve therapies other than opioid pain medication.   Talk with you often about the possible benefits, and the risk of harm of any medicine that we prescribe for you.   Provide a plan on how to taper (discontinue or go off) using this medicine if the decision is made to stop its use.    As a Patient, I understand that opioid(s):   Are a controlled substance prescribed by my care team to help me function or work and manage my condition(s).   Are strong medicines and can cause serious side effects such as:  Drowsiness, which can seriously affect my driving ability  A lower breathing rate, enough to cause death  Harm to my thinking ability   Depression   Abuse of and addiction to this medicine  Need to be taken exactly as prescribed. Combining opioids with certain medicines or chemicals (such as illegal drugs, sedatives, sleeping pills, and benzodiazepines) can be dangerous or even fatal. If I stop opioids suddenly, I may have severe withdrawal symptoms.  Do not work for all types of pain nor for all patients. If they re not helpful, I may be asked to stop  them.      The risks, benefits and side effects of these medicine(s) were explained to me. I agree that:  I will take part in other treatments as advised by my care team. This may be psychiatry or counseling, physical therapy, behavioral therapy, group treatment or a referral to a specialist.     I will keep all my appointments. I understand that this is part of the monitoring of opioids. My care team may require an office visit for EVERY opioid/controlled substance refill. If I miss appointments or don t follow instructions, my care team may stop my medicine.    I will take my medicines as prescribed. I will not change the dose or schedule unless my care team tells me to. There will be no refills if I run out early.     I may be asked to come to the clinic and complete a urine drug test or complete a pill count at any time. If I don t give a urine sample or participate in a pill count, the care team may stop my medicine.    I will only receive prescriptions from this clinic for chronic pain. If I am treated by another provider for acute pain issues, I will tell them that I am taking opioid pain medication for chronic pain and that I have a treatment agreement with this provider. I will inform my [facility] care team within one business day if I am given a prescription for any pain medication by another healthcare provider. My [facility] care team can contact other providers and pharmacists about my use of any medicines.    It is up to me to make sure that I don t run out of my medicines on weekends or holidays. If my care team is willing to refill my opioid prescription without a visit, I must request refills only during office hours. Refills may take up to 3 business days to process. I will use one pharmacy to fill all my opioid and other controlled substance prescriptions. I will notify the clinic about any changes to my insurance or medication availability.    I am responsible for my prescriptions.  If the medicine/prescription is lost, stolen or destroyed, it will not be replaced. I also agree not to share controlled substance medicines with anyone.    I am aware I should not use any illegal or recreational drugs. I agree not to drink alcohol unless my care team says I can.       If I enroll in the [place] Medical Cannabis program, I will tell my care team prior to my next refill.     I will tell my care team right away if I become pregnant, have a new medical problem treated outside of my regular clinic, or have a change in my medications.    I understand that this medicine can affect my thinking, judgment and reaction time. Alcohol and drugs affect the brain and body, which can affect the safety of my driving. Being under the influence of alcohol or drugs can affect my decision-making, behaviors, personal safety, and the safety of others. Driving while impaired (DWI) can occur if a person is driving, operating, or in physical control of a car, motorcycle, boat, snowmobile, ATV, motorbike, off-road vehicle, or any other motor vehicle ([place] Statute 169A.20). I understand the risk if I choose to drive or operate any vehicle or machinery.    I understand that if I do not follow any of the conditions above, my prescriptions or treatment may be stopped or changed.          Opioids  What You Need to Know    What are opioids?   Opioids are pain medicines that must be prescribed by a doctor. They are also known as narcotics.     Examples are:   morphine (MS Contin, Nya)  oxycodone (Oxycontin)  oxycodone and acetaminophen (Percocet)  hydrocodone and acetaminophen (Vicodin, Norco)   fentanyl patch (Duragesic)   hydromorphone (Dilaudid)   methadone  codeine (Tylenol #3)     What do opioids do well?   Opioids are best for severe short-term pain such as after a surgery or injury. They may work well for cancer pain. They may help some people with long-lasting (chronic) pain.     What do opioids NOT do well?   Opioids  never get rid of pain entirely, and they don t work well for most patients with chronic pain. Opioids don t reduce swelling, one of the causes of pain.                                    Other ways to manage chronic pain and improve function include:     Treat the health problem that may be causing pain  Anti-inflammation medicines, which reduce swelling and tenderness, such as ibuprofen (Advil, Motrin) or naproxen (Aleve)  Acetaminophen (Tylenol)  Antidepressants and anti-seizure medicines, especially for nerve pain  Topical treatments such as patches or creams  Injections or nerve blocks  Chiropractic or osteopathic treatment  Acupuncture, massage, deep breathing, meditation, visual imagery, aromatherapy  Use heat or ice at the pain site  Physical therapy   Exercise  Stop smoking  Take part in therapy       Risks and side effects     Talk to your doctor before you start or decide to keep taking opioids. Possible side effects include:    Lowering your breathing rate enough to cause death  Overdose, including death, especially if taking higher than prescribed doses  Worse depression symptoms; less pleasure in things you usually enjoy  Feeling tired or sluggish  Slower thoughts or cloudy thinking  Being more sensitive to pain over time; pain is harder to control  Trouble sleeping or restless sleep  Changes in hormone levels (for example, less testosterone)  Changes in sex drive or ability to have sex  Constipation  Unsafe driving  Itching and sweating  Dizziness  Nausea, throwing up and dry mouth    What else should I know about opioids?    Opioids may lead to dependence, tolerance, or addiction.    Dependence means that if you stop or reduce the medicine too quickly, you will have withdrawal symptoms. These include loose poop (diarrhea), jitters, flu-like symptoms, nervousness and tremors. Dependence is not the same as addiction.                     Tolerance means needing higher doses over time to get the same  effect. This may increase the chance of serious side effects.    Addiction is when people improperly use a substance that harms their body, their mind or their relations with others. Use of opiates can cause a relapse of addiction if you have a history of drug or alcohol abuse.    People who have used opioids for a long time may have a lower quality of life, worse depression, higher levels of pain and more visits to doctors.    You can overdose on opioids. Take these steps to lower your risk of overdose:    Recognize the signs:  Signs of overdose include decrease or loss of consciousness (blackout), slowed breathing, trouble waking up and blue lips. If someone is worried about overdose, they should call 911.    Talk to your doctor about Narcan (naloxone).   If you are at risk for overdose, you may be given a prescription for Narcan. This medicine very quickly reverses the effects of opioids.   If you overdose, a friend or family member can give you Narcan while waiting for the ambulance. They need to know the signs of overdose and how to give Narcan.     Don't use alcohol or street drugs.   Taking them with opioids can cause death.    Do not take any of these medicines unless your doctor says it s OK. Taking these with opioids can cause death:  Benzodiazepines, such as lorazepam (Ativan), alprazolam (Xanax) or diazepam (Valium)  Muscle relaxers, such as cyclobenzaprine (Flexeril)  Sleeping pills like zolpidem (Ambien)   Other opioids      How to keep you and other people safe while taking opioids:    Never share your opioids with others.  Opioid medicines are regulated by the Drug Enforcement Agency (MARTY). Selling or sharing medications is a criminal act.    2. Be sure to store opioids in a secure place, locked up if possible. Young children can easily swallow them and overdose.    3. When you are traveling with your medicines, keep them in the original bottles. If you use a pill box, be sure you also carry a copy  of your medicine list from your clinic or pharmacy.    4. Safe disposal of opioids    Most pharmacies have places to get rid of medicine, called disposal kiosks. Medicine disposal options are also available in every [facility]. Search your county and  medication disposal  to find more options. You can find more details at:  https://www.pca.Replaced by [facility].[place]./living-green/managing-unwanted-medications     I agree that my provider, clinic care team, and pharmacy may work with any city, state or federal law enforcement agency that investigates the misuse, sale, or other diversion of my controlled medicine. I will allow my provider to discuss my care with, or share a copy of, this agreement with any other treating provider, pharmacy or emergency room where I receive care.    I have read this agreement and have asked questions about anything I did not understand.    _______________________________________________________  Patient Signature - Meli Rodriguez _____________________                   Date     _______________________________________________________  Provider Signature - Sharmaine Christensen PA-C   _____________________                   Date     _______________________________________________________  Witness Signature (required if provider not present while patient signing)   _____________________                   Date

## 2025-07-03 ENCOUNTER — RESULTS FOLLOW-UP (OUTPATIENT)
Dept: FAMILY MEDICINE | Facility: CLINIC | Age: 50
End: 2025-07-03

## 2025-07-03 DIAGNOSIS — E03.9 HYPOTHYROIDISM, UNSPECIFIED TYPE: Primary | ICD-10-CM

## 2025-07-03 DIAGNOSIS — E55.9 VITAMIN D DEFICIENCY: ICD-10-CM

## 2025-07-03 DIAGNOSIS — G89.4 CHRONIC PAIN SYNDROME: ICD-10-CM

## 2025-07-03 RX ORDER — OXYCODONE AND ACETAMINOPHEN 5; 325 MG/1; MG/1
1 TABLET ORAL DAILY
Qty: 7 TABLET | Refills: 0 | Status: SHIPPED | OUTPATIENT
Start: 2025-07-10 | End: 2025-07-18

## 2025-07-03 RX ORDER — LEVOTHYROXINE SODIUM 25 UG/1
25 TABLET ORAL
Qty: 30 TABLET | Refills: 1 | Status: SHIPPED | OUTPATIENT
Start: 2025-07-03

## 2025-07-06 ENCOUNTER — HOSPITAL ENCOUNTER (EMERGENCY)
Facility: CLINIC | Age: 50
Discharge: LEFT BEFORE DISCHARGE | End: 2025-07-06
Payer: COMMERCIAL

## 2025-07-06 VITALS
HEART RATE: 59 BPM | OXYGEN SATURATION: 93 % | RESPIRATION RATE: 16 BRPM | TEMPERATURE: 98.1 F | SYSTOLIC BLOOD PRESSURE: 106 MMHG | DIASTOLIC BLOOD PRESSURE: 72 MMHG

## 2025-07-06 DIAGNOSIS — R06.02 SHORTNESS OF BREATH: ICD-10-CM

## 2025-07-06 DIAGNOSIS — R07.9 CHEST PAIN, UNSPECIFIED TYPE: Primary | ICD-10-CM

## 2025-07-06 LAB
ALBUMIN SERPL BCG-MCNC: 4.1 G/DL (ref 3.5–5.2)
ALP SERPL-CCNC: 69 U/L (ref 40–150)
ALT SERPL W P-5'-P-CCNC: 9 U/L (ref 0–50)
ANION GAP SERPL CALCULATED.3IONS-SCNC: 13 MMOL/L (ref 7–15)
AST SERPL W P-5'-P-CCNC: 17 U/L (ref 0–45)
ATRIAL RATE - MUSE: 58 BPM
BILIRUB DIRECT SERPL-MCNC: 0.2 MG/DL (ref 0–0.3)
BILIRUB SERPL-MCNC: 0.8 MG/DL
BUN SERPL-MCNC: 8 MG/DL (ref 6–20)
CALCIUM SERPL-MCNC: 9 MG/DL (ref 8.8–10.4)
CHLORIDE SERPL-SCNC: 100 MMOL/L (ref 98–107)
CREAT SERPL-MCNC: 0.67 MG/DL (ref 0.51–0.95)
DIASTOLIC BLOOD PRESSURE - MUSE: NORMAL MMHG
EGFRCR SERPLBLD CKD-EPI 2021: >90 ML/MIN/1.73M2
ERYTHROCYTE [DISTWIDTH] IN BLOOD BY AUTOMATED COUNT: 12.8 % (ref 10–15)
GLUCOSE SERPL-MCNC: 106 MG/DL (ref 70–99)
HCO3 SERPL-SCNC: 21 MMOL/L (ref 22–29)
HCT VFR BLD AUTO: 38.3 % (ref 35–47)
HGB BLD-MCNC: 13.3 G/DL (ref 11.7–15.7)
HOLD SPECIMEN: NORMAL
INTERPRETATION ECG - MUSE: NORMAL
LIPASE SERPL-CCNC: 10 U/L (ref 13–60)
MCH RBC QN AUTO: 31.1 PG (ref 26.5–33)
MCHC RBC AUTO-ENTMCNC: 34.7 G/DL (ref 31.5–36.5)
MCV RBC AUTO: 90 FL (ref 78–100)
P AXIS - MUSE: 80 DEGREES
PLATELET # BLD AUTO: 202 10E3/UL (ref 150–450)
POTASSIUM SERPL-SCNC: 3.7 MMOL/L (ref 3.4–5.3)
PR INTERVAL - MUSE: 252 MS
PROT SERPL-MCNC: 6.6 G/DL (ref 6.4–8.3)
QRS DURATION - MUSE: 72 MS
QT - MUSE: 412 MS
QTC - MUSE: 404 MS
R AXIS - MUSE: 96 DEGREES
RBC # BLD AUTO: 4.28 10E6/UL (ref 3.8–5.2)
SODIUM SERPL-SCNC: 134 MMOL/L (ref 135–145)
SYSTOLIC BLOOD PRESSURE - MUSE: NORMAL MMHG
T AXIS - MUSE: 75 DEGREES
TROPONIN T SERPL HS-MCNC: <6 NG/L
VENTRICULAR RATE- MUSE: 58 BPM
WBC # BLD AUTO: 8.7 10E3/UL (ref 4–11)

## 2025-07-06 PROCEDURE — 85027 COMPLETE CBC AUTOMATED: CPT

## 2025-07-06 PROCEDURE — 36415 COLL VENOUS BLD VENIPUNCTURE: CPT

## 2025-07-06 PROCEDURE — 93005 ELECTROCARDIOGRAM TRACING: CPT

## 2025-07-06 PROCEDURE — 83690 ASSAY OF LIPASE: CPT

## 2025-07-06 PROCEDURE — 99284 EMERGENCY DEPT VISIT MOD MDM: CPT

## 2025-07-06 PROCEDURE — 250N000013 HC RX MED GY IP 250 OP 250 PS 637

## 2025-07-06 PROCEDURE — 84484 ASSAY OF TROPONIN QUANT: CPT

## 2025-07-06 PROCEDURE — 84075 ASSAY ALKALINE PHOSPHATASE: CPT

## 2025-07-06 RX ORDER — OXYCODONE HYDROCHLORIDE 5 MG/1
5 TABLET ORAL ONCE
Refills: 0 | Status: COMPLETED | OUTPATIENT
Start: 2025-07-06 | End: 2025-07-06

## 2025-07-06 RX ADMIN — OXYCODONE HYDROCHLORIDE 5 MG: 5 TABLET ORAL at 16:06

## 2025-07-06 ASSESSMENT — ACTIVITIES OF DAILY LIVING (ADL)
ADLS_ACUITY_SCORE: 51
ADLS_ACUITY_SCORE: 51

## 2025-07-06 NOTE — ED NOTES
Writer was approached by ER. Pt refused to go to xray before receiving pain medication. Writer brought in prescribed pain medication, oxycodone pt refused, and is requesting IV pain medications as they work faster. State if she doesn't get IV pain medications she will leave AMA. Writer spoke to provider regarding IV pain medications. Recommend to use PO oxycodone. Writer attempted to medication with PO oxycodone. Pt refused at first, then took medications. Pt asked if it was only an xray that was ordered. Writer educated pt that an xray is the first step unless if something concerning is found on chest xray then we will go from there. Pt is requesting a CT as she states the xray will not find what is going on. Pt stated if she doesn't get a CT she will just leave. Writer spoke to provider. Provider spoke to pt. Pt requested to leave AMA. PIV removed. AMA form singed. Provider updated. Pt is vitally stable. A/Ox4.

## 2025-07-06 NOTE — ED TRIAGE NOTES
Pt presented with bilateral flank , upper abd, and left anterior CP x 4 days. ,Pt is febrile, denied hx of kidney stones, pain does not wrap around to abdomen or groin, pt denied hematuria, dysuria, urgency, frequency, dizziness, SOB, hx of MI. Surgical hx lap marcos, hysterectomy.      Triage Assessment (Adult)       Row Name 07/06/25 7814          Triage Assessment    Airway WDL WDL        Respiratory WDL    Respiratory WDL WDL        Skin Circulation/Temperature WDL    Skin Circulation/Temperature WDL WDL        Cardiac WDL    Cardiac WDL WDL        Peripheral/Neurovascular WDL    Peripheral Neurovascular WDL WDL        Cognitive/Neuro/Behavioral WDL    Cognitive/Neuro/Behavioral WDL WDL

## 2025-07-06 NOTE — ED PROVIDER NOTES
"[facility]  Emergency Department Visit Note    PATIENT:  Meli Rodriguez     49 year old     female      [id number]    Chief complaint:  Chief Complaint   Patient presents with    Flank Pain    Abdominal Pain    Chest Pain          History of present illness:  Patient is a 49 year old female with chronic abdominal pain, borderline intellectual functioning, bipolar 2, PTSD, migraine, polysubstance use including chronic opioid use, anxiety, TBI, emphysema presenting for evaluation of a variety of symptoms.    Frequent emergency department use (today is her 12th emergency department visit since the first of the year).    Here within the past month or vomiting and diarrhea.  Labs were reassuring, subsequently discharged with outpatient follow-up.    Seen in follow-up with surgery 6/25/2025, surgeon at that time had offered EGD and colonoscopy for evaluation, patient \"got angry and walked out of the visit and stated that I was gaslighting her again\".  This was apparently in response to the patient being told there was no reason for her to be admitted to the hospital for more emergent EGD/colonoscopy prep.    Follow-up with primary for complex outpatient pain management.  I reviewed PDMP, getting weekly prescriptions of oxycodone acetaminophen 5-325, most recently filled 3 days ago for 7 days (1 tablet/day).    She presents today for chest pain, has been ongoing for the past 4 days.  Localized diffusely across the front of her chest.  It is worse with coughing.  Having some shortness of breath with it.  No fevers.  No vomiting.  Having some abdominal pain.  No dysuria or frequency.  No lower extremity pain or edema, prior VTE, recent travel, or recent surgeries.  Not on oral contraceptives.    Review of Systems:  As in HPI above    /72   Pulse 59   Temp 98.1  F (36.7  C)   Resp 16   SpO2 93%       Physical Exam:  Constitutional: laying in hospital bed, alert, oriented, in no apparent " distress, and answering questions appropriately  HEENT: normocephalic, atraumatic, pupils 3mm, equal, round, and reactive to light, and sclerae anicteric  Neck: no stridor  Cardiovascular: regular rate and rhythm and no murmurs, rubs, or extra heart sounds  Pulmonary: breathing comfortably on room air and lungs clear to auscultation bilaterally  Abdominal: Soft, mildly generally tender to palpation without rebound or guarding, nondistended  Extremities/MSK: no peripheral edema  Skin: warm, dry  Neurologic: moves all four extremities spontaneously  Psychiatric: calm, appropriate, comes with home blanket and stuffed animals      MDM:  Patient is a 49 year old female with above history presenting for evaluation of left chest pain, shortness of breath.    Vitals are reassuring, satting well on room air. Exam similarly reassuring, not in distress, clear lungs.    Acute on chronic presentation.  Differential includes musculoskeletal.  History not classic for ACS.  Doubt dissection.  No infectious signs or symptoms.  PERC negative.  No acute concerns regarding abdominal discomfort as this is chronic for her.    Labs, chest x-ray, ECG.  Oxycodone once for pain.    Disposition pending above workup. Remainder of ED course below.    ED COURSE:  ED Course as of 07/06/25 1626   Sun Jul 06, 2025   1536 EKG 12 lead  My independent ECG interpretation:  - Ventricular rate 58 bpm, regular  - DC prolonged, QRS and QT intervals normal  - Axis borderline rightward deviated  - no ST segment or T wave changes concerning for acute ischemia  - Comparison to prior ECGs: No significant change  - My independent interpretation: Sinus rhythm with first-degree AV block, redemonstrated nonspecific ST segment and T wave changes   1607 Labs reviewed and are quite reassuring.  No leukocytosis reassuring against significant systemic infectious department or process.  Troponin negative in this context reassuring against ACS.  No concerning electrolyte  abnormalities.  No kidney injury.   1610 Had been awaiting x-ray.  Patient requesting pain medications prior to going to x-ray.  Oxycodone had been ordered, when brought, patient inquiring about dose of IV, which I do not think is indicated.  She was agreeable with oxycodone.  She subsequently declined x-ray stating she wanted CT scan.  I reassessed her, she stated that she needed a CT scan.  I stated that from my perspective there is no indication for CT scan and that the risks outweigh the benefits and that an x-ray would be the most appropriate starting screening exam.  She stated that she did not want this and wants to leave.  I provide explicit recommendation that she stay for evaluation of pathologies including pneumothorax or pneumonia, she states she does not have either of these Or like to go.  She has capacity to make this decision.  Discharging against medical advice.       Encounter Diagnoses:  Final diagnoses:   Chest pain, unspecified type   Shortness of breath       Final disposition: discharge    Cabrera Mckinney MD  7/6/2025  3:18 PM   Emergency Medicine  [facility]      Cabrera Mckinney MD  07/06/25 5600

## 2025-07-06 NOTE — DISCHARGE INSTRUCTIONS
You were seen in the emergency department today for chest pain and shortness of breath. We did tests including blood tests and ECG that showed no clear cause for your symptoms, but was reassuring against a life-threatening cause at this time.  We recommended x-ray, but you preferred to go home and monitor symptoms there.    Please follow up with your primary doctor within the next 1 to 2 weeks for ongoing evaluation and management of your symptoms.    Return to the emergency department with new or worsening symptoms that you find concerning.

## 2025-07-07 ENCOUNTER — PATIENT OUTREACH (OUTPATIENT)
Dept: CARE COORDINATION | Facility: CLINIC | Age: 50
End: 2025-07-07
Payer: COMMERCIAL

## 2025-07-07 NOTE — PROGRESS NOTES
Clinic Care Coordination Contact  [facility]/Voicemail      Patient is enrolled.  Patient was at [facility] ED on 7/6/2025 for flank, abdominal and chest pain.  Per ED report, this is patient's 12th ED visit this year.  Per this report, patient has a dx of chronic abdominal pain, borderline intellectual functioning, PTSD, Bipolar 2, and migraine, polysubstance abuse (including chronic opioids), anxiety and TBI.  Patient recently at this ED for nausea, vomiting and diarrhea. She was offered a colonoscopy and an EGD, became upset and declined.  Patient's Oxycodone prescription recently filled for 7 days. Prescription is 1 tablet per day.  Patient reporting chest pain of 4 days, some SOB and worse with coughing.  No L/E pain or fever.  Patient is scheduled for CT abdomen on 7/8/2025 and a CT chest on 7/26/2025.  Patient given one Oxycodone in ED for pain.    SW will request CC RN review for medical assessment.    Clinical Data: Care Coordinator Outreach    Outreach Documentation Number of Outreach Attempt   6/17/2025   1:56 PM 1   7/7/2025  12:29 PM 1       Left message on patient's voicemail with call back information and requested return call.      Plan: Care Coordinator will try to reach patient again in 1-2 business days.    Renetta Mayers, SOOW, MSW   [facility]  Care Coordination  [facility]  [phone]  7/7/2025 12:30 PM

## 2025-07-08 ENCOUNTER — PATIENT OUTREACH (OUTPATIENT)
Dept: CARE COORDINATION | Facility: CLINIC | Age: 50
End: 2025-07-08
Payer: COMMERCIAL

## 2025-07-08 NOTE — PROGRESS NOTES
Clinic Care Coordination Contact  [facility]/Voicemail    Clinical Data: Care Coordinator Outreach    Outreach Documentation Number of Outreach Attempt   6/17/2025   1:56 PM 1   7/7/2025  12:29 PM 1   7/8/2025  11:46 AM 2       Unable to leave a message due to: Voicemail is full.      Plan: CHW will call as scheduled for update, Care Coordinator will review progress to goals and plan of care in 6 weeks.     Renetta Mayers, SOOW, MSW   [facility]  Care Coordination  [facility]  [phone]  7/8/2025 11:47 AM

## 2025-07-08 NOTE — PROGRESS NOTES
Clinic Care Coordination Contact    Situation: Patient chart reviewed by care coordinator.    Background: RN CC chart review due to increased number of ER visits.     Assessment: Patient has been seen 12 times in the ER this year.     Plan/Recommendations: RN CC unable to reach patient. CHW will call as scheduled for update, note added to see if patient would speak with RN CC for a care plan goal to decrease ER visits.     Criselda Cornejo RN Care Coordination   [facility] [place], [place]  Email: [email or web address]  Phone: [phone]

## 2025-07-09 NOTE — PATIENT INSTRUCTIONS
It was a pleasure taking care of you today.  I've included a brief summary of our discussion and care plan from today's visit below.  Please review this information with your primary care provider.  ______________________________________________________________________    My recommendations are summarized as follows:    1.  As we discussed today, please complete upper GI endoscopy.  They will contact you to schedule the procedure.    2.  I also ordered a stomach emptying study.  Someone will call you to schedule the test.    3.  Complete stool studies.    4.  Try to eat small portion meals every 3-4 hours.  Take ondansetron as needed for nausea.  We can try mirtazapine to stimulate your appetite, if needed.    Return to GI Clinic in 2 to 3 months to review your progress.    ______________________________________________________________________  FUNCTIONAL DYSPEPSIA  Functional dyspepsia is the medical term for a condition that causes an upset stomach or pain or discomfort in the upper belly, near the ribs. Functional dyspepsia often comes back over time. Doctors are not able to find a cause for functional dyspepsia in most people.     The most common symptoms of functional dyspepsia include:  ?Upset stomach  ?Discomfort or pain in the belly  ?Bloating  ?Feeling full quickly when eating  Some people also have nausea, vomiting, a lack of appetite, or weight loss.    It is usually not clear what causes functional dyspepsia. However, researchers have focused on several factors that may be involved.   1.Motor or nerve problems -- The process of digesting food involves a series of events involving the nerves and muscles of the digestive tract. Problems in this system can cause the stomach to empty more slowly than normal, causing nausea and vomiting, feeling full quickly when eating, or bloating. However, not everyone whose stomach empties slowly has functional dyspepsia.  2. Pain sensitivity -- The stomach normally  stretches as we eat to hold more food. However, some people are sensitive to this stretching and feel pain. It is not clear why this happens.  3. Infection -- Dyspepsia could be caused by bacteria or viruses. For example, Helicobacter pylori (H. pylori) is a bacterial infection of the stomach that can lead to inflammation (gastritis) or ulcers. Dyspepsia can persist long after the infection subsides, perhaps caused by a change in the bacteria that normally live in the gastrointestinal tract.   4. Psychological and social factors -- People with functional dyspepsia often have mood problems, like anxiety or depression. Treating the underlying depression or anxiety can improve symptoms of abdominal pain.      Being diagnosed with functional dyspepsia might be a relief to some people and a frustration to others. It is important to understand that your pain is not in your head.   The following are some interventions that could help your symptoms:    Diet -- Some people feel less pain after making changes in what they eat. This might include:  ?Avoiding fatty foods (which can slow the emptying of the stomach). Opt for cooking methods such as grilling, baking, or steaming instead of frying.  ?Eating small, frequent meals. Instead of three large meals, eat five or six small meals.  ?Avoiding foods that make you feel worse. Common triggers include fatty foods, spicy foods, caffeine, chocolate, alcohol, and carbonated beverages. Keeping a food diary can help identify personal triggers. Focus on a diet rich in fruits, vegetables, whole grains, and lean proteins. Low-fat dairy products can also be included.  ?Avoiding  alcohol.    Acid-reducing medicines -- Some people feel better after taking a medicine that reduces stomach acid. Examples include:  ?Proton pump inhibitors (PPIs) are more likely than other types of acid reducers to improve pain. Example of PPIs include omeprazole (Prilosec), esomeprazole (Nexium), lansoprazole  (Prevacid),and pantoprazole (Protonix).  ?Histamine blockers might help some people. Examples include famotidine (Pepcid) or cimetidine(Tagamet).      Pain medicines -- Low doses of an antidepressant medicine might help to reduce symptoms, even if you are not depressed. One of the most commonly used antidepressants is called a tricyclic antidepressant (TCA). The dose of TCAs used to treat pain is usually much lower than that used to treat depression.  TCAs commonly used for pain include amitriptyline and desipramine. In the beginning, many people who take TCAs feel tired; this is not always an undesirable side effect since it can help improve sleep when TCAs are taken in the evening. TCAs are generally started in low doses and increased gradually. It can take a few weeks to begin feeling better.    Please avoid nonprescription pain medicines like aspirin, ibuprofen (Advil, Motrin), and naproxen (Aleve) are not usually helpful and can actually worsen stomach upset.       ____________________________________________________________________  Please see below for any additional questions and scheduling guidelines.    Sign up for [facility]: [facility] patient portal serves as a secure platform for accessing your medical records from the [facility]. Additionally, [employer] [facility] facilitates easy, timely, and secure messaging with your care team. If you have not signed up, you may do so by using the provided code or calling [phone].    Coordinating your care after your visit:  There are multiple options for scheduling your follow-up care based on your provider's recommendation.    How do I schedule a follow-up clinic appointment:   After your appointment, you may receive scheduling assistance with the Clinic Coordinators by having a seat in the waiting room and a Clinic Coordinator will call you up to schedule.  Virtual visits or after you leave the clinic:  Your provider has placed a follow-up order in the [facility]  portal for scheduling your return appointment. A member of the scheduling team will contact you to schedule.  [facility] Scheduling: Timely scheduling through [facility] is advised to ensure appointment availability.   Call to schedule: You may schedule your follow-up appointment(s) by calling [phone], option 1.    How do I schedule my endoscopy or colonoscopy procedure:  If a procedure, such as a colonoscopy or upper endoscopy was ordered by your provider, the scheduling team will contact you to schedule this procedure. Or you may choose to call to schedule at   [phone], option 2.  Please allow 20-30 minutes when scheduling a procedure.    How do I get my blood work done? To get your blood work done, you need to schedule a lab appointment at an [facility]. There are multiple ways to schedule:   At the clinic: The Clinic Coordinator you meet after your visit can help you schedule a lab appointment.   [facility] scheduling: [facility] offers online lab scheduling at all [facility] laboratory locations.   Call to schedule: You can call [phone] to schedule your lab appointment.    How do I schedule my imaging study: To schedule imaging studies, such as CT scans, ultrasounds, MRIs, or X-rays, contact Imaging Services at [phone].    How do I schedule a referral to another doctor: If your provider recommended a referral to another specialist(s), the referral order was placed by your provider. You will receive a phone call to schedule this referral, or you may choose to call the number attached to the referral to self-schedule.    For Post-Visit Question(s):  For any inquiries following today's visit:  Please utilize [facility] messaging and allow 48 hours for reply or contact the Call Center during normal business hours at [phone], option 3.  For Emergent After-hours questions, contact the On-Call GI Fellow through the [facility] at [phone].  In addition, you may  contact your Nurse directly using the provided contact information.    Test Results:  Test results will be accessible via [employer] in compliance with the 21st Century Cures Act. This means that your results will be available to you at the same time as your provider. Often you may see your results before your provider does. Results are reviewed by staff within two weeks with communication follow-up. Results may be released in the patient portal prior to your care team review.    Prescription Refill(s):  Medication prescribed by your provider will be addressed during your visit. For future refills, please coordinate with your pharmacy. If you have not had a recent clinic visit or routine labs, for your safety, your provider may not be able to refill your prescription.     Sincerely,  TAMI Balderas,  [facility],  Division of Gastroenterology   ([facility])

## 2025-07-10 ENCOUNTER — LAB (OUTPATIENT)
Dept: LAB | Facility: CLINIC | Age: 50
End: 2025-07-10
Payer: COMMERCIAL

## 2025-07-10 ENCOUNTER — VIRTUAL VISIT (OUTPATIENT)
Dept: GASTROENTEROLOGY | Facility: CLINIC | Age: 50
End: 2025-07-10
Attending: PHYSICIAN ASSISTANT
Payer: COMMERCIAL

## 2025-07-10 DIAGNOSIS — G89.29 CHRONIC ABDOMINAL PAIN: ICD-10-CM

## 2025-07-10 DIAGNOSIS — R63.4 WEIGHT LOSS: ICD-10-CM

## 2025-07-10 DIAGNOSIS — R19.5 LOOSE STOOLS: ICD-10-CM

## 2025-07-10 DIAGNOSIS — R10.9 CHRONIC ABDOMINAL PAIN: ICD-10-CM

## 2025-07-10 DIAGNOSIS — R63.0 POOR APPETITE: ICD-10-CM

## 2025-07-10 DIAGNOSIS — R19.5 LOOSE STOOLS: Primary | ICD-10-CM

## 2025-07-10 DIAGNOSIS — R11.0 NAUSEA: ICD-10-CM

## 2025-07-12 DIAGNOSIS — F31.81 BIPOLAR 2 DISORDER (H): ICD-10-CM

## 2025-07-14 LAB
C CAYETANENSIS DNA STL QL NAA+NON-PROBE: NEGATIVE
C DIFF TOX B STL QL: NEGATIVE
CAMPYLOBACTER DNA SPEC NAA+PROBE: NEGATIVE
CRYPTOSP DNA STL QL NAA+NON-PROBE: NEGATIVE
EC STX1+STX2 GENES STL QL NAA+NON-PROBE: NEGATIVE
G LAMBLIA DNA STL QL NAA+NON-PROBE: NEGATIVE
NOROVIRUS GI+II RNA STL QL NAA+NON-PROBE: NEGATIVE
SALMONELLA SP RPOD STL QL NAA+PROBE: NEGATIVE
SHIGELLA SP+EIEC IPAH ST NAA+NON-PROBE: NEGATIVE
VIBRIO DNA SPEC NAA+PROBE: NEGATIVE
Y ENTEROCOL DNA STL QL NAA+PROBE: NEGATIVE

## 2025-07-14 RX ORDER — ESCITALOPRAM OXALATE 10 MG/1
10 TABLET ORAL DAILY
Qty: 30 TABLET | Refills: 0 | Status: SHIPPED | OUTPATIENT
Start: 2025-07-14

## 2025-07-14 RX ORDER — ARIPIPRAZOLE 2 MG/1
2 TABLET ORAL DAILY
Qty: 30 TABLET | Refills: 0 | Status: SHIPPED | OUTPATIENT
Start: 2025-07-14

## 2025-07-15 LAB
FAT STL QL: NORMAL
H PYLORI AG STL QL IA: NEGATIVE
NEUTRAL FAT STL QL: NORMAL
O+P STL MICRO: NEGATIVE
SPECIMEN TYPE: NORMAL

## 2025-07-16 LAB
CALPROTECTIN STL-MCNT: 47.7 MG/KG (ref 0–49.9)
ELASTASE PANC STL-MCNT: >800 UG/G

## 2025-07-18 ENCOUNTER — HOSPITAL ENCOUNTER (OUTPATIENT)
Facility: CLINIC | Age: 50
End: 2025-07-18
Payer: COMMERCIAL

## 2025-07-29 ENCOUNTER — HOSPITAL ENCOUNTER (EMERGENCY)
Facility: CLINIC | Age: 50
Discharge: HOME | End: 2025-07-29
Attending: FAMILY MEDICINE | Admitting: FAMILY MEDICINE
Payer: COMMERCIAL

## 2025-07-29 VITALS
OXYGEN SATURATION: 98 % | BODY MASS INDEX: 21.16 KG/M2 | HEIGHT: 62 IN | WEIGHT: 115 LBS | TEMPERATURE: 98 F | DIASTOLIC BLOOD PRESSURE: 76 MMHG | HEART RATE: 66 BPM | RESPIRATION RATE: 18 BRPM | SYSTOLIC BLOOD PRESSURE: 119 MMHG

## 2025-07-29 DIAGNOSIS — R10.9 CHRONIC ABDOMINAL PAIN: Primary | ICD-10-CM

## 2025-07-29 DIAGNOSIS — G89.29 CHRONIC ABDOMINAL PAIN: Primary | ICD-10-CM

## 2025-07-29 PROCEDURE — 99283 EMERGENCY DEPT VISIT LOW MDM: CPT | Performed by: FAMILY MEDICINE

## 2025-07-29 PROCEDURE — 250N000013 HC RX MED GY IP 250 OP 250 PS 637: Performed by: FAMILY MEDICINE

## 2025-07-29 RX ORDER — DEXTROSE MONOHYDRATE AND SODIUM CHLORIDE 5; .9 G/100ML; G/100ML
INJECTION, SOLUTION INTRAVENOUS ONCE
Status: COMPLETED | OUTPATIENT
Start: 2025-07-29 | End: 2025-07-29

## 2025-07-29 RX ORDER — OXYCODONE HYDROCHLORIDE 5 MG/1
10 TABLET ORAL ONCE
Refills: 0 | Status: COMPLETED | OUTPATIENT
Start: 2025-07-29 | End: 2025-07-29

## 2025-07-29 RX ORDER — ONDANSETRON 4 MG/1
4 TABLET, ORALLY DISINTEGRATING ORAL ONCE
Status: COMPLETED | OUTPATIENT
Start: 2025-07-29 | End: 2025-07-29

## 2025-07-29 RX ORDER — ONDANSETRON 2 MG/ML
4 INJECTION INTRAMUSCULAR; INTRAVENOUS
Status: DISCONTINUED | OUTPATIENT
Start: 2025-07-29 | End: 2025-07-29 | Stop reason: HOSPADM

## 2025-07-29 RX ADMIN — OXYCODONE HYDROCHLORIDE 10 MG: 5 TABLET ORAL at 14:08

## 2025-07-29 ASSESSMENT — COLUMBIA-SUICIDE SEVERITY RATING SCALE - C-SSRS
6. HAVE YOU EVER DONE ANYTHING, STARTED TO DO ANYTHING, OR PREPARED TO DO ANYTHING TO END YOUR LIFE?: NO
1. IN THE PAST MONTH, HAVE YOU WISHED YOU WERE DEAD OR WISHED YOU COULD GO TO SLEEP AND NOT WAKE UP?: NO
2. HAVE YOU ACTUALLY HAD ANY THOUGHTS OF KILLING YOURSELF IN THE PAST MONTH?: NO

## 2025-07-29 ASSESSMENT — ACTIVITIES OF DAILY LIVING (ADL): ADLS_ACUITY_SCORE: 51

## 2025-07-29 NOTE — DISCHARGE INSTRUCTIONS
RETURN TO THE EMERGENCY ROOM FOR THE FOLLOWING:    Severely worsening pain with fever greater than 101 or repeated vomiting or dehydration, or at anytime for any concern.    FOLLOW UP:    With your primary clinic as scheduled.    TREATMENT RECOMMENDATIONS:    There have been no new medications provided and there are no prescription medication changes recommended.     NURSE ADVICE LINE:  [phone] or [phone]

## 2025-07-29 NOTE — ED TRIAGE NOTES
"Pt sent by primary MD for pain control. Pt reports chronic abdominal pain, pt takes oxycodone once per day and it is not helping. Pt is scheduled to \"eat radiate eggs\" on 8/7/25. Pt said they don't know why she is having pain. Pt said the pain is constant right now generalized the whole abdomen. Pt also has nausea.      Triage Assessment (Adult)       Row Name 07/29/25 7107          Triage Assessment    Airway WDL WDL        Respiratory WDL    Respiratory WDL WDL        Cognitive/Neuro/Behavioral WDL    Cognitive/Neuro/Behavioral WDL WDL           "

## 2025-07-30 ENCOUNTER — PATIENT OUTREACH (OUTPATIENT)
Dept: CARE COORDINATION | Facility: CLINIC | Age: 50
End: 2025-07-30
Payer: COMMERCIAL

## 2025-07-30 NOTE — CONFIDENTIAL NOTE
Clinic Care Coordination Contact    Patient is enrolled. She was at [facility] ED 7/29/2025 for chronic abdominal pain. Per these notes, patient declined IV, requesting pain control.  PO Oxycodone given. SW noted that patient has procedure 8/7/2025 for gastric emptying, an EGD scheduled for 8/20/2025 and follow up GI appt on 10/7/2025.  Patient reported she sees her PCP every 3 months and will call and scheduled appt after 2 procedures noted above.  Patient stated plan to address other health needs once GI is managed.  Patient reported no current needs.      Renetta Mayers, SOOW, MSW   [facility]  Care Coordination  [facility] [place] [facility]  [phone]  7/30/2025 9:11 AM

## 2025-08-04 ENCOUNTER — MYC REFILL (OUTPATIENT)
Dept: FAMILY MEDICINE | Facility: CLINIC | Age: 50
End: 2025-08-04
Payer: COMMERCIAL

## 2025-08-04 ENCOUNTER — TELEPHONE (OUTPATIENT)
Dept: FAMILY MEDICINE | Facility: CLINIC | Age: 50
End: 2025-08-04
Payer: COMMERCIAL

## 2025-08-04 DIAGNOSIS — R41.840 ATTENTION OR CONCENTRATION DEFICIT: ICD-10-CM

## 2025-08-04 DIAGNOSIS — R11.0 NAUSEA: ICD-10-CM

## 2025-08-04 RX ORDER — ONDANSETRON 4 MG/1
4 TABLET, ORALLY DISINTEGRATING ORAL EVERY 8 HOURS PRN
Qty: 30 TABLET | Refills: 0 | Status: SHIPPED | OUTPATIENT
Start: 2025-08-04

## 2025-08-05 DIAGNOSIS — G89.4 CHRONIC PAIN SYNDROME: ICD-10-CM

## 2025-08-05 RX ORDER — DEXTROAMPHETAMINE SACCHARATE, AMPHETAMINE ASPARTATE, DEXTROAMPHETAMINE SULFATE AND AMPHETAMINE SULFATE 5; 5; 5; 5 MG/1; MG/1; MG/1; MG/1
20 TABLET ORAL DAILY
Qty: 30 TABLET | Refills: 0 | Status: SHIPPED | OUTPATIENT
Start: 2025-08-05

## 2025-08-05 RX ORDER — OXYCODONE AND ACETAMINOPHEN 5; 325 MG/1; MG/1
1 TABLET ORAL DAILY
Qty: 7 TABLET | Refills: 0 | Status: SHIPPED | OUTPATIENT
Start: 2025-08-08

## 2025-08-12 ENCOUNTER — APPOINTMENT (OUTPATIENT)
Dept: GENERAL RADIOLOGY | Facility: CLINIC | Age: 50
End: 2025-08-12
Attending: EMERGENCY MEDICINE
Payer: COMMERCIAL

## 2025-08-12 ENCOUNTER — HOSPITAL ENCOUNTER (EMERGENCY)
Facility: CLINIC | Age: 50
Discharge: HOME | End: 2025-08-12
Attending: EMERGENCY MEDICINE | Admitting: EMERGENCY MEDICINE
Payer: COMMERCIAL

## 2025-08-12 ENCOUNTER — NURSE TRIAGE (OUTPATIENT)
Dept: FAMILY MEDICINE | Facility: CLINIC | Age: 50
End: 2025-08-12
Payer: COMMERCIAL

## 2025-08-12 PROCEDURE — 71046 X-RAY EXAM CHEST 2 VIEWS: CPT

## 2025-08-12 ASSESSMENT — ACTIVITIES OF DAILY LIVING (ADL)
ADLS_ACUITY_SCORE: 51

## 2025-08-13 ENCOUNTER — PATIENT OUTREACH (OUTPATIENT)
Dept: CARE COORDINATION | Facility: CLINIC | Age: 50
End: 2025-08-13
Payer: COMMERCIAL

## 2025-08-15 PROBLEM — R73.03 PREDIABETES: Status: ACTIVE | Noted: 2025-08-15

## 2025-08-18 ENCOUNTER — ANESTHESIA EVENT (OUTPATIENT)
Dept: GASTROENTEROLOGY | Facility: CLINIC | Age: 50
End: 2025-08-18
Payer: COMMERCIAL

## 2025-08-18 RX ORDER — ONDANSETRON 4 MG/1
4 TABLET, ORALLY DISINTEGRATING ORAL EVERY 30 MIN PRN
Status: CANCELLED | OUTPATIENT
Start: 2025-08-18

## 2025-08-18 RX ORDER — ONDANSETRON 2 MG/ML
4 INJECTION INTRAMUSCULAR; INTRAVENOUS EVERY 30 MIN PRN
Status: CANCELLED | OUTPATIENT
Start: 2025-08-18

## 2025-08-18 ASSESSMENT — LIFESTYLE VARIABLES: TOBACCO_USE: 1

## 2025-08-18 ASSESSMENT — ENCOUNTER SYMPTOMS: DYSRHYTHMIAS: 1

## 2025-08-18 ASSESSMENT — COPD QUESTIONNAIRES: COPD: 1

## 2025-08-19 RX ORDER — NALOXONE HYDROCHLORIDE 0.4 MG/ML
0.4 INJECTION, SOLUTION INTRAMUSCULAR; INTRAVENOUS; SUBCUTANEOUS
Status: CANCELLED | OUTPATIENT
Start: 2025-08-19

## 2025-08-19 RX ORDER — LIDOCAINE 40 MG/G
CREAM TOPICAL
Status: CANCELLED | OUTPATIENT
Start: 2025-08-19

## 2025-08-19 RX ORDER — ONDANSETRON 2 MG/ML
4 INJECTION INTRAMUSCULAR; INTRAVENOUS EVERY 6 HOURS PRN
Status: CANCELLED | OUTPATIENT
Start: 2025-08-19

## 2025-08-19 RX ORDER — NALOXONE HYDROCHLORIDE 0.4 MG/ML
0.2 INJECTION, SOLUTION INTRAMUSCULAR; INTRAVENOUS; SUBCUTANEOUS
Status: CANCELLED | OUTPATIENT
Start: 2025-08-19

## 2025-08-19 RX ORDER — ONDANSETRON 4 MG/1
4 TABLET, ORALLY DISINTEGRATING ORAL EVERY 6 HOURS PRN
Status: CANCELLED | OUTPATIENT
Start: 2025-08-19

## 2025-08-19 RX ORDER — FLUMAZENIL 0.1 MG/ML
0.2 INJECTION, SOLUTION INTRAVENOUS
Status: CANCELLED | OUTPATIENT
Start: 2025-08-19 | End: 2025-08-19

## 2025-08-19 RX ORDER — ONDANSETRON 2 MG/ML
4 INJECTION INTRAMUSCULAR; INTRAVENOUS
Status: CANCELLED | OUTPATIENT
Start: 2025-08-19

## 2025-08-20 ENCOUNTER — ANESTHESIA (OUTPATIENT)
Dept: GASTROENTEROLOGY | Facility: CLINIC | Age: 50
End: 2025-08-20
Payer: COMMERCIAL

## 2025-08-28 ENCOUNTER — PATIENT OUTREACH (OUTPATIENT)
Dept: CARE COORDINATION | Facility: CLINIC | Age: 50
End: 2025-08-28
Payer: COMMERCIAL

## 2025-09-02 ENCOUNTER — TELEPHONE (OUTPATIENT)
Dept: FAMILY MEDICINE | Facility: CLINIC | Age: 50
End: 2025-09-02

## 2025-09-02 ENCOUNTER — APPOINTMENT (OUTPATIENT)
Dept: CT IMAGING | Facility: CLINIC | Age: 50
End: 2025-09-02
Payer: COMMERCIAL

## 2025-09-02 ENCOUNTER — HOSPITAL ENCOUNTER (EMERGENCY)
Facility: CLINIC | Age: 50
Discharge: HOME | End: 2025-09-02
Payer: COMMERCIAL

## 2025-09-02 VITALS
HEIGHT: 63 IN | HEART RATE: 92 BPM | TEMPERATURE: 99.7 F | SYSTOLIC BLOOD PRESSURE: 111 MMHG | OXYGEN SATURATION: 95 % | DIASTOLIC BLOOD PRESSURE: 81 MMHG | WEIGHT: 115 LBS | BODY MASS INDEX: 20.38 KG/M2 | RESPIRATION RATE: 16 BRPM

## 2025-09-02 DIAGNOSIS — S32.010A COMPRESSION FRACTURE OF L1 VERTEBRA, INITIAL ENCOUNTER (H): Primary | ICD-10-CM

## 2025-09-02 PROCEDURE — 99285 EMERGENCY DEPT VISIT HI MDM: CPT | Mod: 25

## 2025-09-02 PROCEDURE — 99284 EMERGENCY DEPT VISIT MOD MDM: CPT

## 2025-09-02 PROCEDURE — 96374 THER/PROPH/DIAG INJ IV PUSH: CPT

## 2025-09-02 PROCEDURE — 250N000013 HC RX MED GY IP 250 OP 250 PS 637

## 2025-09-02 PROCEDURE — 72131 CT LUMBAR SPINE W/O DYE: CPT

## 2025-09-02 PROCEDURE — 250N000011 HC RX IP 250 OP 636

## 2025-09-02 RX ORDER — ACETAMINOPHEN 325 MG/1
975 TABLET ORAL ONCE
Status: COMPLETED | OUTPATIENT
Start: 2025-09-02 | End: 2025-09-02

## 2025-09-02 RX ORDER — OXYCODONE HYDROCHLORIDE 5 MG/1
10 TABLET ORAL ONCE
Refills: 0 | Status: COMPLETED | OUTPATIENT
Start: 2025-09-02 | End: 2025-09-02

## 2025-09-02 RX ORDER — LIDOCAINE 4 G/G
1 PATCH TOPICAL ONCE
Status: DISCONTINUED | OUTPATIENT
Start: 2025-09-02 | End: 2025-09-02 | Stop reason: HOSPADM

## 2025-09-02 RX ADMIN — OXYCODONE HYDROCHLORIDE 10 MG: 5 TABLET ORAL at 16:19

## 2025-09-02 RX ADMIN — ACETAMINOPHEN 975 MG: 325 TABLET ORAL at 16:19

## 2025-09-02 RX ADMIN — HYDROMORPHONE HYDROCHLORIDE 1 MG: 1 INJECTION, SOLUTION INTRAMUSCULAR; INTRAVENOUS; SUBCUTANEOUS at 18:55

## 2025-09-02 ASSESSMENT — ACTIVITIES OF DAILY LIVING (ADL)
ADLS_ACUITY_SCORE: 51

## 2025-09-02 ASSESSMENT — COLUMBIA-SUICIDE SEVERITY RATING SCALE - C-SSRS
1. IN THE PAST MONTH, HAVE YOU WISHED YOU WERE DEAD OR WISHED YOU COULD GO TO SLEEP AND NOT WAKE UP?: NO
6. HAVE YOU EVER DONE ANYTHING, STARTED TO DO ANYTHING, OR PREPARED TO DO ANYTHING TO END YOUR LIFE?: NO
2. HAVE YOU ACTUALLY HAD ANY THOUGHTS OF KILLING YOURSELF IN THE PAST MONTH?: NO

## 2025-09-03 ENCOUNTER — E-VISIT (OUTPATIENT)
Dept: FAMILY MEDICINE | Facility: CLINIC | Age: 50
End: 2025-09-03
Payer: COMMERCIAL

## 2025-09-03 DIAGNOSIS — S32.010D COMPRESSION FRACTURE OF L1 VERTEBRA WITH ROUTINE HEALING, SUBSEQUENT ENCOUNTER: Primary | ICD-10-CM

## 2025-09-03 DIAGNOSIS — F11.20 CONTINUOUS OPIOID DEPENDENCE (H): ICD-10-CM

## 2025-09-03 DIAGNOSIS — Z78.9 MEDICALLY COMPLEX PATIENT: ICD-10-CM

## 2025-09-04 ENCOUNTER — TELEPHONE (OUTPATIENT)
Dept: FAMILY MEDICINE | Facility: CLINIC | Age: 50
End: 2025-09-04
Payer: COMMERCIAL

## 2025-09-04 DIAGNOSIS — G89.29 CHRONIC ABDOMINAL PAIN: ICD-10-CM

## 2025-09-04 DIAGNOSIS — G89.4 CHRONIC PAIN SYNDROME: ICD-10-CM

## 2025-09-04 DIAGNOSIS — R10.9 CHRONIC ABDOMINAL PAIN: ICD-10-CM

## 2025-09-04 RX ORDER — OXYCODONE AND ACETAMINOPHEN 5; 325 MG/1; MG/1
1 TABLET ORAL DAILY PRN
Qty: 7 TABLET | Refills: 0 | Status: SHIPPED | OUTPATIENT
Start: 2025-09-05